# Patient Record
Sex: FEMALE | Race: BLACK OR AFRICAN AMERICAN | Employment: UNEMPLOYED | URBAN - METROPOLITAN AREA
[De-identification: names, ages, dates, MRNs, and addresses within clinical notes are randomized per-mention and may not be internally consistent; named-entity substitution may affect disease eponyms.]

---

## 2017-01-11 ENCOUNTER — OFFICE VISIT (OUTPATIENT)
Dept: FAMILY MEDICINE CLINIC | Age: 62
End: 2017-01-11

## 2017-01-11 VITALS
WEIGHT: 169 LBS | HEART RATE: 83 BPM | TEMPERATURE: 98.2 F | BODY MASS INDEX: 28.16 KG/M2 | DIASTOLIC BLOOD PRESSURE: 89 MMHG | OXYGEN SATURATION: 96 % | RESPIRATION RATE: 20 BRPM | HEIGHT: 65 IN | SYSTOLIC BLOOD PRESSURE: 143 MMHG

## 2017-01-11 DIAGNOSIS — M50.30 DDD (DEGENERATIVE DISC DISEASE), CERVICAL: ICD-10-CM

## 2017-01-11 DIAGNOSIS — M54.2 NECK PAIN: ICD-10-CM

## 2017-01-11 DIAGNOSIS — M79.601 RIGHT ARM PAIN: Primary | ICD-10-CM

## 2017-01-11 RX ORDER — MELOXICAM 15 MG/1
15 TABLET ORAL DAILY
Qty: 30 TAB | Refills: 3 | Status: SHIPPED | OUTPATIENT
Start: 2017-01-11 | End: 2017-04-29 | Stop reason: SDUPTHER

## 2017-01-11 RX ORDER — HYDROCODONE BITARTRATE AND ACETAMINOPHEN 5; 325 MG/1; MG/1
1 TABLET ORAL
Qty: 20 TAB | Refills: 0 | Status: SHIPPED | OUTPATIENT
Start: 2017-01-11 | End: 2018-04-10 | Stop reason: ALTCHOICE

## 2017-01-11 NOTE — PROGRESS NOTES
Acute Care Visit    Today's Date:  2017   Patient's Name: Romy Funes   Patient's :  1955     History:     Chief Complaint   Patient presents with    Back Pain     pt presents for neck pain that goes to pt right shoulder and back pain as well pt has had these pain for a couple of months        Romy Funes is a 63 y.o. female presenting for Acute Care    Right Arm Pain     Onset: worsened about 1-2 months ago. Occurs in the right shoulder it radiates down the neck  Character: achy/throbbing. Denies numbness or shooting pain. Worse when lays on the arm  Denies acute injuries or falls. Patient usually takes zanaflex and NSAID for pain relief  At worst pain can be 10/10. Past Medical History   Diagnosis Date    CAD (coronary artery disease)     Hypertension     Uterine fibroid      History reviewed. No pertinent past surgical history. Social History     Social History    Marital status: SINGLE     Spouse name: N/A    Number of children: N/A    Years of education: N/A     Social History Main Topics    Smoking status: Current Every Day Smoker     Packs/day: 0.25     Years: 2.00    Smokeless tobacco: None    Alcohol use No    Drug use: Yes     Special: Marijuana    Sexual activity: Not Asked     Other Topics Concern    None     Social History Narrative     Family History   Problem Relation Age of Onset    Diabetes Mother     Stroke Mother     Diabetes Father     Cancer Paternal Grandfather      Allergies   Allergen Reactions    Latex Rash    Penicillins Nausea and Vomiting       Problem List:      Patient Active Problem List   Diagnosis Code    Type II diabetes mellitus, uncontrolled [facility] E11.65    Mixed hyperlipidemia E78.2       Medications:     Current Outpatient Prescriptions   Medication Sig    meloxicam (MOBIC) 15 mg tablet Take 1 Tab by mouth daily.     HYDROcodone-acetaminophen (NORCO) 5-325 mg per tablet Take 1 Tab by mouth every eight (8) hours as needed for Pain. Max Daily Amount: 3 Tabs.  glucose blood VI test strips (FREESTYLE LITE STRIPS) strip Check glucose ACHS.  pravastatin (PRAVACHOL) 40 mg tablet Take 1 Tab by mouth nightly.  Lancets misc Check glucose ACHS    insulin glargine (LANTUS) 100 unit/mL injection 20 Units by SubCUTAneous route every twelve (12) hours.  tiZANidine (ZANAFLEX) 2 mg tablet 1 tablet three times a day    Blood-Glucose Meter (FREESTYLE LITE METER) monitoring kit accucheck ACHS and record in log. Take log to every doctor visit. **MEDICALLY NECESSARY**    alcohol swabs padm 1 Each by Apply Externally route Before breakfast, lunch, dinner and at bedtime. accucheck ACHS and record in log. Take log to every doctor visit. **MEDICALLY NECESSARY**    traZODone (DESYREL) 150 mg tablet Take 600 mg by mouth nightly.  sertraline (ZOLOFT) 100 mg tablet Take 100 mg by mouth daily.  sertraline (ZOLOFT) 50 mg tablet Take 50 mg by mouth daily.  insulin lispro (HUMALOG) 100 unit/mL injection 10 Units by SubCUTAneous route Before breakfast, lunch, and dinner.  insulin lispro (HUMALOG) 100 unit/mL injection For Blood Sugar (mg/dL) of: <150 =   0 units; 150 -199 =   2 units; 200 -249 =   4 units; 250 -299 =   6 units; 300 -349 = 8 units; 350 and above =   10 units    perphenazine (TRILAFON) 4 mg tablet Take 4 mg by mouth three (3) times daily.  benztropine (COGENTIN) 1 mg tablet Take 1 mg by mouth daily. No current facility-administered medications for this visit.           Constitutional: negative for fevers, chills, sweats and fatigue  Respiratory: negative for cough, sputum or wheezing  Cardiovascular: negative for chest pain, chest pressure/discomfort, dyspnea  Gastrointestinal: negative for nausea, vomiting, diarrhea, constipation and abdominal pain  Musculoskeletal:positive for myalgias and arthralgias    Physical Assessment:   VS:    Visit Vitals    /89    Pulse 83    Temp 98.2 °F (36.8 °C) (Oral)    Resp 20  Ht 5' 5\" (1.651 m)    Wt 169 lb (76.7 kg)    SpO2 96%    BMI 28.12 kg/m2       Lab Results   Component Value Date/Time    Sodium 141 07/25/2016 12:47 AM    Potassium 3.4 07/25/2016 12:47 AM    Chloride 110 07/25/2016 12:47 AM    CO2 22 07/25/2016 12:47 AM    Anion gap 9 07/25/2016 12:47 AM    Glucose 259 07/25/2016 12:47 AM    BUN 10 07/25/2016 12:47 AM    Creatinine 0.86 07/25/2016 12:47 AM    BUN/Creatinine ratio 12 07/25/2016 12:47 AM    GFR est AA >60 07/25/2016 12:47 AM    GFR est non-AA >60 07/25/2016 12:47 AM    Calcium 8.4 07/25/2016 12:47 AM       General:   Well-groomed, well-nourished, in no distress, pleasant, alert, appropriate and conversant. Mouth:  Good dentition, oropharynx WNL without membranes, exudates, petechiae or ulcers  Neck:   Neck supple, no swelling, mass or tenderness, no thyromegaly  Cardiovasc:   RRR, no MRG. Pulses 2+ and symmetric at distal extremities. Pulmonary:   Lungs clear bilaterally. Normal respiratory effort. Abdomen:   Abdomen soft, NT, ND, NAB. Extremities:   No edema, no TTP bilateral calves. LEs warm and well-perfused. Neuro:   Alert and oriented, CN 2-12 intact, no focal deficits. No facial asymmetry noted. Skin:    No rashes or jaundice  MSK:   Normal ROM, 5/5 muscle strength  Psych:  No pressured speech or abnormal thought content        Assessment/Plan & Orders:       1. Right arm pain    2. Neck pain    3. DDD (degenerative disc disease), cervical        Orders Placed This Encounter    meloxicam (MOBIC) 15 mg tablet    HYDROcodone-acetaminophen (NORCO) 5-325 mg per tablet       Right Arm Pain most likely secondary to arthritis of DDD  -recommend mobic daily and norco for break through pain  -offered Ref to the [id number]    Follow up in 2-3 months    *Plan of care reviewed with patient. Patient in agreement with plan and expresses understanding.  All questions answered and patient encouraged to call or RTO if further questions or concerns.     Louise Petit MD  Family Medicine  1/11/2017  12:02 PM

## 2017-01-11 NOTE — MR AVS SNAPSHOT
Visit Information Date & Time Provider Department Dept. Phone Encounter #  
 1/11/2017 11:30 AM Irene Patterson [phone] [id number] Follow-up Instructions Return in about 3 months (around 4/11/2017). Your Appointments 2/27/2017  9:15 AM  
FOLLOW UP EXAM with MD Irene Patterson [address] [facility]) Appt Note: 3 month f/u  
 [address] [facility] [address]  
[facility] [address] Upcoming Health Maintenance Date Due Hepatitis C Screening 1955 FOOT EXAM Q1 9/7/1967 MICROALBUMIN Q1 9/7/1967 EYE EXAM RETINAL OR DILATED Q1 9/7/1967 Pneumococcal 19-64 Medium Risk (1 of 1 - PPSV23) 9/7/1976 DTaP/Tdap/Td series (1 - Tdap) 9/7/1978 PAP AKA CERVICAL CYTOLOGY 9/7/1978 FOBT Q 1 YEAR AGE 50-75 9/7/2007 BREAST CANCER SCRN MAMMOGRAM 4/14/2011 INFLUENZA AGE 9 TO ADULT 8/1/2016 HEMOGLOBIN A1C Q6M 5/25/2017 LIPID PANEL Q1 7/23/2017 Allergies as of 1/11/2017  Review Complete On: 1/11/2017 By: Jennifer Ireland Severity Noted Reaction Type Reactions Latex Medium 08/12/2012   Topical Rash Penicillins Medium 08/12/2012   Side Effect Nausea and Vomiting Current Immunizations  Never Reviewed No immunizations on file. Not reviewed this visit You Were Diagnosed With   
  
 Codes Comments Right arm pain    -  Primary ICD-10-CM: D35.376 ICD-9-CM: 729.5 Neck pain     ICD-10-CM: M54.2 ICD-9-CM: 723.1 DDD (degenerative disc disease), cervical     ICD-10-CM: M50.30 ICD-9-CM: 722.4 Vitals BP Pulse Temp Resp Height(growth percentile) Weight(growth percentile) 143/89 83 98.2 °F (36.8 °C) (Oral) 20 5' 5\" (1.651 m) 169 lb (76.7 kg) SpO2 BMI OB Status Smoking Status 96% 28.12 kg/m2 Menopause Current Every Day Smoker Vitals History BMI and BSA Data Body Mass Index Body Surface Area  
 28.12 kg/m 2 1.88 m 2 Preferred Pharmacy Pharmacy Name Phone [facility] [address] [phone] Your Updated Medication List  
  
   
This list is accurate as of: 1/11/17 12:09 PM.  Always use your most recent med list.  
  
  
  
  
 alcohol swabs Padm 1 Each by Apply Externally route Before breakfast, lunch, dinner and at bedtime. accucheck ACHS and record in log. Take log to every doctor visit. **MEDICALLY NECESSARY**  
  
 benztropine 1 mg tablet Commonly known as:  COGENTIN Take 1 mg by mouth daily. Blood-Glucose Meter monitoring kit Commonly known as:  FREESTYLE LITE METER  
accucheck ACHS and record in log. Take log to every doctor visit. **MEDICALLY NECESSARY**  
  
 glucose blood VI test strips strip Commonly known as:  FREESTYLE LITE STRIPS Check glucose ACHS. HYDROcodone-acetaminophen 5-325 mg per tablet Commonly known as:  Annamary Yuriy Take 1 Tab by mouth every eight (8) hours as needed for Pain. Max Daily Amount: 3 Tabs. insulin glargine 100 unit/mL injection Commonly known as:  LANTUS  
20 Units by SubCUTAneous route every twelve (12) hours. * insulin lispro 100 unit/mL injection Commonly known as:  HUMALOG For Blood Sugar (mg/dL) of: <150 =   0 units; 150 -199 =   2 units; 200 -249 =   4 units; 250 -299 =   6 units; 300 -349 = 8 units; 350 and above =   10 units * insulin lispro 100 unit/mL injection Commonly known as:  HUMALOG  
10 Units by SubCUTAneous route Before breakfast, lunch, and dinner. Lancets Misc Check glucose ACHS  
  
 meloxicam 15 mg tablet Commonly known as:  MOBIC Take 1 Tab by mouth daily. perphenazine 4 mg tablet Commonly known as:  TRILAFON Take 4 mg by mouth three (3) times daily. pravastatin 40 mg tablet Commonly known as:  PRAVACHOL Take 1 Tab by mouth nightly. * sertraline 100 mg tablet Commonly known as:  ZOLOFT Take 100 mg by mouth daily. * sertraline 50 mg tablet Commonly known as:  ZOLOFT Take 50 mg by mouth daily. tiZANidine 2 mg tablet Commonly known as:  ZANAFLEX  
1 tablet three times a day  
  
 traZODone 150 mg tablet Commonly known as:  Junecarlos Rodriguez Take 600 mg by mouth nightly. * Notice: This list has 4 medication(s) that are the same as other medications prescribed for you. Read the directions carefully, and ask your doctor or other care provider to review them with you. Prescriptions Printed Refills HYDROcodone-acetaminophen (NORCO) 5-325 mg per tablet 0 Sig: Take 1 Tab by mouth every eight (8) hours as needed for Pain. Max Daily Amount: 3 Tabs. Class: Print Route: Oral  
  
Prescriptions Sent to Pharmacy Refills  
 meloxicam (MOBIC) 15 mg tablet 3 Sig: Take 1 Tab by mouth daily. Class: Normal  
 Pharmacy: [id number] [facility] [address]  #: [phone] Route: Oral  
  
Follow-up Instructions Return in about 3 months (around 4/11/2017). Patient Instructions Eating Healthy Foods: Care Instructions Your Care Instructions Eating healthy foods can help lower your risk for disease. Healthy food gives you energy and keeps your heart strong, your brain active, your muscles working, and your bones strong. A healthy diet includes a variety of foods from the basic food groups: grains, vegetables, fruits, milk and milk products, and meat and beans. Some people may eat more of their favorite foods from only one food group and, as a result, miss getting the nutrients they need. So, it is important to pay attention not only to what you eat but also to what you are missing from your diet. You can eat a healthy, balanced diet by making a few small changes. Follow-up care is a key part of your treatment and safety.  Be sure to make and go to all appointments, and call your doctor if you are having problems. Its also a good idea to know your test results and keep a list of the medicines you take. How can you care for yourself at home? Look at what you eat · Keep a food diary for a week or two and record everything you eat or drink. Track the number of servings you eat from each food group. · For a balanced diet every day, eat a variety of: ¨ 6 or more ounce-equivalents of grains, such as cereals, breads, crackers, rice, or pasta, every day. An ounce-equivalent is 1 slice of bread, 1 cup of ready-to-eat cereal, or ½ cup of cooked rice, cooked pasta, or cooked cereal. 
¨ 2½ cups of vegetables, especially: § Dark-green vegetables such as broccoli and spinach. § Orange vegetables such as carrots and sweet potatoes. § Dry beans (such as viveros and kidney beans) and peas (such as lentils). ¨ 2 cups of fresh, frozen, or canned fruit. A small apple or 1 banana or orange equals 1 cup. ¨ 3 cups of nonfat or low-fat milk, yogurt, or other milk products. ¨ 5½ ounces of meat and beans, such as chicken, fish, lean meat, beans, nuts, and seeds. One egg, 1 tablespoon of peanut butter, ½ ounce nuts or seeds, or ¼ cup of cooked beans equals 1 ounce of meat. · Learn how to read food labels for serving sizes and ingredients. Fast-food and convenience-food meals often contain few or no fruits or vegetables. Make sure you eat some fruits and vegetables to make the meal more nutritious. · Look at your food diary. For each food group, add up what you have eaten and then divide the total by the number of days. This will give you an idea of how much you are eating from each food group. See if you can find some ways to change your diet to make it more healthy. Start small · Do not try to make dramatic changes to your diet all at once. You might feel that you are missing out on your favorite foods and then be more likely to fail. · Start slowly, and gradually change your habits. Try some of the following: ¨ Use whole wheat bread instead of white bread. ¨ Use nonfat or low-fat milk instead of whole milk. ¨ Eat brown rice instead of white rice, and eat whole wheat pasta instead of white-flour pasta. ¨ Try low-fat cheeses and low-fat yogurt. ¨ Add more fruits and vegetables to meals and have them for snacks. ¨ Add lettuce, tomato, cucumber, and onion to sandwiches. ¨ Add fruit to yogurt and cereal. 
Enjoy food · You can still eat your favorite foods. You just may need to eat less of them. If your favorite foods are high in fat, salt, and sugar, limit how often you eat them, but do not cut them out entirely. · Eat a wide variety of foods. Make healthy choices when eating out · The type of restaurant you choose can help you make healthy choices. Even fast-food chains are now offering more low-fat or healthier choices on the menu. · Choose smaller portions, or take half of your meal home. · When eating out, try: ¨ A veggie pizza with a whole wheat crust or grilled chicken (instead of sausage or pepperoni). ¨ Pasta with roasted vegetables, grilled chicken, or marinara sauce instead of cream sauce. ¨ A vegetable wrap or grilled chicken wrap. ¨ Broiled or poached food instead of fried or breaded items. Make healthy choices easy · Buy packaged, prewashed, ready-to-eat fresh vegetables and fruits, such as baby carrots, salad mixes, and chopped or shredded broccoli and cauliflower. · Buy packaged, presliced fruits, such as melon or pineapple. · Choose 100% fruit or vegetable juice instead of soda. Limit juice intake to 4 to 6 oz (½ to ¾ cup) a day. · Blend low-fat yogurt, fruit juice, and canned or frozen fruit to make a smoothie for breakfast or a snack. Where can you learn more? Go to http://bernardino-prateek.info/. Enter T756 in the search box to learn more about \"Eating Healthy Foods: Care Instructions. \" 
 Current as of: November 20, 2015 Content Version: 11.1 © 8079-3241 [employer]. Care instructions adapted under license by [employer] (which disclaims liability or warranty for this information). If you have questions about a medical condition or this instruction, always ask your healthcare professional. [address] any warranty or liability for your use of this information. Please provide this summary of care documentation to your next provider. Your primary care clinician is listed as Devon Warner. If you have any questions after today's visit, please call [phone].

## 2017-01-11 NOTE — PATIENT INSTRUCTIONS

## 2017-01-20 DIAGNOSIS — M50.30 DDD (DEGENERATIVE DISC DISEASE), CERVICAL: Primary | ICD-10-CM

## 2017-02-21 ENCOUNTER — OFFICE VISIT (OUTPATIENT)
Dept: ORTHOPEDIC SURGERY | Age: 62
End: 2017-02-21

## 2017-02-21 VITALS
TEMPERATURE: 98.4 F | RESPIRATION RATE: 18 BRPM | HEART RATE: 84 BPM | OXYGEN SATURATION: 94 % | WEIGHT: 173 LBS | BODY MASS INDEX: 28.79 KG/M2 | DIASTOLIC BLOOD PRESSURE: 77 MMHG | SYSTOLIC BLOOD PRESSURE: 123 MMHG

## 2017-02-21 DIAGNOSIS — M79.18 MYOFASCIAL PAIN: ICD-10-CM

## 2017-02-21 DIAGNOSIS — M79.2 NEURITIS: ICD-10-CM

## 2017-02-21 DIAGNOSIS — M50.30 DDD (DEGENERATIVE DISC DISEASE), CERVICAL: Primary | ICD-10-CM

## 2017-02-21 DIAGNOSIS — M47.22 OSTEOARTHRITIS OF SPINE WITH RADICULOPATHY, CERVICAL REGION: ICD-10-CM

## 2017-02-21 DIAGNOSIS — F17.200 TOBACCO USE DISORDER: ICD-10-CM

## 2017-02-21 DIAGNOSIS — M25.511 RIGHT SHOULDER PAIN, UNSPECIFIED CHRONICITY: ICD-10-CM

## 2017-02-21 RX ORDER — PREDNISONE 10 MG/1
TABLET ORAL
Qty: 11 TAB | Refills: 0 | Status: SHIPPED | OUTPATIENT
Start: 2017-02-21 | End: 2018-03-19 | Stop reason: ALTCHOICE

## 2017-02-21 RX ORDER — GABAPENTIN 300 MG/1
CAPSULE ORAL
Qty: 60 CAP | Refills: 1 | Status: SHIPPED | OUTPATIENT
Start: 2017-02-21 | End: 2017-04-11 | Stop reason: SDUPTHER

## 2017-02-21 NOTE — PATIENT INSTRUCTIONS
Neck Arthritis: Exercises  Your Care Instructions  Here are some examples of typical rehabilitation exercises for your condition. Start each exercise slowly. Ease off the exercise if you start to have pain. Your doctor or physical therapist will tell you when you can start these exercises and which ones will work best for you. How to do the exercises  Neck stretches to the side    1. This stretch works best if you keep your shoulder down as you lean away from it. To help you remember to do this, start by relaxing your shoulders and lightly holding on to your thighs or your chair. 2. Tilt your head toward your shoulder and hold for 15 to 30 seconds. Let the weight of your head stretch your muscles. 3. Repeat 2 to 4 times toward each shoulder. Chin tuck    1. Lie on the floor with a rolled-up towel under your neck. Your head should be touching the floor. 2. Slowly bring your chin toward your chest.  3. Hold for a count of 6, and then relax for up to 10 seconds. 4. Repeat 8 to 12 times. Active cervical rotation    1. Sit in a firm chair, or stand up straight. 2. Keeping your chin level, turn your head to the right, and hold for 15 to 30 seconds. 3. Turn your head to the left and hold for 15 to 30 seconds. 4. Repeat 2 to 4 times to each side. Shoulder blade squeeze    1. While standing, squeeze your shoulder blades together. 2. Do not raise your shoulders up as you are squeezing. 3. Hold for 6 seconds. 4. Repeat 8 to 12 times. Shoulder rolls    1. Sit comfortably with your feet shoulder-width apart. You can also do this exercise standing up. 2. Roll your shoulders up, then back, and then down in a smooth, circular motion. 3. Repeat 2 to 4 times. Follow-up care is a key part of your treatment and safety. Be sure to make and go to all appointments, and call your doctor if you are having problems. It's also a good idea to know your test results and keep a list of the medicines you take.   Where can you learn more? Go to http://bernardino-prateek.info/. Enter E356 in the search box to learn more about \"Neck Arthritis: Exercises. \"  Current as of: May 23, 2016  Content Version: 11.1  © 9801-5270 [employer]. Care instructions adapted under license by [employer] (which disclaims liability or warranty for this information). If you have questions about a medical condition or this instruction, always ask your healthcare professional. [address] any warranty or liability for your use of this information. Learning About Benefits From Quitting Smoking  How does quitting smoking make you healthier? If you're thinking about quitting smoking, you may have a few reasons to be smoke-free. Your health may be one of them. · When you quit smoking, you lower your risks for cancer, lung disease, heart attack, stroke, blood vessel disease, and blindness from macular degeneration. · When you're smoke-free, you get sick less often, and you heal faster. You are less likely to get colds, flu, bronchitis, and pneumonia. · As a nonsmoker, you may find that your mood is better and you are less stressed. When and how will you feel healthier? Quitting has real health benefits that start from day 1 of being smoke-free. And the longer you stay smoke-free, the healthier you get and the better you feel. The first hours  · After just 20 minutes, your blood pressure and heart rate go down. That means there's less stress on your heart and blood vessels. · Within 12 hours, the level of carbon monoxide in your blood drops back to normal. That makes room for more oxygen. With more oxygen in your body, you may notice that you have more energy than when you smoked. After 2 weeks  · Your lungs start to work better. · Your risk of heart attack starts to drop. After 1 month  · When your lungs are clear, you cough less and breathe deeper, so it's easier to be active.   · Your sense of taste and smell return. That means you can enjoy food more than you have since you started smoking. Over the years  · After 1 year, your risk of heart disease is half what it would be if you kept smoking. · After 5 years, your risk of stroke starts to shrink. Within a few years after that, it's about the same as if you'd never smoked. · After 10 years, your risk of dying from lung cancer is cut by about half. And your risk for many other types of cancer is lower too. How would quitting help others in your life? When you quit smoking, you improve the health of everyone who now breathes in your smoke. · Their heart, lung, and cancer risks drop, much like yours. · They are sick less. For babies and small children, living smoke-free means they're less likely to have ear infections, pneumonia, and bronchitis. · If you're a woman who is or will be pregnant someday, quitting smoking means a healthier . · Children who are close to you are less likely to become adult smokers. Where can you learn more? Go to http://bernardino-prateek.info/. Enter [id number] [id number] in the search box to learn more about \"Learning About Benefits From Quitting Smoking. \"  Current as of: May 26, 2016  Content Version: 11.1  © 8657-7900 [employer]. Care instructions adapted under license by [employer] (which disclaims liability or warranty for this information). If you have questions about a medical condition or this instruction, always ask your healthcare professional. [place] [address] any warranty or liability for your use of this information. Stopping Smoking: Care Instructions  Your Care Instructions  Cigarette smokers crave the nicotine in cigarettes. Giving it up is much harder than simply changing a habit. Your body has to stop craving the nicotine. It is hard to quit, but you can do it. There are many tools that people use to quit smoking.  You may find that combining tools works best for you. There are several steps to quitting. First you get ready to quit. Then you get support to help you. After that, you learn new skills and behaviors to become a nonsmoker. For many people, a necessary step is getting and using medicine. Your doctor will help you set up the plan that best meets your needs. You may want to attend a smoking cessation program to help you quit smoking. When you choose a program, look for one that has proven success. Ask your doctor for ideas. You will greatly increase your chances of success if you take medicine as well as get counseling or join a cessation program.  Some of the changes you feel when you first quit tobacco are uncomfortable. Your body will miss the nicotine at first, and you may feel short-tempered and grumpy. You may have trouble sleeping or concentrating. Medicine can help you deal with these symptoms. You may struggle with changing your smoking habits and rituals. The last step is the tricky one: Be prepared for the smoking urge to continue for a time. This is a lot to deal with, but keep at it. You will feel better. Follow-up care is a key part of your treatment and safety. Be sure to make and go to all appointments, and call your doctor if you are having problems. Its also a good idea to know your test results and keep a list of the medicines you take. How can you care for yourself at home? · Ask your family, friends, and coworkers for support. You have a better chance of quitting if you have help and support. · Join a support group, such as Nicotine Anonymous, for people who are trying to quit smoking. · Consider signing up for a smoking cessation program, such as the American Lung Association's Freedom from Smoking program.  · Set a quit date. Pick your date carefully so that it is not right in the middle of a big deadline or stressful time. Once you quit, do not even take a puff.  Get rid of all ashtrays and lighters after your last cigarette. Clean your house and your clothes so that they do not smell of smoke. · Learn how to be a nonsmoker. Think about ways you can avoid those things that make you reach for a cigarette. ¨ Avoid situations that put you at greatest risk for smoking. For some people, it is hard to have a drink with friends without smoking. For others, they might skip a coffee break with coworkers who smoke. ¨ Change your daily routine. Take a different route to work or eat a meal in a different place. · Cut down on stress. Calm yourself or release tension by doing an activity you enjoy, such as reading a book, taking a hot bath, or gardening. · Talk to your doctor or pharmacist about nicotine replacement therapy, which replaces the nicotine in your body. You still get nicotine but you do not use tobacco. Nicotine replacement products help you slowly reduce the amount of nicotine you need. These products come in several forms, many of them available over-the-counter:  ¨ Nicotine patches  ¨ Nicotine gum and lozenges  ¨ Nicotine inhaler  · Ask your doctor about bupropion (Wellbutrin) or varenicline (Chantix), which are prescription medicines. They do not contain nicotine. They help you by reducing withdrawal symptoms, such as stress and anxiety. · Some people find hypnosis, acupuncture, and massage helpful for ending the smoking habit. · Eat a healthy diet and get regular exercise. Having healthy habits will help your body move past its craving for nicotine. · Be prepared to keep trying. Most people are not successful the first few times they try to quit. Do not get mad at yourself if you smoke again. Make a list of things you learned and think about when you want to try again, such as next week, next month, or next year. Where can you learn more? Go to http://bernardino-prateek.info/. Enter U893 in the search box to learn more about \"Stopping Smoking: Care Instructions. \"  Current as of: May 26, 2016  Content Version: 11.1  © 7840-0061 [employer]. Care instructions adapted under license by [employer] (which disclaims liability or warranty for this information). If you have questions about a medical condition or this instruction, always ask your healthcare professional. [address] any warranty or liability for your use of this information.

## 2017-02-21 NOTE — PROGRESS NOTES
[facility]  [address] [place], [address]  Phone: [phone]  Fax: [phone]          HISTORY OF PRESENT ILLNESS:  Richard Rubalcava is a 63 y.o. right hand dominant female with history of cervical pain. She c/o pain in the neck that extends to the right shoulder and down the arm to the 5th digit. Pt also c/o a tingling, crawling sensation in the right arm. She denies any inciting injuries. Pt reports that she favors her left arm. Pt experiences pain with any activity, even when taking a shower. Pt denies recently trying physical therapy. She is diabetic, admits that her blood sugars fluctuate, but states that recently they have remained around 190. Pt denies ever trying Neurontin. Pt at this time desires to proceed with medication evaluation and physical therapy. She does have some difficulty with sleep at times. Of note, Pt smokes 2 cigarettes daily. 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    Pain Scale: 8/10     PCP: Sachin Solo MD      Past Medical History:   Diagnosis Date    CAD (coronary artery disease)     Hypertension     Uterine fibroid         Social History     Social History    Marital status: SINGLE     Spouse name: N/A    Number of children: N/A    Years of education: N/A     Occupational History    Not on file.      Social History Main Topics    Smoking status: Current Every Day Smoker     Packs/day: 0.25     Years: 2.00    Smokeless tobacco: Not on file    Alcohol use No    Drug use: Yes     Special: Marijuana    Sexual activity: Not on file     Other Topics Concern    Not on file     Social History Narrative       Current Outpatient Prescriptions   Medication Sig Dispense Refill    predniSONE (DELTASONE) 10 mg tablet 2 pills in am for 3 days, then 1 pill in am for 3 days and 1/2 pill in am for remainder 11 Tab 0    gabapentin (NEURONTIN) 300 mg capsule Take 1 in the evening for 1 week then increase to 2  Indications: NEUROPATHIC PAIN 60 Cap 1    meloxicam (MOBIC) 15 mg tablet Take 1 Tab by mouth daily. 30 Tab 3    HYDROcodone-acetaminophen (NORCO) 5-325 mg per tablet Take 1 Tab by mouth every eight (8) hours as needed for Pain. Max Daily Amount: 3 Tabs. 20 Tab 0    glucose blood VI test strips (FREESTYLE LITE STRIPS) strip Check glucose ACHS. 200 Strip 11    pravastatin (PRAVACHOL) 40 mg tablet Take 1 Tab by mouth nightly. 30 Tab 2    insulin lispro (HUMALOG) 100 unit/mL injection 10 Units by SubCUTAneous route Before breakfast, lunch, and dinner. 1 Vial 2    Lancets misc Check glucose ACHS 200 Each 11    insulin glargine (LANTUS) 100 unit/mL injection 20 Units by SubCUTAneous route every twelve (12) hours. 2 Vial 2    tiZANidine (ZANAFLEX) 2 mg tablet 1 tablet three times a day 90 Tab 3    Blood-Glucose Meter (FREESTYLE LITE METER) monitoring kit accucheck ACHS and record in log. Take log to every doctor visit. **MEDICALLY NECESSARY** 1 Kit 0    insulin lispro (HUMALOG) 100 unit/mL injection For Blood Sugar (mg/dL) of: <150 =   0 units; 150 -199 =   2 units; 200 -249 =   4 units; 250 -299 =   6 units; 300 -349 = 8 units; 350 and above =   10 units 1 Vial 2    alcohol swabs padm 1 Each by Apply Externally route Before breakfast, lunch, dinner and at bedtime. accucheck ACHS and record in log. Take log to every doctor visit. **MEDICALLY NECESSARY** 100 Pad 2    traZODone (DESYREL) 150 mg tablet Take 600 mg by mouth nightly.  sertraline (ZOLOFT) 100 mg tablet Take 100 mg by mouth daily.  perphenazine (TRILAFON) 4 mg tablet Take 4 mg by mouth three (3) times daily.  benztropine (COGENTIN) 1 mg tablet Take 1 mg by mouth daily.  sertraline (ZOLOFT) 50 mg tablet Take 50 mg by mouth daily.          Allergies   Allergen Reactions    Latex Rash    Penicillins Nausea and Vomiting       REVIEW OF SYSTEMS    Constitutional: Negative for fever, chills, or weight change. Respiratory: Negative for cough or shortness of breath. Cardiovascular: Negative for chest pain or palpitations. Gastrointestinal: Negative for acid reflux, change in bowel habits, or constipation. Genitourinary: Negative for dysuria and flank pain. Musculoskeletal: Positive for cervical pain and shoulder pain  Skin: Negative for rash. Neurological: Positive for numbness in the right arm. Negative for headaches or dizziness. Endo/Heme/Allergies: Negative for increased bruising. Psychiatric/Behavioral: Positive for difficulty with sleep. PHYSICAL EXAMINATION  Visit Vitals    /77    Pulse 84    Temp 98.4 °F (36.9 °C) (Oral)    Resp 18    Wt 173 lb (78.5 kg)    SpO2 94%    BMI 28.79 kg/m2       Constitutional: Awake, alert, and in no acute distress  HEENT: Normocephalic. Atraumatic. Oropharynx is moist and clear. PERRL. EOMI. Sclerae are nonicteric  Heart: Regular rate and rhythm  Lungs: Clear to auscultation bilaterally  Abdomen: Soft and nontender. Bowel sounds are present  Neurological: 1+ symmetrical DTRs in the upper extremities. 1+ symmetrical DTRs in the lower extremities. Sensation to light touch is intact. Negative Sofia's sign bilaterally. Skin: warm, dry, and intact. Musculoskeletal: Tight across the trapezius, R>L, with multiple trigger points. Decreased left cervical flexion. Pain with right cervical flexion and extension. No pain and good ROM in the right shoulder. Pain with right shoulder abduction and positive signs of impingement. Pain with external rotation of there right shoulder. Positive Neer's test on the right. Moderate pain with extension and axial loading. Tenderness to palpation in the lower lumbar region and over the SI joints. No pain with internal or external rotation of her hips. Negative straight leg raise bilaterally.        Biceps  Triceps Deltoids Wrist Ext Wrist Flex Hand Intrin   Right +4/5 -4/5 +4/5 +4/5 +4/5 +4/5   Left +4/5 +4/5 +4/5 +4/5 +4/5 +4/5      Hip Flex  Quads Hamstrings Ankle DF EHL Ankle PF   Right +4/5 +4/5 +4/5 +4/5 +4/5 +4/5   Left +4/5 +4/5 +4/5 +4/5 +4/5 +4/5     IMAGING:    Thoracic Spine 2V X-rays from 12/27/2016 were personally reviewed with the Pt and demonstrated:    Results from [place] encounter on 12/27/16   XR SPINE THORAC 2 V   Narrative THORACIC SPINE AP AND LATERAL    CPT CODE: 38046    HISTORY: Upper back pain. COMPARISON: None. FINDINGS:    There are mild endplate irregularities in each level of the thoracic spine with  very small circumferential vertebral body osteophytes  The vertebral body  heights and alignment appears normal. .         Impression IMPRESSION:    Mild spondylosis at multiple levels. Cervical Spine X-rays from 12/27/2016 were personally reviewed with the Pt and demonstrated:  FINDINGS:     Vertebral body height and alignment is intact. Slight straightening of the  cervical lordosis. Moderate disc height loss endplate sclerosis and vertebral  body osteophytes from C3 through C7. Alignment is intact. No fracture. Mild  facet joint arthropathy at multiple levels. The prevertebral space appears normal.      IMPRESSION:     Moderate cervical spondylosis. Janice Hunter was seen today for neck pain, back pain, shoulder pain and new patient. Diagnoses and all orders for this visit:    DDD (degenerative disc disease), cervical  -     predniSONE (DELTASONE) 10 mg tablet; 2 pills in am for 3 days, then 1 pill in am for 3 days and 1/2 pill in am for remainder  -     REFERRAL TO PHYSICAL THERAPY    Myofascial pain  -     REFERRAL TO PHYSICAL THERAPY    Neuritis  -     gabapentin (NEURONTIN) 300 mg capsule;  Take 1 in the evening for 1 week then increase to 2  Indications: NEUROPATHIC PAIN  -     REFERRAL TO PHYSICAL THERAPY    Osteoarthritis of spine with radiculopathy, cervical region  -     REFERRAL TO PHYSICAL THERAPY    Right shoulder pain, unspecified chronicity  -     REFERRAL TO PHYSICAL THERAPY  -     XR SHOULDER RT AP/LAT MIN 2 V; Future    Tobacco use disorder         IMPRESSION AND PLAN:  Mamta Ferrera is a 63 y.o. right hand dominant female with history of cervical pain. She c/o pain in the neck that extends to the right shoulder and down the arm to the 5th digit. Pt also c/o a tingling, crawling sensation in the right arm. She denies ever trying Neurontin. 1) Pt was given information on cervical arthritis exercises. 2) She was prescribed Neurontin 300 mg 2 tabs QHS, tapering up as directed. 3) Pt was also prescribed a small prednisone taper. 4) She was referred to cervical and right shoulder physical therapy. 5) I strongly encouraged the Pt to quit smoking and gave information on smoking cessation. 6) Ms. Ezra Huynh has a reminder for a \"due or due soon\" health maintenance. I have asked that she contact her primary care provider, Allyson Aponte MD, for follow-up on this health maintenance. 7)  reviewed. 8) Pt will follow-up in 6 weeks.         Written by Solange Centeno, as dictated by Ilia Kim MD.

## 2017-02-21 NOTE — MR AVS SNAPSHOT
Visit Information Date & Time Provider Department Dept. Phone Encounter #  
 2/21/2017  1:00 PM Kathia Medina MD [facility] [phone] [id number] Follow-up Instructions Return in about 6 weeks (around 4/4/2017) for PT follow up, Medication follow up. Your Appointments 4/11/2017  4:00 PM  
FOLLOW UP EXAM with Sachin Solo MD  
[address]) Appt Note: 3 month f/u  
 [address] [facility] [address]  
[facility] [address] Upcoming Health Maintenance Date Due Hepatitis C Screening 1955 FOOT EXAM Q1 9/7/1967 MICROALBUMIN Q1 9/7/1967 EYE EXAM RETINAL OR DILATED Q1 9/7/1967 Pneumococcal 19-64 Medium Risk (1 of 1 - PPSV23) 9/7/1976 DTaP/Tdap/Td series (1 - Tdap) 9/7/1978 PAP AKA CERVICAL CYTOLOGY 9/7/1978 FOBT Q 1 YEAR AGE 50-75 9/7/2007 BREAST CANCER SCRN MAMMOGRAM 4/14/2011 INFLUENZA AGE 9 TO ADULT 8/1/2016 HEMOGLOBIN A1C Q6M 5/25/2017 LIPID PANEL Q1 7/23/2017 Allergies as of 2/21/2017  Review Complete On: 2/21/2017 By: Kathia Medina MD  
  
 Severity Noted Reaction Type Reactions Latex Medium 08/12/2012   Topical Rash Penicillins Medium 08/12/2012   Side Effect Nausea and Vomiting Current Immunizations  Never Reviewed No immunizations on file. Not reviewed this visit You Were Diagnosed With   
  
 Codes Comments DDD (degenerative disc disease), cervical    -  Primary ICD-10-CM: M50.30 ICD-9-CM: 722.4 Myofascial pain     ICD-10-CM: M79.1 ICD-9-CM: 729.1 Neuritis     ICD-10-CM: M79.2 ICD-9-CM: 729.2 Osteoarthritis of spine with radiculopathy, cervical region     ICD-10-CM: M47.22 
ICD-9-CM: 721.0 Right shoulder pain, unspecified chronicity     ICD-10-CM: M25.511 ICD-9-CM: 719.41 Tobacco use disorder     ICD-10-CM: F17.200 ICD-9-CM: 305.1 Vitals BP Pulse Temp Resp Weight(growth percentile) SpO2  
 123/77 84 98.4 °F (36.9 °C) (Oral) 18 173 lb (78.5 kg) 94% BMI OB Status Smoking Status 28.79 kg/m2 Menopause Current Every Day Smoker BMI and BSA Data Body Mass Index Body Surface Area 28.79 kg/m 2 1.9 m 2 Preferred Pharmacy Pharmacy Name Phone [facility] [address] [phone] Your Updated Medication List  
  
   
This list is accurate as of: 2/21/17  2:06 PM.  Always use your most recent med list.  
  
  
  
  
 alcohol swabs Padm 1 Each by Apply Externally route Before breakfast, lunch, dinner and at bedtime. accucheck ACHS and record in log. Take log to every doctor visit. **MEDICALLY NECESSARY**  
  
 benztropine 1 mg tablet Commonly known as:  COGENTIN Take 1 mg by mouth daily. Blood-Glucose Meter monitoring kit Commonly known as:  FREESTYLE LITE METER  
accucheck ACHS and record in log. Take log to every doctor visit. **MEDICALLY NECESSARY**  
  
 gabapentin 300 mg capsule Commonly known as:  NEURONTIN Take 1 in the evening for 1 week then increase to 2  Indications: NEUROPATHIC PAIN  
  
 glucose blood VI test strips strip Commonly known as:  FREESTYLE LITE STRIPS Check glucose ACHS. HYDROcodone-acetaminophen 5-325 mg per tablet Commonly known as:  Teodoro Cabanipes Take 1 Tab by mouth every eight (8) hours as needed for Pain. Max Daily Amount: 3 Tabs. insulin glargine 100 unit/mL injection Commonly known as:  LANTUS  
20 Units by SubCUTAneous route every twelve (12) hours. * insulin lispro 100 unit/mL injection Commonly known as:  HUMALOG For Blood Sugar (mg/dL) of: <150 =   0 units; 150 -199 =   2 units; 200 -249 =   4 units; 250 -299 =   6 units; 300 -349 = 8 units; 350 and above =   10 units * insulin lispro 100 unit/mL injection Commonly known as:  HUMALOG 10 Units by SubCUTAneous route Before breakfast, lunch, and dinner. Lancets Misc Check glucose ACHS  
  
 meloxicam 15 mg tablet Commonly known as:  MOBIC Take 1 Tab by mouth daily. perphenazine 4 mg tablet Commonly known as:  TRILAFON Take 4 mg by mouth three (3) times daily. pravastatin 40 mg tablet Commonly known as:  PRAVACHOL Take 1 Tab by mouth nightly. predniSONE 10 mg tablet Commonly known as:  DELTASONE  
2 pills in am for 3 days, then 1 pill in am for 3 days and 1/2 pill in am for remainder * sertraline 100 mg tablet Commonly known as:  ZOLOFT Take 100 mg by mouth daily. * sertraline 50 mg tablet Commonly known as:  ZOLOFT Take 50 mg by mouth daily. tiZANidine 2 mg tablet Commonly known as:  ZANAFLEX  
1 tablet three times a day  
  
 traZODone 150 mg tablet Commonly known as:  Rebbecca Bunde Take 600 mg by mouth nightly. * Notice: This list has 4 medication(s) that are the same as other medications prescribed for you. Read the directions carefully, and ask your doctor or other care provider to review them with you. Prescriptions Sent to Pharmacy Refills  
 predniSONE (DELTASONE) 10 mg tablet 0 Si pills in am for 3 days, then 1 pill in am for 3 days and 1/2 pill in am for remainder Class: Normal  
 Pharmacy: [id number] [facility]. Ph #: [phone]  
 gabapentin (NEURONTIN) 300 mg capsule 1 Sig: Take 1 in the evening for 1 week then increase to 2  Indications: NEUROPATHIC PAIN Class: Normal  
 Pharmacy: [id number] [facility]. Ph #: [phone] We Performed the Following REFERRAL TO PHYSICAL THERAPY [QWT86 Custom] Comments:  
 Eval/Treat Cervical and Right Shoulder Follow-up Instructions Return in about 6 weeks (around 2017) for PT follow up, Medication follow up. To-Do List   
 2017 Imaging:  XR SHOULDER RT AP/LAT MIN 2 V Referral Information Referral ID Referred By Referred To  
  
 [id number] Mitra Ray Not Available Visits Status Start Date End Date 1 New Request 2/21/17 2/21/18 If your referral has a status of pending review or denied, additional information will be sent to support the outcome of this decision. Patient Instructions Neck Arthritis: Exercises Your Care Instructions Here are some examples of typical rehabilitation exercises for your condition. Start each exercise slowly. Ease off the exercise if you start to have pain. Your doctor or physical therapist will tell you when you can start these exercises and which ones will work best for you. How to do the exercises Neck stretches to the side 1. This stretch works best if you keep your shoulder down as you lean away from it. To help you remember to do this, start by relaxing your shoulders and lightly holding on to your thighs or your chair. 2. Tilt your head toward your shoulder and hold for 15 to 30 seconds. Let the weight of your head stretch your muscles. 3. Repeat 2 to 4 times toward each shoulder. Chin tuck 1. Lie on the floor with a rolled-up towel under your neck. Your head should be touching the floor. 2. Slowly bring your chin toward your chest. 
3. Hold for a count of 6, and then relax for up to 10 seconds. 4. Repeat 8 to 12 times. Active cervical rotation 1. Sit in a firm chair, or stand up straight. 2. Keeping your chin level, turn your head to the right, and hold for 15 to 30 seconds. 3. Turn your head to the left and hold for 15 to 30 seconds. 4. Repeat 2 to 4 times to each side. Shoulder blade squeeze 1. While standing, squeeze your shoulder blades together. 2. Do not raise your shoulders up as you are squeezing. 3. Hold for 6 seconds. 4. Repeat 8 to 12 times. Shoulder rolls 1. Sit comfortably with your feet shoulder-width apart. You can also do this exercise standing up. 2. Roll your shoulders up, then back, and then down in a smooth, circular motion. 3. Repeat 2 to 4 times. Follow-up care is a key part of your treatment and safety. Be sure to make and go to all appointments, and call your doctor if you are having problems. It's also a good idea to know your test results and keep a list of the medicines you take. Where can you learn more? Go to http://bernardino-prateek.info/. Enter T529 in the search box to learn more about \"Neck Arthritis: Exercises. \" Current as of: May 23, 2016 Content Version: 11.1 © 2091-4440 [employer]. Care instructions adapted under license by [employer] (which disclaims liability or warranty for this information). If you have questions about a medical condition or this instruction, always ask your healthcare professional. [place] [address] any warranty or liability for your use of this information. Learning About Benefits From Quitting Smoking How does quitting smoking make you healthier? If you're thinking about quitting smoking, you may have a few reasons to be smoke-free. Your health may be one of them. · When you quit smoking, you lower your risks for cancer, lung disease, heart attack, stroke, blood vessel disease, and blindness from macular degeneration. · When you're smoke-free, you get sick less often, and you heal faster. You are less likely to get colds, flu, bronchitis, and pneumonia. · As a nonsmoker, you may find that your mood is better and you are less stressed. When and how will you feel healthier? Quitting has real health benefits that start from day 1 of being smoke-free. And the longer you stay smoke-free, the healthier you get and the better you feel. The first hours · After just 20 minutes, your blood pressure and heart rate go down.  That means there's less stress on your heart and blood vessels. · Within 12 hours, the level of carbon monoxide in your blood drops back to normal. That makes room for more oxygen. With more oxygen in your body, you may notice that you have more energy than when you smoked. After 2 weeks · Your lungs start to work better. · Your risk of heart attack starts to drop. After 1 month · When your lungs are clear, you cough less and breathe deeper, so it's easier to be active. · Your sense of taste and smell return. That means you can enjoy food more than you have since you started smoking. Over the years · After 1 year, your risk of heart disease is half what it would be if you kept smoking. · After 5 years, your risk of stroke starts to shrink. Within a few years after that, it's about the same as if you'd never smoked. · After 10 years, your risk of dying from lung cancer is cut by about half. And your risk for many other types of cancer is lower too. How would quitting help others in your life? When you quit smoking, you improve the health of everyone who now breathes in your smoke. · Their heart, lung, and cancer risks drop, much like yours. · They are sick less. For babies and small children, living smoke-free means they're less likely to have ear infections, pneumonia, and bronchitis. · If you're a woman who is or will be pregnant someday, quitting smoking means a healthier . · Children who are close to you are less likely to become adult smokers. Where can you learn more? Go to http://bernardino-prateek.info/. Enter [id number] [id number] [id number] in the search box to learn more about \"Learning About Benefits From Quitting Smoking. \" Current as of: May 26, 2016 Content Version: 11.1 © 8667-0859 [employer] Care instructions adapted under license by [employer] (which disclaims liability or warranty for this information).  If you have questions about a medical condition or this instruction, always ask your healthcare professional. [address] any warranty or liability for your use of this information. Stopping Smoking: Care Instructions Your Care Instructions Cigarette smokers crave the nicotine in cigarettes. Giving it up is much harder than simply changing a habit. Your body has to stop craving the nicotine. It is hard to quit, but you can do it. There are many tools that people use to quit smoking. You may find that combining tools works best for you. There are several steps to quitting. First you get ready to quit. Then you get support to help you. After that, you learn new skills and behaviors to become a nonsmoker. For many people, a necessary step is getting and using medicine. Your doctor will help you set up the plan that best meets your needs. You may want to attend a smoking cessation program to help you quit smoking. When you choose a program, look for one that has proven success. Ask your doctor for ideas. You will greatly increase your chances of success if you take medicine as well as get counseling or join a cessation program. 
Some of the changes you feel when you first quit tobacco are uncomfortable. Your body will miss the nicotine at first, and you may feel short-tempered and grumpy. You may have trouble sleeping or concentrating. Medicine can help you deal with these symptoms. You may struggle with changing your smoking habits and rituals. The last step is the tricky one: Be prepared for the smoking urge to continue for a time. This is a lot to deal with, but keep at it. You will feel better. Follow-up care is a key part of your treatment and safety. Be sure to make and go to all appointments, and call your doctor if you are having problems. Its also a good idea to know your test results and keep a list of the medicines you take. How can you care for yourself at home? · Ask your family, friends, and coworkers for support. You have a better chance of quitting if you have help and support. · Join a support group, such as Nicotine Anonymous, for people who are trying to quit smoking. · Consider signing up for a smoking cessation program, such as the American Lung Association's Freedom from Smoking program. 
· Set a quit date. Pick your date carefully so that it is not right in the middle of a big deadline or stressful time. Once you quit, do not even take a puff. Get rid of all ashtrays and lighters after your last cigarette. Clean your house and your clothes so that they do not smell of smoke. · Learn how to be a nonsmoker. Think about ways you can avoid those things that make you reach for a cigarette. ¨ Avoid situations that put you at greatest risk for smoking. For some people, it is hard to have a drink with friends without smoking. For others, they might skip a coffee break with coworkers who smoke. ¨ Change your daily routine. Take a different route to work or eat a meal in a different place. · Cut down on stress. Calm yourself or release tension by doing an activity you enjoy, such as reading a book, taking a hot bath, or gardening. · Talk to your doctor or pharmacist about nicotine replacement therapy, which replaces the nicotine in your body. You still get nicotine but you do not use tobacco. Nicotine replacement products help you slowly reduce the amount of nicotine you need. These products come in several forms, many of them available over-the-counter: ¨ Nicotine patches ¨ Nicotine gum and lozenges ¨ Nicotine inhaler · Ask your doctor about bupropion (Wellbutrin) or varenicline (Chantix), which are prescription medicines. They do not contain nicotine. They help you by reducing withdrawal symptoms, such as stress and anxiety. · Some people find hypnosis, acupuncture, and massage helpful for ending the smoking habit. · Eat a healthy diet and get regular exercise. Having healthy habits will help your body move past its craving for nicotine. · Be prepared to keep trying. Most people are not successful the first few times they try to quit. Do not get mad at yourself if you smoke again. Make a list of things you learned and think about when you want to try again, such as next week, next month, or next year. Where can you learn more? Go to http://bernardino-prateek.info/. Enter W730 in the search box to learn more about \"Stopping Smoking: Care Instructions. \" Current as of: May 26, 2016 Content Version: 11.1 © 3290-2085 [employer]. Care instructions adapted under license by [employer] (which disclaims liability or warranty for this information). If you have questions about a medical condition or this instruction, always ask your healthcare professional. [address] any warranty or liability for your use of this information. Please provide this summary of care documentation to your next provider. Your primary care clinician is listed as Leia Rojas. If you have any questions after today's visit, please call [phone].

## 2017-03-18 ENCOUNTER — HOSPITAL ENCOUNTER (OUTPATIENT)
Dept: GENERAL RADIOLOGY | Age: 62
Discharge: HOME | End: 2017-03-18
Payer: MEDICAID

## 2017-03-18 DIAGNOSIS — M25.511 RIGHT SHOULDER PAIN, UNSPECIFIED CHRONICITY: ICD-10-CM

## 2017-03-18 PROCEDURE — 73030 X-RAY EXAM OF SHOULDER: CPT

## 2017-04-04 ENCOUNTER — OFFICE VISIT (OUTPATIENT)
Dept: ORTHOPEDIC SURGERY | Age: 62
End: 2017-04-04

## 2017-04-04 VITALS
DIASTOLIC BLOOD PRESSURE: 79 MMHG | RESPIRATION RATE: 18 BRPM | HEART RATE: 82 BPM | OXYGEN SATURATION: 95 % | TEMPERATURE: 98.5 F | SYSTOLIC BLOOD PRESSURE: 133 MMHG

## 2017-04-04 DIAGNOSIS — M47.22 OSTEOARTHRITIS OF SPINE WITH RADICULOPATHY, CERVICAL REGION: ICD-10-CM

## 2017-04-04 DIAGNOSIS — M62.838 MUSCLE SPASM: ICD-10-CM

## 2017-04-04 DIAGNOSIS — M50.30 DDD (DEGENERATIVE DISC DISEASE), CERVICAL: Primary | ICD-10-CM

## 2017-04-04 DIAGNOSIS — M79.18 MYOFASCIAL PAIN: ICD-10-CM

## 2017-04-04 DIAGNOSIS — M25.511 RIGHT SHOULDER PAIN, UNSPECIFIED CHRONICITY: ICD-10-CM

## 2017-04-04 RX ORDER — METHOCARBAMOL 500 MG/1
TABLET, FILM COATED ORAL
Qty: 75 TAB | Refills: 3 | Status: SHIPPED | OUTPATIENT
Start: 2017-04-04 | End: 2017-06-23 | Stop reason: SDUPTHER

## 2017-04-04 RX ORDER — GABAPENTIN 300 MG/1
CAPSULE ORAL
Qty: 120 CAP | Refills: 3 | Status: SHIPPED | OUTPATIENT
Start: 2017-04-04 | End: 2017-07-13 | Stop reason: SDUPTHER

## 2017-04-04 NOTE — MR AVS SNAPSHOT
Visit Information Date & Time Provider Department Dept. Phone Encounter #  
 4/4/2017  3:15 PM Brenda Deng MD [facility] [phone] Follow-up Instructions Return in about 3 months (around 7/4/2017) for Medication follow up. Your Appointments 4/11/2017  4:00 PM  
FOLLOW UP EXAM with Hugo Park MD  
[address] [facility]) Appt Note: 3 month f/u  
 [address] [facility] [address]  
[facility] [id number] Upcoming Health Maintenance Date Due Hepatitis C Screening 1955 FOOT EXAM Q1 9/7/1967 MICROALBUMIN Q1 9/7/1967 EYE EXAM RETINAL OR DILATED Q1 9/7/1967 Pneumococcal 19-64 Medium Risk (1 of 1 - PPSV23) 9/7/1976 DTaP/Tdap/Td series (1 - Tdap) 9/7/1978 PAP AKA CERVICAL CYTOLOGY 9/7/1978 FOBT Q 1 YEAR AGE 50-75 9/7/2007 BREAST CANCER SCRN MAMMOGRAM 4/14/2011 INFLUENZA AGE 9 TO ADULT 8/1/2016 HEMOGLOBIN A1C Q6M 5/25/2017 LIPID PANEL Q1 7/23/2017 Allergies as of 4/4/2017  Review Complete On: 4/4/2017 By: Brenda Deng MD  
  
 Severity Noted Reaction Type Reactions Latex Medium 08/12/2012   Topical Rash Penicillins Medium 08/12/2012   Side Effect Nausea and Vomiting Current Immunizations  Never Reviewed No immunizations on file. Not reviewed this visit You Were Diagnosed With   
  
 Codes Comments DDD (degenerative disc disease), cervical    -  Primary ICD-10-CM: M50.30 ICD-9-CM: 722.4 Myofascial pain     ICD-10-CM: M79.1 ICD-9-CM: 729.1 Right shoulder pain, unspecified chronicity     ICD-10-CM: M25.511 ICD-9-CM: 719.41 Osteoarthritis of spine with radiculopathy, cervical region     ICD-10-CM: M47.22 
ICD-9-CM: 721.0 Muscle spasm     ICD-10-CM: L14.030 ICD-9-CM: 728.85 Vitals BP Pulse Temp Resp SpO2 OB Status 133/79 82 98.5 °F (36.9 °C) (Oral) 18 95% Menopause Smoking Status Current Every Day Smoker Preferred Pharmacy Pharmacy Name Phone [facility] [address] [phone] Your Updated Medication List  
  
   
This list is accurate as of: 4/4/17  4:09 PM.  Always use your most recent med list.  
  
  
  
  
 alcohol swabs Padm 1 Each by Apply Externally route Before breakfast, lunch, dinner and at bedtime. accucheck ACHS and record in log. Take log to every doctor visit. **MEDICALLY NECESSARY**  
  
 benztropine 1 mg tablet Commonly known as:  COGENTIN Take 1 mg by mouth daily. Blood-Glucose Meter monitoring kit Commonly known as:  FREESTYLE LITE METER  
accucheck ACHS and record in log. Take log to every doctor visit. **MEDICALLY NECESSARY**  
  
 * gabapentin 300 mg capsule Commonly known as:  NEURONTIN Take 1 in the evening for 1 week then increase to 2  Indications: NEUROPATHIC PAIN  
  
 * gabapentin 300 mg capsule Commonly known as:  NEURONTIN Take 1-2 po q am and 1-2 po q evening  
  
 glucose blood VI test strips strip Commonly known as:  FREESTYLE LITE STRIPS Check glucose ACHS. HYDROcodone-acetaminophen 5-325 mg per tablet Commonly known as:  Lynnda Levo Take 1 Tab by mouth every eight (8) hours as needed for Pain. Max Daily Amount: 3 Tabs. insulin glargine 100 unit/mL injection Commonly known as:  LANTUS  
20 Units by SubCUTAneous route every twelve (12) hours. * insulin lispro 100 unit/mL injection Commonly known as:  HUMALOG For Blood Sugar (mg/dL) of: <150 =   0 units; 150 -199 =   2 units; 200 -249 =   4 units; 250 -299 =   6 units; 300 -349 = 8 units; 350 and above =   10 units * insulin lispro 100 unit/mL injection Commonly known as:  HUMALOG  
10 Units by SubCUTAneous route Before breakfast, lunch, and dinner. Lancets Misc Check glucose ACHS meloxicam 15 mg tablet Commonly known as:  MOBIC Take 1 Tab by mouth daily. methocarbamol 500 mg tablet Commonly known as:  ROBAXIN Take 1 po bid-tid prn spasms  
  
 perphenazine 4 mg tablet Commonly known as:  TRILAFON Take 4 mg by mouth three (3) times daily. pravastatin 40 mg tablet Commonly known as:  PRAVACHOL Take 1 Tab by mouth nightly. predniSONE 10 mg tablet Commonly known as:  DELTASONE  
2 pills in am for 3 days, then 1 pill in am for 3 days and 1/2 pill in am for remainder * sertraline 100 mg tablet Commonly known as:  ZOLOFT Take 100 mg by mouth daily. * sertraline 50 mg tablet Commonly known as:  ZOLOFT Take 50 mg by mouth daily. tiZANidine 2 mg tablet Commonly known as:  ZANAFLEX  
1 tablet three times a day  
  
 traZODone 150 mg tablet Commonly known as:  Auther Sax Take 600 mg by mouth nightly. * Notice: This list has 6 medication(s) that are the same as other medications prescribed for you. Read the directions carefully, and ask your doctor or other care provider to review them with you. Prescriptions Sent to Pharmacy Refills  
 gabapentin (NEURONTIN) 300 mg capsule 3 Sig: Take 1-2 po q am and 1-2 po q evening Class: Normal  
 Pharmacy: [id number] Ph #: [phone]  
 methocarbamol (ROBAXIN) 500 mg tablet 3 Sig: Take 1 po bid-tid prn spasms Class: Normal  
 Pharmacy: [id number] Ph #: [phone] Follow-up Instructions Return in about 3 months (around 7/4/2017) for Medication follow up. Patient Instructions Shoulder Arthritis: Exercises Your Care Instructions Here are some examples of typical rehabilitation exercises for your condition. Start each exercise slowly. Ease off the exercise if you start to have pain. Your doctor or physical therapist will tell you when you can start these exercises and which ones will work best for you. How to do the exercises Shoulder flexion (lying down) Note: To make a wand for this exercise, use a piece of PVC pipe or a broom handle with the broom removed. Make the wand about a foot wider than your shoulders. 1. Lie on your back, holding a wand with both hands. Your palms should face down as you hold the wand. 2. Keeping your elbows straight, slowly raise your arms over your head. Raise them until you feel a stretch in your shoulders, upper back, and chest. 
3. Hold for 15 to 30 seconds. 4. Repeat 2 to 4 times. Shoulder rotation (lying down) Note: To make a wand for this exercise, use a piece of PVC pipe or a broom handle with the broom removed. Make the wand about a foot wider than your shoulders. 1. Lie on your back. Hold a wand with both hands with your elbows bent and palms up. 2. Keep your elbows close to your body, and move the wand across your body toward the sore arm. 3. Hold for 8 to 12 seconds. 4. Repeat 2 to 4 times. Shoulder internal rotation with towel 1. Hold a towel above and behind your head with the arm that is not sore. 2. With your sore arm, reach behind your back and grasp the towel. 3. With the arm above your head, pull the towel upward. Do this until you feel a stretch on the front and outside of your sore shoulder. 4. Hold 15 to 30 seconds. 5. Repeat 2 to 4 times. Shoulder blade squeeze 1. Stand with your arms at your sides, and squeeze your shoulder blades together. Do not raise your shoulders up as you squeeze. 2. Hold 6 seconds. 3. Repeat 8 to 12 times. Resisted rows Note: For this exercise, you will need elastic exercise material, such as surgical tubing or Thera-Band. 1. Put the band around a solid object at about waist level. (A bedpost will work well.) Each hand should hold an end of the band. 2. With your elbows at your sides and bent to 90 degrees, pull the band back. Your shoulder blades should move toward each other. Return to the starting position. 3. Repeat 8 to 12 times. External rotator strengthening exercise 1. Start by tying a piece of elastic exercise material to a doorknob. You can use surgical tubing or Thera-Band. (You may also hold one end of the band in each hand.) 2. Stand or sit with your shoulder relaxed and your elbow bent 90 degrees. Your upper arm should rest comfortably against your side. Squeeze a rolled towel between your elbow and your body for comfort. This will help keep your arm at your side. 3. Hold one end of the elastic band with the hand of the painful arm. 4. Start with your forearm across your belly. Slowly rotate the forearm out away from your body. Keep your elbow and upper arm tucked against the towel roll or the side of your body until you begin to feel tightness in your shoulder. Slowly move your arm back to where you started. 5. Repeat 8 to 12 times. Internal rotator strengthening exercise 1. Start by tying a piece of elastic exercise material to a doorknob. You can use surgical tubing or Thera-Band. 2. Stand or sit with your shoulder relaxed and your elbow bent 90 degrees. Your upper arm should rest comfortably against your side. Squeeze a rolled towel between your elbow and your body for comfort. This will help keep your arm at your side. 3. Hold one end of the elastic band in the hand of the painful arm. 4. Slowly rotate your forearm toward your body until it touches your belly. Slowly move it back to where you started. 5. Keep your elbow and upper arm firmly tucked against the towel roll or at your side. 6. Repeat 8 to 12 times. Pendulum swing Note: If you have pain in your back, do not do this exercise. 1. Hold on to a table or the back of a chair with your good arm.  Then bend forward a little and let your sore arm hang straight down. This exercise does not use the arm muscles. Rather, use your legs and your hips to create movement that makes your arm swing freely. 2. Use the movement from your hips and legs to guide the slightly swinging arm back and forth like a pendulum (or elephant trunk). Then guide it in circles that start small (about the size of a dinner plate). Make the circles a bit larger each day, as your pain allows. 3. Do this exercise for 5 minutes, 5 to 7 times each day. 4. As you have less pain, try bending over a little farther to do this exercise. This will increase the amount of movement at your shoulder. Follow-up care is a key part of your treatment and safety. Be sure to make and go to all appointments, and call your doctor if you are having problems. It's also a good idea to know your test results and keep a list of the medicines you take. Where can you learn more? Go to http://bernardino-prateek.info/. Enter H562 in the search box to learn more about \"Shoulder Arthritis: Exercises. \" Current as of: May 23, 2016 Content Version: 11.2 © 6388-5231 [employer]. Care instructions adapted under license by [employer] (which disclaims liability or warranty for this information). If you have questions about a medical condition or this instruction, always ask your healthcare professional. [place] [address] any warranty or liability for your use of this information. Please provide this summary of care documentation to your next provider. Your primary care clinician is listed as Sophybernardo Kennedy. If you have any questions after today's visit, please call [phone].

## 2017-04-04 NOTE — PATIENT INSTRUCTIONS
Shoulder Arthritis: Exercises  Your Care Instructions  Here are some examples of typical rehabilitation exercises for your condition. Start each exercise slowly. Ease off the exercise if you start to have pain. Your doctor or physical therapist will tell you when you can start these exercises and which ones will work best for you. How to do the exercises  Shoulder flexion (lying down)    Note: To make a wand for this exercise, use a piece of PVC pipe or a broom handle with the broom removed. Make the wand about a foot wider than your shoulders. 1. Lie on your back, holding a wand with both hands. Your palms should face down as you hold the wand. 2. Keeping your elbows straight, slowly raise your arms over your head. Raise them until you feel a stretch in your shoulders, upper back, and chest.  3. Hold for 15 to 30 seconds. 4. Repeat 2 to 4 times. Shoulder rotation (lying down)    Note: To make a wand for this exercise, use a piece of PVC pipe or a broom handle with the broom removed. Make the wand about a foot wider than your shoulders. 1. Lie on your back. Hold a wand with both hands with your elbows bent and palms up. 2. Keep your elbows close to your body, and move the wand across your body toward the sore arm. 3. Hold for 8 to 12 seconds. 4. Repeat 2 to 4 times. Shoulder internal rotation with towel    1. Hold a towel above and behind your head with the arm that is not sore. 2. With your sore arm, reach behind your back and grasp the towel. 3. With the arm above your head, pull the towel upward. Do this until you feel a stretch on the front and outside of your sore shoulder. 4. Hold 15 to 30 seconds. 5. Repeat 2 to 4 times. Shoulder blade squeeze    1. Stand with your arms at your sides, and squeeze your shoulder blades together. Do not raise your shoulders up as you squeeze. 2. Hold 6 seconds. 3. Repeat 8 to 12 times.   Resisted rows    Note: For this exercise, you will need elastic exercise material, such as surgical tubing or Thera-Band. 1. Put the band around a solid object at about waist level. (A bedpost will work well.) Each hand should hold an end of the band. 2. With your elbows at your sides and bent to 90 degrees, pull the band back. Your shoulder blades should move toward each other. Return to the starting position. 3. Repeat 8 to 12 times. External rotator strengthening exercise    1. Start by tying a piece of elastic exercise material to a doorknob. You can use surgical tubing or Thera-Band. (You may also hold one end of the band in each hand.)  2. Stand or sit with your shoulder relaxed and your elbow bent 90 degrees. Your upper arm should rest comfortably against your side. Squeeze a rolled towel between your elbow and your body for comfort. This will help keep your arm at your side. 3. Hold one end of the elastic band with the hand of the painful arm. 4. Start with your forearm across your belly. Slowly rotate the forearm out away from your body. Keep your elbow and upper arm tucked against the towel roll or the side of your body until you begin to feel tightness in your shoulder. Slowly move your arm back to where you started. 5. Repeat 8 to 12 times. Internal rotator strengthening exercise    1. Start by tying a piece of elastic exercise material to a doorknob. You can use surgical tubing or Thera-Band. 2. Stand or sit with your shoulder relaxed and your elbow bent 90 degrees. Your upper arm should rest comfortably against your side. Squeeze a rolled towel between your elbow and your body for comfort. This will help keep your arm at your side. 3. Hold one end of the elastic band in the hand of the painful arm. 4. Slowly rotate your forearm toward your body until it touches your belly. Slowly move it back to where you started. 5. Keep your elbow and upper arm firmly tucked against the towel roll or at your side. 6. Repeat 8 to 12 times.   Pendulum swing    Note: If you have pain in your back, do not do this exercise. 1. Hold on to a table or the back of a chair with your good arm. Then bend forward a little and let your sore arm hang straight down. This exercise does not use the arm muscles. Rather, use your legs and your hips to create movement that makes your arm swing freely. 2. Use the movement from your hips and legs to guide the slightly swinging arm back and forth like a pendulum (or elephant trunk). Then guide it in circles that start small (about the size of a dinner plate). Make the circles a bit larger each day, as your pain allows. 3. Do this exercise for 5 minutes, 5 to 7 times each day. 4. As you have less pain, try bending over a little farther to do this exercise. This will increase the amount of movement at your shoulder. Follow-up care is a key part of your treatment and safety. Be sure to make and go to all appointments, and call your doctor if you are having problems. It's also a good idea to know your test results and keep a list of the medicines you take. Where can you learn more? Go to http://bernardino-prateek.info/. Enter H562 in the search box to learn more about \"Shoulder Arthritis: Exercises. \"  Current as of: May 23, 2016  Content Version: 11.2  © 4494-3054 [employer]. Care instructions adapted under license by [employer] (which disclaims liability or warranty for this information). If you have questions about a medical condition or this instruction, always ask your healthcare professional. [address] any warranty or liability for your use of this information.

## 2017-04-04 NOTE — PROGRESS NOTES
[facility]  [address] [place], [address]  Phone: [phone]  Fax: [phone]          HISTORY OF PRESENT ILLNESS:  Harriet Morocho is a 63 y.o. female with history of cervical pain. She c/o neck and right shoulder pain. Pt reports that she no longer experiences pain radiating down the right arm. Pt admits that she occasionally has to sit while washing dishes due to upper back pain. She tried a small prednisone taper but denies any significant relief with the medication. Pt was unable to start physical therapy for her neck and shoulder due to issues with transportation. She has been prescribed Neurontin 300 mg but reports that she discontinued the medication since she thought it was making her gain weight and was not effective. Pt denies any sedation when taking the Neurontin. She currently takes Mobic 15 mg 1 tab daily. Pt at this time desires to proceed with medication evaluation. Pain Scale: 7/10    PCP: Orestes Howard MD       Past Medical History:   Diagnosis Date    CAD (coronary artery disease)     Hypertension     Uterine fibroid         Social History     Social History    Marital status: SINGLE     Spouse name: N/A    Number of children: N/A    Years of education: N/A     Occupational History    Not on file. Social History Main Topics    Smoking status: Current Every Day Smoker     Packs/day: 0.25     Years: 2.00    Smokeless tobacco: Not on file    Alcohol use No    Drug use: Yes     Special: Marijuana    Sexual activity: Not on file     Other Topics Concern    Not on file     Social History Narrative       Current Outpatient Prescriptions   Medication Sig Dispense Refill    gabapentin (NEURONTIN) 300 mg capsule Take 1-2 po q am and 1-2 po q evening 120 Cap 3    methocarbamol (ROBAXIN) 500 mg tablet Take 1 po bid-tid prn spasms 75 Tab 3    meloxicam (MOBIC) 15 mg tablet Take 1 Tab by mouth daily.  30 Tab 3    glucose blood VI test strips (FREESTYLE LITE STRIPS) strip Check glucose ACHS. 200 Strip 11    pravastatin (PRAVACHOL) 40 mg tablet Take 1 Tab by mouth nightly. 30 Tab 2    insulin lispro (HUMALOG) 100 unit/mL injection 10 Units by SubCUTAneous route Before breakfast, lunch, and dinner. 1 Vial 2    Lancets misc Check glucose ACHS 200 Each 11    insulin glargine (LANTUS) 100 unit/mL injection 20 Units by SubCUTAneous route every twelve (12) hours. 2 Vial 2    tiZANidine (ZANAFLEX) 2 mg tablet 1 tablet three times a day 90 Tab 3    Blood-Glucose Meter (FREESTYLE LITE METER) monitoring kit accucheck ACHS and record in log. Take log to every doctor visit. **MEDICALLY NECESSARY** 1 Kit 0    insulin lispro (HUMALOG) 100 unit/mL injection For Blood Sugar (mg/dL) of: <150 =   0 units; 150 -199 =   2 units; 200 -249 =   4 units; 250 -299 =   6 units; 300 -349 = 8 units; 350 and above =   10 units 1 Vial 2    alcohol swabs padm 1 Each by Apply Externally route Before breakfast, lunch, dinner and at bedtime. accucheck ACHS and record in log. Take log to every doctor visit. **MEDICALLY NECESSARY** 100 Pad 2    traZODone (DESYREL) 150 mg tablet Take 600 mg by mouth nightly.  sertraline (ZOLOFT) 100 mg tablet Take 100 mg by mouth daily.  perphenazine (TRILAFON) 4 mg tablet Take 4 mg by mouth three (3) times daily.  benztropine (COGENTIN) 1 mg tablet Take 1 mg by mouth daily.  sertraline (ZOLOFT) 50 mg tablet Take 50 mg by mouth daily.  predniSONE (DELTASONE) 10 mg tablet 2 pills in am for 3 days, then 1 pill in am for 3 days and 1/2 pill in am for remainder 11 Tab 0    gabapentin (NEURONTIN) 300 mg capsule Take 1 in the evening for 1 week then increase to 2  Indications: NEUROPATHIC PAIN 60 Cap 1    HYDROcodone-acetaminophen (NORCO) 5-325 mg per tablet Take 1 Tab by mouth every eight (8) hours as needed for Pain. Max Daily Amount: 3 Tabs.  20 Tab 0       Allergies   Allergen Reactions    Latex Rash    Penicillins Nausea and Vomiting         REVIEW OF SYSTEMS    Constitutional: Negative for fever, chills, or weight change. Respiratory: Negative for cough or shortness of breath. Cardiovascular: Negative for chest pain or palpitations. Gastrointestinal: Negative for acid reflux, change in bowel habits, or constipation. Genitourinary: Negative for dysuria and flank pain. Musculoskeletal: Positive for cervical pain. Skin: Negative for rash. Neurological: Negative for headaches, dizziness, or numbness. Endo/Heme/Allergies: Negative for increased bruising. Psychiatric/Behavioral: Negative for difficulty with sleep. PHYSICAL EXAMINATION  Visit Vitals    /79    Pulse 82    Temp 98.5 °F (36.9 °C) (Oral)    Resp 18    SpO2 95%       Constitutional: Awake, alert, and in no acute distress  Neurological: 1+ symmetrical DTRs in the upper extremities. Sensation to light touch is intact. Negative Sofia's sign bilaterally. Skin: warm, dry, and intact. Musculoskeletal: Moderately tight across the upper trapezius. Good ROM in both shoulders. Negative empty can test bilaterally. Pain with palpation in the left infrascapular area. Good strength with resisted abduction and adduction. Positive Cesar's test on the right. No pain with extension, axial loading, or forward flexion. No pain with internal or external rotation of her hips. Negative straight leg raise bilaterally. Pt ambulates with the assistance of a single point cane. Biceps  Triceps Deltoids Wrist Ext Wrist Flex Hand Intrin   Right -4/5 -4/5 -4/5 +4/5 +4/5 +4/5   Left +4/5 +4/5 +4/5 +4/5 +4/5 +4/5     IMAGING:    Cervical Spine X-rays from 12/27/2016 were personally reviewed with the Pt and demonstrated:  FINDINGS:      Vertebral body height and alignment is intact. Slight straightening of the  cervical lordosis. Moderate disc height loss endplate sclerosis and vertebral  body osteophytes from C3 through C7.  Alignment is intact. No fracture. Mild  facet joint arthropathy at multiple levels. The prevertebral space appears normal.       IMPRESSION:      Moderate cervical spondylosis. Thoracic Spine 2V X-rays from 12/27/2016 were personally reviewed with the Pt and demonstrated:   Results from [facility] on 12/27/16   XR SPINE THORAC 2 V    Narrative THORACIC SPINE AP AND LATERAL    CPT CODE: 11577    HISTORY: Upper back pain. COMPARISON: None. FINDINGS:    There are mild endplate irregularities in each level of the thoracic spine with  very small circumferential vertebral body osteophytes The vertebral body  heights and alignment appears normal. .           Impression IMPRESSION:    Mild spondylosis at multiple levels.          ASSESSMENT   German Wilkins was seen today for back pain and neck pain. Diagnoses and all orders for this visit:    DDD (degenerative disc disease), cervical  -     gabapentin (NEURONTIN) 300 mg capsule; Take 1-2 po q am and 1-2 po q evening    Myofascial pain    Right shoulder pain, unspecified chronicity    Osteoarthritis of spine with radiculopathy, cervical region    Muscle spasm  -     methocarbamol (ROBAXIN) 500 mg tablet; Take 1 po bid-tid prn spasms       IMPRESSION AND PLAN:  Hilda Javier is a 63 y.o. female with history of cervical pain. She c/o neck and right shoulder pain. Pt tried a small prednisone taper but denies any significant relief with the medication. Pt was unable to start physical therapy for her neck and shoulder due to issues with transportation. 1) Pt was given information on shoulder arthritis exercises. 2) She will increase her Neurontin 300 mg to 1-2 tabs QAM and 1-2 tabs QHS, tapering up as directed. 3) Pt will continue taking Mobic 15 mg as prescribed. 4) She was prescribed Robaxin 750 mg 1 tab TID prn muscle spasm. 5) Ms. Sean Naik has a reminder for a \"due or due soon\" health maintenance.  I have asked that she contact her primary care provider, Cori Mooney Ethel Anthony MD, for follow-up on this health maintenance. 6)  reviewed. 7) Pt will follow-up in 3 months. Written by Tonny Hopson, as dictated by Nora Cole MD.  I, Dr. Nora Cole confirm that all documentation is accurate.

## 2017-04-11 ENCOUNTER — OFFICE VISIT (OUTPATIENT)
Dept: FAMILY MEDICINE CLINIC | Age: 62
End: 2017-04-11

## 2017-04-11 VITALS
OXYGEN SATURATION: 95 % | HEART RATE: 100 BPM | TEMPERATURE: 98.8 F | DIASTOLIC BLOOD PRESSURE: 89 MMHG | WEIGHT: 171 LBS | RESPIRATION RATE: 20 BRPM | SYSTOLIC BLOOD PRESSURE: 142 MMHG | BODY MASS INDEX: 28.49 KG/M2 | HEIGHT: 65 IN

## 2017-04-11 DIAGNOSIS — E78.2 MIXED HYPERLIPIDEMIA: Chronic | ICD-10-CM

## 2017-04-11 DIAGNOSIS — I10 ESSENTIAL HYPERTENSION: ICD-10-CM

## 2017-04-11 LAB
HBA1C MFR BLD HPLC: 6.4 %
MICROALBUMIN UR TEST STR-MCNC: 30 MG/L
MICROALBUMIN/CREAT RATIO POC: 200 MG/G

## 2017-04-11 RX ORDER — LISINOPRIL 10 MG/1
10 TABLET ORAL DAILY
Qty: 90 TAB | Refills: 1 | Status: SHIPPED | OUTPATIENT
Start: 2017-04-11 | End: 2018-04-10 | Stop reason: ALTCHOICE

## 2017-04-11 RX ORDER — PRAVASTATIN SODIUM 40 MG/1
40 TABLET ORAL
Qty: 90 TAB | Refills: 1 | Status: SHIPPED | OUTPATIENT
Start: 2017-04-11 | End: 2017-12-18 | Stop reason: SDUPTHER

## 2017-04-11 NOTE — MR AVS SNAPSHOT
Visit Information Date & Time Provider Department Dept. Phone Encounter #  
 4/11/2017  4:00 PM Meli Servin [facility] [phone] [id number] Your Appointments 7/3/2017  3:15 PM  
Follow Up with Gifty Koo MD  
[address] [facility], [address] [facility] [place] [facility]) Appt Note: 3 MONTH FU - NO COPAY PT AWARE  
 [address] [facility] [address]  
[phone]  
  
   
 [address] [facility],[address] [facility]  
  
    
 10/12/2017  4:00 PM  
FOLLOW UP EXAM with Meli Servin MD  
[facility]) Appt Note: 6 month f/u  
 [address] [facility] [address]  
[facility] [address] Upcoming Health Maintenance Date Due Hepatitis C Screening 1955 FOOT EXAM Q1 9/7/1967 MICROALBUMIN Q1 9/7/1967 EYE EXAM RETINAL OR DILATED Q1 9/7/1967 Pneumococcal 19-64 Medium Risk (1 of 1 - PPSV23) 9/7/1976 DTaP/Tdap/Td series (1 - Tdap) 9/7/1978 PAP AKA CERVICAL CYTOLOGY 9/7/1978 FOBT Q 1 YEAR AGE 50-75 9/7/2007 BREAST CANCER SCRN MAMMOGRAM 4/14/2011 INFLUENZA AGE 9 TO ADULT 8/1/2016 HEMOGLOBIN A1C Q6M 5/25/2017 LIPID PANEL Q1 7/23/2017 Allergies as of 4/11/2017  Review Complete On: 4/11/2017 By: Vamshi Brannon LPN Severity Noted Reaction Type Reactions Latex Medium 08/12/2012   Topical Rash Penicillins Medium 08/12/2012   Side Effect Nausea and Vomiting Current Immunizations  Never Reviewed No immunizations on file. Not reviewed this visit You Were Diagnosed With   
  
 Codes Comments Uncontrolled type 2 diabetes mellitus without complication, with long-term current use of insulin ([facility] [address]    -  Primary ICD-10-CM: E11.65, Z79.4 ICD-9-CM: 250.02, V58.67 Mixed hyperlipidemia     ICD-10-CM: E78.2 ICD-9-CM: 272.2  Essential hypertension     ICD-10-CM: I10 
 ICD-9-CM: 401.9 Vitals BP Pulse Temp Resp Height(growth percentile) Weight(growth percentile) 142/89 (BP 1 Location: Left arm, BP Patient Position: Sitting) 100 98.8 °F (37.1 °C) (Oral) 20 5' 5\" (1.651 m) 171 lb (77.6 kg) SpO2 BMI OB Status Smoking Status 95% 28.46 kg/m2 Menopause Current Every Day Smoker Vitals History BMI and BSA Data Body Mass Index Body Surface Area  
 28.46 kg/m 2 1.89 m 2 Preferred Pharmacy Pharmacy Name Phone [facility] [address] [phone] Your Updated Medication List  
  
   
This list is accurate as of: 4/11/17  4:44 PM.  Always use your most recent med list.  
  
  
  
  
 alcohol swabs Padm 1 Each by Apply Externally route Before breakfast, lunch, dinner and at bedtime. accucheck ACHS and record in log. Take log to every doctor visit. **MEDICALLY NECESSARY**  
  
 benztropine 1 mg tablet Commonly known as:  COGENTIN Take 1 mg by mouth daily. Blood-Glucose Meter monitoring kit Commonly known as:  FREESTYLE LITE METER  
accucheck ACHS and record in log. Take log to every doctor visit. **MEDICALLY NECESSARY**  
  
 gabapentin 300 mg capsule Commonly known as:  NEURONTIN Take 1-2 po q am and 1-2 po q evening  
  
 glucose blood VI test strips strip Commonly known as:  FREESTYLE LITE STRIPS Check glucose ACHS. HYDROcodone-acetaminophen 5-325 mg per tablet Commonly known as:  Lynnda Levo Take 1 Tab by mouth every eight (8) hours as needed for Pain. Max Daily Amount: 3 Tabs. insulin glargine 100 unit/mL injection Commonly known as:  LANTUS  
20 Units by SubCUTAneous route every twelve (12) hours. * insulin lispro 100 unit/mL injection Commonly known as:  HUMALOG For Blood Sugar (mg/dL) of: <150 =   0 units; 150 -199 =   2 units; 200 -249 =   4 units; 250 -299 =   6 units; 300 -349 = 8 units; 350 and above =   10 units * insulin lispro 100 unit/mL injection Commonly known as:  HUMALOG  
10 Units by SubCUTAneous route Before breakfast, lunch, and dinner. Lancets Misc Check glucose ACHS  
  
 lisinopril 10 mg tablet Commonly known as:  Shirlie Holes Take 1 Tab by mouth daily. meloxicam 15 mg tablet Commonly known as:  MOBIC Take 1 Tab by mouth daily. methocarbamol 500 mg tablet Commonly known as:  ROBAXIN Take 1 po bid-tid prn spasms  
  
 perphenazine 4 mg tablet Commonly known as:  TRILAFON Take 4 mg by mouth three (3) times daily. pravastatin 40 mg tablet Commonly known as:  PRAVACHOL Take 1 Tab by mouth nightly. predniSONE 10 mg tablet Commonly known as:  DELTASONE  
2 pills in am for 3 days, then 1 pill in am for 3 days and 1/2 pill in am for remainder * sertraline 100 mg tablet Commonly known as:  ZOLOFT Take 100 mg by mouth daily. * sertraline 50 mg tablet Commonly known as:  ZOLOFT Take 50 mg by mouth daily. tiZANidine 2 mg tablet Commonly known as:  ZANAFLEX  
1 tablet three times a day  
  
 traZODone 150 mg tablet Commonly known as:  Ayesha Foil Take 600 mg by mouth nightly. * Notice: This list has 4 medication(s) that are the same as other medications prescribed for you. Read the directions carefully, and ask your doctor or other care provider to review them with you. Prescriptions Sent to Pharmacy Refills  
 pravastatin (PRAVACHOL) 40 mg tablet 1 Sig: Take 1 Tab by mouth nightly. Class: Normal  
 Pharmacy: [place] [id number]. Ph #: [phone] Route: Oral  
 lisinopril (PRINIVIL, ZESTRIL) 10 mg tablet 1 Sig: Take 1 Tab by mouth daily. Class: Normal  
 Pharmacy: [place] [id number]. Ph #: [phone] Route: Oral  
  
We Performed the Following AMB POC HEMOGLOBIN A1C [19336 CPT(R)] AMB POC URINE, MICROALBUMIN, SEMIQUANTITATIVE [04456 CPT(R)]  DIABETES FOOT EXAM [7 Custom] Please provide this summary of care documentation to your next provider. Your primary care clinician is listed as Dominick Rojas. If you have any questions after today's visit, please call [phone].

## 2017-04-11 NOTE — PROGRESS NOTES
Chronic Illness Visit    Today's Date:  2017  Patient's Name: Mango Toscano   Patient's :  1955     History:     Chief Complaint   Patient presents with    Follow Up Chronic Condition     pt here for follow up chronic conditions DM,back pain       Mango Toscano is a 63 y.o. female presenting for a follow up of Chronic Illness. Diabetes/Hyperlipidemia/Hypertension     BP today is not at goal today <130/80. Patients risk factors include: denies  Home Blood Sugars are in the following range: <100. Denies hypoglycemic episodes  Recent hospitalizations: yes, for DKA  Medications regimen is as follows: see below  Last HgbA1C:  6.3 (2016) and 6.4 (2017)  Daily exercise and diet are as follows: denies  Weight is stable  Takes the below meds regularly with no side effects. Taking daily ASA 81 mg. Last LDL: 139  Last DM eye exam: unknown  Last DM foot exam: 2017  Last urine microalbumin: 2017     Chronic Back Pain    Onset: 3-6 months ago  Patient is being followed by the [id number] [place] [address] but denies resolution in pain. Started on prednisone and muscle relaxers. Neck and upper back pain acute onset  Denies history of trauma/injury  Character of Pain: achy/throbbin  Denies problem with bowels or urine  Alleviating/Agravating Factors: worse w/ activity  Current meds: none    Past Medical History:   Diagnosis Date    CAD (coronary artery disease)     Hypertension     Uterine fibroid      No past surgical history on file.   Social History     Social History    Marital status: SINGLE     Spouse name: N/A    Number of children: N/A    Years of education: N/A     Social History Main Topics    Smoking status: Current Every Day Smoker     Packs/day: 0.25     Years: 2.00    Smokeless tobacco: None    Alcohol use No    Drug use: Yes     Special: Marijuana    Sexual activity: Not Asked     Other Topics Concern    None     Social History Narrative     Family History   Problem Relation Age of Onset    Diabetes Mother     Stroke Mother     Diabetes Father     Cancer Paternal Grandfather      Allergies   Allergen Reactions    Latex Rash    Penicillins Nausea and Vomiting       Problem List:      Patient Active Problem List   Diagnosis Code    [facility] mellitus, uncontrolled ([facility] E11.65    Mixed hyperlipidemia E78.2    Myofascial pain M79.1    DDD (degenerative disc disease), cervical M50.30    Osteoarthritis of spine with radiculopathy, cervical region M47.22    Right shoulder pain M25.511    Tobacco use disorder F17.200    Muscle spasm M62.838       Medications:     Current Outpatient Prescriptions   Medication Sig    pravastatin (PRAVACHOL) 40 mg tablet Take 1 Tab by mouth nightly.  lisinopril (PRINIVIL, ZESTRIL) 10 mg tablet Take 1 Tab by mouth daily.  gabapentin (NEURONTIN) 300 mg capsule Take 1-2 po q am and 1-2 po q evening    meloxicam (MOBIC) 15 mg tablet Take 1 Tab by mouth daily.  glucose blood VI test strips (FREESTYLE LITE STRIPS) strip Check glucose ACHS.  insulin lispro (HUMALOG) 100 unit/mL injection 10 Units by SubCUTAneous route Before breakfast, lunch, and dinner.  Lancets misc Check glucose ACHS    insulin glargine (LANTUS) 100 unit/mL injection 20 Units by SubCUTAneous route every twelve (12) hours.  Blood-Glucose Meter (FREESTYLE LITE METER) monitoring kit accucheck ACHS and record in log. Take log to every doctor visit. **MEDICALLY NECESSARY**    insulin lispro (HUMALOG) 100 unit/mL injection For Blood Sugar (mg/dL) of: <150 =   0 units; 150 -199 =   2 units; 200 -249 =   4 units; 250 -299 =   6 units; 300 -349 = 8 units; 350 and above =   10 units    alcohol swabs padm 1 Each by Apply Externally route Before breakfast, lunch, dinner and at bedtime. accucheck ACHS and record in log. Take log to every doctor visit. **MEDICALLY NECESSARY**    traZODone (DESYREL) 150 mg tablet Take 600 mg by mouth nightly.     sertraline (ZOLOFT) 100 mg tablet Take 100 mg by mouth daily.  perphenazine (TRILAFON) 4 mg tablet Take 4 mg by mouth three (3) times daily.  benztropine (COGENTIN) 1 mg tablet Take 1 mg by mouth daily.  sertraline (ZOLOFT) 50 mg tablet Take 50 mg by mouth daily.  methocarbamol (ROBAXIN) 500 mg tablet Take 1 po bid-tid prn spasms    predniSONE (DELTASONE) 10 mg tablet 2 pills in am for 3 days, then 1 pill in am for 3 days and 1/2 pill in am for remainder    HYDROcodone-acetaminophen (NORCO) 5-325 mg per tablet Take 1 Tab by mouth every eight (8) hours as needed for Pain. Max Daily Amount: 3 Tabs.  tiZANidine (ZANAFLEX) 2 mg tablet 1 tablet three times a day     No current facility-administered medications for this visit.           Constitutional: negative for fevers, chills, sweats and fatigue  Respiratory: negative for cough, sputum or wheezing  Cardiovascular: negative for chest pain, chest pressure/discomfort, dyspnea  Gastrointestinal: negative for nausea, vomiting, diarrhea, constipation and abdominal pain  Musculoskeletal:positive for myalgias, neck pain and back pain, negative for muscle weakness  Neurological: negative for headaches and dizziness  Behavioral/Psych: negative for anxiety and depression    Physical Assessment:   VS:    Visit Vitals    /89 (BP 1 Location: Left arm, BP Patient Position: Sitting)    Pulse 100    Temp 98.8 °F (37.1 °C) (Oral)    Resp 20    Ht 5' 5\" (1.651 m)    Wt 171 lb (77.6 kg)    SpO2 95%    BMI 28.46 kg/m2       Lab Results   Component Value Date/Time    Sodium 141 07/25/2016 12:47 AM    Potassium 3.4 07/25/2016 12:47 AM    Chloride 110 07/25/2016 12:47 AM    CO2 22 07/25/2016 12:47 AM    Anion gap 9 07/25/2016 12:47 AM    Glucose 259 07/25/2016 12:47 AM    BUN 10 07/25/2016 12:47 AM    Creatinine 0.86 07/25/2016 12:47 AM    BUN/Creatinine ratio 12 07/25/2016 12:47 AM    GFR est AA >60 07/25/2016 12:47 AM    GFR est non-AA >60 07/25/2016 12:47 AM    Calcium 8.4 07/25/2016 12:47 AM       General:   Well-groomed, well-nourished, in no distress, pleasant, alert, appropriate and conversant. Mouth:  Good dentition, oropharynx WNL without membranes, exudates, petechiae or ulcers  Neck:   Neck supple, no swelling, mass or tenderness, no thyromegaly  Cardiovasc:   RRR, no MRG. Pulses 2+ and symmetric at distal extremities. Pulmonary:   Lungs clear bilaterally. Normal respiratory effort. Abdomen:   Abdomen soft, NT, ND, NAB. Extremities:   No edema, no TTP bilateral calves. LEs warm and well-perfused. Neuro:   Alert and oriented, no focal deficits. No facial asymmetry noted. Skin:    No rashes or jaundice  MSK:   Normal ROM, 5/5 muscle strength  Psych:  No pressured speech or abnormal thought content        Assessment/Plan & Orders:       1. Uncontrolled type 2 diabetes mellitus without complication, with long-term current use of insulin ([facility] [address]    2. Mixed hyperlipidemia    3. Essential hypertension        Orders Placed This Encounter    AMB POC HEMOGLOBIN A1C    AMB POC URINE, MICROALBUMIN, SEMIQUANTITATIVE    HM DIABETES FOOT EXAM    pravastatin (PRAVACHOL) 40 mg tablet    lisinopril (PRINIVIL, ZESTRIL) 10 mg tablet       Diabetes HgbA1c 6.4 stable patient is doing well on current meds  HTN Mildly elevated BP will start low dose lisinopril  Hyperlipidemia would like patient to continue w/ pravachol  Acute/Chronic Back pain being followed by the [facility]       Follow up in 2-3 months    *Plan of care reviewed with patient. Patient in agreement with plan and expresses understanding. All questions answered and patient encouraged to call or RTO if further questions or concerns.     Quang Gastelum MD  Family Medicine  4/11/2017   3:59 PM

## 2017-04-29 DIAGNOSIS — M54.2 NECK PAIN: ICD-10-CM

## 2017-04-29 DIAGNOSIS — M79.601 RIGHT ARM PAIN: ICD-10-CM

## 2017-04-29 DIAGNOSIS — M50.30 DDD (DEGENERATIVE DISC DISEASE), CERVICAL: ICD-10-CM

## 2017-05-01 RX ORDER — MELOXICAM 15 MG/1
TABLET ORAL
Qty: 30 TAB | Refills: 4 | Status: SHIPPED | OUTPATIENT
Start: 2017-05-01 | End: 2017-07-13 | Stop reason: SDUPTHER

## 2017-06-23 DIAGNOSIS — M62.838 MUSCLE SPASM: ICD-10-CM

## 2017-06-26 RX ORDER — METHOCARBAMOL 500 MG/1
TABLET, FILM COATED ORAL
Qty: 75 TAB | Refills: 0 | Status: SHIPPED | OUTPATIENT
Start: 2017-06-26 | End: 2017-07-13 | Stop reason: SDUPTHER

## 2017-07-13 ENCOUNTER — OFFICE VISIT (OUTPATIENT)
Dept: ORTHOPEDIC SURGERY | Age: 62
End: 2017-07-13

## 2017-07-13 VITALS
TEMPERATURE: 98.2 F | HEART RATE: 84 BPM | SYSTOLIC BLOOD PRESSURE: 130 MMHG | BODY MASS INDEX: 29.15 KG/M2 | DIASTOLIC BLOOD PRESSURE: 72 MMHG | RESPIRATION RATE: 16 BRPM | WEIGHT: 175.2 LBS

## 2017-07-13 DIAGNOSIS — M62.838 MUSCLE SPASM: ICD-10-CM

## 2017-07-13 DIAGNOSIS — F17.200 TOBACCO USE DISORDER: ICD-10-CM

## 2017-07-13 DIAGNOSIS — M50.30 DDD (DEGENERATIVE DISC DISEASE), CERVICAL: Primary | ICD-10-CM

## 2017-07-13 DIAGNOSIS — M54.2 NECK PAIN: ICD-10-CM

## 2017-07-13 DIAGNOSIS — M79.18 MYOFASCIAL PAIN: ICD-10-CM

## 2017-07-13 DIAGNOSIS — M79.601 RIGHT ARM PAIN: ICD-10-CM

## 2017-07-13 RX ORDER — METHOCARBAMOL 500 MG/1
TABLET, FILM COATED ORAL
Qty: 75 TAB | Refills: 0 | Status: SHIPPED | OUTPATIENT
Start: 2017-07-13 | End: 2017-11-28 | Stop reason: SDUPTHER

## 2017-07-13 RX ORDER — GABAPENTIN 300 MG/1
CAPSULE ORAL
Qty: 120 CAP | Refills: 3 | Status: SHIPPED | OUTPATIENT
Start: 2017-07-13 | End: 2017-11-28 | Stop reason: SDUPTHER

## 2017-07-13 RX ORDER — MELOXICAM 15 MG/1
TABLET ORAL
Qty: 30 TAB | Refills: 4 | Status: SHIPPED | OUTPATIENT
Start: 2017-07-13 | End: 2018-04-10

## 2017-07-13 NOTE — PATIENT INSTRUCTIONS
Neck Arthritis: Exercises  Your Care Instructions  Here are some examples of typical rehabilitation exercises for your condition. Start each exercise slowly. Ease off the exercise if you start to have pain. Your doctor or physical therapist will tell you when you can start these exercises and which ones will work best for you. How to do the exercises  Neck stretches to the side    1. This stretch works best if you keep your shoulder down as you lean away from it. To help you remember to do this, start by relaxing your shoulders and lightly holding on to your thighs or your chair. 2. Tilt your head toward your shoulder and hold for 15 to 30 seconds. Let the weight of your head stretch your muscles. 3. Repeat 2 to 4 times toward each shoulder. Chin tuck    1. Lie on the floor with a rolled-up towel under your neck. Your head should be touching the floor. 2. Slowly bring your chin toward your chest.  3. Hold for a count of 6, and then relax for up to 10 seconds. 4. Repeat 8 to 12 times. Active cervical rotation    1. Sit in a firm chair, or stand up straight. 2. Keeping your chin level, turn your head to the right, and hold for 15 to 30 seconds. 3. Turn your head to the left and hold for 15 to 30 seconds. 4. Repeat 2 to 4 times to each side. Shoulder blade squeeze    1. While standing, squeeze your shoulder blades together. 2. Do not raise your shoulders up as you are squeezing. 3. Hold for 6 seconds. 4. Repeat 8 to 12 times. Shoulder rolls    1. Sit comfortably with your feet shoulder-width apart. You can also do this exercise standing up. 2. Roll your shoulders up, then back, and then down in a smooth, circular motion. 3. Repeat 2 to 4 times. Follow-up care is a key part of your treatment and safety. Be sure to make and go to all appointments, and call your doctor if you are having problems. It's also a good idea to know your test results and keep a list of the medicines you take.   Where can you learn more? Go to http://bernardino-prateek.info/. Enter L686 in the search box to learn more about \"Neck Arthritis: Exercises. \"  Current as of: March 21, 2017  Content Version: 11.3  © 1632-6002 [employer]. Care instructions adapted under license by [employer] (which disclaims liability or warranty for this information). If you have questions about a medical condition or this instruction, always ask your healthcare professional. [address] any warranty or liability for your use of this information. Healthy Upper Back: Exercises  Your Care Instructions  Here are some examples of exercises for your upper back. Start each exercise slowly. Ease off the exercise if you start to have pain. Your doctor or physical therapist will tell you when you can start these exercises and which ones will work best for you. How to do the exercises  Lower neck and upper back stretch    4. Stretch your arms out in front of your body. Clasp one hand on top of your other hand. 5. Gently reach out so that you feel your shoulder blades stretching away from each other. 6. Gently bend your head forward. 7. Hold for 15 to 30 seconds. 8. Repeat 2 to 4 times. Midback stretch    Note: If you have knee pain, do not do this exercise. 5. Kneel on the floor, and sit back on your ankles. 6. Lean forward, place your hands on the floor, and stretch your arms out in front of you. Rest your head between your arms. 7. Gently push your chest toward the floor, reaching as far in front of you as possible. 8. Hold for 15 to 30 seconds. 9. Repeat 2 to 4 times. Shoulder rolls    5. Sit comfortably with your feet shoulder-width apart. You can also do this exercise while standing. 6. Roll your shoulders up, then back, and then down in a smooth, circular motion. 7. Repeat 2 to 4 times. Wall push-up    5.  Stand against a wall with your feet about 12 to 24 inches back from the wall. If you feel any pain when you do this exercise, stand closer to the wall. 6. Place your hands on the wall slightly wider apart than your shoulders, and lean forward. 7. Gently lean your body toward the wall. Then push back to your starting position. Keep the motion smooth and controlled. 8. Repeat 8 to 12 times. Resisted shoulder blade squeeze    Note: For this exercise, you will need elastic exercise material, such as surgical tubing or Thera-Band. 4. Sit or stand, holding the band in both hands in front of you. Keep your elbows close to your sides, bent at a 90-degree angle. Your palms should face up. 5. Squeeze your shoulder blades together, and move your arms to the outside, stretching the band. Be sure to keep your elbows at your sides while you do this. 6. Relax. 7. Repeat 8 to 12 times. Resisted rows    Note: For this exercise, you will need elastic exercise material, such as surgical tubing or Thera-Band. 1. Put the band around a solid object, such as a bedpost, at about waist level. Hold one end of the band in each hand. 2. With your elbows at your sides and bent to 90 degrees, pull the band back to move your shoulder blades toward each other. Return to the starting position. 3. Repeat 8 to 12 times. Follow-up care is a key part of your treatment and safety. Be sure to make and go to all appointments, and call your doctor if you are having problems. It's also a good idea to know your test results and keep a list of the medicines you take. Where can you learn more? Go to http://bernardino-prateek.info/. Enter Y500 in the search box to learn more about \"Healthy Upper Back: Exercises. \"  Current as of: March 21, 2017  Content Version: 11.3  © 0348-7120 [employer], [employer]. Care instructions adapted under license by [employer] (which disclaims liability or warranty for this information).  If you have questions about a medical condition or this instruction, always ask your healthcare professional. [place] [address] any warranty or liability for your use of this information.

## 2017-07-13 NOTE — PROGRESS NOTES
[facility]  [address] [place] [address], [facility] [address]  Phone: [phone]  Fax: [phone]      ASSESSMENT   Michael Henderson was seen today for neck pain and back pain. Diagnoses and all orders for this visit:    DDD (degenerative disc disease), cervical  -     gabapentin (NEURONTIN) 300 mg capsule; Take 1-2 po q am and 1-2 po q evening  -     meloxicam (MOBIC) 15 mg tablet; TAKE ONE TABLET BY MOUTH ONCE DAILY-- take with food and use as needed    Muscle spasm  -     methocarbamol (ROBAXIN) 500 mg tablet; TAKE ONE TABLET BY MOUTH 2 TO 3 TIMES DAILY AS NEEDED FOR SPASMS    Myofascial pain    Right arm pain  -     meloxicam (MOBIC) 15 mg tablet; TAKE ONE TABLET BY MOUTH ONCE DAILY-- take with food and use as needed    Neck pain  -     meloxicam (MOBIC) 15 mg tablet; TAKE ONE TABLET BY MOUTH ONCE DAILY-- take with food and use as needed    Tobacco use disorder         IMPRESSION AND PLAN:  Norlin Babinski is a 63 y.o. female with history of cervical pain. She was referred to physical therapy but experienced issue with transportation. Pt wakes up with soreness in the neck and hands and takes Neurontin 300 mg to 1 tab QAM and 2 tabs QHS and Robaxin 500 mg 1 tab TID. 1) Pt was given information on cervical arthritis and upper back exercises. 2) She received a refill of Robaxin 500 mg 2-3 tabs daily prn muscle spasms. Pt was instructed to take the Robaxin as needed. 3) I recommended the Pt try water exercise. 4) Pt was prescribed Mobic 15 mg 1 tab daily with food as needed. 5) She received a refill of Neurontin 300 mg 1-2 tabs QAM and 1-2 tab QHS. 6) Pt was given information on how to use a TheraCane. 7) I recommended the Pt try moist heat. 8) Ms. Ismael Hodgkins has a reminder for a \"due or due soon\" health maintenance. I have asked that she contact her primary care provider, rCystal Savage MD, for follow-up on this health maintenance.   9)  demonstrated consistency with prescribing. 10) Pt will follow-up in 3 months. 11) Smoking cessation discussed    HISTORY OF PRESENT ILLNESS:  Janice Jimenez is a 63 y.o. female with history of cervical pain. She was referred to physical therapy but experienced issue with transportation. Pt has been performing exercises on her own. She wakes up with soreness in the neck and hands. Pt increased her Neurontin 300 mg to 1 tab QAM and 2 tabs QHS and has experienced some relief. She also takes Robaxin 500 mg 1 tab TID and admits to sedation with her medications. Pt reports that she does not take Mobic or ibuprofen at this time. Pt desires to proceed with medication evaluation. Pain Scale: 7/10    PCP: Stasia Gottron, MD       Past Medical History:   Diagnosis Date    CAD (coronary artery disease)     Hypertension     Uterine fibroid         Social History     Social History    Marital status: SINGLE     Spouse name: N/A    Number of children: N/A    Years of education: N/A     Occupational History    Not on file. Social History Main Topics    Smoking status: Current Every Day Smoker     Packs/day: 0.25     Years: 2.00    Smokeless tobacco: Not on file    Alcohol use No    Drug use: Yes     Special: Marijuana    Sexual activity: Not on file     Other Topics Concern    Not on file     Social History Narrative       Current Outpatient Prescriptions   Medication Sig Dispense Refill    methocarbamol (ROBAXIN) 500 mg tablet TAKE ONE TABLET BY MOUTH 2 TO 3 TIMES DAILY AS NEEDED FOR SPASMS 75 Tab 0    gabapentin (NEURONTIN) 300 mg capsule Take 1-2 po q am and 1-2 po q evening 120 Cap 3    meloxicam (MOBIC) 15 mg tablet TAKE ONE TABLET BY MOUTH ONCE DAILY-- take with food and use as needed 30 Tab 4    pravastatin (PRAVACHOL) 40 mg tablet Take 1 Tab by mouth nightly. 90 Tab 1    glucose blood VI test strips (FREESTYLE LITE STRIPS) strip Check glucose ACHS.  200 Strip 11    Lancets misc Check glucose ACHS 200 Each 11    insulin glargine (LANTUS) 100 unit/mL injection 20 Units by SubCUTAneous route every twelve (12) hours. 2 Vial 2    Blood-Glucose Meter (FREESTYLE LITE METER) monitoring kit accucheck ACHS and record in log. Take log to every doctor visit. **MEDICALLY NECESSARY** 1 Kit 0    insulin lispro (HUMALOG) 100 unit/mL injection For Blood Sugar (mg/dL) of: <150 =   0 units; 150 -199 =   2 units; 200 -249 =   4 units; 250 -299 =   6 units; 300 -349 = 8 units; 350 and above =   10 units 1 Vial 2    alcohol swabs padm 1 Each by Apply Externally route Before breakfast, lunch, dinner and at bedtime. accucheck ACHS and record in log. Take log to every doctor visit. **MEDICALLY NECESSARY** 100 Pad 2    traZODone (DESYREL) 150 mg tablet Take 600 mg by mouth nightly.  sertraline (ZOLOFT) 100 mg tablet Take 100 mg by mouth daily.  benztropine (COGENTIN) 1 mg tablet Take 1 mg by mouth daily.  sertraline (ZOLOFT) 50 mg tablet Take 50 mg by mouth daily.  lisinopril (PRINIVIL, ZESTRIL) 10 mg tablet Take 1 Tab by mouth daily. 90 Tab 1    predniSONE (DELTASONE) 10 mg tablet 2 pills in am for 3 days, then 1 pill in am for 3 days and 1/2 pill in am for remainder 11 Tab 0    HYDROcodone-acetaminophen (NORCO) 5-325 mg per tablet Take 1 Tab by mouth every eight (8) hours as needed for Pain. Max Daily Amount: 3 Tabs. 20 Tab 0    insulin lispro (HUMALOG) 100 unit/mL injection 10 Units by SubCUTAneous route Before breakfast, lunch, and dinner. 1 Vial 2    tiZANidine (ZANAFLEX) 2 mg tablet 1 tablet three times a day 90 Tab 3    perphenazine (TRILAFON) 4 mg tablet Take 4 mg by mouth three (3) times daily. Allergies   Allergen Reactions    Latex Rash    Penicillins Nausea and Vomiting         REVIEW OF SYSTEMS    Constitutional: Negative for fever, chills, or weight change. Respiratory: Negative for cough or shortness of breath. Cardiovascular: Negative for chest pain or palpitations.   Gastrointestinal: Negative for acid reflux, change in bowel habits, or constipation. Genitourinary: Negative for dysuria and flank pain. Musculoskeletal: Positive for cervical pain. Skin: Negative for rash. Neurological: Negative for headaches, dizziness, or numbness. Endo/Heme/Allergies: Negative for increased bruising. Psychiatric/Behavioral: Negative for difficulty with sleep. PHYSICAL EXAMINATION  Visit Vitals    /72 (BP 1 Location: Left arm, BP Patient Position: Sitting)    Pulse 84    Temp 98.2 °F (36.8 °C) (Oral)    Resp 16    Wt 175 lb 3.2 oz (79.5 kg)    BMI 29.15 kg/m2       Constitutional: Awake, alert, and in no acute distress  Neurological: 1+ symmetrical DTRs in the upper extremities Sensation to light touch is intact. Negative Sofia's sign bilaterally. Skin: warm, dry, and intact. Musculoskeletal: Mild decreased side to side cervical flexion. Mild tightness across the upper trapezius and mid to lower scapular region. No pain with extension, axial loading, or forward flexion. No pain with internal or external rotation of her hips. Negative straight leg raise bilaterally. Biceps  Triceps Deltoids Wrist Ext Wrist Flex Hand Intrin   Right 4/5 4/5 4/5 +4/5 +4/5 +4/5   Left 4/5 4/5 4/5 +4/5 +4/5 +4/5     IMAGING:    Cervical Spine X-rays from 12/27/2016 were personally reviewed with the Pt and demonstrated:  FINDINGS:       Vertebral body height and alignment is intact. Slight straightening of the  cervical lordosis. Moderate disc height loss endplate sclerosis and vertebral  body osteophytes from C3 through C7. Alignment is intact. No fracture. Mild  facet joint arthropathy at multiple levels.   The prevertebral space appears normal.       IMPRESSION:      Moderate cervical spondylosis.      Thoracic Spine 2V X-rays from 12/27/2016 were personally reviewed with the Pt and demonstrated:   Results from [facility] on 12/27/16   XR SPINE THORAC 2 V     Narrative THORACIC SPINE AP AND LATERAL    CPT CODE: 18477    HISTORY: Upper back pain. COMPARISON: None. FINDINGS:    There are mild endplate irregularities in each level of the thoracic spine with  very small circumferential vertebral body osteophytes The vertebral body  heights and alignment appears normal. .              Impression IMPRESSION:    Mild spondylosis at multiple levels. Written by Papa Mcelroy, as dictated by Eric Abraham MD.  I, Dr. Eric Abraham confirm that all documentation is accurate.

## 2017-07-13 NOTE — MR AVS SNAPSHOT
Visit Information Date & Time Provider Department Dept. Phone Encounter #  
 7/13/2017  2:45 PM Shanna Fernandez MD [facility] [phone] [id number] Follow-up Instructions Return in about 3 months (around 10/13/2017) for Medication follow up. Your Appointments 10/12/2017  4:00 PM  
FOLLOW UP EXAM with Lindy Tapia MD  
[address] [facility]) Appt Note: 6 month f/u  
 [address] [facility] [address]  
[facility] [address] Upcoming Health Maintenance Date Due Hepatitis C Screening 1955 EYE EXAM RETINAL OR DILATED Q1 9/7/1967 Pneumococcal 19-64 Medium Risk (1 of 1 - PPSV23) 9/7/1976 DTaP/Tdap/Td series (1 - Tdap) 9/7/1978 PAP AKA CERVICAL CYTOLOGY 9/7/1978 FOBT Q 1 YEAR AGE 50-75 9/7/2007 BREAST CANCER SCRN MAMMOGRAM 4/14/2011 LIPID PANEL Q1 7/23/2017 INFLUENZA AGE 9 TO ADULT 8/1/2017 HEMOGLOBIN A1C Q6M 10/11/2017 FOOT EXAM Q1 4/11/2018 MICROALBUMIN Q1 4/11/2018 Allergies as of 7/13/2017  Review Complete On: 7/13/2017 By: Rachel Elaine Severity Noted Reaction Type Reactions Latex Medium 08/12/2012   Topical Rash Penicillins Medium 08/12/2012   Side Effect Nausea and Vomiting Current Immunizations  Never Reviewed No immunizations on file. Not reviewed this visit You Were Diagnosed With   
  
 Codes Comments DDD (degenerative disc disease), cervical    -  Primary ICD-10-CM: M50.30 ICD-9-CM: 722.4 Muscle spasm     ICD-10-CM: E85.611 ICD-9-CM: 728.85 Myofascial pain     ICD-10-CM: M79.1 ICD-9-CM: 729.1 Right arm pain     ICD-10-CM: N37.376 ICD-9-CM: 729.5 Neck pain     ICD-10-CM: M54.2 ICD-9-CM: 723.1 Vitals  BP Pulse Temp Resp Weight(growth percentile) BMI  
 130/72 (BP 1 Location: Left arm, BP Patient Position: Sitting) 84 98.2 °F (36.8 °C) (Oral) 16 175 lb 3.2 oz (79.5 kg) 29.15 kg/m2 OB Status Smoking Status Menopause Current Every Day Smoker BMI and BSA Data Body Mass Index Body Surface Area  
 29.15 kg/m 2 1.91 m 2 Preferred Pharmacy Pharmacy Name Phone [facility] [phone] [phone] Your Updated Medication List  
  
   
This list is accurate as of: 7/13/17  3:52 PM.  Always use your most recent med list.  
  
  
  
  
 alcohol swabs Padm 1 Each by Apply Externally route Before breakfast, lunch, dinner and at bedtime. accucheck ACHS and record in log. Take log to every doctor visit. **MEDICALLY NECESSARY**  
  
 benztropine 1 mg tablet Commonly known as:  COGENTIN Take 1 mg by mouth daily. Blood-Glucose Meter monitoring kit Commonly known as:  FREESTYLE LITE METER  
accucheck ACHS and record in log. Take log to every doctor visit. **MEDICALLY NECESSARY**  
  
 gabapentin 300 mg capsule Commonly known as:  NEURONTIN Take 1-2 po q am and 1-2 po q evening  
  
 glucose blood VI test strips strip Commonly known as:  FREESTYLE LITE STRIPS Check glucose ACHS. HYDROcodone-acetaminophen 5-325 mg per tablet Commonly known as:  Iona Duran Take 1 Tab by mouth every eight (8) hours as needed for Pain. Max Daily Amount: 3 Tabs. insulin glargine 100 unit/mL injection Commonly known as:  LANTUS  
20 Units by SubCUTAneous route every twelve (12) hours. * insulin lispro 100 unit/mL injection Commonly known as:  HUMALOG For Blood Sugar (mg/dL) of: <150 =   0 units; 150 -199 =   2 units; 200 -249 =   4 units; 250 -299 =   6 units; 300 -349 = 8 units; 350 and above =   10 units * insulin lispro 100 unit/mL injection Commonly known as:  HUMALOG  
10 Units by SubCUTAneous route Before breakfast, lunch, and dinner. Lancets Misc Check glucose ACHS  
  
 lisinopril 10 mg tablet Commonly known as:  Armin Lamarhector Take 1 Tab by mouth daily. meloxicam 15 mg tablet Commonly known as:  MOBIC  
TAKE ONE TABLET BY MOUTH ONCE DAILY-- take with food and use as needed  
  
 methocarbamol 500 mg tablet Commonly known as:  ROBAXIN  
TAKE ONE TABLET BY MOUTH 2 TO 3 TIMES DAILY AS NEEDED FOR SPASMS  
  
 perphenazine 4 mg tablet Commonly known as:  TRILAFON Take 4 mg by mouth three (3) times daily. pravastatin 40 mg tablet Commonly known as:  PRAVACHOL Take 1 Tab by mouth nightly. predniSONE 10 mg tablet Commonly known as:  DELTASONE  
2 pills in am for 3 days, then 1 pill in am for 3 days and 1/2 pill in am for remainder * sertraline 100 mg tablet Commonly known as:  ZOLOFT Take 100 mg by mouth daily. * sertraline 50 mg tablet Commonly known as:  ZOLOFT Take 50 mg by mouth daily. tiZANidine 2 mg tablet Commonly known as:  ZANAFLEX  
1 tablet three times a day  
  
 traZODone 150 mg tablet Commonly known as:  [facility] Take 600 mg by mouth nightly. * Notice: This list has 4 medication(s) that are the same as other medications prescribed for you. Read the directions carefully, and ask your doctor or other care provider to review them with you. Prescriptions Printed Refills  
 meloxicam (MOBIC) 15 mg tablet 4 Sig: TAKE ONE TABLET BY MOUTH ONCE DAILY-- take with food and use as needed Class: Print Prescriptions Sent to Pharmacy Refills  
 methocarbamol (ROBAXIN) 500 mg tablet 0 Sig: TAKE ONE TABLET BY MOUTH 2 TO 3 TIMES DAILY AS NEEDED FOR SPASMS Class: Normal  
 [address] Ph #: [phone]  
 gabapentin (NEURONTIN) 300 mg capsule 3 Sig: Take 1-2 po q am and 1-2 po q evening Class: Normal  
 [address] Ph #: [phone] Follow-up Instructions Return in about 3 months (around 10/13/2017) for Medication follow up. Patient Instructions Neck Arthritis: Exercises Your Care Instructions Here are some examples of typical rehabilitation exercises for your condition. Start each exercise slowly. Ease off the exercise if you start to have pain. Your doctor or physical therapist will tell you when you can start these exercises and which ones will work best for you. How to do the exercises Neck stretches to the side 1. This stretch works best if you keep your shoulder down as you lean away from it. To help you remember to do this, start by relaxing your shoulders and lightly holding on to your thighs or your chair. 2. Tilt your head toward your shoulder and hold for 15 to 30 seconds. Let the weight of your head stretch your muscles. 3. Repeat 2 to 4 times toward each shoulder. Chin tuck 1. Lie on the floor with a rolled-up towel under your neck. Your head should be touching the floor. 2. Slowly bring your chin toward your chest. 
3. Hold for a count of 6, and then relax for up to 10 seconds. 4. Repeat 8 to 12 times. Active cervical rotation 1. Sit in a firm chair, or stand up straight. 2. Keeping your chin level, turn your head to the right, and hold for 15 to 30 seconds. 3. Turn your head to the left and hold for 15 to 30 seconds. 4. Repeat 2 to 4 times to each side. Shoulder blade squeeze 1. While standing, squeeze your shoulder blades together. 2. Do not raise your shoulders up as you are squeezing. 3. Hold for 6 seconds. 4. Repeat 8 to 12 times. Shoulder rolls 1. Sit comfortably with your feet shoulder-width apart. You can also do this exercise standing up. 2. Roll your shoulders up, then back, and then down in a smooth, circular motion. 3. Repeat 2 to 4 times. Follow-up care is a key part of your treatment and safety.  Be sure to make and go to all appointments, and call your doctor if you are having problems. It's also a good idea to know your test results and keep a list of the medicines you take. Where can you learn more? Go to http://bernardino-prateek.info/. Enter E390 in the search box to learn more about \"Neck Arthritis: Exercises. \" Current as of: March 21, 2017 Content Version: 11.3 © 5977-5356 [employer]. Care instructions adapted under license by [employer] (which disclaims liability or warranty for this information). If you have questions about a medical condition or this instruction, always ask your healthcare professional. [place] [address] any warranty or liability for your use of this information. Healthy Upper Back: Exercises Your Care Instructions Here are some examples of exercises for your upper back. Start each exercise slowly. Ease off the exercise if you start to have pain. Your doctor or physical therapist will tell you when you can start these exercises and which ones will work best for you. How to do the exercises Lower neck and upper back stretch 4. Stretch your arms out in front of your body. Clasp one hand on top of your other hand. 5. Gently reach out so that you feel your shoulder blades stretching away from each other. 6. Gently bend your head forward. 7. Hold for 15 to 30 seconds. 8. Repeat 2 to 4 times. Midback stretch Note: If you have knee pain, do not do this exercise. 5. Kneel on the floor, and sit back on your ankles. 6. Lean forward, place your hands on the floor, and stretch your arms out in front of you. Rest your head between your arms. 7. Gently push your chest toward the floor, reaching as far in front of you as possible. 8. Hold for 15 to 30 seconds. 9. Repeat 2 to 4 times. Shoulder rolls 5. Sit comfortably with your feet shoulder-width apart. You can also do this exercise while standing. 6. Roll your shoulders up, then back, and then down in a smooth, circular motion. 7. Repeat 2 to 4 times. Wall push-up 5. Stand against a wall with your feet about 12 to 24 inches back from the wall. If you feel any pain when you do this exercise, stand closer to the wall. 6. Place your hands on the wall slightly wider apart than your shoulders, and lean forward. 7. Gently lean your body toward the wall. Then push back to your starting position. Keep the motion smooth and controlled. 8. Repeat 8 to 12 times. Resisted shoulder blade squeeze Note: For this exercise, you will need elastic exercise material, such as surgical tubing or Thera-Band. 4. Sit or stand, holding the band in both hands in front of you. Keep your elbows close to your sides, bent at a 90-degree angle. Your palms should face up. 5. Squeeze your shoulder blades together, and move your arms to the outside, stretching the band. Be sure to keep your elbows at your sides while you do this. 6. Relax. 7. Repeat 8 to 12 times. Resisted rows Note: For this exercise, you will need elastic exercise material, such as surgical tubing or Thera-Band. 1. Put the band around a solid object, such as a bedpost, at about waist level. Hold one end of the band in each hand. 2. With your elbows at your sides and bent to 90 degrees, pull the band back to move your shoulder blades toward each other. Return to the starting position. 3. Repeat 8 to 12 times. Follow-up care is a key part of your treatment and safety. Be sure to make and go to all appointments, and call your doctor if you are having problems. It's also a good idea to know your test results and keep a list of the medicines you take. Where can you learn more? Go to http://bernardino-prateek.info/. Enter H733 in the search box to learn more about \"Healthy Upper Back: Exercises. \" Current as of: March 21, 2017 Content Version: 11.3 © 4488-4045 [employer].  Care instructions adapted under license by [address] (which disclaims liability or warranty for this information). If you have questions about a medical condition or this instruction, always ask your healthcare professional. [address] any warranty or liability for your use of this information. Please provide this summary of care documentation to your next provider. Your primary care clinician is listed as Sheba Johns. If you have any questions after today's visit, please call [phone].

## 2017-07-25 RX ORDER — INSULIN GLARGINE 100 [IU]/ML
INJECTION, SOLUTION SUBCUTANEOUS
Qty: 20 VIAL | Refills: 0 | Status: SHIPPED | OUTPATIENT
Start: 2017-07-25 | End: 2018-03-19 | Stop reason: SDUPTHER

## 2017-09-11 ENCOUNTER — OFFICE VISIT (OUTPATIENT)
Dept: FAMILY MEDICINE CLINIC | Age: 62
End: 2017-09-11

## 2017-09-11 VITALS
WEIGHT: 171 LBS | HEIGHT: 65 IN | RESPIRATION RATE: 20 BRPM | DIASTOLIC BLOOD PRESSURE: 79 MMHG | BODY MASS INDEX: 28.49 KG/M2 | TEMPERATURE: 98.3 F | SYSTOLIC BLOOD PRESSURE: 152 MMHG | HEART RATE: 82 BPM | OXYGEN SATURATION: 96 %

## 2017-09-11 DIAGNOSIS — Z23 ENCOUNTER FOR IMMUNIZATION: ICD-10-CM

## 2017-09-11 DIAGNOSIS — F32.A DEPRESSION, UNSPECIFIED DEPRESSION TYPE: ICD-10-CM

## 2017-09-11 DIAGNOSIS — E78.2 MIXED HYPERLIPIDEMIA: Chronic | ICD-10-CM

## 2017-09-11 RX ORDER — SERTRALINE HYDROCHLORIDE 50 MG/1
50 TABLET, FILM COATED ORAL DAILY
Qty: 30 TAB | Refills: 3 | Status: SHIPPED | OUTPATIENT
Start: 2017-09-11 | End: 2018-12-04 | Stop reason: SDUPTHER

## 2017-09-11 NOTE — PATIENT INSTRUCTIONS
Noninsulin Medicines for Type 2 Diabetes: Care Instructions  Your Care Instructions  There are different types of noninsulin medicines for diabetes. Each works in a different way. But they all help you control your blood sugar. Some types help your body make insulin to lower your blood sugar. Others lower how much insulin your body needs. Some can slow how fast your body digests sugars. And some can remove extra glucose through your urine. · Alpha-glucosidase inhibitors. These keep starches from breaking down. This means that they lower the amount of glucose absorbed when you eat. They don't help your body make more insulin. So they will not cause low blood sugar unless you use them with other medicines for diabetes. They include acarbose and miglitol. · DPP-4 inhibitors. These help your body raise the level of insulin after you eat. They also help your body make less of a hormone that raises blood sugar. They include linagliptin, saxagliptin, and sitagliptin. · Incretin hormones (GLP-1 receptor agonists). Your body makes a protein that can raise your insulin level. It also can lower your blood sugar and make you less hungry. You can get shots of hormones that work the same way. They include exenatide and liraglutide. · Meglitinides. These help your body release insulin. They also help slow how your body digests sugars. So they can keep your blood sugar from rising too fast after you eat. They include nateglinide and repaglinide. · Metformin. This lowers how much glucose your liver makes. And it helps you respond better to insulin. It also lowers the amount of stored sugar that your liver releases when you are not eating. · SGLT2 inhibitors. These help to remove extra glucose through your urine. They may also help some people lose weight. They include canagliflozin, dapagliflozin, and empagliflozin. · Sulfonylureas. These help your body release more insulin. Some work for many hours.  They can cause low blood sugar if you don't eat as you planned. They include glipizide and glyburide. · Thiazolidinediones. These reduce the amount of blood glucose. They also help you respond better to insulin. They include pioglitazone and rosiglitazone. You may need to take more than one medicine for diabetes. Two or more medicines may work better to lower your blood sugar level than just one does. Follow-up care is a key part of your treatment and safety. Be sure to make and go to all appointments, and call your doctor if you are having problems. It's also a good idea to know your test results and keep a list of the medicines you take. How can you care for yourself at home? · Eat a healthy diet. Get some exercise each day. This may help you to reduce how much medicine you need. · Do not take other prescription or over-the-counter medicines, vitamins, herbal products, or supplements without talking to your doctor first. Some medicines for type 2 diabetes can cause problems with other medicines or supplements. · Tell your doctor if you plan to get pregnant. Some of these drugs are not safe for pregnant women. · Be safe with medicines. Take your medicines exactly as prescribed. Meglitinides and sulfonylureas can cause your blood sugar to drop very low. Call your doctor if you think you are having a problem with your medicine. · Check your blood sugar often. You can use a glucose monitor. Keeping track can help you know how certain foods, activities, and medicines affect your blood sugar. And it can help you keep your blood sugar from getting so low that it's not safe. When should you call for help? Call 911 anytime you think you may need emergency care. For example, call if:  · You passed out (lost consciousness). · You are confused or cannot think clearly. · Your blood sugar is very high or very low.   Watch closely for changes in your health, and be sure to contact your doctor if:  · Your blood sugar stays outside the level your doctor set for you. · You have any problems. Where can you learn more? Go to http://bernardino-prateek.info/. Enter H153 in the search box to learn more about \"Noninsulin Medicines for Type 2 Diabetes: Care Instructions. \"  Current as of: March 13, 2017  Content Version: 11.3  © 1994-0928 [employer]. Care instructions adapted under license by [employer] (which disclaims liability or warranty for this information). If you have questions about a medical condition or this instruction, always ask your healthcare professional. [address] any warranty or liability for your use of this information.

## 2017-09-11 NOTE — PROGRESS NOTES
Chronic Illness Visit    Today's Date:  2017  Patient's Name: Sherly Wilkes   Patient's :  1955     History:     Chief Complaint   Patient presents with    Follow Up Chronic Condition     pt here for follow up chronic condition DM       Sherly Wilkes is a 63 y.o. female presenting for a follow up of Chronic Illness. Diabetes/Hyperlipidemia/Hypertension     BP today is not at goal today <130/80. Patients risk factors include: denies  Home Blood Sugars are in the following range: <100. Denies hypoglycemic episodes  Recent hospitalizations: yes, for DKA  Medications regimen is as follows: see below  Last HgbA1C:  6.3 (2016) and 6.4 (2017)  Daily exercise and diet are as follows: denies  Weight is stable  Takes the below meds regularly with no side effects. Taking daily ASA 81 mg. Last LDL: 139  Last DM eye exam: unknown  Last DM foot exam: 2017  Last urine microalbumin: 2017     Chronic Back Pain    Onset: 12 months ago. Pain is worsening [facility] Patient is being followed by the [address] [place] [address] but denies resolution in pain. Started on prednisone and muscle relaxers no improvements. Neck and upper back pain acute onset  Denies history of trauma/injury  Character of Pain: achy/throbbin  Denies problem with bowels or urine  Alleviating/Agravating Factors: worse w/ activity  Current meds: none    Past Medical History:   Diagnosis Date    CAD (coronary artery disease)     Hypertension     Uterine fibroid      No past surgical history on file.   Social History     Social History    Marital status: SINGLE     Spouse name: N/A    Number of children: N/A    Years of education: N/A     Social History Main Topics    Smoking status: Current Every Day Smoker     Packs/day: 0.25     Years: 2.00    Smokeless tobacco: None    Alcohol use No    Drug use: Yes     Special: Marijuana    Sexual activity: Not Asked     Other Topics Concern    None     Social History Narrative     Family History   Problem Relation Age of Onset    Diabetes Mother     Stroke Mother     Diabetes Father     Cancer Paternal Grandfather      Allergies   Allergen Reactions    Latex Rash    Penicillins Nausea and Vomiting       Problem List:      Patient Active Problem List   Diagnosis Code    Type [facility] mellitus, uncontrolled [facility] E11.65    Mixed hyperlipidemia E78.2    Myofascial pain M79.1    DDD (degenerative disc disease), cervical M50.30    Osteoarthritis of spine with radiculopathy, cervical region M47.22    Right shoulder pain M25.511    Tobacco use disorder F17.200    Muscle spasm M62.838       Medications:     Current Outpatient Prescriptions   Medication Sig    sertraline (ZOLOFT) 50 mg tablet Take 1 Tab by mouth daily.  methocarbamol (ROBAXIN) 500 mg tablet TAKE ONE TABLET BY MOUTH 2 TO 3 TIMES DAILY AS NEEDED FOR SPASMS    LANTUS 100 unit/mL injection INJECT 20 UNITS SUBCUTANEOUSLY EVERY 12 HOURS    methocarbamol (ROBAXIN) 500 mg tablet TAKE ONE TABLET BY MOUTH 2 TO 3 TIMES DAILY AS NEEDED FOR SPASMS    gabapentin (NEURONTIN) 300 mg capsule Take 1-2 po q am and 1-2 po q evening    meloxicam (MOBIC) 15 mg tablet TAKE ONE TABLET BY MOUTH ONCE DAILY-- take with food and use as needed    pravastatin (PRAVACHOL) 40 mg tablet Take 1 Tab by mouth nightly.  glucose blood VI test strips (FREESTYLE LITE STRIPS) strip Check glucose ACHS.  Lancets misc Check glucose ACHS    insulin glargine (LANTUS) 100 unit/mL injection 20 Units by SubCUTAneous route every twelve (12) hours.  traZODone (DESYREL) 150 mg tablet Take 600 mg by mouth nightly.  sertraline (ZOLOFT) 100 mg tablet Take 100 mg by mouth daily.  perphenazine (TRILAFON) 4 mg tablet Take 4 mg by mouth three (3) times daily.  benztropine (COGENTIN) 1 mg tablet Take 1 mg by mouth daily.  lisinopril (PRINIVIL, ZESTRIL) 10 mg tablet Take 1 Tab by mouth daily.     predniSONE (DELTASONE) 10 mg tablet 2 pills in am for 3 days, then 1 pill in am for 3 days and 1/2 pill in am for remainder    HYDROcodone-acetaminophen (NORCO) 5-325 mg per tablet Take 1 Tab by mouth every eight (8) hours as needed for Pain. Max Daily Amount: 3 Tabs.  insulin lispro (HUMALOG) 100 unit/mL injection 10 Units by SubCUTAneous route Before breakfast, lunch, and dinner.  tiZANidine (ZANAFLEX) 2 mg tablet 1 tablet three times a day    Blood-Glucose Meter (FREESTYLE LITE METER) monitoring kit accucheck ACHS and record in log. Take log to every doctor visit. **MEDICALLY NECESSARY**    insulin lispro (HUMALOG) 100 unit/mL injection For Blood Sugar (mg/dL) of: <150 =   0 units; 150 -199 =   2 units; 200 -249 =   4 units; 250 -299 =   6 units; 300 -349 = 8 units; 350 and above =   10 units    alcohol swabs padm 1 Each by Apply Externally route Before breakfast, lunch, dinner and at bedtime. accucheck ACHS and record in log. Take log to every doctor visit. **MEDICALLY NECESSARY**     No current facility-administered medications for this visit.           Constitutional: negative for fevers, chills, sweats and fatigue  Respiratory: negative for cough, sputum or wheezing  Cardiovascular: negative for chest pain, chest pressure/discomfort, dyspnea  Gastrointestinal: negative for nausea, vomiting, diarrhea, constipation and abdominal pain  Musculoskeletal:positive for myalgias, neck pain and back pain, negative for muscle weakness  Neurological: negative for headaches and dizziness  Behavioral/Psych: negative for anxiety and depression    Physical Assessment:   VS:    Visit Vitals    /79 (BP 1 Location: Left arm, BP Patient Position: Sitting)    Pulse 82    Temp 98.3 °F (36.8 °C) (Oral)    Resp 20    Ht 5' 5\" (1.651 m)    Wt 171 lb (77.6 kg)    SpO2 96%    BMI 28.46 kg/m2       Lab Results   Component Value Date/Time    Sodium 141 07/25/2016 12:47 AM    Potassium 3.4 07/25/2016 12:47 AM    Chloride 110 07/25/2016 12:47 AM CO2 22 07/25/2016 12:47 AM    Anion gap 9 07/25/2016 12:47 AM    Glucose 259 07/25/2016 12:47 AM    BUN 10 07/25/2016 12:47 AM    Creatinine 0.86 07/25/2016 12:47 AM    BUN/Creatinine ratio 12 07/25/2016 12:47 AM    GFR est AA >60 07/25/2016 12:47 AM    GFR est non-AA >60 07/25/2016 12:47 AM    Calcium 8.4 07/25/2016 12:47 AM       General:   Well-groomed, well-nourished, in no distress, pleasant, alert, appropriate and conversant. Mouth:  Good dentition, oropharynx WNL without membranes, exudates, petechiae or ulcers  Neck:   Neck supple, no swelling, mass or tenderness, no thyromegaly  Cardiovasc:   RRR, no MRG. Pulses 2+ and symmetric at distal extremities. Pulmonary:   Lungs clear bilaterally. Normal respiratory effort. Abdomen:   Abdomen soft, NT, ND, NAB. Extremities:   No edema, no TTP bilateral calves. LEs warm and well-perfused. Neuro:   Alert and oriented, no focal deficits. No facial asymmetry noted. Skin:    No rashes or jaundice  MSK:   Normal ROM, 5/5 muscle strength  Psych:  No pressured speech or abnormal thought content        Assessment/Plan & Orders:       1. Uncontrolled type 2 diabetes mellitus without complication, with long-term current use of insulin ([address]    2. Mixed hyperlipidemia    3. Depression, unspecified depression type        Orders Placed This Encounter    HEMOGLOBIN A1C WITH EAG    METABOLIC PANEL, COMPREHENSIVE    LIPID PANEL    sertraline (ZOLOFT) 50 mg tablet       Diabetes HgbA1c 6.4 stable patient is doing well on current meds will order HgbA1c, CMP and Lipid panel. HTN Mildly elevated BP will start low dose lisinopril  Hyperlipidemia would like patient to continue w/ pravachol  Acute/Chronic Back pain uncontrolled followed by the [facility] [address] [place] [address] Dr. Fred Sullivan     Follow up in 2-3 months    *Plan of care reviewed with patient. Patient in agreement with plan and expresses understanding.  All questions answered and patient encouraged to call or RTO if further questions or concerns.     Rafa Sesay MD  Family Medicine  9/11/2017  5:40 PM

## 2017-09-11 NOTE — MR AVS SNAPSHOT
Visit Information Date & Time Provider Department Dept. Phone Encounter #  
 9/11/2017  5:30 PM MD Anthony Carranza [facility] 81 [phone] [id number] Follow-up Instructions Return in about 3 months (around 12/11/2017). Your Appointments 10/11/2017  9:45 AM  
Follow Up with Zurdo Eaton MD  
[facility]) Appt Note: 3 mo f/u  
 [address] [facility] [address]  
[phone]  
  
   
 [address] [facility],[address] [facility]  
  
    
 10/12/2017  4:00 PM  
FOLLOW UP EXAM with MD Anthony Carranza [address]) Appt Note: 6 month f/u  
 [address] [facility] [address]  
[facility] [address] Upcoming Health Maintenance Date Due Hepatitis C Screening 1955 EYE EXAM RETINAL OR DILATED Q1 9/7/1967 Pneumococcal 19-64 Medium Risk (1 of 1 - PPSV23) 9/7/1976 DTaP/Tdap/Td series (1 - Tdap) 9/7/1978 PAP AKA CERVICAL CYTOLOGY 9/7/1978 FOBT Q 1 YEAR AGE 50-75 9/7/2007 BREAST CANCER SCRN MAMMOGRAM 4/14/2011 ZOSTER VACCINE AGE 60> 7/7/2017 LIPID PANEL Q1 7/23/2017 INFLUENZA AGE 9 TO ADULT 8/1/2017 HEMOGLOBIN A1C Q6M 10/11/2017 FOOT EXAM Q1 4/11/2018 MICROALBUMIN Q1 4/11/2018 Allergies as of 9/11/2017  Review Complete On: 9/11/2017 By: Arslan Mg MD  
  
 Severity Noted Reaction Type Reactions Latex Medium 08/12/2012   Topical Rash Penicillins Medium 08/12/2012   Side Effect Nausea and Vomiting Current Immunizations  Never Reviewed Name Date Influenza Vaccine (Quad) PF  Incomplete Not reviewed this visit You Were Diagnosed With   
  
 Codes Comments Uncontrolled type 2 diabetes mellitus without complication, with long-term current use of insulin ([facility] [address]    -  Primary ICD-10-CM: E11.65, Z79.4 ICD-9-CM: 250.02, V58.67 Mixed hyperlipidemia     ICD-10-CM: E78.2 ICD-9-CM: 272.2 Depression, unspecified depression type     ICD-10-CM: F32.9 ICD-9-CM: 677 Encounter for immunization     ICD-10-CM: N27 ICD-9-CM: V03.89 Vitals BP Pulse Temp Resp Height(growth percentile) Weight(growth percentile) 152/79 (BP 1 Location: Left arm, BP Patient Position: Sitting) 82 98.3 °F (36.8 °C) (Oral) 20 5' 5\" (1.651 m) 171 lb (77.6 kg) SpO2 BMI OB Status Smoking Status 96% 28.46 kg/m2 Menopause Current Every Day Smoker Vitals History BMI and BSA Data Body Mass Index Body Surface Area  
 28.46 kg/m 2 1.89 m 2 Preferred Pharmacy Pharmacy Name Phone [id number] [address] [phone] Your Updated Medication List  
  
   
This list is accurate as of: 9/11/17  6:19 PM.  Always use your most recent med list.  
  
  
  
  
 alcohol swabs Padm 1 Each by Apply Externally route Before breakfast, lunch, dinner and at bedtime. accucheck ACHS and record in log. Take log to every doctor visit. **MEDICALLY NECESSARY**  
  
 benztropine 1 mg tablet Commonly known as:  COGENTIN Take 1 mg by mouth daily. Blood-Glucose Meter monitoring kit Commonly known as:  FREESTYLE LITE METER  
accucheck ACHS and record in log. Take log to every doctor visit. **MEDICALLY NECESSARY**  
  
 gabapentin 300 mg capsule Commonly known as:  NEURONTIN Take 1-2 po q am and 1-2 po q evening  
  
 glucose blood VI test strips strip Commonly known as:  FREESTYLE LITE STRIPS Check glucose ACHS. HYDROcodone-acetaminophen 5-325 mg per tablet Commonly known as:  Marnie Lv Take 1 Tab by mouth every eight (8) hours as needed for Pain. Max Daily Amount: 3 Tabs. * insulin glargine 100 unit/mL injection Commonly known as:  LANTUS  
20 Units by SubCUTAneous route every twelve (12) hours. * LANTUS 100 unit/mL injection Generic drug:  insulin glargine INJECT 20 UNITS SUBCUTANEOUSLY EVERY 12 HOURS  
  
 * insulin lispro 100 unit/mL injection Commonly known as:  HUMALOG For Blood Sugar (mg/dL) of: <150 =   0 units; 150 -199 =   2 units; 200 -249 =   4 units; 250 -299 =   6 units; 300 -349 = 8 units; 350 and above =   10 units * insulin lispro 100 unit/mL injection Commonly known as:  HUMALOG  
10 Units by SubCUTAneous route Before breakfast, lunch, and dinner. Lancets Misc Check glucose ACHS  
  
 lisinopril 10 mg tablet Commonly known as:  Burma Fairy Take 1 Tab by mouth daily. meloxicam 15 mg tablet Commonly known as:  MOBIC  
TAKE ONE TABLET BY MOUTH ONCE DAILY-- take with food and use as needed * methocarbamol 500 mg tablet Commonly known as:  ROBAXIN  
TAKE ONE TABLET BY MOUTH 2 TO 3 TIMES DAILY AS NEEDED FOR SPASMS * methocarbamol 500 mg tablet Commonly known as:  ROBAXIN  
TAKE ONE TABLET BY MOUTH 2 TO 3 TIMES DAILY AS NEEDED FOR SPASMS  
  
 perphenazine 4 mg tablet Commonly known as:  TRILAFON Take 4 mg by mouth three (3) times daily. pravastatin 40 mg tablet Commonly known as:  PRAVACHOL Take 1 Tab by mouth nightly. predniSONE 10 mg tablet Commonly known as:  DELTASONE  
2 pills in am for 3 days, then 1 pill in am for 3 days and 1/2 pill in am for remainder * sertraline 100 mg tablet Commonly known as:  ZOLOFT Take 100 mg by mouth daily. * sertraline 50 mg tablet Commonly known as:  ZOLOFT Take 1 Tab by mouth daily. tiZANidine 2 mg tablet Commonly known as:  ZANAFLEX  
1 tablet three times a day  
  
 traZODone 150 mg tablet Commonly known as:  Donneta Maringouin Take 600 mg by mouth nightly. * Notice: This list has 8 medication(s) that are the same as other medications prescribed for you. Read the directions carefully, and ask your doctor or other care provider to review them with you. Prescriptions Sent to Pharmacy Refills  
 sertraline (ZOLOFT) 50 mg tablet 3 Sig: Take 1 Tab by mouth daily. Class: Normal  
 Pharmacy: [place] [id number]ika Thomas.  #: [phone] Route: Oral  
  
We Performed the Following INFLUENZA VIRUS VAC QUAD,SPLIT,PRESV FREE SYRINGE IM [id number] CPT(R)] SD IMMUNIZ ADMIN,1 SINGLE/COMB VAC/TOXOID [id number] CPT(R)] Follow-up Instructions Return in about 3 months (around 12/11/2017). To-Do List   
 09/11/2017 Lab:  HEMOGLOBIN A1C WITH EAG   
  
 09/11/2017 Lab:  LIPID PANEL   
  
 09/11/2017 Lab:  METABOLIC PANEL, COMPREHENSIVE Patient Instructions Noninsulin Medicines for Type 2 Diabetes: Care Instructions Your Care Instructions There are different types of noninsulin medicines for diabetes. Each works in a different way. But they all help you control your blood sugar. Some types help your body make insulin to lower your blood sugar. Others lower how much insulin your body needs. Some can slow how fast your body digests sugars. And some can remove extra glucose through your urine. · Alpha-glucosidase inhibitors. These keep starches from breaking down. This means that they lower the amount of glucose absorbed when you eat. They don't help your body make more insulin. So they will not cause low blood sugar unless you use them with other medicines for diabetes. They include acarbose and miglitol. · DPP-4 inhibitors. These help your body raise the level of insulin after you eat. They also help your body make less of a hormone that raises blood sugar. They include linagliptin, saxagliptin, and sitagliptin. · Incretin hormones (GLP-1 receptor agonists). Your body makes a protein that can raise your insulin level. It also can lower your blood sugar and make you less hungry. You can get shots of hormones that work the same way. They include exenatide and liraglutide. · Meglitinides. These help your body release insulin. They also help slow how your body digests sugars. So they can keep your blood sugar from rising too fast after you eat. They include nateglinide and repaglinide. · Metformin. This lowers how much glucose your liver makes. And it helps you respond better to insulin. It also lowers the amount of stored sugar that your liver releases when you are not eating. · SGLT2 inhibitors. These help to remove extra glucose through your urine. They may also help some people lose weight. They include canagliflozin, dapagliflozin, and empagliflozin. · Sulfonylureas. These help your body release more insulin. Some work for many hours. They can cause low blood sugar if you don't eat as you planned. They include glipizide and glyburide. · Thiazolidinediones. These reduce the amount of blood glucose. They also help you respond better to insulin. They include pioglitazone and rosiglitazone. You may need to take more than one medicine for diabetes. Two or more medicines may work better to lower your blood sugar level than just one does. Follow-up care is a key part of your treatment and safety. Be sure to make and go to all appointments, and call your doctor if you are having problems. It's also a good idea to know your test results and keep a list of the medicines you take. How can you care for yourself at home? · Eat a healthy diet. Get some exercise each day. This may help you to reduce how much medicine you need. · Do not take other prescription or over-the-counter medicines, vitamins, herbal products, or supplements without talking to your doctor first. Some medicines for type 2 diabetes can cause problems with other medicines or supplements. · Tell your doctor if you plan to get pregnant. Some of these drugs are not safe for pregnant women. · Be safe with medicines. Take your medicines exactly as prescribed. Meglitinides and sulfonylureas can cause your blood sugar to drop very low. Call your doctor if you think you are having a problem with your medicine. · Check your blood sugar often. You can use a glucose monitor. Keeping track can help you know how certain foods, activities, and medicines affect your blood sugar. And it can help you keep your blood sugar from getting so low that it's not safe. When should you call for help? Call 911 anytime you think you may need emergency care. For example, call if: 
· You passed out (lost consciousness). · You are confused or cannot think clearly. · Your blood sugar is very high or very low. Watch closely for changes in your health, and be sure to contact your doctor if: 
· Your blood sugar stays outside the level your doctor set for you. · You have any problems. Where can you learn more? Go to http://bernardino-prateek.info/. Enter H153 in the search box to learn more about \"Noninsulin Medicines for Type 2 Diabetes: Care Instructions. \" Current as of: March 13, 2017 Content Version: 11.3 © 8869-5352 [employer]. Care instructions adapted under license by [employer] (which disclaims liability or warranty for this information). If you have questions about a medical condition or this instruction, always ask your healthcare professional. [address] any warranty or liability for your use of this information. Please provide this summary of care documentation to your next provider. Your primary care clinician is listed as Conrad Lopez. If you have any questions after today's visit, please call [phone].

## 2017-09-23 ENCOUNTER — HOSPITAL ENCOUNTER (OUTPATIENT)
Dept: LAB | Age: 62
Discharge: HOME | End: 2017-09-23
Payer: SELF-PAY

## 2017-09-23 DIAGNOSIS — E78.2 MIXED HYPERLIPIDEMIA: Chronic | ICD-10-CM

## 2017-09-23 LAB
ALBUMIN SERPL-MCNC: 3.8 G/DL (ref 3.4–5)
ALBUMIN/GLOB SERPL: 0.9 {RATIO} (ref 0.8–1.7)
ALP SERPL-CCNC: 61 U/L (ref 45–117)
ALT SERPL-CCNC: 40 U/L (ref 13–56)
ANION GAP SERPL CALC-SCNC: 7 MMOL/L (ref 3–18)
AST SERPL-CCNC: 21 U/L (ref 15–37)
BILIRUB SERPL-MCNC: 0.6 MG/DL (ref 0.2–1)
BUN SERPL-MCNC: 13 MG/DL (ref 7–18)
BUN/CREAT SERPL: 15 (ref 12–20)
CALCIUM SERPL-MCNC: 9.4 MG/DL (ref 8.5–10.1)
CHLORIDE SERPL-SCNC: 106 MMOL/L (ref 100–108)
CHOLEST SERPL-MCNC: 191 MG/DL
CO2 SERPL-SCNC: 28 MMOL/L (ref 21–32)
CREAT SERPL-MCNC: 0.89 MG/DL (ref 0.6–1.3)
EST. AVERAGE GLUCOSE BLD GHB EST-MCNC: 143 MG/DL
GLOBULIN SER CALC-MCNC: 4.1 G/DL (ref 2–4)
GLUCOSE SERPL-MCNC: 91 MG/DL (ref 74–99)
HBA1C MFR BLD: 6.6 % (ref 4.2–5.6)
HDLC SERPL-MCNC: 65 MG/DL (ref 40–60)
HDLC SERPL: 2.9 {RATIO} (ref 0–5)
LDLC SERPL CALC-MCNC: 98.8 MG/DL (ref 0–100)
LIPID PROFILE,FLP: ABNORMAL
POTASSIUM SERPL-SCNC: 3.9 MMOL/L (ref 3.5–5.5)
PROT SERPL-MCNC: 7.9 G/DL (ref 6.4–8.2)
SODIUM SERPL-SCNC: 141 MMOL/L (ref 136–145)
TRIGL SERPL-MCNC: 136 MG/DL (ref ?–150)
VLDLC SERPL CALC-MCNC: 27.2 MG/DL

## 2017-09-23 PROCEDURE — 80061 LIPID PANEL: CPT | Performed by: FAMILY MEDICINE

## 2017-09-23 PROCEDURE — 36415 COLL VENOUS BLD VENIPUNCTURE: CPT | Performed by: FAMILY MEDICINE

## 2017-09-23 PROCEDURE — 83036 HEMOGLOBIN GLYCOSYLATED A1C: CPT | Performed by: FAMILY MEDICINE

## 2017-09-23 PROCEDURE — 80053 COMPREHEN METABOLIC PANEL: CPT | Performed by: FAMILY MEDICINE

## 2017-10-10 DIAGNOSIS — M79.601 RIGHT ARM PAIN: ICD-10-CM

## 2017-10-10 DIAGNOSIS — M50.30 DDD (DEGENERATIVE DISC DISEASE), CERVICAL: ICD-10-CM

## 2017-10-10 DIAGNOSIS — M54.2 NECK PAIN: ICD-10-CM

## 2017-10-10 RX ORDER — MELOXICAM 15 MG/1
TABLET ORAL
Qty: 30 TAB | Refills: 4 | Status: SHIPPED | OUTPATIENT
Start: 2017-10-10 | End: 2017-11-28 | Stop reason: SDUPTHER

## 2017-10-23 DIAGNOSIS — E78.2 MIXED HYPERLIPIDEMIA: Chronic | ICD-10-CM

## 2017-10-23 RX ORDER — INSULIN GLARGINE 100 [IU]/ML
INJECTION, SOLUTION SUBCUTANEOUS
Qty: 10 VIAL | Refills: 5 | Status: SHIPPED | OUTPATIENT
Start: 2017-10-23 | End: 2018-12-05 | Stop reason: SDUPTHER

## 2017-10-24 RX ORDER — PRAVASTATIN SODIUM 40 MG/1
TABLET ORAL
Qty: 30 TAB | Refills: 2 | Status: SHIPPED | OUTPATIENT
Start: 2017-10-24 | End: 2017-12-18 | Stop reason: SDUPTHER

## 2017-11-06 ENCOUNTER — TELEPHONE (OUTPATIENT)
Dept: FAMILY MEDICINE CLINIC | Age: 62
End: 2017-11-06

## 2017-11-06 ENCOUNTER — DOCUMENTATION ONLY (OUTPATIENT)
Dept: ORTHOPEDIC SURGERY | Age: 62
End: 2017-11-06

## 2017-11-06 NOTE — TELEPHONE ENCOUNTER
Call made to the pt using two identifiers, name and . Pt made aware that the forms that she dropped of (Physcian's Report Physical Residual Functional Capacity Evaluation), has to completed by Dr. Ralph Rawls, who is following her for the Chronic Back Pain. Pt verbalized understanding.

## 2017-11-28 ENCOUNTER — OFFICE VISIT (OUTPATIENT)
Dept: ORTHOPEDIC SURGERY | Age: 62
End: 2017-11-28

## 2017-11-28 VITALS
SYSTOLIC BLOOD PRESSURE: 151 MMHG | HEIGHT: 65 IN | HEART RATE: 79 BPM | BODY MASS INDEX: 28.66 KG/M2 | RESPIRATION RATE: 18 BRPM | OXYGEN SATURATION: 97 % | WEIGHT: 172 LBS | DIASTOLIC BLOOD PRESSURE: 80 MMHG | TEMPERATURE: 99 F

## 2017-11-28 DIAGNOSIS — M25.511 RIGHT SHOULDER PAIN, UNSPECIFIED CHRONICITY: ICD-10-CM

## 2017-11-28 DIAGNOSIS — M47.894 OTHER OSTEOARTHRITIS OF SPINE, THORACIC REGION: ICD-10-CM

## 2017-11-28 DIAGNOSIS — M79.18 MYOFASCIAL PAIN: Primary | ICD-10-CM

## 2017-11-28 DIAGNOSIS — M62.838 MUSCLE SPASM: ICD-10-CM

## 2017-11-28 DIAGNOSIS — M50.30 DDD (DEGENERATIVE DISC DISEASE), CERVICAL: ICD-10-CM

## 2017-11-28 RX ORDER — METHOCARBAMOL 500 MG/1
TABLET, FILM COATED ORAL
Qty: 75 TAB | Refills: 0 | Status: SHIPPED | OUTPATIENT
Start: 2017-11-28 | End: 2017-12-21 | Stop reason: SDUPTHER

## 2017-11-28 RX ORDER — GABAPENTIN 300 MG/1
CAPSULE ORAL
Qty: 120 CAP | Refills: 3 | Status: SHIPPED | OUTPATIENT
Start: 2017-11-28 | End: 2018-07-10 | Stop reason: SDUPTHER

## 2017-11-28 RX ORDER — MELOXICAM 15 MG/1
TABLET ORAL
Qty: 90 TAB | Refills: 1 | Status: SHIPPED | OUTPATIENT
Start: 2017-11-28 | End: 2017-12-18 | Stop reason: SDUPTHER

## 2017-11-28 NOTE — MR AVS SNAPSHOT
Visit Information Date & Time Provider Department Dept. Phone Encounter #  
 11/28/2017  3:15 PM Gunjan Devi MD [address] [facility] [phone] [id number] Follow-up Instructions Return in about 3 months (around 2/28/2018) for Medication follow up. Your Appointments 12/4/2017  4:00 PM  
FOLLOW UP EXAM with SHEILA Anton [facility] Appt Note: f/u appt [address] [facility] [address]  
[facility] [address] Upcoming Health Maintenance Date Due Hepatitis C Screening 1955 EYE EXAM RETINAL OR DILATED Q1 9/7/1967 Pneumococcal 19-64 Medium Risk (1 of 1 - PPSV23) 9/7/1976 DTaP/Tdap/Td series (1 - Tdap) 9/7/1978 PAP AKA CERVICAL CYTOLOGY 9/7/1978 FOBT Q 1 YEAR AGE 50-75 9/7/2007 BREAST CANCER SCRN MAMMOGRAM 4/14/2011 ZOSTER VACCINE AGE 60> 7/7/2017 HEMOGLOBIN A1C Q6M 3/23/2018 FOOT EXAM Q1 4/11/2018 MICROALBUMIN Q1 4/11/2018 LIPID PANEL Q1 9/23/2018 Allergies as of 11/28/2017  Review Complete On: 11/28/2017 By: Gunjan Devi MD  
  
 Severity Noted Reaction Type Reactions Latex Medium 08/12/2012   Topical Rash Penicillins Medium 08/12/2012   Side Effect Nausea and Vomiting Current Immunizations  Reviewed on 9/11/2017 Name Date Influenza Vaccine (Quad) PF 9/11/2017 Not reviewed this visit You Were Diagnosed With   
  
 Codes Comments Myofascial pain    -  Primary ICD-10-CM: M79.1 ICD-9-CM: 729.1 Muscle spasm     ICD-10-CM: J06.718 ICD-9-CM: 728.85 DDD (degenerative disc disease), cervical     ICD-10-CM: M50.30 ICD-9-CM: 722.4 Right shoulder pain, unspecified chronicity     ICD-10-CM: M25.511 ICD-9-CM: 719.41 Right arm pain     ICD-10-CM: H77.712 ICD-9-CM: 729.5 Neck pain     ICD-10-CM: M54.2 ICD-9-CM: 723.1 Vitals BP Pulse Temp Resp Height(growth percentile) Weight(growth percentile) 151/80 79 99 °F (37.2 °C) (Oral) 18 5' 5\" (1.651 m) 172 lb (78 kg) SpO2 BMI OB Status Smoking Status 97% 28.62 kg/m2 Menopause Current Every Day Smoker BMI and BSA Data Body Mass Index Body Surface Area  
 28.62 kg/m 2 1.89 m 2 [facility] [address] [phone] Your Updated Medication List  
  
   
This list is accurate as of: 11/28/17  4:19 PM.  Always use your most recent med list.  
  
  
  
  
 alcohol swabs Padm 1 Each by Apply Externally route Before breakfast, lunch, dinner and at bedtime. accucheck ACHS and record in log. Take log to every doctor visit. **MEDICALLY NECESSARY**  
  
 benztropine 1 mg tablet Commonly known as:  COGENTIN Take 1 mg by mouth daily. Blood-Glucose Meter monitoring kit Commonly known as:  FREESTYLE LITE METER  
accucheck ACHS and record in log. Take log to every doctor visit. **MEDICALLY NECESSARY**  
  
 gabapentin 300 mg capsule Commonly known as:  NEURONTIN Take 1-2 po q am and 1-2 po q evening  
  
 glucose blood VI test strips strip Commonly known as:  FREESTYLE LITE STRIPS Check glucose ACHS. HYDROcodone-acetaminophen 5-325 mg per tablet Commonly known as:  Wayna Glaze Take 1 Tab by mouth every eight (8) hours as needed for Pain. Max Daily Amount: 3 Tabs. * insulin glargine 100 unit/mL injection Commonly known as:  LANTUS  
20 Units by SubCUTAneous route every twelve (12) hours. * LANTUS 100 unit/mL injection Generic drug:  insulin glargine INJECT 20 UNITS SUBCUTANEOUSLY EVERY 12 HOURS  
  
 * LANTUS 100 unit/mL injection Generic drug:  insulin glargine INJECT 20 UNITS SUBCUTANEOUSLY EVERY 12 HOURS  
  
 * insulin lispro 100 unit/mL injection Commonly known as:  HUMALOG For Blood Sugar (mg/dL) of: <150 =   0 units; 150 -199 =   2 units; 200 -249 =   4 units; 250 -299 =   6 units; 300 -349 = 8 units; 350 and above =   10 units * insulin lispro 100 unit/mL injection Commonly known as:  HUMALOG  
10 Units by SubCUTAneous route Before breakfast, lunch, and dinner. Lancets Misc Check glucose ACHS  
  
 lisinopril 10 mg tablet Commonly known as:  Mccurdy Paradise Take 1 Tab by mouth daily. * meloxicam 15 mg tablet Commonly known as:  MOBIC  
TAKE ONE TABLET BY MOUTH ONCE DAILY-- take with food and use as needed * meloxicam 15 mg tablet Commonly known as:  MOBIC Take 1 daily as needed for pain * methocarbamol 500 mg tablet Commonly known as:  ROBAXIN  
TAKE ONE TABLET BY MOUTH 2 TO 3 TIMES DAILY AS NEEDED FOR SPASMS * methocarbamol 500 mg tablet Commonly known as:  ROBAXIN  
TAKE ONE TABLET BY MOUTH 2 TO 3 TIMES DAILY AS NEEDED FOR SPASMS  
  
 perphenazine 4 mg tablet Commonly known as:  TRILAFON Take 4 mg by mouth three (3) times daily. * pravastatin 40 mg tablet Commonly known as:  PRAVACHOL Take 1 Tab by mouth nightly. * pravastatin 40 mg tablet Commonly known as:  PRAVACHOL  
TAKE ONE TABLET BY MOUTH NIGHTLY. predniSONE 10 mg tablet Commonly known as:  DELTASONE  
2 pills in am for 3 days, then 1 pill in am for 3 days and 1/2 pill in am for remainder * sertraline 100 mg tablet Commonly known as:  ZOLOFT Take 100 mg by mouth daily. * sertraline 50 mg tablet Commonly known as:  ZOLOFT Take 1 Tab by mouth daily. tiZANidine 2 mg tablet Commonly known as:  ZANAFLEX  
1 tablet three times a day  
  
 traZODone 150 mg tablet Commonly known as:  Caleb [place] Take 600 mg by mouth nightly. * Notice: This list has 13 medication(s) that are the same as other medications prescribed for you.  Read the directions carefully, and ask your doctor or other care provider to review them with you. Prescriptions Sent to Pharmacy Refills  
 methocarbamol (ROBAXIN) 500 mg tablet 0 Sig: TAKE ONE TABLET BY MOUTH 2 TO 3 TIMES DAILY AS NEEDED FOR SPASMS Class: Normal  
 Pharmacy: [place] [id number]. Ph #: [phone]  
 gabapentin (NEURONTIN) 300 mg capsule 3 Sig: Take 1-2 po q am and 1-2 po q evening Class: Normal  
 Pharmacy: [place] [id number]. Ph #: [phone]  
 meloxicam (MOBIC) 15 mg tablet 1 Sig: Take 1 daily as needed for pain  
 Class: Normal  
 Pharmacy: [place] [address]. Ph #: [phone] Follow-up Instructions Return in about 3 months (around 2/28/2018) for Medication follow up. Patient Instructions Neck Arthritis: Exercises Your Care Instructions Here are some examples of typical rehabilitation exercises for your condition. Start each exercise slowly. Ease off the exercise if you start to have pain. Your doctor or physical therapist will tell you when you can start these exercises and which ones will work best for you. How to do the exercises Neck stretches to the side 1. This stretch works best if you keep your shoulder down as you lean away from it. To help you remember to do this, start by relaxing your shoulders and lightly holding on to your thighs or your chair. 2. Tilt your head toward your shoulder and hold for 15 to 30 seconds. Let the weight of your head stretch your muscles. 3. Repeat 2 to 4 times toward each shoulder. Chin tuck 1. Lie on the floor with a rolled-up towel under your neck. Your head should be touching the floor. 2. Slowly bring your chin toward your chest. 
3. Hold for a count of 6, and then relax for up to 10 seconds. 4. Repeat 8 to 12 times. Active cervical rotation 1. Sit in a firm chair, or stand up straight. 2. Keeping your chin level, turn your head to the right, and hold for 15 to 30 seconds. 3. Turn your head to the left and hold for 15 to 30 seconds. 4. Repeat 2 to 4 times to each side. Shoulder blade squeeze 1. While standing, squeeze your shoulder blades together. 2. Do not raise your shoulders up as you are squeezing. 3. Hold for 6 seconds. 4. Repeat 8 to 12 times. Shoulder rolls 1. Sit comfortably with your feet shoulder-width apart. You can also do this exercise standing up. 2. Roll your shoulders up, then back, and then down in a smooth, circular motion. 3. Repeat 2 to 4 times. Follow-up care is a key part of your treatment and safety. Be sure to make and go to all appointments, and call your doctor if you are having problems. It's also a good idea to know your test results and keep a list of the medicines you take. Where can you learn more? Go to http://bernardino-prateek.info/. Enter T814 in the search box to learn more about \"Neck Arthritis: Exercises. \" Current as of: March 21, 2017 Content Version: 11.4 © 7609-7308 [employer]. Care instructions adapted under license by [employer] (which disclaims liability or warranty for this information). If you have questions about a medical condition or this instruction, always ask your healthcare professional. [place] [address] any warranty or liability for your use of this information. Please provide this summary of care documentation to your next provider. Your primary care clinician is listed as Cruz Ribeiro. If you have any questions after today's visit, please call [phone].

## 2017-11-28 NOTE — PATIENT INSTRUCTIONS
Neck Arthritis: Exercises  Your Care Instructions  Here are some examples of typical rehabilitation exercises for your condition. Start each exercise slowly. Ease off the exercise if you start to have pain. Your doctor or physical therapist will tell you when you can start these exercises and which ones will work best for you. How to do the exercises  Neck stretches to the side    1. This stretch works best if you keep your shoulder down as you lean away from it. To help you remember to do this, start by relaxing your shoulders and lightly holding on to your thighs or your chair. 2. Tilt your head toward your shoulder and hold for 15 to 30 seconds. Let the weight of your head stretch your muscles. 3. Repeat 2 to 4 times toward each shoulder. Chin tuck    1. Lie on the floor with a rolled-up towel under your neck. Your head should be touching the floor. 2. Slowly bring your chin toward your chest.  3. Hold for a count of 6, and then relax for up to 10 seconds. 4. Repeat 8 to 12 times. Active cervical rotation    1. Sit in a firm chair, or stand up straight. 2. Keeping your chin level, turn your head to the right, and hold for 15 to 30 seconds. 3. Turn your head to the left and hold for 15 to 30 seconds. 4. Repeat 2 to 4 times to each side. Shoulder blade squeeze    1. While standing, squeeze your shoulder blades together. 2. Do not raise your shoulders up as you are squeezing. 3. Hold for 6 seconds. 4. Repeat 8 to 12 times. Shoulder rolls    1. Sit comfortably with your feet shoulder-width apart. You can also do this exercise standing up. 2. Roll your shoulders up, then back, and then down in a smooth, circular motion. 3. Repeat 2 to 4 times. Follow-up care is a key part of your treatment and safety. Be sure to make and go to all appointments, and call your doctor if you are having problems. It's also a good idea to know your test results and keep a list of the medicines you take.   Where can you learn more? Go to http://bernardino-prateek.info/. Enter H494 in the search box to learn more about \"Neck Arthritis: Exercises. \"  Current as of: March 21, 2017  Content Version: 11.4  © 2465-6086 Healthwise, [employer]. Care instructions adapted under license by [employer] (which disclaims liability or warranty for this information). If you have questions about a medical condition or this instruction, always ask your healthcare professional. [address] any warranty or liability for your use of this information.

## 2017-11-28 NOTE — PROGRESS NOTES
[facility]  Sean Douglas., [address] [place] [address], [facility] [address]  Phone: [phone]  Fax: [phone]      ASSESSMENT   Diagnoses and all orders for this visit:    1. Myofascial pain  -     gabapentin (NEURONTIN) 300 mg capsule; Take 1-2 po q am and 1-2 po q evening    2. Muscle spasm  -     methocarbamol (ROBAXIN) 500 mg tablet; TAKE ONE TABLET BY MOUTH 2 TO 3 TIMES DAILY AS NEEDED FOR SPASMS    3. DDD (degenerative disc disease), cervical  -     gabapentin (NEURONTIN) 300 mg capsule; Take 1-2 po q am and 1-2 po q evening  -     meloxicam (MOBIC) 15 mg tablet; Take 1 daily as needed for pain    4. Right shoulder pain, unspecified chronicity  -     meloxicam (MOBIC) 15 mg tablet; Take 1 daily as needed for pain    5. Other osteoarthritis of spine, thoracic region  -     meloxicam (MOBIC) 15 mg tablet; Take 1 daily as needed for pain         IMPRESSION AND PLAN:  Leidy Valadez is a 63 y.o. female with history of cervical pain. She complains of swelling in the neck and continues to experience aching back pain. Pt admits to weakness in the arms. She reports that she has hired a  to help her get disability. Pt has been prescribed Neurontin 300 mg and takes 1-2 tabs QAM and 1-2 tabs QHS. She also takes Mobic 15 mg and Robaxin 500 mg as needed. Pt at this time desires to continue with current care. 1) Pt was given information on cervical arthritis exercises. 2) She received a refill of Robaxin 500 mg 1 tab BID-TID prn muscle spasm. 3) Pt also received a refill of Neurontin 300 mg 1-2 tabs QAM and 1-2 tabs QHS. 4) She received a refill of Mobic 15 mg 1 tab daily. 5) Ms. Ana Cristina Dominguez has a reminder for a \"due or due soon\" health maintenance. I have asked that she contact her primary care provider, Ada Silvestre MD, for follow-up on this health maintenance. 6)  demonstrated consistency with prescribing.    7) Pt will follow-up in 3 months or sooner if needed. HISTORY OF PRESENT ILLNESS:  Eda Jordan is a 63 y.o. female with history of cervical pain. She complains of swelling in the neck and continues to experience aching back pain. Pt admits to weakness in the arms. She reports that she has hired a  to help her get disability. Pt has been prescribed Neurontin 300 mg and takes 1-2 tabs QAM and 1-2 tabs QHS. She also takes Mobic 15 mg and Robaxin 500 mg as needed. Pt at this time desires to continue with current care. Of note, her last creatinine was 0.89 and her GFR was greater than 60. Pain Scale: 10 - Worst pain ever/10    PCP: Katarzyna Marie MD       Past Medical History:   Diagnosis Date    CAD (coronary artery disease)     Hypertension     Uterine fibroid         Social History     Social History    Marital status: SINGLE     Spouse name: N/A    Number of children: N/A    Years of education: N/A     Occupational History    Not on file. Social History Main Topics    Smoking status: Current Every Day Smoker     Packs/day: 0.25     Years: 2.00    Smokeless tobacco: Not on file    Alcohol use No    Drug use: Yes     Special: Marijuana    Sexual activity: Not on file     Other Topics Concern    Not on file     Social History Narrative       Current Outpatient Prescriptions   Medication Sig Dispense Refill    methocarbamol (ROBAXIN) 500 mg tablet TAKE ONE TABLET BY MOUTH 2 TO 3 TIMES DAILY AS NEEDED FOR SPASMS 75 Tab 0    gabapentin (NEURONTIN) 300 mg capsule Take 1-2 po q am and 1-2 po q evening 120 Cap 3    meloxicam (MOBIC) 15 mg tablet Take 1 daily as needed for pain 90 Tab 1    methocarbamol (ROBAXIN) 500 mg tablet TAKE ONE TABLET BY MOUTH 2 TO 3 TIMES DAILY AS NEEDED FOR SPASMS 75 Tab 0    pravastatin (PRAVACHOL) 40 mg tablet TAKE ONE TABLET BY MOUTH NIGHTLY. 30 Tab 2    LANTUS 100 unit/mL injection INJECT 20 UNITS SUBCUTANEOUSLY EVERY 12 HOURS 10 Vial 5    sertraline (ZOLOFT) 50 mg tablet Take 1 Tab by mouth daily. 30 Tab 3    LANTUS 100 unit/mL injection INJECT 20 UNITS SUBCUTANEOUSLY EVERY 12 HOURS 20 Vial 0    meloxicam (MOBIC) 15 mg tablet TAKE ONE TABLET BY MOUTH ONCE DAILY-- take with food and use as needed 30 Tab 4    pravastatin (PRAVACHOL) 40 mg tablet Take 1 Tab by mouth nightly. 90 Tab 1    glucose blood VI test strips (FREESTYLE LITE STRIPS) strip Check glucose ACHS. 200 Strip 11    insulin lispro (HUMALOG) 100 unit/mL injection 10 Units by SubCUTAneous route Before breakfast, lunch, and dinner. 1 Vial 2    Lancets misc Check glucose ACHS 200 Each 11    insulin glargine (LANTUS) 100 unit/mL injection 20 Units by SubCUTAneous route every twelve (12) hours. 2 Vial 2    Blood-Glucose Meter (FREESTYLE LITE METER) monitoring kit accucheck ACHS and record in log. Take log to every doctor visit. **MEDICALLY NECESSARY** 1 Kit 0    insulin lispro (HUMALOG) 100 unit/mL injection For Blood Sugar (mg/dL) of: <150 =   0 units; 150 -199 =   2 units; 200 -249 =   4 units; 250 -299 =   6 units; 300 -349 = 8 units; 350 and above =   10 units 1 Vial 2    alcohol swabs padm 1 Each by Apply Externally route Before breakfast, lunch, dinner and at bedtime. accucheck ACHS and record in log. Take log to every doctor visit. **MEDICALLY NECESSARY** 100 Pad 2    traZODone (DESYREL) 150 mg tablet Take 600 mg by mouth nightly.  sertraline (ZOLOFT) 100 mg tablet Take 100 mg by mouth daily.  perphenazine (TRILAFON) 4 mg tablet Take 4 mg by mouth three (3) times daily.  benztropine (COGENTIN) 1 mg tablet Take 1 mg by mouth daily.  lisinopril (PRINIVIL, ZESTRIL) 10 mg tablet Take 1 Tab by mouth daily. 90 Tab 1    predniSONE (DELTASONE) 10 mg tablet 2 pills in am for 3 days, then 1 pill in am for 3 days and 1/2 pill in am for remainder 11 Tab 0    HYDROcodone-acetaminophen (NORCO) 5-325 mg per tablet Take 1 Tab by mouth every eight (8) hours as needed for Pain. Max Daily Amount: 3 Tabs.  20 Tab 0    tiZANidine (ZANAFLEX) 2 mg tablet 1 tablet three times a day 90 Tab 3       Allergies   Allergen Reactions    Latex Rash    Penicillins Nausea and Vomiting         REVIEW OF SYSTEMS    Constitutional: Negative for fever, chills, or weight change. Respiratory: Negative for cough or shortness of breath. Cardiovascular: Negative for chest pain or palpitations. Gastrointestinal: Negative for acid reflux, change in bowel habits, or constipation. Genitourinary: Negative for dysuria and flank pain. Musculoskeletal: Positive for cervical pain and left arm pain  Skin: Negative for rash. Neurological: Negative for headaches, dizziness, or numbness. Endo/Heme/Allergies: Negative for increased bruising. Psychiatric/Behavioral: Negative for difficulty with sleep. PHYSICAL EXAMINATION  Visit Vitals    /80    Pulse 79    Temp 99 °F (37.2 °C) (Oral)    Resp 18    Ht 5' 5\" (1.651 m)    Wt 172 lb (78 kg)    SpO2 97%    BMI 28.62 kg/m2       Constitutional: Awake, alert, and in no acute distress. Neurological: 1+ symmetrical DTRs in the upper extremities. Sensation to light touch is intact. Negative Sofia's sign bilaterally. Skin: warm, dry, and intact. Musculoskeletal: Mild decreased side to side cervical flexion. Tight across the upper trapezius. No pain with extension, axial loading, or forward flexion. No pain with internal or external rotation of her hips. Negative straight leg raise bilaterally. Biceps  Triceps Deltoids Wrist Ext Wrist Flex Hand Intrin   Right +4/5 +4/5 +4/5 +4/5 +4/5 +4/5   Left +4/5 +4/5 +4/5 +4/5 +4/5 +4/5     IMAGING:    Cervical Spine X-rays from 12/27/2016 were personally reviewed with the Pt and demonstrated:  FINDINGS:       Vertebral body height and alignment is intact. Slight straightening of the  cervical lordosis. Moderate disc height loss endplate sclerosis and vertebral  body osteophytes from C3 through C7. Alignment is intact. No fracture.  Mild  facet joint arthropathy at multiple levels. The prevertebral space appears normal.       IMPRESSION:      Moderate cervical spondylosis.     Thoracic Spine 2V X-rays from 12/27/2016 were personally reviewed with the Pt and demonstrated:   Results from [facility] on 12/27/16   XR SPINE THORAC 2 V     Narrative THORACIC SPINE AP AND LATERAL    CPT CODE: 91433    HISTORY: Upper back pain. COMPARISON: None. FINDINGS:    There are mild endplate irregularities in each level of the thoracic spine with  very small circumferential vertebral body osteophytes The vertebral body  heights and alignment appears normal. .              Impression IMPRESSION:    Mild spondylosis at multiple levels. Written by Sarwat Ugalde, as dictated by Robert Vazquez MD.  I, Dr. Robert Vazquez confirm that all documentation is accurate.

## 2017-12-06 ENCOUNTER — DOCUMENTATION ONLY (OUTPATIENT)
Dept: ORTHOPEDIC SURGERY | Age: 62
End: 2017-12-06

## 2017-12-18 ENCOUNTER — OFFICE VISIT (OUTPATIENT)
Dept: FAMILY MEDICINE CLINIC | Age: 62
End: 2017-12-18

## 2017-12-18 VITALS
RESPIRATION RATE: 18 BRPM | TEMPERATURE: 98.1 F | BODY MASS INDEX: 28.66 KG/M2 | HEART RATE: 83 BPM | WEIGHT: 172 LBS | OXYGEN SATURATION: 97 % | DIASTOLIC BLOOD PRESSURE: 82 MMHG | HEIGHT: 65 IN | SYSTOLIC BLOOD PRESSURE: 138 MMHG

## 2017-12-18 DIAGNOSIS — I10 ESSENTIAL HYPERTENSION: ICD-10-CM

## 2017-12-18 DIAGNOSIS — E78.2 MIXED HYPERLIPIDEMIA: Chronic | ICD-10-CM

## 2017-12-18 DIAGNOSIS — M47.22 OSTEOARTHRITIS OF SPINE WITH RADICULOPATHY, CERVICAL REGION: ICD-10-CM

## 2017-12-18 RX ORDER — PRAVASTATIN SODIUM 40 MG/1
TABLET ORAL
Qty: 30 TAB | Refills: 2 | Status: SHIPPED | OUTPATIENT
Start: 2017-12-18 | End: 2018-04-05 | Stop reason: SDUPTHER

## 2017-12-18 NOTE — PATIENT INSTRUCTIONS
Learning About Meal Planning for Diabetes  Why plan your meals? Meal planning can be a key part of managing diabetes. Planning meals and snacks with the right balance of carbohydrate, protein, and fat can help you keep your blood sugar at the target level you set with your doctor. You don't have to eat special foods. You can eat what your family eats, including sweets once in a while. But you do have to pay attention to how often you eat and how much you eat of certain foods. You may want to work with a dietitian or a certified diabetes educator. He or she can give you tips and meal ideas and can answer your questions about meal planning. This health professional can also help you reach a healthy weight if that is one of your goals. What plan is right for you? Your dietitian or diabetes educator may suggest that you start with the plate format or carbohydrate counting. The plate format  The plate format is a simple way to help you manage how you eat. You plan meals by learning how much space each food should take on a plate. Using the plate format helps you spread carbohydrate throughout the day. It can make it easier to keep your blood sugar level within your target range. It also helps you see if you're eating healthy portion sizes. To use the plate format, you put non-starchy vegetables on half your plate. Add meat or meat substitutes on one-quarter of the plate. Put a grain or starchy vegetable (such as brown rice or a potato) on the final quarter of the plate. You can add a small piece of fruit and some low-fat or fat-free milk or yogurt, depending on your carbohydrate goal for each meal.  Here are some tips for using the plate format:  · Make sure that you are not using an oversized plate. A 9-inch plate is best. Many restaurants use larger plates. · Get used to using the plate format at home. Then you can use it when you eat out. · Write down your questions about using the plate format.  Talk to your doctor, a dietitian, or a diabetes educator about your concerns. Carbohydrate counting  With carbohydrate counting, you plan meals based on the amount of carbohydrate in each food. Carbohydrate raises blood sugar higher and more quickly than any other nutrient. It is found in desserts, breads and cereals, and fruit. It's also found in starchy vegetables such as potatoes and corn, grains such as rice and pasta, and milk and yogurt. Spreading carbohydrate throughout the day helps keep your blood sugar levels within your target range. Your daily amount depends on several things, including your weight, how active you are, which diabetes medicines you take, and what your goals are for your blood sugar levels. A registered dietitian or diabetes educator can help you plan how much carbohydrate to include in each meal and snack. A guideline for your daily amount of carbohydrate is:  · 45 to 60 grams at each meal. That's about the same as 3 to 4 carbohydrate servings. · 15 to 20 grams at each snack. That's about the same as 1 carbohydrate serving. The Nutrition Facts label on packaged foods tells you how much carbohydrate is in a serving of the food. First, look at the serving size on the food label. Is that the amount you eat in a serving? All of the nutrition information on a food label is based on that serving size. So if you eat more or less than that, you'll need to adjust the other numbers. Total carbohydrate is the next thing you need to look for on the label. If you count carbohydrate servings, one serving of carbohydrate is 15 grams. For foods that don't come with labels, such as fresh fruits and vegetables, you'll need a guide that lists carbohydrate in these foods. Ask your doctor, dietitian, or diabetes educator about books or other nutrition guides you can use.   If you take insulin, you need to know how many grams of carbohydrate are in a meal. This lets you know how much rapid-acting insulin to take before you eat. If you use an insulin pump, you get a constant rate of insulin during the day. So the pump must be programmed at meals to give you extra insulin to cover the rise in blood sugar after meals. When you know how much carbohydrate you will eat, you can take the right amount of insulin. Or, if you always use the same amount of insulin, you need to make sure that you eat the same amount of carbohydrate at meals. If you need more help to understand carbohydrate counting and food labels, ask your doctor, dietitian, or diabetes educator. How do you get started with meal planning? Here are some tips to get started:  · Plan your meals a week at a time. Don't forget to include snacks too. · Use cookbooks or online recipes to plan several main meals. Plan some quick meals for busy nights. You also can double some recipes that freeze well. Then you can save half for other busy nights when you don't have time to cook. · Make sure you have the ingredients you need for your recipes. If you're running low on basic items, put these items on your shopping list too. · List foods that you use to make breakfasts, lunches, and snacks. List plenty of fruits and vegetables. · Post this list on the refrigerator. Add to it as you think of more things you need. · Take the list to the store to do your weekly shopping. Follow-up care is a key part of your treatment and safety. Be sure to make and go to all appointments, and call your doctor if you are having problems. It's also a good idea to know your test results and keep a list of the medicines you take. Where can you learn more? Go to http://bernardino-prateek.info/. Radha Branch in the search box to learn more about \"Learning About Meal Planning for Diabetes. \"  Current as of: March 13, 2017  Content Version: 11.4  © 7001-0281 Healthwise, Incorporated.  Care instructions adapted under license by [employer] (which disclaims liability or warranty for this information). If you have questions about a medical condition or this instruction, always ask your healthcare professional. [address] any warranty or liability for your use of this information. Low Sodium Diet (2,000 Milligram): Care Instructions  Your Care Instructions    Too much sodium causes your body to hold on to extra water. This can raise your blood pressure and force your heart and kidneys to work harder. In very serious cases, this could cause you to be put in the hospital. It might even be life-threatening. By limiting sodium, you will feel better and lower your risk of serious problems. The most common source of sodium is salt. People get most of the salt in their diet from canned, prepared, and packaged foods. Fast food and restaurant meals also are very high in sodium. Your doctor will probably limit your sodium to less than 2,000 milligrams (mg) a day. This limit counts all the sodium in prepared and packaged foods and any salt you add to your food. Follow-up care is a key part of your treatment and safety. Be sure to make and go to all appointments, and call your doctor if you are having problems. It's also a good idea to know your test results and keep a list of the medicines you take. How can you care for yourself at home? Read food labels  · Read labels on cans and food packages. The labels tell you how much sodium is in each serving. Make sure that you look at the serving size. If you eat more than the serving size, you have eaten more sodium. · Food labels also tell you the Percent Daily Value for sodium. Choose products with low Percent Daily Values for sodium. · Be aware that sodium can come in forms other than salt, including monosodium glutamate (MSG), sodium citrate, and sodium bicarbonate (baking soda). MSG is often added to Asian food. When you eat out, you can sometimes ask for food without MSG or added salt.   Buy low-sodium foods  · Buy foods that are labeled \"unsalted\" (no salt added), \"sodium-free\" (less than 5 mg of sodium per serving), or \"low-sodium\" (less than 140 mg of sodium per serving). Foods labeled \"reduced-sodium\" and \"light sodium\" may still have too much sodium. Be sure to read the label to see how much sodium you are getting. · Buy fresh vegetables, or frozen vegetables without added sauces. Buy low-sodium versions of canned vegetables, soups, and other canned goods. Prepare low-sodium meals  · Cut back on the amount of salt you use in cooking. This will help you adjust to the taste. Do not add salt after cooking. One teaspoon of salt has about 2,300 mg of sodium. · Take the salt shaker off the table. · Flavor your food with garlic, lemon juice, onion, vinegar, herbs, and spices. Do not use soy sauce, lite soy sauce, steak sauce, onion salt, garlic salt, celery salt, mustard, or ketchup on your food. · Use low-sodium salad dressings, sauces, and ketchup. Or make your own salad dressings and sauces without adding salt. · Use less salt (or none) when recipes call for it. You can often use half the salt a recipe calls for without losing flavor. Other foods such as rice, pasta, and grains do not need added salt. · Rinse canned vegetables, and cook them in fresh water. This removes some-but not all-of the salt. · Avoid water that is naturally high in sodium or that has been treated with water softeners, which add sodium. Call your local water company to find out the sodium content of your water supply. If you buy bottled water, read the label and choose a sodium-free brand. Avoid high-sodium foods  · Avoid eating:  ¨ Smoked, cured, salted, and canned meat, fish, and poultry. ¨ Ham, fajardo, hot dogs, and luncheon meats. ¨ Regular, hard, and processed cheese and regular peanut butter. ¨ Crackers with salted tops, and other salted snack foods such as pretzels, chips, and salted popcorn.   ¨ Frozen prepared meals, unless labeled low-sodium. ¨ Canned and dried soups, broths, and bouillon, unless labeled sodium-free or low-sodium. ¨ Canned vegetables, unless labeled sodium-free or low-sodium. ¨ Western [place]ela fries, pizza, tacos, and other fast foods. ¨ Pickles, olives, ketchup, and other condiments, especially soy sauce, unless labeled sodium-free or low-sodium. Where can you learn more? Go to http://bernardino-prateek.info/. Enter B069 in the search box to learn more about \"Low Sodium Diet (2,000 Milligram): Care Instructions. \"  Current as of: May 12, 2017  Content Version: 11.4  © 0437-2549 Healthwise, Incorporated. Care instructions adapted under license by [employer] (which disclaims liability or warranty for this information). If you have questions about a medical condition or this instruction, always ask your healthcare professional. [place] [address] any warranty or liability for your use of this information.

## 2017-12-18 NOTE — MR AVS SNAPSHOT
Visit Information Date & Time Provider Department Dept. Phone Encounter #  
 12/18/2017  4:45 PM [address] [facility] [phone] [id number] Follow-up Instructions Return in about 3 months (around 3/18/2018). Your Appointments 3/5/2018  3:30 PM  
Follow Up with Steffany Cruz MD  
[facility]) Appt Note: 3 mo fu neck and back [address] [facility] [address]  
[phone]  
  
   
 [address] [facility] [address] Upcoming Health Maintenance Date Due Hepatitis C Screening 1955 EYE EXAM RETINAL OR DILATED Q1 9/7/1967 Pneumococcal 19-64 Medium Risk (1 of 1 - PPSV23) 9/7/1976 DTaP/Tdap/Td series (1 - Tdap) 9/7/1978 PAP AKA CERVICAL CYTOLOGY 9/7/1978 FOBT Q 1 YEAR AGE 50-75 9/7/2007 ZOSTER VACCINE AGE 60> 7/7/2017 HEMOGLOBIN A1C Q6M 3/23/2018 FOOT EXAM Q1 4/11/2018 MICROALBUMIN Q1 4/11/2018 LIPID PANEL Q1 9/23/2018 Allergies as of 12/18/2017  Review Complete On: 12/18/2017 By: Yi Colon LPN Severity Noted Reaction Type Reactions Latex Medium 08/12/2012   Topical Rash Penicillins Medium 08/12/2012   Side Effect Nausea and Vomiting Current Immunizations  Reviewed on 9/11/2017 Name Date Influenza Vaccine (Quad) PF 9/11/2017 Not reviewed this visit Vitals BP Pulse Temp Resp Height(growth percentile) Weight(growth percentile) 138/82 (BP 1 Location: Left arm, BP Patient Position: Sitting) 83 98.1 °F (36.7 °C) (Oral) 18 5' 5\" (1.651 m) 172 lb (78 kg) SpO2 BMI OB Status Smoking Status 97% 28.62 kg/m2 Menopause Current Every Day Smoker Vitals History BMI and BSA Data Body Mass Index Body Surface Area  
 28.62 kg/m 2 1.89 m 2 Preferred Pharmacy Pharmacy Name Phone [id number] [phone] Your Updated Medication List  
  
   
This list is accurate as of: 12/18/17  5:42 PM.  Always use your most recent med list.  
  
  
  
  
 alcohol swabs Padm 1 Each by Apply Externally route Before breakfast, lunch, dinner and at bedtime. accucheck ACHS and record in log. Take log to every doctor visit. **MEDICALLY NECESSARY**  
  
 benztropine 1 mg tablet Commonly known as:  COGENTIN Take 1 mg by mouth daily. Blood-Glucose Meter monitoring kit Commonly known as:  FREESTYLE LITE METER  
accucheck ACHS and record in log. Take log to every doctor visit. **MEDICALLY NECESSARY**  
  
 gabapentin 300 mg capsule Commonly known as:  NEURONTIN Take 1-2 po q am and 1-2 po q evening  
  
 glucose blood VI test strips strip Commonly known as:  FREESTYLE LITE STRIPS Check glucose ACHS. HYDROcodone-acetaminophen 5-325 mg per tablet Commonly known as:  Benetta Pock Take 1 Tab by mouth every eight (8) hours as needed for Pain. Max Daily Amount: 3 Tabs. * insulin glargine 100 unit/mL injection Commonly known as:  LANTUS  
20 Units by SubCUTAneous route every twelve (12) hours. * LANTUS 100 unit/mL injection Generic drug:  insulin glargine INJECT 20 UNITS SUBCUTANEOUSLY EVERY 12 HOURS  
  
 * LANTUS 100 unit/mL injection Generic drug:  insulin glargine INJECT 20 UNITS SUBCUTANEOUSLY EVERY 12 HOURS  
  
 * insulin lispro 100 unit/mL injection Commonly known as:  HUMALOG For Blood Sugar (mg/dL) of: <150 =   0 units; 150 -199 =   2 units; 200 -249 =   4 units; 250 -299 =   6 units; 300 -349 = 8 units; 350 and above =   10 units * insulin lispro 100 unit/mL injection Commonly known as:  HUMALOG  
10 Units by SubCUTAneous route Before breakfast, lunch, and dinner. Lancets Misc Check glucose ACHS  
  
 lisinopril 10 mg tablet Commonly known as:  Cindi Needy Take 1 Tab by mouth daily. meloxicam 15 mg tablet Commonly known as:  MOBIC  
 TAKE ONE TABLET BY MOUTH ONCE DAILY-- take with food and use as needed * methocarbamol 500 mg tablet Commonly known as:  ROBAXIN  
TAKE ONE TABLET BY MOUTH 2 TO 3 TIMES DAILY AS NEEDED FOR SPASMS * methocarbamol 500 mg tablet Commonly known as:  ROBAXIN  
TAKE ONE TABLET BY MOUTH 2 TO 3 TIMES DAILY AS NEEDED FOR SPASMS  
  
 perphenazine 4 mg tablet Commonly known as:  TRILAFON Take 4 mg by mouth three (3) times daily. * pravastatin 40 mg tablet Commonly known as:  PRAVACHOL Take 1 Tab by mouth nightly. * pravastatin 40 mg tablet Commonly known as:  PRAVACHOL  
TAKE ONE TABLET BY MOUTH NIGHTLY. predniSONE 10 mg tablet Commonly known as:  DELTASONE  
2 pills in am for 3 days, then 1 pill in am for 3 days and 1/2 pill in am for remainder * sertraline 100 mg tablet Commonly known as:  ZOLOFT Take 100 mg by mouth daily. * sertraline 50 mg tablet Commonly known as:  ZOLOFT Take 1 Tab by mouth daily. tiZANidine 2 mg tablet Commonly known as:  ZANAFLEX  
1 tablet three times a day  
  
 traZODone 150 mg tablet Commonly known as:  Oletta Leyla Take 600 mg by mouth nightly. * Notice: This list has 11 medication(s) that are the same as other medications prescribed for you. Read the directions carefully, and ask your doctor or other care provider to review them with you. Follow-up Instructions Return in about 3 months (around 3/18/2018). Please provide this summary of care documentation to your next provider. Your primary care clinician is listed as Shaista Kiran. If you have any questions after today's visit, please call [phone].

## 2017-12-21 DIAGNOSIS — M62.838 MUSCLE SPASM: ICD-10-CM

## 2017-12-21 RX ORDER — METHOCARBAMOL 500 MG/1
TABLET, FILM COATED ORAL
Qty: 75 TAB | Refills: 0 | Status: SHIPPED | OUTPATIENT
Start: 2017-12-21 | End: 2018-01-23 | Stop reason: SDUPTHER

## 2018-01-23 DIAGNOSIS — M62.838 MUSCLE SPASM: ICD-10-CM

## 2018-01-23 RX ORDER — METHOCARBAMOL 500 MG/1
TABLET, FILM COATED ORAL
Qty: 75 TAB | Refills: 0 | Status: SHIPPED | OUTPATIENT
Start: 2018-01-23 | End: 2018-02-10 | Stop reason: SDUPTHER

## 2018-02-10 DIAGNOSIS — M62.838 MUSCLE SPASM: ICD-10-CM

## 2018-02-11 RX ORDER — METHOCARBAMOL 500 MG/1
TABLET, FILM COATED ORAL
Qty: 75 TAB | Refills: 0 | Status: SHIPPED | OUTPATIENT
Start: 2018-02-11 | End: 2018-03-28 | Stop reason: SDUPTHER

## 2018-03-19 ENCOUNTER — OFFICE VISIT (OUTPATIENT)
Dept: FAMILY MEDICINE CLINIC | Age: 63
End: 2018-03-19

## 2018-03-19 VITALS
OXYGEN SATURATION: 96 % | RESPIRATION RATE: 20 BRPM | HEART RATE: 87 BPM | SYSTOLIC BLOOD PRESSURE: 143 MMHG | TEMPERATURE: 98.3 F | BODY MASS INDEX: 28.82 KG/M2 | HEIGHT: 65 IN | WEIGHT: 173 LBS | DIASTOLIC BLOOD PRESSURE: 87 MMHG

## 2018-03-19 DIAGNOSIS — Z12.11 SCREENING FOR COLON CANCER: ICD-10-CM

## 2018-03-19 DIAGNOSIS — E78.2 MIXED HYPERLIPIDEMIA: Chronic | ICD-10-CM

## 2018-03-19 DIAGNOSIS — Z12.39 SCREENING FOR BREAST CANCER: ICD-10-CM

## 2018-03-19 LAB — HBA1C MFR BLD HPLC: 6.4 %

## 2018-03-19 NOTE — PATIENT INSTRUCTIONS

## 2018-03-19 NOTE — PROGRESS NOTES
Today's Date:  3/19/2018   Patient:  Lucia Davis  Patient :  1955    Subjective:     Chief Complaint   Patient presents with    Follow Up Chronic Condition     HTN and Diabetes        Lucia Davis is a 63 y.o. female who presents for follow up for Chroinc Diseases. BP today is not at goal today >130/80.    States that fasting blood sugars are usually range (80- 115). Denies hypoglycemic episodes. Patient report compliance with medication and denies side effects. Daily exercise and diet are as follows: patient reports staying very active. Patient is on a statin denies myalgia        Current Outpatient Meds and Allergies     Current Outpatient Prescriptions on File Prior to Visit   Medication Sig Dispense Refill    methocarbamol (ROBAXIN) 500 mg tablet TAKE ONE TABLET BY MOUTH TWO TO THREE TIMES DAILY AS NEEDED FOR SPASMS 75 Tab 0    pravastatin (PRAVACHOL) 40 mg tablet TAKE ONE TABLET BY MOUTH NIGHTLY. 30 Tab 2    gabapentin (NEURONTIN) 300 mg capsule Take 1-2 po q am and 1-2 po q evening 120 Cap 3    methocarbamol (ROBAXIN) 500 mg tablet TAKE ONE TABLET BY MOUTH 2 TO 3 TIMES DAILY AS NEEDED FOR SPASMS 75 Tab 0    LANTUS 100 unit/mL injection INJECT 20 UNITS SUBCUTANEOUSLY EVERY 12 HOURS 10 Vial 5    sertraline (ZOLOFT) 50 mg tablet Take 1 Tab by mouth daily. 30 Tab 3    meloxicam (MOBIC) 15 mg tablet TAKE ONE TABLET BY MOUTH ONCE DAILY-- take with food and use as needed 30 Tab 4    lisinopril (PRINIVIL, ZESTRIL) 10 mg tablet Take 1 Tab by mouth daily. 90 Tab 1    glucose blood VI test strips (FREESTYLE LITE STRIPS) strip Check glucose ACHS. 200 Strip 11    insulin lispro (HUMALOG) 100 unit/mL injection 10 Units by SubCUTAneous route Before breakfast, lunch, and dinner. 1 Vial 2    Lancets misc Check glucose ACHS 200 Each 11    insulin glargine (LANTUS) 100 unit/mL injection 20 Units by SubCUTAneous route every twelve (12) hours.  2 Vial 2    Blood-Glucose Meter (FREESTYLE LITE METER) monitoring kit accucheck ACHS and record in log. Take log to every doctor visit. **MEDICALLY NECESSARY** 1 Kit 0    insulin lispro (HUMALOG) 100 unit/mL injection For Blood Sugar (mg/dL) of: <150 =   0 units; 150 -199 =   2 units; 200 -249 =   4 units; 250 -299 =   6 units; 300 -349 = 8 units; 350 and above =   10 units 1 Vial 2    alcohol swabs padm 1 Each by Apply Externally route Before breakfast, lunch, dinner and at bedtime. accucheck ACHS and record in log. Take log to every doctor visit. **MEDICALLY NECESSARY** 100 Pad 2    traZODone (DESYREL) 150 mg tablet Take 600 mg by mouth nightly.  sertraline (ZOLOFT) 100 mg tablet Take 100 mg by mouth daily.  perphenazine (TRILAFON) 4 mg tablet Take 4 mg by mouth three (3) times daily.  benztropine (COGENTIN) 1 mg tablet Take 1 mg by mouth daily.  HYDROcodone-acetaminophen (NORCO) 5-325 mg per tablet Take 1 Tab by mouth every eight (8) hours as needed for Pain. Max Daily Amount: 3 Tabs. 20 Tab 0     No current facility-administered medications on file prior to visit. These medications have been reviewed and reconciled with the patient during today's visit. Allergies   Allergen Reactions    Latex Rash    Penicillins Nausea and Vomiting         ROS:     CONST:   Denies fatigue, weight change, appetite change   NEURO:   Denies headaches, vision changes, dizziness, loss of consciousness  CV:      Denies chest pain, palpitations, orthopnea, PND  PULM:  Denies SOB, wheezing, cough, hemoptysis  GI:             Denies nausea, vomiting, abdominal pain, greasy stools, blood in stool, diarrhea, constipation  :       Denies dysuria, hematuria, change in urine  MS:      Denies muscle/joint pain, joint swelling  SKIN:        Denies rashes, skin changes  ALLERGY: Denies seasonal allergies, itchy eyes  PSYCH: No agitation, confusion/disorientation, suicidal or homicidal ideation.      Objective:     VS:    Visit Vitals    /87    Pulse 87    Temp 98.3 °F (36.8 °C) (Oral)    Resp 20    Ht 5' 5\" (1.651 m)    Wt 173 lb (78.5 kg)    SpO2 96%    BMI 28.79 kg/m2       General:   Well-nourished, well-groomed, pleasant, alert, in no acute distress. Cardiovasc:   Regular rate and rhythm, no murmurs, no rubs, no gallops,   Pulmonary:   Clear breath sounds bilaterally, good air movement, no wheezing, no rales, no rhonchi, normal respiratory effort  Abdomen:   Abdomen soft, nontender, nondistended, NABS,  Extremities:   No edema, no tenderness with palpation of calves, warm and well-perfused  Neuro:   Alert, conversant, appropriate, following commands, no focal deficits. Psychiatric: Oriented to time, place and person. Normal mood, no agitation or anxiety. Normal affect. Pleasant and cooperative. Pertinent diagnostic procedures include:  No visits with results within 3 Month(s) from this visit. Latest known visit with results is:    Hospital Outpatient Visit on 09/23/2017   Component Date Value Ref Range Status    Hemoglobin A1c 09/23/2017 6.6* 4.2 - 5.6 % Final    Comment: (NOTE)  HbA1C Interpretive Ranges  <5.7              Normal  5.7 - 6.4         Consider Prediabetes  >6.5              Consider Diabetes      Est. average glucose 09/23/2017 143  mg/dL Final    Comment: (NOTE)  The eAG should be interpreted with patient characteristics in mind   since ethnicity, interindividual differences, red cell lifespan,   variation in rates of glycation, etc. may affect the validity of the   calculation.       Sodium 09/23/2017 141  136 - 145 mmol/L Final    Potassium 09/23/2017 3.9  3.5 - 5.5 mmol/L Final    Chloride 09/23/2017 106  100 - 108 mmol/L Final    CO2 09/23/2017 28  21 - 32 mmol/L Final    Anion gap 09/23/2017 7  3.0 - 18 mmol/L Final    Glucose 09/23/2017 91  74 - 99 mg/dL Final    BUN 09/23/2017 13  7.0 - 18 MG/DL Final    Creatinine 09/23/2017 0.89  0.6 - 1.3 MG/DL Final    BUN/Creatinine ratio 09/23/2017 15  12 - 20   Final    GFR est AA 09/23/2017 >60  >60 ml/min/1.73m2 Final    GFR est non-AA 09/23/2017 >60  >60 ml/min/1.73m2 Final    Comment: (NOTE)  Estimated GFR is calculated using the Modification of Diet in Renal   Disease (MDRD) Study equation, reported for both  Americans   (GFRAA) and non- Americans (GFRNA), and normalized to 1.73m2   body surface area. The physician must decide which value applies to   the patient. The MDRD study equation should only be used in   individuals age 25 or older. It has not been validated for the   following: pregnant women, patients with serious comorbid conditions,   or on certain medications, or persons with extremes of body size,   muscle mass, or nutritional status.  Calcium 09/23/2017 9.4  8.5 - 10.1 MG/DL Final    Bilirubin, total 09/23/2017 0.6  0.2 - 1.0 MG/DL Final    ALT (SGPT) 09/23/2017 40  13 - 56 U/L Final    AST (SGOT) 09/23/2017 21  15 - 37 U/L Final    Alk. phosphatase 09/23/2017 61  45 - 117 U/L Final    Protein, total 09/23/2017 7.9  6.4 - 8.2 g/dL Final    Albumin 09/23/2017 3.8  3.4 - 5.0 g/dL Final    Globulin 09/23/2017 4.1* 2.0 - 4.0 g/dL Final    A-G Ratio 09/23/2017 0.9  0.8 - 1.7   Final    LIPID PROFILE 09/23/2017        Final    Cholesterol, total 09/23/2017 191  <200 MG/DL Final    Triglyceride 09/23/2017 136  <150 MG/DL Final    Comment: The drugs N-acetylcysteine (NAC) and  Metamiszole have been found to cause falsely  low results in this chemical assay. Please  be sure to submit blood samples obtained  BEFORE administration of either of these  drugs to assure correct results.  HDL Cholesterol 09/23/2017 65* 40 - 60 MG/DL Final    LDL, calculated 09/23/2017 98.8  0 - 100 MG/DL Final    VLDL, calculated 09/23/2017 27.2  MG/DL Final    CHOL/HDL Ratio 09/23/2017 2.9  0 - 5.0   Final       Assessment/Plan:       1.  Uncontrolled type 2 diabetes mellitus without complication, with long-term current use of insulin ([facility] [address]  - REFERRAL TO OPHTHALMOLOGY  - AMB POC HEMOGLOBIN A1C    2. Mixed hyperlipidemia    3. Screening for colon cancer  - OCCULT BLOOD, IMMUNOASSAY (FIT); Future    4. Screening for breast cancer  - NATASHA MAMMO BI SCREENING INCL CAD; Future    I have discussed the diagnosis with the patient and the intended plan as seen in the above orders. The patient has received an after-visit summary along with patient information handout. I have discussed medication side effects and warnings with the patient as well. Pt verbalized understanding. Follow-up Disposition:  Return in about 3 months (around 6/19/2018).   SHEILA King  3/19/2018, 4:31 PM

## 2018-03-19 NOTE — MR AVS SNAPSHOT
[address] [facility] [id number] [phone] Patient: Rolf Mcallister MRN: [id number] HKO:[id number] Visit Information Date & Time Provider Department Dept. Phone Encounter #  
 3/19/2018  4:00 PM SHEILA Pisano [facility] [phone] [id number] Follow-up Instructions Return in about 3 months (around 6/19/2018). Your Appointments 3/20/2018  3:30 PM  
Follow Up with Livier Flores MD  
[facility]) Appt Note: 3 mo fu neck and back - pt r/s 3/6 appt due to trans [address] [facility] [id number] [phone]  
  
   
 [address] [facility] [address] Upcoming Health Maintenance Date Due Hepatitis C Screening 1955 EYE EXAM RETINAL OR DILATED Q1 9/7/1967 Pneumococcal 19-64 Medium Risk (1 of 1 - PPSV23) 9/7/1976 DTaP/Tdap/Td series (1 - Tdap) 9/7/1978 PAP AKA CERVICAL CYTOLOGY 9/7/1978 FOBT Q 1 YEAR AGE 50-75 9/7/2007 BREAST CANCER SCRN MAMMOGRAM 4/14/2011 ZOSTER VACCINE AGE 60> 7/7/2017 HEMOGLOBIN A1C Q6M 3/23/2018 FOOT EXAM Q1 4/11/2018 MICROALBUMIN Q1 4/11/2018 LIPID PANEL Q1 9/23/2018 Allergies as of 3/19/2018  Review Complete On: 3/19/2018 By: SHEILA Pisano Severity Noted Reaction Type Reactions Latex Medium 08/12/2012   Topical Rash Penicillins Medium 08/12/2012   Side Effect Nausea and Vomiting Current Immunizations  Reviewed on 9/11/2017 Name Date Influenza Vaccine (Quad) PF 9/11/2017 Not reviewed this visit You Were Diagnosed With   
  
 Codes Comments Uncontrolled type 2 diabetes mellitus without complication, with long-term current use of insulin ([facility]    -  Primary ICD-10-CM: E11.65, Z79.4 ICD-9-CM: 250.02, V58.67 Mixed hyperlipidemia     ICD-10-CM: E78.2 ICD-9-CM: 272.2 Screening for colon cancer     ICD-10-CM: Z12.11 ICD-9-CM: V76.51 Screening for breast cancer     ICD-10-CM: Z12.31 
ICD-9-CM: V76.10 Vitals BP Pulse Temp Resp Height(growth percentile) Weight(growth percentile) 143/87 87 98.3 °F (36.8 °C) (Oral) 20 5' 5\" (1.651 m) 173 lb (78.5 kg) SpO2 BMI OB Status Smoking Status 96% 28.79 kg/m2 Menopause Current Every Day Smoker Vitals History BMI and BSA Data Body Mass Index Body Surface Area 28.79 kg/m 2 1.9 m 2 Preferred Pharmacy Pharmacy Name Phone [address] [phone] Your Updated Medication List  
  
   
This list is accurate as of 3/19/18  4:46 PM.  Always use your most recent med list.  
  
  
  
  
 alcohol swabs Padm 1 Each by Apply Externally route Before breakfast, lunch, dinner and at bedtime. accucheck ACHS and record in log. Take log to every doctor visit. **MEDICALLY NECESSARY**  
  
 benztropine 1 mg tablet Commonly known as:  COGENTIN Take 1 mg by mouth daily. Blood-Glucose Meter monitoring kit Commonly known as:  FREESTYLE LITE METER  
accucheck ACHS and record in log. Take log to every doctor visit. **MEDICALLY NECESSARY**  
  
 gabapentin 300 mg capsule Commonly known as:  NEURONTIN Take 1-2 po q am and 1-2 po q evening  
  
 glucose blood VI test strips strip Commonly known as:  FREESTYLE LITE STRIPS Check glucose ACHS. HYDROcodone-acetaminophen 5-325 mg per tablet Commonly known as:  [place] Boast Take 1 Tab by mouth every eight (8) hours as needed for Pain. Max Daily Amount: 3 Tabs. * insulin glargine 100 unit/mL injection Commonly known as:  LANTUS  
20 Units by SubCUTAneous route every twelve (12) hours. * LANTUS U-100 INSULIN 100 unit/mL injection Generic drug:  insulin glargine INJECT 20 UNITS SUBCUTANEOUSLY EVERY 12 HOURS  
  
 * insulin lispro 100 unit/mL injection Commonly known as:  HUMALOG For Blood Sugar (mg/dL) of: <150 =   0 units; 150 -199 =   2 units; 200 -249 =   4 units; 250 -299 =   6 units; 300 -349 = 8 units; 350 and above =   10 units * insulin lispro 100 unit/mL injection Commonly known as:  HUMALOG  
10 Units by SubCUTAneous route Before breakfast, lunch, and dinner. Lancets Misc Check glucose ACHS  
  
 lisinopril 10 mg tablet Commonly known as:  Nu Best Take 1 Tab by mouth daily. meloxicam 15 mg tablet Commonly known as:  MOBIC  
TAKE ONE TABLET BY MOUTH ONCE DAILY-- take with food and use as needed * methocarbamol 500 mg tablet Commonly known as:  ROBAXIN  
TAKE ONE TABLET BY MOUTH 2 TO 3 TIMES DAILY AS NEEDED FOR SPASMS * methocarbamol 500 mg tablet Commonly known as:  ROBAXIN  
TAKE ONE TABLET BY MOUTH TWO TO THREE TIMES DAILY AS NEEDED FOR SPASMS  
  
 perphenazine 4 mg tablet Commonly known as:  TRILAFON Take 4 mg by mouth three (3) times daily. pravastatin 40 mg tablet Commonly known as:  PRAVACHOL  
TAKE ONE TABLET BY MOUTH NIGHTLY. * sertraline 100 mg tablet Commonly known as:  ZOLOFT Take 100 mg by mouth daily. * sertraline 50 mg tablet Commonly known as:  ZOLOFT Take 1 Tab by mouth daily. traZODone 150 mg tablet Commonly known as:  Larena Legato Take 600 mg by mouth nightly. * Notice: This list has 8 medication(s) that are the same as other medications prescribed for you. Read the directions carefully, and ask your doctor or other care provider to review them with you. We Performed the Following AMB POC HEMOGLOBIN A1C [02394 CPT(R)] REFERRAL TO OPHTHALMOLOGY [REF57 Custom] Comments:  
 Please evaluate for Diabetic Eye Exam  
  
Follow-up Instructions Return in about 3 months (around 6/19/2018). To-Do List   
 03/19/2018 Imaging:  [facility] MAMMO BI SCREENING INCL CAD   
  
 04/18/2018 Lab:  OCCULT BLOOD, IMMUNOASSAY (FIT) Referral Information Referral ID Referred By Referred To  
  
 [id number] Hardy Isaac V, OD   
   [address] [facility] Francie Gibson, [place]isha [place]  Phone: [phone] Fax: [phone] Visits Status Start Date End Date 1 New Request 3/19/18 3/19/19 If your referral has a status of pending review or denied, additional information will be sent to support the outcome of this decision. Patient Instructions Learning About Diabetes Food Guidelines Your Care Instructions Meal planning is important to manage diabetes. It helps keep your blood sugar at a target level (which you set with your doctor). You don't have to eat special foods. You can eat what your family eats, including sweets once in a while. But you do have to pay attention to how often you eat and how much you eat of certain foods. You may want to work with a dietitian or a certified diabetes educator (CDE) to help you plan meals and snacks. A dietitian or CDE can also help you lose weight if that is one of your goals. What should you know about eating carbs? Managing the amount of carbohydrate (carbs) you eat is an important part of healthy meals when you have diabetes. Carbohydrate is found in many foods. · Learn which foods have carbs. And learn the amounts of carbs in different foods. ¨ Bread, cereal, pasta, and rice have about 15 grams of carbs in a serving. A serving is 1 slice of bread (1 ounce), ½ cup of cooked cereal, or 1/3 cup of cooked pasta or rice. ¨ Fruits have 15 grams of carbs in a serving. A serving is 1 small fresh fruit, such as an apple or orange; ½ of a banana; ½ cup of cooked or canned fruit; ½ cup of fruit juice; 1 cup of melon or raspberries; or 2 tablespoons of dried fruit. ¨ Milk and no-sugar-added yogurt have 15 grams of carbs in a serving. A serving is 1 cup of milk or 2/3 cup of no-sugar-added yogurt. ¨ Starchy vegetables have 15 grams of carbs in a serving. A serving is ½ cup of mashed potatoes or sweet potato; 1 cup winter squash; ½ of a small baked potato; ½ cup of cooked beans; or ½ cup cooked corn or green peas. · Learn how much carbs to eat each day and at each meal. A dietitian or CDE can teach you how to keep track of the amount of carbs you eat. This is called carbohydrate counting. · If you are not sure how to count carbohydrate grams, use the Plate Method to plan meals. It is a good, quick way to make sure that you have a balanced meal. It also helps you spread carbs throughout the day. ¨ Divide your plate by types of foods. Put non-starchy vegetables on half the plate, meat or other protein food on one-quarter of the plate, and a grain or starchy vegetable in the final quarter of the plate. To this you can add a small piece of fruit and 1 cup of milk or yogurt, depending on how many carbs you are supposed to eat at a meal. 
· Try to eat about the same amount of carbs at each meal. Do not \"save up\" your daily allowance of carbs to eat at one meal. 
· Proteins have very little or no carbs per serving. Examples of proteins are beef, chicken, turkey, fish, eggs, tofu, cheese, cottage cheese, and peanut butter. A serving size of meat is 3 ounces, which is about the size of a deck of cards. Examples of meat substitute serving sizes (equal to 1 ounce of meat) are 1/4 cup of cottage cheese, 1 egg, 1 tablespoon of peanut butter, and ½ cup of tofu. How can you eat out and still eat healthy? · Learn to estimate the serving sizes of foods that have carbohydrate. If you measure food at home, it will be easier to estimate the amount in a serving of restaurant food. · If the meal you order has too much carbohydrate (such as potatoes, corn, or baked beans), ask to have a low-carbohydrate food instead. Ask for a salad or green vegetables.  
· If you use insulin, check your blood sugar before and after eating out to help you plan how much to eat in the future. · If you eat more carbohydrate at a meal than you had planned, take a walk or do other exercise. This will help lower your blood sugar. What else should you know? · Limit saturated fat, such as the fat from meat and dairy products. This is a healthy choice because people who have diabetes are at higher risk of heart disease. So choose lean cuts of meat and nonfat or low-fat dairy products. Use olive or canola oil instead of butter or shortening when cooking. · Don't skip meals. Your blood sugar may drop too low if you skip meals and take insulin or certain medicines for diabetes. · Check with your doctor before you drink alcohol. Alcohol can cause your blood sugar to drop too low. Alcohol can also cause a bad reaction if you take certain diabetes medicines. Follow-up care is a key part of your treatment and safety. Be sure to make and go to all appointments, and call your doctor if you are having problems. It's also a good idea to know your test results and keep a list of the medicines you take. Where can you learn more? Go to http://bernardinoPerfect Priceprateek.info/. Enter W128 in the search box to learn more about \"Learning About Diabetes Food Guidelines. \" Current as of: March 13, 2017 Content Version: 11.4 © 4164-0473 Healthwise, Incorporated. Care instructions adapted under license by [employer] (which disclaims liability or warranty for this information). If you have questions about a medical condition or this instruction, always ask your healthcare professional. [place] [address] any warranty or liability for your use of this information. Introducing [facility] HE[facility]! Dear Triston Doshi: Thank you for requesting a Burstly account. Our records indicate that you have previously registered for a Burstly account but its currently inactive. Please call our Burstly support line at [phone]. Additional Information If you have questions, please visit the Frequently Asked Questions section of the Tripnaryhart website at [email or web address] Remember, Strikingly is NOT to be used for urgent needs. For medical emergencies, dial 911. Now available from your iPhone and Android! Please provide this summary of care documentation to your next provider. Your primary care clinician is listed as Velta Slot. If you have any questions after today's visit, please call [phone].

## 2018-03-20 ENCOUNTER — OFFICE VISIT (OUTPATIENT)
Dept: ORTHOPEDIC SURGERY | Age: 63
End: 2018-03-20

## 2018-03-20 VITALS
WEIGHT: 174 LBS | SYSTOLIC BLOOD PRESSURE: 175 MMHG | HEART RATE: 81 BPM | BODY MASS INDEX: 28.99 KG/M2 | DIASTOLIC BLOOD PRESSURE: 90 MMHG | HEIGHT: 65 IN

## 2018-03-20 DIAGNOSIS — F17.200 TOBACCO USE DISORDER: ICD-10-CM

## 2018-03-20 DIAGNOSIS — G89.29 CHRONIC RIGHT SHOULDER PAIN: ICD-10-CM

## 2018-03-20 DIAGNOSIS — M51.34 DDD (DEGENERATIVE DISC DISEASE), THORACIC: Primary | ICD-10-CM

## 2018-03-20 DIAGNOSIS — M62.838 MUSCLE SPASM: ICD-10-CM

## 2018-03-20 DIAGNOSIS — M25.511 CHRONIC RIGHT SHOULDER PAIN: ICD-10-CM

## 2018-03-20 DIAGNOSIS — M47.14 OSTEOARTHRITIS OF THORACIC SPINE WITH MYELOPATHY: ICD-10-CM

## 2018-03-20 RX ORDER — PREDNISONE 10 MG/1
TABLET ORAL
Qty: 11 TAB | Refills: 0 | Status: SHIPPED | OUTPATIENT
Start: 2018-03-20 | End: 2018-04-10 | Stop reason: ALTCHOICE

## 2018-03-20 NOTE — PATIENT INSTRUCTIONS
Neck Arthritis: Exercises  Your Care Instructions  Here are some examples of typical rehabilitation exercises for your condition. Start each exercise slowly. Ease off the exercise if you start to have pain. Your doctor or physical therapist will tell you when you can start these exercises and which ones will work best for you. How to do the exercises  Neck stretches to the side    1. This stretch works best if you keep your shoulder down as you lean away from it. To help you remember to do this, start by relaxing your shoulders and lightly holding on to your thighs or your chair. 2. Tilt your head toward your shoulder and hold for 15 to 30 seconds. Let the weight of your head stretch your muscles. 3. Repeat 2 to 4 times toward each shoulder. Chin tuck    1. Lie on the floor with a rolled-up towel under your neck. Your head should be touching the floor. 2. Slowly bring your chin toward your chest.  3. Hold for a count of 6, and then relax for up to 10 seconds. 4. Repeat 8 to 12 times. Active cervical rotation    1. Sit in a firm chair, or stand up straight. 2. Keeping your chin level, turn your head to the right, and hold for 15 to 30 seconds. 3. Turn your head to the left and hold for 15 to 30 seconds. 4. Repeat 2 to 4 times to each side. Shoulder blade squeeze    1. While standing, squeeze your shoulder blades together. 2. Do not raise your shoulders up as you are squeezing. 3. Hold for 6 seconds. 4. Repeat 8 to 12 times. Shoulder rolls    1. Sit comfortably with your feet shoulder-width apart. You can also do this exercise standing up. 2. Roll your shoulders up, then back, and then down in a smooth, circular motion. 3. Repeat 2 to 4 times. Follow-up care is a key part of your treatment and safety. Be sure to make and go to all appointments, and call your doctor if you are having problems. It's also a good idea to know your test results and keep a list of the medicines you take.   Where can you learn more? Go to http://bernardino-prateek.info/. Enter K334 in the search box to learn more about \"Neck Arthritis: Exercises. \"  Current as of: March 21, 2017  Content Version: 11.4  © 4163-3708 [employer]. Care instructions adapted under license by [email or web address] (which disclaims liability or warranty for this information). If you have questions about a medical condition or this instruction, always ask your healthcare professional. [address] any warranty or liability for your use of this information. Learning About Benefits From Quitting Smoking  How does quitting smoking make you healthier? If you're thinking about quitting smoking, you may have a few reasons to be smoke-free. Your health may be one of them. · When you quit smoking, you lower your risks for cancer, lung disease, heart attack, stroke, blood vessel disease, and blindness from macular degeneration. · When you're smoke-free, you get sick less often, and you heal faster. You are less likely to get colds, flu, bronchitis, and pneumonia. · As a nonsmoker, you may find that your mood is better and you are less stressed. When and how will you feel healthier? Quitting has real health benefits that start from day 1 of being smoke-free. And the longer you stay smoke-free, the healthier you get and the better you feel. The first hours  · After just 20 minutes, your blood pressure and heart rate go down. That means there's less stress on your heart and blood vessels. · Within 12 hours, the level of carbon monoxide in your blood drops back to normal. That makes room for more oxygen. With more oxygen in your body, you may notice that you have more energy than when you smoked. After 2 weeks  · Your lungs start to work better. · Your risk of heart attack starts to drop.   After 1 month  · When your lungs are clear, you cough less and breathe deeper, so it's easier to be active. · Your sense of taste and smell return. That means you can enjoy food more than you have since you started smoking. Over the years  · After 1 year, your risk of heart disease is half what it would be if you kept smoking. · After 5 years, your risk of stroke starts to shrink. Within a few years after that, it's about the same as if you'd never smoked. · After 10 years, your risk of dying from lung cancer is cut by about half. And your risk for many other types of cancer is lower too. How would quitting help others in your life? When you quit smoking, you improve the health of everyone who now breathes in your smoke. · Their heart, lung, and cancer risks drop, much like yours. · They are sick less. For babies and small children, living smoke-free means they're less likely to have ear infections, pneumonia, and bronchitis. · If you're a woman who is or will be pregnant someday, quitting smoking means a healthier . · Children who are close to you are less likely to become adult smokers. Where can you learn more? Go to http://bernardinoBookyaprateek.info/. Enter [id number] in the search box to learn more about \"Learning About Benefits From Quitting Smoking. \"  Current as of: 2017  Content Version: 11.4  © 8582-8868 Healthwise, Incorporated. Care instructions adapted under license by [employer] (which disclaims liability or warranty for this information). If you have questions about a medical condition or this instruction, always ask your healthcare professional. [place] [address] any warranty or liability for your use of this information. Healthy Upper Back: Exercises  Your Care Instructions  Here are some examples of exercises for your upper back. Start each exercise slowly. Ease off the exercise if you start to have pain.   Your doctor or physical therapist will tell you when you can start these exercises and which ones will work best for you.  How to do the exercises  Lower neck and upper back stretch    4. Stretch your arms out in front of your body. Clasp one hand on top of your other hand. 5. Gently reach out so that you feel your shoulder blades stretching away from each other. 6. Gently bend your head forward. 7. Hold for 15 to 30 seconds. 8. Repeat 2 to 4 times. Midback stretch    If you have knee pain, do not do this exercise. 5. Kneel on the floor, and sit back on your ankles. 6. Lean forward, place your hands on the floor, and stretch your arms out in front of you. Rest your head between your arms. 7. Gently push your chest toward the floor, reaching as far in front of you as possible. 8. Hold for 15 to 30 seconds. 9. Repeat 2 to 4 times. Shoulder rolls    5. Sit comfortably with your feet shoulder-width apart. You can also do this exercise while standing. 6. Roll your shoulders up, then back, and then down in a smooth, circular motion. 7. Repeat 2 to 4 times. Wall push-up    5. Stand against a wall with your feet about 12 to 24 inches back from the wall. If you feel any pain when you do this exercise, stand closer to the wall. 6. Place your hands on the wall slightly wider apart than your shoulders, and lean forward. 7. Gently lean your body toward the wall. Then push back to your starting position. Keep the motion smooth and controlled. 8. Repeat 8 to 12 times. Resisted shoulder blade squeeze    For this exercise, you will need elastic exercise material, such as surgical tubing or Thera-Band. 4. Sit or stand, holding the band in both hands in front of you. Keep your elbows close to your sides, bent at a 90-degree angle. Your palms should face up. 5. Squeeze your shoulder blades together, and move your arms to the outside, stretching the band. Be sure to keep your elbows at your sides while you do this. 6. Relax. 7. Repeat 8 to 12 times.   Resisted rows    For this exercise, you will need elastic exercise material, such as surgical tubing or Thera-Band. 1. Put the band around a solid object, such as a bedpost, at about waist level. Hold one end of the band in each hand. 2. With your elbows at your sides and bent to 90 degrees, pull the band back to move your shoulder blades toward each other. Return to the starting position. 3. Repeat 8 to 12 times. Follow-up care is a key part of your treatment and safety. Be sure to make and go to all appointments, and call your doctor if you are having problems. It's also a good idea to know your test results and keep a list of the medicines you take. Where can you learn more? Go to http://bernardino-prateek.info/. Enter P867 in the search box to learn more about \"Healthy Upper Back: Exercises. \"  Current as of: March 21, 2017  Content Version: 11.4  © 7081-2782 Healthwise, Incorporated. Care instructions adapted under license by [employer] (which disclaims liability or warranty for this information). If you have questions about a medical condition or this instruction, always ask your healthcare professional. [place] [address] any warranty or liability for your use of this information.

## 2018-03-20 NOTE — PROGRESS NOTES
[facility]  Sean Douglas., [address] [place] [address], [address]  Phone: [phone]  Fax: [phone]    Pt's YOB: 1955    ASSESSMENT   Diagnoses and all orders for this visit:    1. DDD (degenerative disc disease), thoracic  -     predniSONE (DELTASONE) 10 mg tablet; 2 pills in am for 3 days, then 1 pill in am for 3 days and 1/2 pill in am for remainder  -     MRI [facility] SPINE WO CONT; Future    2. Osteoarthritis of thoracic spine with myelopathy  -     predniSONE (DELTASONE) 10 mg tablet; 2 pills in am for 3 days, then 1 pill in am for 3 days and 1/2 pill in am for remainder  -     MRI [facility] SPINE WO CONT; Future    3. Chronic right shoulder pain    4. Muscle spasm    5. Tobacco use disorder       IMPRESSION AND PLAN:  Khloe Cassidy is a 63 y.o. female with history of cervical and right shoulder pain. She continues to experience neck and right shoulder pain. Pt also experiences tightness and a heavy sensation across the middle back and chest wall. She takes Neurontin 300 mg 1-2 tabs BID, Mobic 15 mg 1 tab daily, and Robaxin 500 mg 1 tab TID. 1) Pt was given information on cervical arthritis and upper back exercises. 2) I strongly encouraged the Pt to quit smoking and gave information on smoking cessation. 3) She was prescribed a small prednisone taper. 4) Pt will continue with home shoulder exercises. 5) A thoracic MRI was ordered. She has pain with bilateral radicular symptoms and leg weakness. Pt is on Mobic. 6) Ms. Alberto Kumar has a reminder for a \"due or due soon\" health maintenance. I have asked that she contact her primary care provider, Reggie Rodriguez MD, for follow-up on this health maintenance; she will also follow up for evaluation of her blood pressure. 7)  demonstrated consistency with prescribing. 8) Pt will follow-up in 3 weeks or sooner if needed.       HISTORY OF PRESENT ILLNESS:  Khloe Cassidy is a 63 y.o. female with history of cervical and right shoulder pain. She continues to experience neck and right shoulder pain. Pt has not tried physical therapy due to transportation issues. She has tried home exercises but reports that this causes increased pain. Pt states that she experienced a muscle spasm in the right arm when she went to brush her hair yesterday. She admits to increased pain when sitting on a hard surface for extended periods of time. Pt reports that she experiences tightness and a heavy sensation across the middle back and chest wall. She states that she is unable to wear a bra due to the tightness and heavy sensation. Pt admits that her left knee gives out in her occasionally but she denies any prior joint replacements. Her pain is worse with activity. Pt has been prescribed Neurontin 300 mg and takes 1-2 tabs BID. She also takes Mobic 15 mg 1 tab daily, and Robaxin 500 mg 1 tab TID. Pt has tried OTC patches but states that they caused burning pain. Of note, patient is diabetic and reports that her blood sugars are well controlled. Pt has not recently tried prednisone. She takes Zoloft and admits to increased sweating. Pt takes trazodone at night. Pt at this time desires to proceed with medication evaluation and a thoracic MRI. Of note, patient is a smoker. Pain Scale: 7/10    PCP: Venu Griggs MD     Past Medical History:   Diagnosis Date    CAD (coronary artery disease)     Hypertension     Uterine fibroid         Social History     Social History    Marital status: SINGLE     Spouse name: N/A    Number of children: N/A    Years of education: N/A     Occupational History    Not on file.      Social History Main Topics    Smoking status: Current Every Day Smoker     Packs/day: 0.25     Years: 2.00    Smokeless tobacco: Never Used    Alcohol use No    Drug use: Yes     Special: Marijuana      Comment: rarely     Sexual activity: Not on file     Other Topics Concern    Not on file     Social History Narrative       Current Outpatient Prescriptions   Medication Sig Dispense Refill    predniSONE (DELTASONE) 10 mg tablet 2 pills in am for 3 days, then 1 pill in am for 3 days and 1/2 pill in am for remainder 11 Tab 0    methocarbamol (ROBAXIN) 500 mg tablet TAKE ONE TABLET BY MOUTH TWO TO THREE TIMES DAILY AS NEEDED FOR SPASMS 75 Tab 0    pravastatin (PRAVACHOL) 40 mg tablet TAKE ONE TABLET BY MOUTH NIGHTLY. 30 Tab 2    gabapentin (NEURONTIN) 300 mg capsule Take 1-2 po q am and 1-2 po q evening 120 Cap 3    methocarbamol (ROBAXIN) 500 mg tablet TAKE ONE TABLET BY MOUTH 2 TO 3 TIMES DAILY AS NEEDED FOR SPASMS 75 Tab 0    LANTUS 100 unit/mL injection INJECT 20 UNITS SUBCUTANEOUSLY EVERY 12 HOURS 10 Vial 5    sertraline (ZOLOFT) 50 mg tablet Take 1 Tab by mouth daily. 30 Tab 3    meloxicam (MOBIC) 15 mg tablet TAKE ONE TABLET BY MOUTH ONCE DAILY-- take with food and use as needed 30 Tab 4    glucose blood VI test strips (FREESTYLE LITE STRIPS) strip Check glucose ACHS. 200 Strip 11    insulin lispro (HUMALOG) 100 unit/mL injection 10 Units by SubCUTAneous route Before breakfast, lunch, and dinner. 1 Vial 2    Lancets misc Check glucose ACHS 200 Each 11    insulin glargine (LANTUS) 100 unit/mL injection 20 Units by SubCUTAneous route every twelve (12) hours. 2 Vial 2    Blood-Glucose Meter (FREESTYLE LITE METER) monitoring kit accucheck ACHS and record in log. Take log to every doctor visit. **MEDICALLY NECESSARY** 1 Kit 0    insulin lispro (HUMALOG) 100 unit/mL injection For Blood Sugar (mg/dL) of: <150 =   0 units; 150 -199 =   2 units; 200 -249 =   4 units; 250 -299 =   6 units; 300 -349 = 8 units; 350 and above =   10 units 1 Vial 2    alcohol swabs padm 1 Each by Apply Externally route Before breakfast, lunch, dinner and at bedtime. accucheck ACHS and record in log. Take log to every doctor visit. **MEDICALLY NECESSARY** 100 Pad 2    traZODone (DESYREL) 150 mg tablet Take 600 mg by mouth nightly.  sertraline (ZOLOFT) 100 mg tablet Take 100 mg by mouth daily.  perphenazine (TRILAFON) 4 mg tablet Take 4 mg by mouth three (3) times daily.  benztropine (COGENTIN) 1 mg tablet Take 1 mg by mouth daily.  lisinopril (PRINIVIL, ZESTRIL) 10 mg tablet Take 1 Tab by mouth daily. (Patient not taking: Reported on 3/20/2018) 90 Tab 1    HYDROcodone-acetaminophen (NORCO) 5-325 mg per tablet Take 1 Tab by mouth every eight (8) hours as needed for Pain. Max Daily Amount: 3 Tabs. (Patient not taking: Reported on 3/20/2018) 20 Tab 0       Allergies   Allergen Reactions    Latex Rash    Penicillins Nausea and Vomiting         REVIEW OF SYSTEMS    Constitutional: Negative for fever, chills, or weight change. Respiratory: Negative for cough or shortness of breath. Cardiovascular: Negative for chest pain or palpitations. Gastrointestinal: Negative for acid reflux, change in bowel habits, or constipation. Genitourinary: Negative for dysuria and flank pain. Musculoskeletal: Positive for cervical and right shoulder pain. Skin: Negative for rash. Neurological: Negative for headaches, dizziness, or numbness. Endo/Heme/Allergies: Negative for increased bruising. Psychiatric/Behavioral: Positive for difficulty with sleep. PHYSICAL EXAMINATION  Visit Vitals    /90    Pulse 81    Ht 5' 5\" (1.651 m)    Wt 174 lb (78.9 kg)    BMI 28.96 kg/m2       Constitutional: Awake, alert, and in no acute distress. Neurological: 1+ symmetrical DTRs in the upper extremities. Sensation to light touch is intact. Negative Sofia's sign bilaterally. Skin: warm, dry, and intact. Musculoskeletal:Tight across the upper trapezii with tenderness to palpation. Pain with extension and axial loading. Better with forward flexion. Good range of motion of the right shoulder. No pain with internal or external rotation of her hips.  Negative straight leg raise bilaterally; 4/5 LE natalee      Biceps  Triceps Deltoids Wrist Ext Wrist Flex Hand Intrin   Right +4/5 +4/5 +4/5 +4/5 +4/5 +4/5   Left +4/5 +4/5 +4/5 +4/5 +4/5 +4/5     IMAGING:    Right shoulder x-rays from 03/18/2017 were personally reviewed with the patient and demonstrated:    Results from [place] encounter on 03/18/17   XR SHOULDER RT AP/LAT MIN 2 V   Narrative Shoulder series. CPT code: 50577    Clinical history: Arthritis in the shoulder and pain in the back under the  shoulder blade without known injury of unspecified chronicity. Technique: Two shoulder views. Comparison: No priors. Findings: There are no fractures. Joint relationships at glenohumeral joint are  normal.  Acromioclavicular joint is normal. There is convex appearance of the  underside of the distal acromion process. There are no soft tissue  calcifications. Impression IMPRESSION:    No acute abnormalities. Convex appearance of the inferior aspect of the acromion  process could increased risk of rotator cuff impingement. Cervical Spine X-rays from 12/27/2016 were personally reviewed with the Pt and demonstrated:  FINDINGS:       Vertebral body height and alignment is intact. Slight straightening of the  cervical lordosis. Moderate disc height loss endplate sclerosis and vertebral  body osteophytes from C3 through C7. Alignment is intact. No fracture. Mild  facet joint arthropathy at multiple levels. The prevertebral space appears normal.       IMPRESSION:      Moderate cervical spondylosis.     Thoracic Spine 2V X-rays from 12/27/2016 were personally reviewed with the Pt and demonstrated:   Results from [facility] on 12/27/16   XR SPINE THORAC 2 V     Narrative THORACIC SPINE AP AND LATERAL    CPT CODE: 66546    HISTORY: Upper back pain. COMPARISON: None.     FINDINGS:    There are mild endplate irregularities in each level of the thoracic spine with  very small circumferential vertebral body osteophytes The vertebral body  heights and alignment appears normal. .              Impression IMPRESSION:    Mild spondylosis at multiple levels. Written by Gill Burgess, as dictated by Ander Marcelo MD.  I, Dr. Ander Marcelo confirm that all documentation is accurate.

## 2018-03-20 NOTE — MR AVS SNAPSHOT
[address] [address] [address] [facility] [address] 
[phone] Patient: Mario Brown MRN: [id number] PKQ:[id number] Visit Information Date & Time Provider Department Dept. Phone Encounter #  
 3/20/2018  3:30 PM Itz Rodriguez, 27 [facility] [phone] Follow-up Instructions Return in about 3 weeks (around 4/10/2018) for Diagnostic Test follow up, Medication follow up. Upcoming Health Maintenance Date Due Hepatitis C Screening 1955 EYE EXAM RETINAL OR DILATED Q1 9/7/1967 Pneumococcal 19-64 Medium Risk (1 of 1 - PPSV23) 9/7/1976 DTaP/Tdap/Td series (1 - Tdap) 9/7/1978 PAP AKA CERVICAL CYTOLOGY 9/7/1978 FOBT Q 1 YEAR AGE 50-75 9/7/2007 BREAST CANCER SCRN MAMMOGRAM 4/14/2011 ZOSTER VACCINE AGE 60> 7/7/2017 FOOT EXAM Q1 4/11/2018 MICROALBUMIN Q1 4/11/2018 HEMOGLOBIN A1C Q6M 9/19/2018 LIPID PANEL Q1 9/23/2018 Allergies as of 3/20/2018  Review Complete On: 3/20/2018 By: Itz Rodriguez MD  
  
 Severity Noted Reaction Type Reactions Latex Medium 08/12/2012   Topical Rash Penicillins Medium 08/12/2012   Side Effect Nausea and Vomiting Current Immunizations  Reviewed on 9/11/2017 Name Date Influenza Vaccine (Quad) PF 9/11/2017 Not reviewed this visit You Were Diagnosed With   
  
 Codes Comments DDD (degenerative disc disease), thoracic    -  Primary ICD-10-CM: M51.34 
ICD-9-CM: 722.51 Osteoarthritis of thoracic spine with myelopathy     ICD-10-CM: M47.14 
ICD-9-CM: 721.41 Vitals BP Pulse Height(growth percentile) Weight(growth percentile) BMI OB Status 175/90 81 5' 5\" (1.651 m) 174 lb (78.9 kg) 28.96 kg/m2 Menopause Smoking Status Current Every Day Smoker Vitals History BMI and BSA Data  Body Mass Index Body Surface Area  
 28.96 kg/m 2 1.9 m 2  
  
  
 Preferred Pharmacy Pharmacy Name Phone [address] [place] [address]. [phone] Your Updated Medication List  
  
   
This list is accurate as of 3/20/18  4:46 PM.  Always use your most recent med list.  
  
  
  
  
 alcohol swabs Padm 1 Each by Apply Externally route Before breakfast, lunch, dinner and at bedtime. accucheck ACHS and record in log. Take log to every doctor visit. **MEDICALLY NECESSARY**  
  
 benztropine 1 mg tablet Commonly known as:  COGENTIN Take 1 mg by mouth daily. Blood-Glucose Meter monitoring kit Commonly known as:  FREESTYLE LITE METER  
accucheck ACHS and record in log. Take log to every doctor visit. **MEDICALLY NECESSARY**  
  
 gabapentin 300 mg capsule Commonly known as:  NEURONTIN Take 1-2 po q am and 1-2 po q evening  
  
 glucose blood VI test strips strip Commonly known as:  FREESTYLE LITE STRIPS Check glucose ACHS. HYDROcodone-acetaminophen 5-325 mg per tablet Commonly known as:  Adrienne Lea Take 1 Tab by mouth every eight (8) hours as needed for Pain. Max Daily Amount: 3 Tabs. * insulin glargine 100 unit/mL injection Commonly known as:  LANTUS  
20 Units by SubCUTAneous route every twelve (12) hours. * LANTUS U-100 INSULIN 100 unit/mL injection Generic drug:  insulin glargine INJECT 20 UNITS SUBCUTANEOUSLY EVERY 12 HOURS  
  
 * insulin lispro 100 unit/mL injection Commonly known as:  HUMALOG For Blood Sugar (mg/dL) of: <150 =   0 units; 150 -199 =   2 units; 200 -249 =   4 units; 250 -299 =   6 units; 300 -349 = 8 units; 350 and above =   10 units * insulin lispro 100 unit/mL injection Commonly known as:  HUMALOG  
10 Units by SubCUTAneous route Before breakfast, lunch, and dinner. Lancets Misc Check glucose ACHS  
  
 lisinopril 10 mg tablet Commonly known as:  Conrad Deny Take 1 Tab by mouth daily. meloxicam 15 mg tablet Commonly known as:  MOBIC  
TAKE ONE TABLET BY MOUTH ONCE DAILY-- take with food and use as needed * methocarbamol 500 mg tablet Commonly known as:  ROBAXIN  
TAKE ONE TABLET BY MOUTH 2 TO 3 TIMES DAILY AS NEEDED FOR SPASMS * methocarbamol 500 mg tablet Commonly known as:  ROBAXIN  
TAKE ONE TABLET BY MOUTH TWO TO THREE TIMES DAILY AS NEEDED FOR SPASMS  
  
 perphenazine 4 mg tablet Commonly known as:  TRILAFON Take 4 mg by mouth three (3) times daily. pravastatin 40 mg tablet Commonly known as:  PRAVACHOL  
TAKE ONE TABLET BY MOUTH NIGHTLY. predniSONE 10 mg tablet Commonly known as:  DELTASONE  
2 pills in am for 3 days, then 1 pill in am for 3 days and 1/2 pill in am for remainder * sertraline 100 mg tablet Commonly known as:  ZOLOFT Take 100 mg by mouth daily. * sertraline 50 mg tablet Commonly known as:  ZOLOFT Take 1 Tab by mouth daily. traZODone 150 mg tablet Commonly known as:  Lunette Grate Take 600 mg by mouth nightly. * Notice: This list has 8 medication(s) that are the same as other medications prescribed for you. Read the directions carefully, and ask your doctor or other care provider to review them with you. Prescriptions Sent to Pharmacy Refills  
 predniSONE (DELTASONE) 10 mg tablet 0 Si pills in am for 3 days, then 1 pill in am for 3 days and 1/2 pill in am for remainder Class: Normal  
 Pharmacy: [facility] [address]DIVIA [address] [address].  #: [phone] Follow-up Instructions Return in about 3 weeks (around 4/10/2018) for Diagnostic Test follow up, Medication follow up. To-Do List   
 2018 Imaging:  MRI [facility] SPINE WO CONT   
  
 2018 4:45 PM  
  Appointment with [facility] MRI RM 1 at [facility] RAD MRI ([phone]) GENERAL INSTRUCTIONS  Bring information (ID card) if you have any medically implanted devices.   You will be required to lie still while the procedure is being performed. Remove any jewelry (including body piercing, hairpins) prior to MRI. If you have had a creatinine level drawn within the past 30 days, please bring most recent results to your appt. Bring any films, CD's, and reports related to your study with you on the day of your exam.  This only includes studies done outside of [address] [place] [address], [facility], [facility], and [facility]. Bring a complete list of all medications you are currently taking to include prescriptions, over-the-counter meds, herbals, vitamins & any dietary supplements. If you were given medications for claustrophobia or anxiety, please arrange to have someone drive you to your appointment. QUESTIONS  Notify the MRI Department if you have any questions concerning your study. [facility] - [phone] [facility] [address] [place] [address] [phone] Patient Instructions Neck Arthritis: Exercises Your Care Instructions Here are some examples of typical rehabilitation exercises for your condition. Start each exercise slowly. Ease off the exercise if you start to have pain. Your doctor or physical therapist will tell you when you can start these exercises and which ones will work best for you. How to do the exercises Neck stretches to the side 1. This stretch works best if you keep your shoulder down as you lean away from it. To help you remember to do this, start by relaxing your shoulders and lightly holding on to your thighs or your chair. 2. Tilt your head toward your shoulder and hold for 15 to 30 seconds. Let the weight of your head stretch your muscles. 3. Repeat 2 to 4 times toward each shoulder. Chin tuck 1. Lie on the floor with a rolled-up towel under your neck. Your head should be touching the floor. 2. Slowly bring your chin toward your chest. 
3. Hold for a count of 6, and then relax for up to 10 seconds. 4. Repeat 8 to 12 times. Active cervical rotation 1. Sit in a firm chair, or stand up straight. 2. Keeping your chin level, turn your head to the right, and hold for 15 to 30 seconds. 3. Turn your head to the left and hold for 15 to 30 seconds. 4. Repeat 2 to 4 times to each side. Shoulder blade squeeze 1. While standing, squeeze your shoulder blades together. 2. Do not raise your shoulders up as you are squeezing. 3. Hold for 6 seconds. 4. Repeat 8 to 12 times. Shoulder rolls 1. Sit comfortably with your feet shoulder-width apart. You can also do this exercise standing up. 2. Roll your shoulders up, then back, and then down in a smooth, circular motion. 3. Repeat 2 to 4 times. Follow-up care is a key part of your treatment and safety. Be sure to make and go to all appointments, and call your doctor if you are having problems. It's also a good idea to know your test results and keep a list of the medicines you take. Where can you learn more? Go to http://bernardino-prateek.info/. Enter K234 in the search box to learn more about \"Neck Arthritis: Exercises. \" Current as of: March 21, 2017 Content Version: 11.4 © 8191-2362 Healthwise, Incorporated. Care instructions adapted under license by [employer] (which disclaims liability or warranty for this information). If you have questions about a medical condition or this instruction, always ask your healthcare professional. [address] [address] any warranty or liability for your use of this information. Learning About Benefits From Quitting Smoking How does quitting smoking make you healthier? If you're thinking about quitting smoking, you may have a few reasons to be smoke-free. Your health may be one of them. · When you quit smoking, you lower your risks for cancer, lung disease, heart attack, stroke, blood vessel disease, and blindness from macular degeneration. · When you're smoke-free, you get sick less often, and you heal faster. You are less likely to get colds, flu, bronchitis, and pneumonia. · As a nonsmoker, you may find that your mood is better and you are less stressed. When and how will you feel healthier? Quitting has real health benefits that start from day 1 of being smoke-free. And the longer you stay smoke-free, the healthier you get and the better you feel. The first hours · After just 20 minutes, your blood pressure and heart rate go down. That means there's less stress on your heart and blood vessels. · Within 12 hours, the level of carbon monoxide in your blood drops back to normal. That makes room for more oxygen. With more oxygen in your body, you may notice that you have more energy than when you smoked. After 2 weeks · Your lungs start to work better. · Your risk of heart attack starts to drop. After 1 month · When your lungs are clear, you cough less and breathe deeper, so it's easier to be active. · Your sense of taste and smell return. That means you can enjoy food more than you have since you started smoking. Over the years · After 1 year, your risk of heart disease is half what it would be if you kept smoking. · After 5 years, your risk of stroke starts to shrink. Within a few years after that, it's about the same as if you'd never smoked. · After 10 years, your risk of dying from lung cancer is cut by about half. And your risk for many other types of cancer is lower too. How would quitting help others in your life? When you quit smoking, you improve the health of everyone who now breathes in your smoke. · Their heart, lung, and cancer risks drop, much like yours. · They are sick less. For babies and small children, living smoke-free means they're less likely to have ear infections, pneumonia, and bronchitis. · If you're a woman who is or will be pregnant someday, quitting smoking means a healthier . · Children who are close to you are less likely to become adult smokers. Where can you learn more? Go to http://bernardino-prateek.info/. Enter [id number] in the search box to learn more about \"Learning About Benefits From Quitting Smoking. \" Current as of: March 20, 2017 Content Version: 11.4 © 0702-3319 [employer]. Care instructions adapted under license by [employer] (which disclaims liability or warranty for this information). If you have questions about a medical condition or this instruction, always ask your healthcare professional. [address] [address] any warranty or liability for your use of this information. Healthy Upper Back: Exercises Your Care Instructions Here are some examples of exercises for your upper back. Start each exercise slowly. Ease off the exercise if you start to have pain. Your doctor or physical therapist will tell you when you can start these exercises and which ones will work best for you. How to do the exercises Lower neck and upper back stretch 4. Stretch your arms out in front of your body. Clasp one hand on top of your other hand. 5. Gently reach out so that you feel your shoulder blades stretching away from each other. 6. Gently bend your head forward. 7. Hold for 15 to 30 seconds. 8. Repeat 2 to 4 times. Midback stretch If you have knee pain, do not do this exercise. 5. Kneel on the floor, and sit back on your ankles. 6. Lean forward, place your hands on the floor, and stretch your arms out in front of you. Rest your head between your arms. 7. Gently push your chest toward the floor, reaching as far in front of you as possible. 8. Hold for 15 to 30 seconds. 9. Repeat 2 to 4 times. Shoulder rolls 5. Sit comfortably with your feet shoulder-width apart. You can also do this exercise while standing. 6. Roll your shoulders up, then back, and then down in a smooth, circular motion. 7. Repeat 2 to 4 times. Wall push-up 5. Stand against a wall with your feet about 12 to 24 inches back from the wall. If you feel any pain when you do this exercise, stand closer to the wall. 6. Place your hands on the wall slightly wider apart than your shoulders, and lean forward. 7. Gently lean your body toward the wall. Then push back to your starting position. Keep the motion smooth and controlled. 8. Repeat 8 to 12 times. Resisted shoulder blade squeeze For this exercise, you will need elastic exercise material, such as surgical tubing or Thera-Band. 4. Sit or stand, holding the band in both hands in front of you. Keep your elbows close to your sides, bent at a 90-degree angle. Your palms should face up. 5. Squeeze your shoulder blades together, and move your arms to the outside, stretching the band. Be sure to keep your elbows at your sides while you do this. 6. Relax. 7. Repeat 8 to 12 times. Resisted rows For this exercise, you will need elastic exercise material, such as surgical tubing or Thera-Band. 1. Put the band around a solid object, such as a bedpost, at about waist level. Hold one end of the band in each hand. 2. With your elbows at your sides and bent to 90 degrees, pull the band back to move your shoulder blades toward each other. Return to the starting position. 3. Repeat 8 to 12 times. Follow-up care is a key part of your treatment and safety. Be sure to make and go to all appointments, and call your doctor if you are having problems. It's also a good idea to know your test results and keep a list of the medicines you take. Where can you learn more? Go to http://bernardino-prateek.info/. Enter G521 in the search box to learn more about \"Healthy Upper Back: Exercises. \" Current as of: March 21, 2017 Content Version: 11.4 © 8008-7343 Healthwise, Incorporated.  Care instructions adapted under license by [employer] (which disclaims liability or warranty for this information). If you have questions about a medical condition or this instruction, always ask your healthcare professional. [address] any warranty or liability for your use of this information. Introducing [facility] & [facility] SERVICES! Roro Devries introduces TagMan patient portal. Now you can access parts of your medical record, email your doctor's office, and request medication refills online. 1. In your internet browser, go to [email or web address] 2. Click on the First Time User? Click Here link in the Sign In box. You will see the New Member Sign Up page. 3. Enter your TagMan Access Code exactly as it appears below. You will not need to use this code after youve completed the sign-up process. If you do not sign up before the expiration date, you must request a new code. · TagMan Access Code: [email or web address] Expires: 6/18/2018  4:18 PM 
 
4. Enter the last four digits of your Social Security Number (xxxx) and Date of Birth (mm/dd/yyyy) as indicated and click Submit. You will be taken to the next sign-up page. 5. Create a TagMan ID. This will be your TagMan login ID and cannot be changed, so think of one that is secure and easy to remember. 6. Create a TagMan password. You can change your password at any time. 7. Enter your Password Reset Question and Answer. This can be used at a later time if you forget your password. 8. Enter your e-mail address. You will receive e-mail notification when new information is available in [address] 9. Click Sign Up. You can now view and download portions of your medical record. 10. Click the Download Summary menu link to download a portable copy of your medical information. If you have questions, please visit the Frequently Asked Questions section of the TagMan website. Remember, TagMan is NOT to be used for urgent needs. For medical emergencies, dial 911. Now available from your iPhone and Android! Please provide this summary of care documentation to your next provider. Your primary care clinician is listed as Katheryn Henao. If you have any questions after today's visit, please call [phone].

## 2018-03-23 ENCOUNTER — HOSPITAL ENCOUNTER (OUTPATIENT)
Dept: LAB | Age: 63
Discharge: HOME | End: 2018-03-23
Payer: MEDICAID

## 2018-03-23 DIAGNOSIS — Z12.11 SCREENING FOR COLON CANCER: ICD-10-CM

## 2018-03-23 PROCEDURE — 82274 ASSAY TEST FOR BLOOD FECAL: CPT | Performed by: NURSE PRACTITIONER

## 2018-03-28 DIAGNOSIS — M62.838 MUSCLE SPASM: ICD-10-CM

## 2018-03-28 LAB — HEMOCCULT STL QL IA: NEGATIVE

## 2018-03-28 RX ORDER — METHOCARBAMOL 500 MG/1
TABLET, FILM COATED ORAL
Qty: 75 TAB | Refills: 0 | Status: SHIPPED | OUTPATIENT
Start: 2018-03-28 | End: 2018-04-20 | Stop reason: SDUPTHER

## 2018-03-28 NOTE — TELEPHONE ENCOUNTER
Requested Prescriptions     Pending Prescriptions Disp Refills    methocarbamol (ROBAXIN) 500 mg tablet [Pharmacy Med Name: METHOCARBAM 500MG   TAB] 75 Tab 0     Sig: TAKE ONE TABLET BY MOUTH TWO TO THREE TIMES DAILY AS NEEDED FOR SPASMS

## 2018-03-29 ENCOUNTER — TELEPHONE (OUTPATIENT)
Dept: FAMILY MEDICINE CLINIC | Age: 63
End: 2018-03-29

## 2018-03-29 NOTE — TELEPHONE ENCOUNTER
----- Message from SHEILA Blackburn sent at 3/29/2018  8:28 AM EDT -----  Please let patient know that her occult blood was negative. Thank you.

## 2018-04-04 ENCOUNTER — HOSPITAL ENCOUNTER (OUTPATIENT)
Dept: MRI IMAGING | Age: 63
Discharge: HOME | End: 2018-04-04
Attending: PHYSICAL MEDICINE & REHABILITATION
Payer: SELF-PAY

## 2018-04-04 DIAGNOSIS — M47.14 OSTEOARTHRITIS OF THORACIC SPINE WITH MYELOPATHY: ICD-10-CM

## 2018-04-04 DIAGNOSIS — M51.34 DDD (DEGENERATIVE DISC DISEASE), THORACIC: ICD-10-CM

## 2018-04-04 PROCEDURE — 72146 MRI CHEST SPINE W/O DYE: CPT

## 2018-04-09 RX ORDER — INSULIN LISPRO 100 [IU]/ML
10 INJECTION, SOLUTION INTRAVENOUS; SUBCUTANEOUS
Qty: 1 VIAL | Refills: 2
Start: 2018-04-09 | End: 2018-10-03 | Stop reason: SDDI

## 2018-04-10 ENCOUNTER — OFFICE VISIT (OUTPATIENT)
Dept: ORTHOPEDIC SURGERY | Age: 63
End: 2018-04-10

## 2018-04-10 VITALS
WEIGHT: 173 LBS | SYSTOLIC BLOOD PRESSURE: 144 MMHG | RESPIRATION RATE: 14 BRPM | DIASTOLIC BLOOD PRESSURE: 83 MMHG | BODY MASS INDEX: 28.82 KG/M2 | HEIGHT: 65 IN | TEMPERATURE: 98.1 F | OXYGEN SATURATION: 99 % | HEART RATE: 66 BPM

## 2018-04-10 DIAGNOSIS — M47.816 LUMBAR FACET ARTHROPATHY: Primary | ICD-10-CM

## 2018-04-10 DIAGNOSIS — F17.200 TOBACCO USE DISORDER: ICD-10-CM

## 2018-04-10 DIAGNOSIS — M51.34 DDD (DEGENERATIVE DISC DISEASE), THORACIC: ICD-10-CM

## 2018-04-10 DIAGNOSIS — M62.838 MUSCLE SPASM: ICD-10-CM

## 2018-04-10 RX ORDER — DICLOFENAC SODIUM 75 MG/1
75 TABLET, DELAYED RELEASE ORAL 2 TIMES DAILY
Qty: 60 TAB | Refills: 2 | Status: SHIPPED | OUTPATIENT
Start: 2018-04-10 | End: 2018-07-10 | Stop reason: SDUPTHER

## 2018-04-10 NOTE — PROGRESS NOTES
[facility]  [address], [facility] [address]  Phone: [phone]  Fax: [phone]    Pt's YOB: 1955    ASSESSMENT   Diagnoses and all orders for this visit:    1. Lumbar facet arthropathy (HCC)  -     diclofenac EC (VOLTAREN) 75 mg EC tablet; Take 1 Tab by mouth two (2) times a day. Take with food as needed for pain  -     REFERRAL TO PHYSICAL THERAPY    2. Muscle spasm  -     diclofenac EC (VOLTAREN) 75 mg EC tablet; Take 1 Tab by mouth two (2) times a day. Take with food as needed for pain  -     REFERRAL TO PHYSICAL THERAPY    3. DDD (degenerative disc disease), thoracic  -     REFERRAL TO PHYSICAL THERAPY    4. Tobacco use disorder         IMPRESSION AND PLAN:  Sherly Wilkes is a 63 y.o. female with history of cervical, thoracic, and right shoulder pain. Pt complains of pain in the middle back that wraps around to the anterior chest wall just below the breasts. She takes Neurontin 300 mg 1 tab QAM and 1 tab QHS and Mobic 15 mg 1 tab QAM. Pt denies ever trying dry needling and tried electrical stimulation years ago. 1) Pt was given information on upper back exercises. 2) She was referred to physical therapy with evaluation for dry needling. 3) Pt was referred to lumbar/thoracic aqua physical therapy with evaluation for dry needling. 4) She was prescribed Voltaren 75 mg 1 tab BID prn pain with food to take in place of Mobic 15 mg.  5) Ms. Renetta Arriaga has a reminder for a \"due or due soon\" health maintenance. I have asked that she contact her primary care provider, SHEILA Morillo, for follow-up on this health maintenance. 6)  demonstrated consistency with prescribing. 7) Pt will follow-up in 4-6 weeks or sooner if needed. HISTORY OF PRESENT ILLNESS:  Sherly Wilkes is a 63 y.o. female with history of cervical, thoracic, and right shoulder pain. She presents to the office today for thoracic spine MRI follow up.  Pt complains of pain in the middle back that wraps around to the anterior chest wall just below the breasts. Her pain is generally better when laying down. She denies any rashes but admits that she is unable to wear a bra due to the pain. Pt has been prescribed Neurontin 300 mg and takes 1 tab QAM and 1 tab QHS. She admits to some sedation when taking the Neurontin and generally goes to sleep after taking the medication. Of note, she does not work at this time. Pt states that she generally wakes up at 7:30 and takes her Neurontin around 9:00 AM. She also takes Mobic 15 mg 1 tab QAM and denies any stomach issues with the medication. Pt admits to improvement in her stiffness and pain when taking the Mobic. She denies ever trying dry needling and tried electrical stimulation years ago. Of note, patient is diabetic and states that her blood sugars are well controlled at this time. Pt at this time desires to proceed with medication evaluation and lumbar/thoracic aqua physical therapy with evaluation for dry needling. Pain Scale: 8/10    PCP: SHEILA Navas     Past Medical History:   Diagnosis Date    CAD (coronary artery disease)     Hypertension     Uterine fibroid         Social History     Social History    Marital status: SINGLE     Spouse name: N/A    Number of children: N/A    Years of education: N/A     Occupational History    Not on file. Social History Main Topics    Smoking status: Current Every Day Smoker     Packs/day: 0.25     Years: 2.00    Smokeless tobacco: Never Used    Alcohol use No    Drug use: Yes     Special: Marijuana      Comment: rarely     Sexual activity: Not on file     Other Topics Concern    Not on file     Social History Narrative       Current Outpatient Prescriptions   Medication Sig Dispense Refill    diclofenac EC (VOLTAREN) 75 mg EC tablet Take 1 Tab by mouth two (2) times a day.  Take with food as needed for pain 60 Tab 2    insulin lispro (HUMALOG) 100 unit/mL injection 10 Units by SubCUTAneous route Before breakfast, lunch, and dinner. 1 Vial 2    pravastatin (PRAVACHOL) 40 mg tablet TAKE ONE TABLET BY MOUTH ONCE DAILY AT  NIGHT 90 Tab 1    methocarbamol (ROBAXIN) 500 mg tablet TAKE ONE TABLET BY MOUTH TWO TO THREE TIMES DAILY AS NEEDED FOR SPASMS 75 Tab 0    gabapentin (NEURONTIN) 300 mg capsule Take 1-2 po q am and 1-2 po q evening 120 Cap 3    sertraline (ZOLOFT) 50 mg tablet Take 1 Tab by mouth daily. 30 Tab 3    glucose blood VI test strips (FREESTYLE LITE STRIPS) strip Check glucose ACHS. 200 Strip 11    Lancets misc Check glucose ACHS 200 Each 11    insulin glargine (LANTUS) 100 unit/mL injection 20 Units by SubCUTAneous route every twelve (12) hours. 2 Vial 2    Blood-Glucose Meter (FREESTYLE LITE METER) monitoring kit accucheck ACHS and record in log. Take log to every doctor visit. **MEDICALLY NECESSARY** 1 Kit 0    alcohol swabs padm 1 Each by Apply Externally route Before breakfast, lunch, dinner and at bedtime. accucheck ACHS and record in log. Take log to every doctor visit. **MEDICALLY NECESSARY** 100 Pad 2    traZODone (DESYREL) 150 mg tablet Take 600 mg by mouth nightly.  sertraline (ZOLOFT) 100 mg tablet Take 100 mg by mouth daily.  perphenazine (TRILAFON) 4 mg tablet Take 4 mg by mouth three (3) times daily.  benztropine (COGENTIN) 1 mg tablet Take 1 mg by mouth daily.  LANTUS 100 unit/mL injection INJECT 20 UNITS SUBCUTANEOUSLY EVERY 12 HOURS 10 Vial 5       Allergies   Allergen Reactions    Latex Rash    Penicillins Nausea and Vomiting         REVIEW OF SYSTEMS    Constitutional: Negative for fever, chills, or weight change. Respiratory: Negative for cough or shortness of breath. Cardiovascular: Negative for chest pain or palpitations. Gastrointestinal: Negative for acid reflux, change in bowel habits, or constipation. Genitourinary: Negative for dysuria and flank pain.    Musculoskeletal: Positive for cervical, thoracic, and lumbar pain. Skin: Negative for rash. Neurological: Negative for headaches, dizziness, or numbness. Endo/Heme/Allergies: Negative for increased bruising. Psychiatric/Behavioral: Negative for difficulty with sleep. PHYSICAL EXAMINATION  Visit Vitals    /83    Pulse 66    Temp 98.1 °F (36.7 °C) (Oral)    Resp 14    Ht 5' 5\" (1.651 m)    Wt 173 lb (78.5 kg)    SpO2 99%    BMI 28.79 kg/m2       Constitutional: Awake, alert, and in no acute distress. Neurological: 1+ symmetrical DTRs in the upper extremities. 1+ symmetrical DTRs in the lower extremities. Sensation to light touch is intact. Negative Sofia's sign bilaterally. Skin: warm, dry, and intact. Musculoskeletal: Diffuse tenderness to palpation in the mid thoracic region and upper trapezii. Tenderness to palpation in the lower lumbar region. Pain with extension and axial loading. Improved with forward flexion. No pain with internal or external rotation of her hips. Negative straight leg raise bilaterally. Biceps  Triceps Deltoids Wrist Ext Wrist Flex Hand Intrin   Right +4/5 +4/5 +4/5 +4/5 +4/5 +4/5   Left +4/5 +4/5 +4/5 +4/5 +4/5 +4/5      Hip Flex  Quads Hamstrings Ankle DF EHL Ankle PF   Right +4/5 +4/5 +4/5 +4/5 +4/5 +4/5   Left +4/5 +4/5 +4/5 +4/5 +4/5 +4/5     IMAGING:    Thoracic spine MRI from 04/04/2018 was personally reviewed with the patient and demonstrated:    Results from [place] encounter on 04/04/18    [facility] CONT   Narrative MRI THORACIC WITHOUT CONTRAST    CPT CODE: 03264    HISTORY: Mid back pain, neck pain, right leg weakness since 2001. COMPARISON: None. TECHNIQUE: Sagittal T1, T2 and stir and axial T2 weighted sequences were  acquired through the thoracic spine without contrast.        FINDINGS:     There is normal anatomic alignment of the thoracic spine. Vertebral body heights  are maintained.  No infiltrative marrow replacement process or marrow edema.  Thoracic spinal cord maintains normal caliber, signal intensity and morphology  throughout. Small bilateral pleural effusions. There are multilevel tiny disc protrusions with contact of the ventral cord at  T6/T7 and T7/T8, but no impingement. No critical foraminal compromise. Incidentally imaged surrounding soft tissues demonstrate no acute abnormalities  otherwise. Impression IMPRESSION:    Small disc protrusions are most pronounced at T6/T7 and T7/T8 where there is  mild flattening of the ventral cord, but no cord impingement. Small bilateral pleural effusions. Right shoulder x-rays from 03/18/2017 were personally reviewed with the patient and demonstrated:  Results from [facility] on 03/18/17   XR SHOULDER RT AP/LAT MIN 2 V    Narrative Shoulder series. CPT code: 87277    Clinical history: Arthritis in the shoulder and pain in the back under the  shoulder blade without known injury of unspecified chronicity. Technique: Two shoulder views. Comparison: No priors. Findings: There are no fractures. Joint relationships at glenohumeral joint are  normal.  Acromioclavicular joint is normal. There is convex appearance of the  underside of the distal acromion process. There are no soft tissue  calcifications.           Impression IMPRESSION:    No acute abnormalities. Convex appearance of the inferior aspect of the acromion  process could increased risk of rotator cuff impingement.           Cervical Spine X-rays from 12/27/2016 were personally reviewed with the Pt and demonstrated:  FINDINGS:       Vertebral body height and alignment is intact. Slight straightening of the  cervical lordosis. Moderate disc height loss endplate sclerosis and vertebral  body osteophytes from C3 through C7. Alignment is intact. No fracture. Mild  facet joint arthropathy at multiple levels.   The prevertebral space appears normal.       IMPRESSION:      Moderate cervical spondylosis. Written by Mumtaz Encarnacion, as dictated by Jerry Archer MD.  I, Dr. Jerry Archer confirm that all documentation is accurate.

## 2018-04-10 NOTE — PATIENT INSTRUCTIONS
Healthy Upper Back: Exercises  Your Care Instructions  Here are some examples of exercises for your upper back. Start each exercise slowly. Ease off the exercise if you start to have pain. Your doctor or physical therapist will tell you when you can start these exercises and which ones will work best for you. How to do the exercises  Lower neck and upper back stretch    1. Stretch your arms out in front of your body. Clasp one hand on top of your other hand. 2. Gently reach out so that you feel your shoulder blades stretching away from each other. 3. Gently bend your head forward. 4. Hold for 15 to 30 seconds. 5. Repeat 2 to 4 times. Midback stretch    If you have knee pain, do not do this exercise. 1. Kneel on the floor, and sit back on your ankles. 2. Lean forward, place your hands on the floor, and stretch your arms out in front of you. Rest your head between your arms. 3. Gently push your chest toward the floor, reaching as far in front of you as possible. 4. Hold for 15 to 30 seconds. 5. Repeat 2 to 4 times. Shoulder rolls    1. Sit comfortably with your feet shoulder-width apart. You can also do this exercise while standing. 2. Roll your shoulders up, then back, and then down in a smooth, circular motion. 3. Repeat 2 to 4 times. Wall push-up    1. Stand against a wall with your feet about 12 to 24 inches back from the wall. If you feel any pain when you do this exercise, stand closer to the wall. 2. Place your hands on the wall slightly wider apart than your shoulders, and lean forward. 3. Gently lean your body toward the wall. Then push back to your starting position. Keep the motion smooth and controlled. 4. Repeat 8 to 12 times. Resisted shoulder blade squeeze    For this exercise, you will need elastic exercise material, such as surgical tubing or Thera-Band. 1. Sit or stand, holding the band in both hands in front of you.  Keep your elbows close to your sides, bent at a 90-degree angle. Your palms should face up. 2. Squeeze your shoulder blades together, and move your arms to the outside, stretching the band. Be sure to keep your elbows at your sides while you do this. 3. Relax. 4. Repeat 8 to 12 times. Resisted rows    For this exercise, you will need elastic exercise material, such as surgical tubing or Thera-Band. 1. Put the band around a solid object, such as a bedpost, at about waist level. Hold one end of the band in each hand. 2. With your elbows at your sides and bent to 90 degrees, pull the band back to move your shoulder blades toward each other. Return to the starting position. 3. Repeat 8 to 12 times. Follow-up care is a key part of your treatment and safety. Be sure to make and go to all appointments, and call your doctor if you are having problems. It's also a good idea to know your test results and keep a list of the medicines you take. Where can you learn more? Go to http://bernardino-prateek.info/. Enter B940 in the search box to learn more about \"Healthy Upper Back: Exercises. \"  Current as of: March 21, 2017  Content Version: 11.4  © 3287-0091 Healthwise, Incorporated. Care instructions adapted under license by [employer] (which disclaims liability or warranty for this information). If you have questions about a medical condition or this instruction, always ask your healthcare professional. [place] [address] any warranty or liability for your use of this information.

## 2018-04-10 NOTE — MR AVS SNAPSHOT
[address] [place] [address] [facility] [address] [place] [address] 
[phone] Patient: Mary Ly MRN: [id number] [facility]:[birth year] Visit Information Date & Time Provider Department Dept. Phone Encounter #  
 4/10/2018 10:50 AM Elina Mackey MD [facility] [phone] [id number] Follow-up Instructions Return in about 6 weeks (around 5/22/2018) for PT follow up, Medication follow up. Upcoming Health Maintenance Date Due Hepatitis C Screening 1955 EYE EXAM RETINAL OR DILATED Q1 9/7/1967 Pneumococcal 19-64 Medium Risk (1 of 1 - PPSV23) 9/7/1976 DTaP/Tdap/Td series (1 - Tdap) 9/7/1978 PAP AKA CERVICAL CYTOLOGY 9/7/1978 BREAST CANCER SCRN MAMMOGRAM 4/14/2011 ZOSTER VACCINE AGE 60> 7/7/2017 FOOT EXAM Q1 4/11/2018 MICROALBUMIN Q1 4/11/2018 HEMOGLOBIN A1C Q6M 9/19/2018 LIPID PANEL Q1 9/23/2018 FOBT Q 1 YEAR AGE 50-75 3/23/2019 Allergies as of 4/10/2018  Review Complete On: 4/10/2018 By: Elina Mackey MD  
  
 Severity Noted Reaction Type Reactions Latex Medium 08/12/2012   Topical Rash Penicillins Medium 08/12/2012   Side Effect Nausea and Vomiting Current Immunizations  Reviewed on 9/11/2017 Name Date Influenza Vaccine (Quad) PF 9/11/2017 Not reviewed this visit You Were Diagnosed With   
  
 Codes Comments Lumbar facet arthropathy (HCC)    -  Primary ICD-10-CM: M46.96 
ICD-9-CM: 721.3 Muscle spasm     ICD-10-CM: E78.273 ICD-9-CM: 728.85 DDD (degenerative disc disease), thoracic     ICD-10-CM: M51.34 
ICD-9-CM: 722.51 Tobacco use disorder     ICD-10-CM: F17.200 ICD-9-CM: 305.1 Vitals BP Pulse Temp Resp Height(growth percentile) Weight(growth percentile) 144/83 66 98.1 °F (36.7 °C) (Oral) 14 5' 5\" (1.651 m) 173 lb (78.5 kg) SpO2 BMI OB Status Smoking Status 99% 28.79 kg/m2 Menopause Current Every Day Smoker Vitals History BMI and BSA Data Body Mass Index Body Surface Area 28.79 kg/m 2 1.9 m 2 Preferred Pharmacy Pharmacy Name Phone [facility] Judy Bustos [address] [place] [address] [phone] Your Updated Medication List  
  
   
This list is accurate as of 4/10/18 11:41 AM.  Always use your most recent med list.  
  
  
  
  
 alcohol swabs Padm 1 Each by Apply Externally route Before breakfast, lunch, dinner and at bedtime. accucheck ACHS and record in log. Take log to every doctor visit. **MEDICALLY NECESSARY**  
  
 benztropine 1 mg tablet Commonly known as:  COGENTIN Take 1 mg by mouth daily. Blood-Glucose Meter monitoring kit Commonly known as:  FREESTYLE LITE METER  
accucheck ACHS and record in log. Take log to every doctor visit. **MEDICALLY NECESSARY**  
  
 diclofenac EC 75 mg EC tablet Commonly known as:  VOLTAREN Take 1 Tab by mouth two (2) times a day. Take with food as needed for pain  
  
 gabapentin 300 mg capsule Commonly known as:  NEURONTIN Take 1-2 po q am and 1-2 po q evening  
  
 glucose blood VI test strips strip Commonly known as:  FREESTYLE LITE STRIPS Check glucose ACHS. * insulin glargine 100 unit/mL injection Commonly known as:  LANTUS  
20 Units by SubCUTAneous route every twelve (12) hours. * LANTUS U-100 INSULIN 100 unit/mL injection Generic drug:  insulin glargine INJECT 20 UNITS SUBCUTANEOUSLY EVERY 12 HOURS  
  
 insulin lispro 100 unit/mL injection Commonly known as:  HUMALOG  
10 Units by SubCUTAneous route Before breakfast, lunch, and dinner. Lancets Misc Check glucose ACHS  
  
 methocarbamol 500 mg tablet Commonly known as:  ROBAXIN  
TAKE ONE TABLET BY MOUTH TWO TO THREE TIMES DAILY AS NEEDED FOR SPASMS  
  
 perphenazine 4 mg tablet Commonly known as:  TRILAFON  
 Take 4 mg by mouth three (3) times daily. pravastatin 40 mg tablet Commonly known as:  PRAVACHOL  
TAKE ONE TABLET BY MOUTH ONCE DAILY AT  NIGHT * sertraline 100 mg tablet Commonly known as:  ZOLOFT Take 100 mg by mouth daily. * sertraline 50 mg tablet Commonly known as:  ZOLOFT Take 1 Tab by mouth daily. traZODone 150 mg tablet Commonly known as:  Georganna Quiver Take 600 mg by mouth nightly. * Notice: This list has 4 medication(s) that are the same as other medications prescribed for you. Read the directions carefully, and ask your doctor or other care provider to review them with you. Prescriptions Sent to Pharmacy Refills  
 diclofenac EC (VOLTAREN) 75 mg EC tablet 2 Sig: Take 1 Tab by mouth two (2) times a day. Take with food as needed for pain  
 Class: Normal  
 Pharmacy: [facility] [address] [place] [address] #: [phone] Route: Oral  
  
We Performed the Following REFERRAL TO PHYSICAL THERAPY [QEO72 Custom] Comments:  
 Eval/Treat Aqua PT for Thoracic/Lumbar, when Aqua Therapy complete, please Eval/Treat Dry Needling. Follow-up Instructions Return in about 6 weeks (around 5/22/2018) for PT follow up, Medication follow up. Referral Information Referral ID Referred By Referred To  
  
 [id number] Kishore Mosley [address] [place] [address] 32. [address] Phone: [phone] Fax: [phone] Visits Status Start Date End Date 1 New Request 4/10/18 4/10/19 If your referral has a status of pending review or denied, additional information will be sent to support the outcome of this decision. Patient Instructions Healthy Upper Back: Exercises Your Care Instructions Here are some examples of exercises for your upper back. Start each exercise slowly. Ease off the exercise if you start to have pain. Your doctor or physical therapist will tell you when you can start these exercises and which ones will work best for you. How to do the exercises Lower neck and upper back stretch 1. Stretch your arms out in front of your body. Clasp one hand on top of your other hand. 2. Gently reach out so that you feel your shoulder blades stretching away from each other. 3. Gently bend your head forward. 4. Hold for 15 to 30 seconds. 5. Repeat 2 to 4 times. Midback stretch If you have knee pain, do not do this exercise. 1. Kneel on the floor, and sit back on your ankles. 2. Lean forward, place your hands on the floor, and stretch your arms out in front of you. Rest your head between your arms. 3. Gently push your chest toward the floor, reaching as far in front of you as possible. 4. Hold for 15 to 30 seconds. 5. Repeat 2 to 4 times. Shoulder rolls 1. Sit comfortably with your feet shoulder-width apart. You can also do this exercise while standing. 2. Roll your shoulders up, then back, and then down in a smooth, circular motion. 3. Repeat 2 to 4 times. Wall push-up 1. Stand against a wall with your feet about 12 to 24 inches back from the wall. If you feel any pain when you do this exercise, stand closer to the wall. 2. Place your hands on the wall slightly wider apart than your shoulders, and lean forward. 3. Gently lean your body toward the wall. Then push back to your starting position. Keep the motion smooth and controlled. 4. Repeat 8 to 12 times. Resisted shoulder blade squeeze For this exercise, you will need elastic exercise material, such as surgical tubing or Thera-Band. 1. Sit or stand, holding the band in both hands in front of you. Keep your elbows close to your sides, bent at a 90-degree angle. Your palms should face up. 2. Squeeze your shoulder blades together, and move your arms to the outside, stretching the band. Be sure to keep your elbows at your sides while you do this. 3. Relax. 4. Repeat 8 to 12 times. Resisted rows For this exercise, you will need elastic exercise material, such as surgical tubing or Thera-Band. 1. Put the band around a solid object, such as a bedpost, at about waist level. Hold one end of the band in each hand. 2. With your elbows at your sides and bent to 90 degrees, pull the band back to move your shoulder blades toward each other. Return to the starting position. 3. Repeat 8 to 12 times. Follow-up care is a key part of your treatment and safety. Be sure to make and go to all appointments, and call your doctor if you are having problems. It's also a good idea to know your test results and keep a list of the medicines you take. Where can you learn more? Go to http://bernardino-prateek.info/. Enter D172 in the search box to learn more about \"Healthy Upper Back: Exercises. \" Current as of: March 21, 2017 Content Version: 11.4 © 7156-5361 [employer]. Care instructions adapted under license by [employer] (which disclaims liability or warranty for this information). If you have questions about a medical condition or this instruction, always ask your healthcare professional. [address] any warranty or liability for your use of this information. Introducing [facility]! Danitza Wolf introduces CapsoVision patient portal. Now you can access parts of your medical record, email your doctor's office, and request medication refills online. 1. In your internet browser, go to [email or web address] 2. Click on the First Time User? Click Here link in the Sign In box. You will see the New Member Sign Up page. 3. Enter your CapsoVision Access Code exactly as it appears below. You will not need to use this code after youve completed the sign-up process. If you do not sign up before the expiration date, you must request a new code. · CapsoVision Access Code: [email or web address] Expires: 6/18/2018  4:18 PM 
 
4. Enter the last four digits of your Social Security Number (xxxx) and Date of Birth (mm/dd/yyyy) as indicated and click Submit. You will be taken to the next sign-up page. 5. Create a Artomatix ID. This will be your Art[employer]ix login ID and cannot be changed, so think of one that is secure and easy to remember. 6. Create a Artomatix password. You can change your password at any time. 7. Enter your Password Reset Question and Answer. This can be used at a later time if you forget your password. 8. Enter your e-mail address. You will receive e-mail notification when new information is available in [address] 9. Click Sign Up. You can now view and download portions of your medical record. 10. Click the Download Summary menu link to download a portable copy of your medical information. If you have questions, please visit the Frequently Asked Questions section of the Art[employer] website. Remember, Art[employer]ix is NOT to be used for urgent needs. For medical emergencies, dial 911. Now available from your iPhone and Android! Please provide this summary of care documentation to your next provider. Your primary care clinician is listed as Brianna Castellon. If you have any questions after today's visit, please call [phone].

## 2018-04-20 DIAGNOSIS — M62.838 MUSCLE SPASM: ICD-10-CM

## 2018-04-20 RX ORDER — METHOCARBAMOL 500 MG/1
TABLET, FILM COATED ORAL
Qty: 75 TAB | Refills: 0 | Status: SHIPPED | OUTPATIENT
Start: 2018-04-20 | End: 2018-05-18 | Stop reason: SDUPTHER

## 2018-04-30 ENCOUNTER — HOSPITAL ENCOUNTER (OUTPATIENT)
Dept: MAMMOGRAPHY | Age: 63
Discharge: HOME | End: 2018-04-30
Attending: NURSE PRACTITIONER
Payer: SELF-PAY

## 2018-04-30 DIAGNOSIS — Z12.39 SCREENING FOR BREAST CANCER: ICD-10-CM

## 2018-04-30 PROCEDURE — 77067 SCR MAMMO BI INCL CAD: CPT

## 2018-05-01 ENCOUNTER — TELEPHONE (OUTPATIENT)
Dept: FAMILY MEDICINE CLINIC | Age: 63
End: 2018-05-01

## 2018-05-01 NOTE — TELEPHONE ENCOUNTER
----- Message from SHEILA Thorpe sent at 5/1/2018 11:33 AM EDT -----  Please let patient know that mammogram was negative. Thank you.

## 2018-05-01 NOTE — PROGRESS NOTES
I called and spoke with pt to discuss mammogram results which were negative. Pt verbalized understanding.

## 2018-05-18 DIAGNOSIS — M62.838 MUSCLE SPASM: ICD-10-CM

## 2018-05-18 RX ORDER — METHOCARBAMOL 500 MG/1
TABLET, FILM COATED ORAL
Qty: 75 TAB | Refills: 0 | Status: SHIPPED | OUTPATIENT
Start: 2018-05-18 | End: 2018-06-18 | Stop reason: SDUPTHER

## 2018-06-18 DIAGNOSIS — M62.838 MUSCLE SPASM: ICD-10-CM

## 2018-06-18 NOTE — TELEPHONE ENCOUNTER
Last Visit: 04/10/2018 with MD Whitaker    Next Appointment: 07/10/2018 with MD Whitaker   Previous Refill Encounters: 05/18/2018 per NP [place] Ji #[id number]    Requested Prescriptions     Pending Prescriptions Disp Refills    methocarbamol (ROBAXIN) 500 mg tablet 75 Tab 0     Sig: Take 1 tablet by mouth bid-tid prn spasms

## 2018-06-19 RX ORDER — METHOCARBAMOL 500 MG/1
TABLET, FILM COATED ORAL
Qty: 75 TAB | Refills: 0 | Status: SHIPPED | OUTPATIENT
Start: 2018-06-19 | End: 2018-07-17 | Stop reason: SDUPTHER

## 2018-07-03 ENCOUNTER — TELEPHONE (OUTPATIENT)
Dept: ORTHOPEDIC SURGERY | Age: 63
End: 2018-07-03

## 2018-07-10 ENCOUNTER — OFFICE VISIT (OUTPATIENT)
Dept: ORTHOPEDIC SURGERY | Age: 63
End: 2018-07-10

## 2018-07-10 VITALS
BODY MASS INDEX: 28.32 KG/M2 | HEART RATE: 103 BPM | TEMPERATURE: 97.5 F | DIASTOLIC BLOOD PRESSURE: 82 MMHG | HEIGHT: 65 IN | OXYGEN SATURATION: 96 % | SYSTOLIC BLOOD PRESSURE: 140 MMHG | RESPIRATION RATE: 17 BRPM | WEIGHT: 170 LBS

## 2018-07-10 DIAGNOSIS — F17.200 TOBACCO USE DISORDER: ICD-10-CM

## 2018-07-10 DIAGNOSIS — M47.22 OSTEOARTHRITIS OF SPINE WITH RADICULOPATHY, CERVICAL REGION: ICD-10-CM

## 2018-07-10 DIAGNOSIS — M47.816 LUMBAR FACET ARTHROPATHY: Primary | ICD-10-CM

## 2018-07-10 DIAGNOSIS — M50.30 DDD (DEGENERATIVE DISC DISEASE), CERVICAL: ICD-10-CM

## 2018-07-10 DIAGNOSIS — M79.18 MYOFASCIAL PAIN: ICD-10-CM

## 2018-07-10 DIAGNOSIS — M62.838 MUSCLE SPASM: ICD-10-CM

## 2018-07-10 RX ORDER — GABAPENTIN 300 MG/1
CAPSULE ORAL
Qty: 60 CAP | Refills: 5 | Status: SHIPPED | OUTPATIENT
Start: 2018-07-10 | End: 2019-01-03 | Stop reason: SDUPTHER

## 2018-07-10 RX ORDER — DICLOFENAC SODIUM 75 MG/1
75 TABLET, DELAYED RELEASE ORAL 2 TIMES DAILY
Qty: 60 TAB | Refills: 5 | Status: SHIPPED | OUTPATIENT
Start: 2018-07-10 | End: 2019-01-28 | Stop reason: SDUPTHER

## 2018-07-10 NOTE — PATIENT INSTRUCTIONS

## 2018-07-10 NOTE — MR AVS SNAPSHOT
Albertina Azar 
 
 
 [facility] [address] [facility] [address] 
[phone] Patient: Jhon Manning MRN: [id number] Visit Information Date & Time Provider Department Dept. Phone Encounter #  
 7/10/2018  2:30 PM Mello Ortega MD [facility] [phone] [id number] Follow-up Instructions Return in about 5 months (around 12/10/2018) for Medication follow up. Upcoming Health Maintenance Date Due Hepatitis C Screening 1955 EYE EXAM RETINAL OR DILATED Q1 9/7/1967 Pneumococcal 19-64 Medium Risk (1 of 1 - PPSV23) 9/7/1976 DTaP/Tdap/Td series (1 - Tdap) 9/7/1978 PAP AKA CERVICAL CYTOLOGY 9/7/1978 ZOSTER VACCINE AGE 60> 7/7/2017 FOOT EXAM Q1 4/11/2018 MICROALBUMIN Q1 4/11/2018 Influenza Age 5 to Adult 8/1/2018 HEMOGLOBIN A1C Q6M 9/19/2018 LIPID PANEL Q1 9/23/2018 FOBT Q 1 YEAR AGE 50-75 3/23/2019 BREAST CANCER SCRN MAMMOGRAM 4/30/2020 Allergies as of 7/10/2018  Review Complete On: 7/10/2018 By: Mello Ortega MD  
  
 Severity Noted Reaction Type Reactions Latex Medium 08/12/2012   Topical Rash Penicillins Medium 08/12/2012   Side Effect Nausea and Vomiting Current Immunizations  Reviewed on 9/11/2017 Name Date Influenza Vaccine (Quad) PF 9/11/2017 Not reviewed this visit You Were Diagnosed With   
  
 Codes Comments Lumbar pain    -  Primary ICD-10-CM: M54.5 ICD-9-CM: 724.2 Osteoarthritis of spine with radiculopathy, cervical region     ICD-10-CM: M47.22 
ICD-9-CM: 721.0 Chronic right shoulder pain     ICD-10-CM: M25.511, G89.29 ICD-9-CM: 719.41, 338.29 Muscle spasm     ICD-10-CM: L44.822 ICD-9-CM: 728.85 Tobacco use disorder     ICD-10-CM: F17.200 ICD-9-CM: 305.1 Lumbar facet arthropathy (HCC)     ICD-10-CM: M46.96 
ICD-9-CM: 721.3 DDD (degenerative disc disease), cervical     ICD-10-CM: M50.30 ICD-9-CM: 722.4 Myofascial pain     ICD-10-CM: M79.1 ICD-9-CM: 729.1 Vitals BP Pulse Temp Resp Height(growth percentile) Weight(growth percentile) 140/82 (!) 103 97.5 °F (36.4 °C) 17 5' 5\" (1.651 m) 170 lb (77.1 kg) SpO2 BMI OB Status Smoking Status 96% 28.29 kg/m2 Menopause Current Every Day Smoker Vitals History BMI and BSA Data Body Mass Index Body Surface Area  
 28.29 kg/m 2 1.88 m 2 Preferred Pharmacy Pharmacy Name Phone [facility] Judy Bustos [address] [place] [address] [phone] Your Updated Medication List  
  
   
This list is accurate as of 7/10/18  3:09 PM.  Always use your most recent med list.  
  
  
  
  
 alcohol swabs Padm 1 Each by Apply Externally route Before breakfast, lunch, dinner and at bedtime. accucheck ACHS and record in log. Take log to every doctor visit. **MEDICALLY NECESSARY**  
  
 benztropine 1 mg tablet Commonly known as:  COGENTIN Take 1 mg by mouth daily. Blood-Glucose Meter monitoring kit Commonly known as:  FREESTYLE LITE METER  
accucheck ACHS and record in log. Take log to every doctor visit. **MEDICALLY NECESSARY**  
  
 diclofenac EC 75 mg EC tablet Commonly known as:  VOLTAREN Take 1 Tab by mouth two (2) times a day. Take with food as needed for pain  
  
 gabapentin 300 mg capsule Commonly known as:  NEURONTIN Take 1 po q am and 1 po q evening  
  
 glucose blood VI test strips strip Commonly known as:  FREESTYLE LITE STRIPS Check glucose ACHS. * insulin glargine 100 unit/mL injection Commonly known as:  LANTUS  
20 Units by SubCUTAneous route every twelve (12) hours. * LANTUS U-100 INSULIN 100 unit/mL injection Generic drug:  insulin glargine INJECT 20 UNITS SUBCUTANEOUSLY EVERY 12 HOURS  
  
 insulin lispro 100 unit/mL injection Commonly known as:  HUMALOG 10 Units by SubCUTAneous route Before breakfast, lunch, and dinner. Lancets Misc Check glucose ACHS  
  
 methocarbamol 500 mg tablet Commonly known as:  ROBAXIN Take 1 tablet by mouth bid-tid prn spasms  
  
 perphenazine 4 mg tablet Commonly known as:  TRILAFON Take 4 mg by mouth three (3) times daily. pravastatin 40 mg tablet Commonly known as:  PRAVACHOL  
TAKE ONE TABLET BY MOUTH ONCE DAILY AT  NIGHT * sertraline 100 mg tablet Commonly known as:  ZOLOFT Take 100 mg by mouth daily. * sertraline 50 mg tablet Commonly known as:  ZOLOFT Take 1 Tab by mouth daily. traZODone 150 mg tablet Commonly known as:  Gilbert Askew Take 600 mg by mouth nightly. * Notice: This list has 4 medication(s) that are the same as other medications prescribed for you. Read the directions carefully, and ask your doctor or other care provider to review them with you. Prescriptions Sent to Pharmacy Refills  
 gabapentin (NEURONTIN) 300 mg capsule 5 Sig: Take 1 po q am and 1 po q evening Class: Normal  
 Pharmacy: [facility] [address] [place] [address] Ph #: [phone]  
 diclofenac EC (VOLTAREN) 75 mg EC tablet 5 Sig: Take 1 Tab by mouth two (2) times a day. Take with food as needed for pain  
 Class: Normal  
 Pharmacy: [facility] [address] [place] [address] [address] Ph #: [phone] Route: Oral  
  
Follow-up Instructions Return in about 5 months (around 12/10/2018) for Medication follow up. Patient Instructions Neck Arthritis: Exercises Your Care Instructions Here are some examples of typical rehabilitation exercises for your condition. Start each exercise slowly. Ease off the exercise if you start to have pain. Your doctor or physical therapist will tell you when you can start these exercises and which ones will work best for you. How to do the exercises Neck stretches to the side 1. This stretch works best if you keep your shoulder down as you lean away from it. To help you remember to do this, start by relaxing your shoulders and lightly holding on to your thighs or your chair. 2. Tilt your head toward your shoulder and hold for 15 to 30 seconds. Let the weight of your head stretch your muscles. 3. Repeat 2 to 4 times toward each shoulder. Chin tuck 1. Lie on the floor with a rolled-up towel under your neck. Your head should be touching the floor. 2. Slowly bring your chin toward your chest. 
3. Hold for a count of 6, and then relax for up to 10 seconds. 4. Repeat 8 to 12 times. Active cervical rotation 1. Sit in a firm chair, or stand up straight. 2. Keeping your chin level, turn your head to the right, and hold for 15 to 30 seconds. 3. Turn your head to the left and hold for 15 to 30 seconds. 4. Repeat 2 to 4 times to each side. Shoulder blade squeeze 1. While standing, squeeze your shoulder blades together. 2. Do not raise your shoulders up as you are squeezing. 3. Hold for 6 seconds. 4. Repeat 8 to 12 times. Shoulder rolls 1. Sit comfortably with your feet shoulder-width apart. You can also do this exercise standing up. 2. Roll your shoulders up, then back, and then down in a smooth, circular motion. 3. Repeat 2 to 4 times. Follow-up care is a key part of your treatment and safety. Be sure to make and go to all appointments, and call your doctor if you are having problems. It's also a good idea to know your test results and keep a list of the medicines you take. Where can you learn more? Go to http://bernardino-prateek.info/. Enter J733 in the search box to learn more about \"Neck Arthritis: Exercises. \" Current as of: November 29, 2017 Content Version: 11.7 © 7478-5316 [employer].  Care instructions adapted under license by [address] (which disclaims liability or warranty for this information). If you have questions about a medical condition or this instruction, always ask your healthcare professional. [address] any warranty or liability for your use of this information. Low Back Arthritis: Exercises Your Care Instructions Here are some examples of typical rehabilitation exercises for your condition. Start each exercise slowly. Ease off the exercise if you start to have pain. Your doctor or physical therapist will tell you when you can start these exercises and which ones will work best for you. When you are not being active, find a comfortable position for rest. Some people are comfortable on the floor or a medium-firm bed with a small pillow under their head and another under their knees. Some people prefer to lie on their side with a pillow between their knees. Don't stay in one position for too long. Take short walks (10 to 20 minutes) every 2 to 3 hours. Avoid slopes, hills, and stairs until you feel better. Walk only distances you can manage without pain, especially leg pain. How to do the exercises Pelvic tilt 4. Lie on your back with your knees bent. 5. \"Brace\" your stomach-tighten your muscles by pulling in and imagining your belly button moving toward your spine. 6. Press your lower back into the floor. You should feel your hips and pelvis rock back. 7. Hold for 6 seconds while breathing smoothly. 8. Relax and allow your pelvis and hips to rock forward. 9. Repeat 8 to 12 times. Back stretches 5. Get down on your hands and knees on the floor. 6. Relax your head and allow it to droop. Round your back up toward the ceiling until you feel a nice stretch in your upper, middle, and lower back. Hold this stretch for as long as it feels comfortable, or about 15 to 30 seconds.  
7. Return to the starting position with a flat back while you are on your hands and knees. 8. Let your back sway by pressing your stomach toward the floor. Lift your buttocks toward the ceiling. 9. Hold this position for 15 to 30 seconds. 10. Repeat 2 to 4 times. Follow-up care is a key part of your treatment and safety. Be sure to make and go to all appointments, and call your doctor if you are having problems. It's also a good idea to know your test results and keep a list of the medicines you take. Where can you learn more? Go to http://bernardino-prateek.info/. Enter Y243 in the search box to learn more about \"Low Back Arthritis: Exercises. \" Current as of: November 29, 2017 Content Version: 11.7 © 4234-5457 [facility]. Care instructions adapted under license by [employer] (which disclaims liability or warranty for this information). If you have questions about a medical condition or this instruction, always ask your healthcare professional. [place] [address] any warranty or liability for your use of this information. Introducing [facility]! Anya Hoff introduces MonkeyFind patient portal. Now you can access parts of your medical record, email your doctor's office, and request medication refills online. 1. In your internet browser, go to [email or web address] 2. Click on the First Time User? Click Here link in the Sign In box. You will see the New Member Sign Up page. 3. Enter your MonkeyFind Access Code exactly as it appears below. You will not need to use this code after youve completed the sign-up process. If you do not sign up before the expiration date, you must request a new code. · MonkeyFind Access Code: [email or web address] Expires: 10/3/2018  1:12 PM 
 
4. Enter the last four digits of your Social Security Number (xxxx) and Date of Birth (mm/dd/yyyy) as indicated and click Submit. You will be taken to the next sign-up page. 5. Create a Giferent ID. This will be your Giferent login ID and cannot be changed, so think of one that is secure and easy to remember. 6. Create a Giferent password. You can change your password at any time. 7. Enter your Password Reset Question and Answer. This can be used at a later time if you forget your password. 8. Enter your e-mail address. You will receive e-mail notification when new information is available in [address] 9. Click Sign Up. You can now view and download portions of your medical record. 10. Click the Download Summary menu link to download a portable copy of your medical information. If you have questions, please visit the Frequently Asked Questions section of the Giferent website. Remember, Giferent is NOT to be used for urgent needs. For medical emergencies, dial 911. Now available from your iPhone and Android! Please provide this summary of care documentation to your next provider. Your primary care clinician is listed as Erickson Kumar. If you have any questions after today's visit, please call [phone].

## 2018-07-10 NOTE — PROGRESS NOTES
[facility]  [address] [place], [facility] [address]  Phone: [phone]  Fax: [phone]    Pt's YOB: 1955    ASSESSMENT   Diagnoses and all orders for this visit:    1. Lumbar facet arthropathy (HCC)  -     diclofenac EC (VOLTAREN) 75 mg EC tablet; Take 1 Tab by mouth two (2) times a day. Take with food as needed for pain    2. DDD (degenerative disc disease), cervical  -     gabapentin (NEURONTIN) 300 mg capsule; Take 1 po q am and 1 po q evening    3. Myofascial pain  -     gabapentin (NEURONTIN) 300 mg capsule; Take 1 po q am and 1 po q evening    4. Osteoarthritis of spine with radiculopathy, cervical region  -     diclofenac EC (VOLTAREN) 75 mg EC tablet; Take 1 Tab by mouth two (2) times a day. Take with food as needed for pain    5. Muscle spasm    6. Tobacco use disorder         IMPRESSION AND PLAN:  Leandro Pereira is a 63 y.o. female with history of cervical, thoracic, and right shoulder pain. She was unable to try aqua physical therapy with dry needling since her last office visit since it was not covered by her insurance. Pt experiences relief with Voltaren 75 mg and Neurontin 300 mg 1 tab QAM and 1 tab QHS. 1) Pt was given information on cervical and lumbar arthritis exercises. 2) I recommended the patient try water exercise. 3) She received a refill of Voltaren 75 mg 1 tab BID prn pain with food. 4) Pt received a refill of Neurontin 300 mg 1 tab QAM and 1 tab QHS. 5) Ms. Randall Saavedra has a reminder for a \"due or due soon\" health maintenance. I have asked that she contact her primary care provider, SHEILA Riddle, for follow-up on this health maintenance. 6)  demonstrated consistency with prescribing. 7) Pt will follow-up in 5 months or sooner if needed. 8) Smoking cessation also recommended      HISTORY OF PRESENT ILLNESS:  Leandro Pereira is a 63 y.o. female with history of cervical, thoracic, and right shoulder pain. She was unable to try aqua physical therapy with dry needling since her last office visit since it was not covered by her insurance. Pt notes that she has been exercising more frequently and has a stationary bicycle. She has been taking Voltaren 75 mg 1 tab QAM and 1 tab around 6:00 PM with benefit and notes that she ran out of the medication since her last office visit. Pt also takes Neurontin 300 mg 1 tab QAM and 1 tab QHS with benefit and requests a refill at this time. Pt at this time desires to continue with current care. She notes that she has cut back on smoking since her last office visit. Pt states that it generally takes her 2 weeks to smoke a pack of cigarettes. Pain Scale: 7/10    PCP: SHEILA Stanford     Past Medical History:   Diagnosis Date    CAD (coronary artery disease)     Hypertension     Uterine fibroid         Social History     Social History    Marital status: SINGLE     Spouse name: N/A    Number of children: N/A    Years of education: N/A     Occupational History    Not on file. Social History Main Topics    Smoking status: Current Every Day Smoker     Packs/day: 0.25     Years: 2.00    Smokeless tobacco: Never Used    Alcohol use No    Drug use: Yes     Special: Marijuana      Comment: rarely     Sexual activity: Not on file     Other Topics Concern    Not on file     Social History Narrative       Current Outpatient Prescriptions   Medication Sig Dispense Refill    gabapentin (NEURONTIN) 300 mg capsule Take 1 po q am and 1 po q evening 60 Cap 5    diclofenac EC (VOLTAREN) 75 mg EC tablet Take 1 Tab by mouth two (2) times a day. Take with food as needed for pain 60 Tab 5    methocarbamol (ROBAXIN) 500 mg tablet Take 1 tablet by mouth bid-tid prn spasms 75 Tab 0    insulin lispro (HUMALOG) 100 unit/mL injection 10 Units by SubCUTAneous route Before breakfast, lunch, and dinner.  1 Vial 2    LANTUS 100 unit/mL injection INJECT 20 UNITS SUBCUTANEOUSLY EVERY 12 HOURS 10 Vial 5    glucose blood VI test strips (FREESTYLE LITE STRIPS) strip Check glucose ACHS. 200 Strip 11    Lancets misc Check glucose ACHS 200 Each 11    insulin glargine (LANTUS) 100 unit/mL injection 20 Units by SubCUTAneous route every twelve (12) hours. 2 Vial 2    Blood-Glucose Meter (FREESTYLE LITE METER) monitoring kit accucheck ACHS and record in log. Take log to every doctor visit. **MEDICALLY NECESSARY** 1 Kit 0    alcohol swabs padm 1 Each by Apply Externally route Before breakfast, lunch, dinner and at bedtime. accucheck ACHS and record in log. Take log to every doctor visit. **MEDICALLY NECESSARY** 100 Pad 2    traZODone (DESYREL) 150 mg tablet Take 600 mg by mouth nightly.  sertraline (ZOLOFT) 100 mg tablet Take 100 mg by mouth daily.  benztropine (COGENTIN) 1 mg tablet Take 1 mg by mouth daily.  pravastatin (PRAVACHOL) 40 mg tablet TAKE ONE TABLET BY MOUTH ONCE DAILY AT  NIGHT 90 Tab 1    sertraline (ZOLOFT) 50 mg tablet Take 1 Tab by mouth daily. 30 Tab 3    perphenazine (TRILAFON) 4 mg tablet Take 4 mg by mouth three (3) times daily. Allergies   Allergen Reactions    Latex Rash    Penicillins Nausea and Vomiting         REVIEW OF SYSTEMS    Constitutional: Negative for fever, chills, or weight change. Respiratory: Negative for cough or shortness of breath. Cardiovascular: Negative for chest pain or palpitations. Gastrointestinal: Negative for acid reflux, change in bowel habits, or constipation. Genitourinary: Negative for dysuria and flank pain. Musculoskeletal: Positive for cervical, thoracic, and right shoulder pain. Skin: Negative for rash. Neurological: Negative for headaches, dizziness, or numbness. Endo/Heme/Allergies: Negative for increased bruising. Psychiatric/Behavioral: Negative for difficulty with sleep.       PHYSICAL EXAMINATION  Visit Vitals    /82    Pulse (!) 103    Temp 97.5 °F (36.4 °C)    Resp 17    Ht 5' 5\" (1.651 m)    Wt 170 lb (77.1 kg)    SpO2 96%    BMI 28.29 kg/m2       Constitutional: Awake, alert, and in no acute distress. Neurological: 1+ symmetrical DTRs in the upper extremities. 1+ symmetrical DTRs in the lower extremities. Sensation to light touch is intact. Negative Sofia's sign bilaterally. Skin: warm, dry, and intact. Musculoskeletal: Moderately tight across the upper trapezius. Tenderness to palpation in the lower lumbar region. Moderate pain with extension and axial loading. No pain with internal or external rotation of her hips. Negative straight leg raise bilaterally. Biceps  Triceps Deltoids Wrist Ext Wrist Flex Hand Intrin   Right +4/5 +4/5 +4/5 +4/5 +4/5 +4/5   Left +4/5 +4/5 +4/5 +4/5 +4/5 +4/5      Hip Flex  Quads Hamstrings Ankle DF EHL Ankle PF   Right +4/5 +4/5 +4/5 +4/5 +4/5 +4/5   Left +4/5 +4/5 +4/5 +4/5 +4/5 +4/5     IMAGING:    Thoracic spine MRI from 04/04/2018 was personally reviewed with the patient and demonstrated:  Results from [facility] on 04/04/18    [facility] CONT    Narrative MRI THORACIC WITHOUT CONTRAST    CPT CODE: 99719    HISTORY: Mid back pain, neck pain, right leg weakness since 2001. COMPARISON: None. TECHNIQUE: Sagittal T1, T2 and stir and axial T2 weighted sequences were  acquired through the thoracic spine without contrast.        FINDINGS:     There is normal anatomic alignment of the thoracic spine. Vertebral body heights  are maintained. No infiltrative marrow replacement process or marrow edema. Thoracic spinal cord maintains normal caliber, signal intensity and morphology  throughout. Small bilateral pleural effusions. There are multilevel tiny disc protrusions with contact of the ventral cord at  T6/T7 and T7/T8, but no impingement. No critical foraminal compromise.   Incidentally imaged surrounding soft tissues demonstrate no acute abnormalities  otherwise.           Impression IMPRESSION:    Small disc protrusions are most pronounced at T6/T7 and T7/T8 where there is  mild flattening of the ventral cord, but no cord impingement. Small bilateral pleural effusions.                         Right shoulder x-rays from 03/18/2017 were personally reviewed with the patient and demonstrated:  Results from [facility] on 03/18/17   XR SHOULDER RT AP/LAT MIN 2 V     Narrative Shoulder series. CPT code: 75305    Clinical history: Arthritis in the shoulder and pain in the back under the  shoulder blade without known injury of unspecified chronicity. Technique: Two shoulder views. Comparison: No priors. Findings: There are no fractures.  Joint relationships at glenohumeral joint are  normal.  Acromioclavicular joint is normal. There is convex appearance of the  underside of the distal acromion process.  There are no soft tissue  calcifications.              Impression IMPRESSION:    No acute abnormalities. Convex appearance of the inferior aspect of the acromion  process could increased risk of rotator cuff impingement.             Cervical Spine X-rays from 12/27/2016 were personally reviewed with the Pt and demonstrated:  FINDINGS:       Vertebral body height and alignment is intact. Slight straightening of the  cervical lordosis. Moderate disc height loss endplate sclerosis and vertebral  body osteophytes from C3 through C7. Alignment is intact. No fracture. Mild  facet joint arthropathy at multiple levels. The prevertebral space appears normal.       IMPRESSION:      Moderate cervical spondylosis.        Written by Mo Reddy, as dictated by Collette Dowling MD.  I, Dr. Collette Dowling confirm that all documentation is accurate.

## 2018-07-17 DIAGNOSIS — E78.2 MIXED HYPERLIPIDEMIA: Chronic | ICD-10-CM

## 2018-07-17 DIAGNOSIS — M62.838 MUSCLE SPASM: ICD-10-CM

## 2018-07-18 DIAGNOSIS — M62.838 MUSCLE SPASM: ICD-10-CM

## 2018-07-19 RX ORDER — METHOCARBAMOL 500 MG/1
TABLET, FILM COATED ORAL
Qty: 60 TAB | Refills: 0 | Status: SHIPPED | OUTPATIENT
Start: 2018-07-19 | End: 2018-10-03 | Stop reason: ALTCHOICE

## 2018-07-19 RX ORDER — PRAVASTATIN SODIUM 40 MG/1
TABLET ORAL
Qty: 90 TAB | Refills: 0 | Status: SHIPPED | OUTPATIENT
Start: 2018-07-19 | End: 2018-12-04 | Stop reason: SDUPTHER

## 2018-07-20 RX ORDER — METHOCARBAMOL 500 MG/1
TABLET, FILM COATED ORAL
Qty: 75 TAB | Refills: 0 | Status: SHIPPED | OUTPATIENT
Start: 2018-07-20 | End: 2018-10-03 | Stop reason: SDUPTHER

## 2018-07-20 NOTE — TELEPHONE ENCOUNTER
Per Dr. Whitaker, ok to refill robaxin. Called patient, verified , informed of above, per patient ok to send to [facility] on [address]. Med sent to pharmacy as listed above. No further action required at this time.

## 2018-10-03 ENCOUNTER — OFFICE VISIT (OUTPATIENT)
Dept: FAMILY MEDICINE CLINIC | Age: 63
End: 2018-10-03

## 2018-10-03 VITALS
HEART RATE: 74 BPM | RESPIRATION RATE: 18 BRPM | WEIGHT: 172 LBS | TEMPERATURE: 98.2 F | SYSTOLIC BLOOD PRESSURE: 149 MMHG | HEIGHT: 65 IN | BODY MASS INDEX: 28.66 KG/M2 | DIASTOLIC BLOOD PRESSURE: 79 MMHG | OXYGEN SATURATION: 98 %

## 2018-10-03 DIAGNOSIS — Z11.59 NEED FOR HEPATITIS C SCREENING TEST: ICD-10-CM

## 2018-10-03 DIAGNOSIS — E11.65 UNCONTROLLED TYPE 2 DIABETES MELLITUS WITH HYPERGLYCEMIA (HCC): Primary | Chronic | ICD-10-CM

## 2018-10-03 DIAGNOSIS — E78.2 MIXED HYPERLIPIDEMIA: Chronic | ICD-10-CM

## 2018-10-03 NOTE — MR AVS SNAPSHOT
[address] [facility] 
 
 
 [address] [facility] [address] [address] 
[phone] Patient: Paresh Ngo MRN: [id number] KVF:[id number] Visit Information Date & Time Provider Department Dept. Phone Encounter #  
 10/3/2018  1:20 PM Phyllis Case, [address] [facility] [address] [facility] [phone] [id number] Follow-up Instructions Return in about 3 months (around 1/3/2019) for DM/HLD. Your Appointments 12/3/2018  2:45 PM  
Follow Up with Joce Thornton MD  
[facility] [address]) Appt Note: 5 MO FU MED REFILL  
 [address] [facility] [address]  
[phone]  
  
   
 [address] [facility] [address] Upcoming Health Maintenance Date Due Hepatitis C Screening 1955 Pneumococcal 19-64 Medium Risk (1 of 1 - PPSV23) 9/7/1976 DTaP/Tdap/Td series (1 - Tdap) 9/7/1978 PAP AKA CERVICAL CYTOLOGY 9/7/1978 Shingrix Vaccine Age 50> (1 of 2) 9/7/2007 FOOT EXAM Q1 4/11/2018 MICROALBUMIN Q1 4/11/2018 Influenza Age 5 to Adult 8/1/2018 HEMOGLOBIN A1C Q6M 9/19/2018 LIPID PANEL Q1 9/23/2018 FOBT Q 1 YEAR AGE 50-75 3/23/2019 EYE EXAM RETINAL OR DILATED Q1 5/3/2019 BREAST CANCER SCRN MAMMOGRAM 4/30/2020 Allergies as of 10/3/2018  Review Complete On: 10/3/2018 By: Phyllis Case NP Severity Noted Reaction Type Reactions Latex Medium 08/12/2012   Topical Rash Penicillins Medium 08/12/2012   Side Effect Nausea and Vomiting Current Immunizations  Reviewed on 9/11/2017 Name Date Influenza Vaccine (Quad) PF 9/11/2017 Not reviewed this visit You Were Diagnosed With   
  
 Codes Comments Uncontrolled type 2 diabetes mellitus with hyperglycemia (HCC)    -  Primary ICD-10-CM: E11.65 ICD-9-CM: 250.02 Mixed hyperlipidemia     ICD-10-CM: E78.2 ICD-9-CM: 272.2 Vitals BP Pulse Temp Resp Height(growth percentile) Weight(growth percentile) 149/79 (BP 1 Location: Left arm, BP Patient Position: Sitting) 74 98.2 °F (36.8 °C) (Oral) 18 5' 5\" (1.651 m) 172 lb (78 kg) SpO2 BMI OB Status Smoking Status 98% 28.62 kg/m2 Menopause Current Every Day Smoker BMI and BSA Data Body Mass Index Body Surface Area  
 28.62 kg/m 2 1.89 m 2 Preferred Pharmacy Pharmacy Name Phone [facility] Judy Bustos [address] [place] [phone] Your Updated Medication List  
  
   
This list is accurate as of 10/3/18  1:47 PM.  Always use your most recent med list.  
  
  
  
  
 alcohol swabs Padm 1 Each by Apply Externally route Before breakfast, lunch, dinner and at bedtime. accucheck ACHS and record in log. Take log to every doctor visit. **MEDICALLY NECESSARY**  
  
 benztropine 1 mg tablet Commonly known as:  COGENTIN Take 1 mg by mouth daily. Blood-Glucose Meter monitoring kit Commonly known as:  FREESTYLE LITE METER  
accucheck ACHS and record in log. Take log to every doctor visit. **MEDICALLY NECESSARY**  
  
 diclofenac EC 75 mg EC tablet Commonly known as:  VOLTAREN Take 1 Tab by mouth two (2) times a day. Take with food as needed for pain  
  
 gabapentin 300 mg capsule Commonly known as:  NEURONTIN Take 1 po q am and 1 po q evening  
  
 glucose blood VI test strips strip Commonly known as:  FREESTYLE LITE STRIPS Check glucose ACHS. insulin lispro 100 unit/mL injection Commonly known as:  HUMALOG  
10 Units by SubCUTAneous route Before breakfast, lunch, and dinner. Lancets Misc Check glucose ACHS  
  
 LANTUS U-100 INSULIN 100 unit/mL injection Generic drug:  insulin glargine INJECT 20 UNITS SUBCUTANEOUSLY EVERY 12 HOURS MELOXICAM PO Take  by mouth. methocarbamol 500 mg tablet Commonly known as:  ROBAXIN  
 Take 1 tablet by mouth bid-tid prn spasms  
  
 perphenazine 4 mg tablet Commonly known as:  TRILAFON Take 4 mg by mouth three (3) times daily. pravastatin 40 mg tablet Commonly known as:  PRAVACHOL  
TAKE ONE TABLET BY MOUTH ONCE DAILY AT  NIGHT * sertraline 100 mg tablet Commonly known as:  ZOLOFT Take 100 mg by mouth daily. * sertraline 50 mg tablet Commonly known as:  ZOLOFT Take 1 Tab by mouth daily. traZODone 150 mg tablet Commonly known as:  Chucho Purple Take 600 mg by mouth nightly. * Notice: This list has 2 medication(s) that are the same as other medications prescribed for you. Read the directions carefully, and ask your doctor or other care provider to review them with you. Follow-up Instructions Return in about 3 months (around 1/3/2019) for DM/HLD. Patient Instructions Learning About Foot and Toenail Care Learning About Foot and Toenail Care Checking your loved one's feet and keeping them clean and soft can help prevent cracks and infection in the skin. This is especially important for people who have diabetes. Keeping toenails trimmedand polished if that's what the person likesalso helps the person feel well-groomed. If the person you care for has diabetes or has foot problems, such as bad bunions and corns, think about taking them to see a podiatrist. This is a doctor who specializes in the care of the feet. Sometimes a podiatrist will come to the home if the person can't go out for visits. Try to take the person for salon pedicures if that is what they want. It's a chance to get out and see people and continue a favorite activity. You can do basic nail care at home. Usually all you need to do is keep the nails clean and at a safe length. How do you trim someone's toenails? Try to trim the person's nails every week. Or check the nails each week to see if they need to be trimmed.  It's easiest to trim nails after the person has had a shower or foot bath. It makes the nails softer and easier to trim. Start by gathering your supplies. You will need toenail clippers and a nail file. You may also need nail polish and nail polish remover. To trim the nails: 
1. Wash and dry your hands. You don't need to wear gloves. 2. Use nail polish remover to take off any polish. 3. Hold the person's foot and toe steady with one hand while you trim the nail with your other hand. Trim the nails straight across. Leave the nails a little longer at the corners so that the sharp ends don't cut into the skin. 4. Keep the nails no longer than the tip of the toes. 5. Let the nails dry if they are still damp and soft. 6. Use a nail file to gently smooth the edges of the nails, especially at the corners. They may be sharp after the nails are cut straight. 7. Apply nail polish, if the person wants it. If the person's nails are thick and discolored, it may be safest to have a podiatrist cut them. What else do you need to know? When you're caring for someone's nails, it is important to remember not to trim or cut the cuticles. A minor cut in a cuticle could lead to an infection. Wash the feet daily in the shower or bath or in a basin made for washing feet. It's extra important to wash the feet carefully if the person has diabetes. After washing the feet, dry gently. Put lotion on the feet, especially on the heels. But don't put it between the toes. If the person doesn't have diabetes and you see signs of athlete's foot (such as dry, cracking, or itchy skin between the toes), you can try an over-the-counter medicine. These medicines can kill the fungus that causes athlete's foot. If the problem doesn't go away, talk to the person's doctor. Look every day for cuts or signs of infection, such as pain, swelling, redness, or warmth. If you see any of these signsespecially in someone who has diabetescall the doctor. Where can you learn more? Go to http://bernardino-prateek.info/. Enter A726 in the search box to learn more about \"Learning About Foot and Toenail Care. \" Current as of: April 19, 2018 Content Version: 11.8 © 9090-7807 Healthwise, Incorporated. Care instructions adapted under license by [email or web address] (which disclaims liability or warranty for this information). If you have questions about a medical condition or this instruction, always ask your healthcare professional. [address] any warranty or liability for your use of this information. Introducing [facility]! [employer] introduces [email or web address] patient portal. Now you can access parts of your medical record, email your doctor's office, and request medication refills online. 1. In your internet browser, go to [email or web address] 2. Click on the First Time User? Click Here link in the Sign In box. You will see the New Member Sign Up page. 3. Enter your [email or web address] Access Code exactly as it appears below. You will not need to use this code after youve completed the sign-up process. If you do not sign up before the expiration date, you must request a new code. · [email or web address] Access Code: [email or web address] Expires: 1/1/2019  1:47 PM 
 
4. Enter the last four digits of your Social Security Number (xxxx) and Date of Birth (mm/dd/yyyy) as indicated and click Submit. You will be taken to the next sign-up page. 5. Create a [email or web address] ID. This will be your [email or web address] login ID and cannot be changed, so think of one that is secure and easy to remember. 6. Create a [email or web address] password. You can change your password at any time. 7. Enter your Password Reset Question and Answer. This can be used at a later time if you forget your password. 8. Enter your e-mail address. You will receive e-mail notification when new information is available in [address] 9. Click Sign Up.  You can now view and download portions of your medical record. 10. Click the Download Summary menu link to download a portable copy of your medical information. If you have questions, please visit the Frequently Asked Questions section of the Pipette website. Remember, Pipette is NOT to be used for urgent needs. For medical emergencies, dial 911. Now available from your iPhone and Android! Please provide this summary of care documentation to your next provider. Your primary care clinician is listed as Marya Julio. If you have any questions after today's visit, please call [phone].

## 2018-10-03 NOTE — PROGRESS NOTES
1. Have you been to the ER, urgent care clinic since your last visit? Hospitalized since your last visit? no    2. Have you seen or consulted any other health care providers outside of the [address] [place] [address] since your last visit? Include any pap smears or colon screening.   no

## 2018-10-03 NOTE — PROGRESS NOTES
Subjective:    Chencho Valdivia is a 66y.o. year old female seen for follow up of diabetes. She also has hyperlipidemia. Diabetic Review of Systems - medication compliance: compliant most of the time, diabetic diet compliance: compliant most of the time, home glucose monitoring: fasting values range 91, further diabetic ROS: no polyuria or polydipsia, no chest pain, dyspnea or TIA's, numbness/tingling in feet due to DDD, last eye exam approximately 5/3/18 with Dr. Gregory Sanchez , acute symptoms are none. Other symptoms and concerns: patient states she only takes lantus 10 units daily. If she takes prescribed dosage her glucose will drop. Has not been using humalog     Patient Active Problem List   Diagnosis Code    Type II diabetes mellitus, uncontrolled ([facility] [address] E11.65    Mixed hyperlipidemia E78.2    Myofascial pain M79.18    DDD (degenerative disc disease), cervical M50.30    Osteoarthritis of spine with radiculopathy, cervical region M47.22    Right shoulder pain M25.511    Tobacco use disorder F17.200    Muscle spasm M62.838     Current Outpatient Prescriptions   Medication Sig Dispense Refill    MELOXICAM PO Take  by mouth.  pravastatin (PRAVACHOL) 40 mg tablet TAKE ONE TABLET BY MOUTH ONCE DAILY AT  NIGHT 90 Tab 0    gabapentin (NEURONTIN) 300 mg capsule Take 1 po q am and 1 po q evening 60 Cap 5    diclofenac EC (VOLTAREN) 75 mg EC tablet Take 1 Tab by mouth two (2) times a day. Take with food as needed for pain 60 Tab 5    insulin lispro (HUMALOG) 100 unit/mL injection 10 Units by SubCUTAneous route Before breakfast, lunch, and dinner. 1 Vial 2    LANTUS 100 unit/mL injection INJECT 20 UNITS SUBCUTANEOUSLY EVERY 12 HOURS 10 Vial 5    sertraline (ZOLOFT) 50 mg tablet Take 1 Tab by mouth daily. 30 Tab 3    glucose blood VI test strips (FREESTYLE LITE STRIPS) strip Check glucose ACHS.  200 Strip 11    Lancets misc Check glucose ACHS 200 Each 11    Blood-Glucose Meter (FREESTYLE LITE METER) monitoring kit accucheck ACHS and record in log. Take log to every doctor visit. **MEDICALLY NECESSARY** 1 Kit 0    alcohol swabs padm 1 Each by Apply Externally route Before breakfast, lunch, dinner and at bedtime. accucheck ACHS and record in log. Take log to every doctor visit. **MEDICALLY NECESSARY** 100 Pad 2    traZODone (DESYREL) 150 mg tablet Take 600 mg by mouth nightly.  sertraline (ZOLOFT) 100 mg tablet Take 100 mg by mouth daily.  perphenazine (TRILAFON) 4 mg tablet Take 4 mg by mouth three (3) times daily.  methocarbamol (ROBAXIN) 500 mg tablet Take 1 tablet by mouth bid-tid prn spasms 60 Tab 0    benztropine (COGENTIN) 1 mg tablet Take 1 mg by mouth daily. Allergies   Allergen Reactions    Latex Rash    Penicillins Nausea and Vomiting     No past surgical history on file. Family History   Problem Relation Age of Onset    Diabetes Mother     Stroke Mother     Diabetes Father     Cancer Paternal Grandfather     Breast Cancer Cousin       Lab Results   Component Value Date/Time    Cholesterol, total 191 09/23/2017 10:58 AM    HDL Cholesterol 65 (H) 09/23/2017 10:58 AM    LDL, calculated 98.8 09/23/2017 10:58 AM    VLDL, calculated 27.2 09/23/2017 10:58 AM    Triglyceride 136 09/23/2017 10:58 AM    CHOL/HDL Ratio 2.9 09/23/2017 10:58 AM     Lab Results   Component Value Date/Time    Sodium 141 09/23/2017 10:58 AM    Potassium 3.9 09/23/2017 10:58 AM    Chloride 106 09/23/2017 10:58 AM    CO2 28 09/23/2017 10:58 AM    Anion gap 7 09/23/2017 10:58 AM    Glucose 91 09/23/2017 10:58 AM    BUN 13 09/23/2017 10:58 AM    Creatinine 0.89 09/23/2017 10:58 AM    BUN/Creatinine ratio 15 09/23/2017 10:58 AM    GFR est AA >60 09/23/2017 10:58 AM    GFR est non-AA >60 09/23/2017 10:58 AM    Calcium 9.4 09/23/2017 10:58 AM    Bilirubin, total 0.6 09/23/2017 10:58 AM    AST (SGOT) 21 09/23/2017 10:58 AM    Alk.  phosphatase 61 09/23/2017 10:58 AM    Protein, total 7.9 09/23/2017 10:58 AM Albumin 3.8 09/23/2017 10:58 AM    Globulin 4.1 (H) 09/23/2017 10:58 AM    A-G Ratio 0.9 09/23/2017 10:58 AM    ALT (SGPT) 40 09/23/2017 10:58 AM     Lab Results   Component Value Date/Time    WBC 7.0 07/25/2016 12:47 AM    HGB 11.3 (L) 07/25/2016 12:47 AM    HCT 32.6 (L) 07/25/2016 12:47 AM    PLATELET 590 74/79/7379 12:47 AM    MCV 87.6 07/25/2016 12:47 AM     Wt Readings from Last 3 Encounters:   10/03/18 172 lb (78 kg)   07/10/18 170 lb (77.1 kg)   04/10/18 173 lb (78.5 kg)     Last Point of Care HGB A1C  Hemoglobin A1c (POC)   Date Value Ref Range Status   03/19/2018 6.4 % Final      BP Readings from Last 3 Encounters:   10/03/18 149/79   07/10/18 140/82   04/10/18 144/83       Last Point of Care Urine mircoalbumin  Results for orders placed or performed during the hospital encounter of 03/23/18   OCCULT BLOOD, IMMUNOASSAY (FIT)   Result Value Ref Range    Occult blood fecal, by IA NEGATIVE  NEGATIVE       Lab Results   Component Value Date/Time    Microalbumin urine (POC) 30 04/11/2017 04:00 PM       Last Diabetic Foot Exam on: 4/11/17        Objective:  Visit Vitals    /79 (BP 1 Location: Left arm, BP Patient Position: Sitting)    Pulse 74    Temp 98.2 °F (36.8 °C) (Oral)    Resp 18    Ht 5' 5\" (1.651 m)    Wt 172 lb (78 kg)    SpO2 98%    BMI 28.62 kg/m2     Awake and alert in no acute distress   Neck supple without lymphadenopathy, no carotid artery bruits auscultated bilaterally. No thyromegaly   Lungs clear throughout   S1 S2 RRR without ectopy or murmur auscultated.    Extremities: no clubbing, cyanosis, peripheral edema   Foot exam: monofilament no abnormalities, DP/PT pulses +2 bilaterally, ankle reflex +2 bilaterally   Integumentary: no rashes   Reviewed vital signs       Diabetic foot exam:     Left Foot:   Visual Exam: normal partial avulsion of left great toenail   Pulse DP: 2+ (normal)   Filament test: normal sensation    Vibratory sensation: normal      Right Foot:   Visual Exam: normal    Pulse DP: 2+ (normal)   Filament test: normal sensation    Vibratory sensation: normal        Assessment/Plan:    ICD-10-CM ICD-9-CM    1. Uncontrolled type 2 diabetes mellitus with hyperglycemia (HCC) E11.65 250.02 LIPID PANEL      METABOLIC PANEL, COMPREHENSIVE      MICROALBUMIN, UR, RAND W/ MICROALB/CREAT RATIO       DIABETES FOOT EXAM      REFERRAL TO PODIATRY   2. Mixed hyperlipidemia E78.2 272.2 LIPID PANEL      METABOLIC PANEL, COMPREHENSIVE   3. Need for hepatitis C screening test Z11.59 V73.89 HEPATITIS C AB      no significant medication side effects noted, control uncertain  Issues reviewed with her: foot care discussed and Podiatry visits discussed. I have discussed the diagnosis with the patient and the intended plan as seen in the above orders. The patient has received an after-visit summary and questions were answered concerning future plans. I have discussed medication side effects and warnings with the patient as well. Patient agreeable with above plan and verbalizes understanding. Follow-up Disposition:  Return in about 3 months (around 1/3/2019) for DM/HLD.

## 2018-10-03 NOTE — PATIENT INSTRUCTIONS
Learning About Foot and [address] your loved one's feet and keeping them clean and soft can help prevent cracks and infection in the skin. This is especially important for people who have diabetes. Keeping toenails trimmed--and polished if that's what the person likes--also helps the person feel well-groomed. If the person you care for has diabetes or has foot problems, such as bad bunions and corns, think about taking them to see a podiatrist. This is a doctor who specializes in the care of the feet. Sometimes a podiatrist will come to the home if the person can't go out for visits. Try to take the person for salon pedicures if that is what they want. It's a chance to get out and see people and continue a favorite activity. You can do basic nail care at home. Usually all you need to do is keep the nails clean and at a safe length. How do you trim someone's toenails? Try to trim the person's nails every week. Or check the nails each week to see if they need to be trimmed. It's easiest to trim nails after the person has had a shower or foot bath. It makes the nails softer and easier to trim. Start by gathering your supplies. You will need toenail clippers and a nail file. You may also need nail polish and nail polish remover. To trim the nails:  1. Wash and dry your hands. You don't need to wear gloves. 2. Use nail polish remover to take off any polish. 3. Hold the person's foot and toe steady with one hand while you trim the nail with your other hand. Trim the nails straight across. Leave the nails a little longer at the corners so that the sharp ends don't cut into the skin. 4. Keep the nails no longer than the tip of the toes. 5. Let the nails dry if they are still damp and soft. 6. Use a nail file to gently smooth the edges of the nails, especially at the corners. They may be sharp after the nails are cut straight.   7. Apply nail polish, if the person wants it. If the person's nails are thick and discolored, it may be safest to have a podiatrist cut them. What else do you need to know? When you're caring for someone's nails, it is important to remember not to trim or cut the cuticles. A minor cut in a cuticle could lead to an infection. Wash the feet daily in the shower or bath or in a basin made for washing feet. It's extra important to wash the feet carefully if the person has diabetes. After washing the feet, dry gently. Put lotion on the feet, especially on the heels. But don't put it between the toes. If the person doesn't have diabetes and you see signs of athlete's foot (such as dry, cracking, or itchy skin between the toes), you can try an over-the-counter medicine. These medicines can kill the fungus that causes athlete's foot. If the problem doesn't go away, talk to the person's doctor. Look every day for cuts or signs of infection, such as pain, swelling, redness, or warmth. If you see any of these signs--especially in someone who has diabetes--call the doctor. Where can you learn more? Go to http://bernardino-prateek.info/. Enter A726 in the search box to learn more about \"Learning About Foot and Toenail Care. \"  Current as of: April 19, 2018  Content Version: 11.8  © 6046-2843 [employer]. Care instructions adapted under license by [employer] (which disclaims liability or warranty for this information). If you have questions about a medical condition or this instruction, always ask your healthcare professional. [place] [address] any warranty or liability for your use of this information.

## 2018-10-18 RX ORDER — MELOXICAM 15 MG/1
15 TABLET ORAL DAILY
Qty: 90 TAB | Refills: 1 | Status: CANCELLED | OUTPATIENT
Start: 2018-10-18

## 2018-10-18 NOTE — TELEPHONE ENCOUNTER
I would recommend she choose one or the other. I do not want to prescribe 2 NSAIDs at the same time.

## 2018-10-18 NOTE — TELEPHONE ENCOUNTER
Returned call to patient, verified , per patient she is taking both medications, however is alternating days as to when she is taking medication. Per patient she is not taking both medications in the same day. Please review/advise.

## 2018-10-18 NOTE — TELEPHONE ENCOUNTER
Last Visit: 07/10/2018 with MD Gray Garcia    Next Appointment: 12/03/2018 with MD Gray Garcia   Previous Refill Encounters: 11/28/2017 per MD Gray Garcia #[phone] with 1 refill     Requested Prescriptions     Pending Prescriptions Disp Refills    meloxicam (MOBIC) 15 mg tablet 90 Tab 1     Sig: Take 1 Tab by mouth daily.

## 2018-10-20 ENCOUNTER — HOSPITAL ENCOUNTER (OUTPATIENT)
Dept: LAB | Age: 63
Discharge: HOME | End: 2018-10-20
Payer: MEDICAID

## 2018-10-20 LAB
ALBUMIN SERPL-MCNC: 4.1 G/DL (ref 3.4–5)
ALBUMIN/GLOB SERPL: 1.1 {RATIO} (ref 0.8–1.7)
ALP SERPL-CCNC: 75 U/L (ref 45–117)
ALT SERPL-CCNC: 82 U/L (ref 13–56)
ANION GAP SERPL CALC-SCNC: 2 MMOL/L (ref 3–18)
AST SERPL-CCNC: 33 U/L (ref 15–37)
BILIRUB SERPL-MCNC: 0.2 MG/DL (ref 0.2–1)
BUN SERPL-MCNC: 15 MG/DL (ref 7–18)
BUN/CREAT SERPL: 16 (ref 12–20)
CALCIUM SERPL-MCNC: 9.1 MG/DL (ref 8.5–10.1)
CHLORIDE SERPL-SCNC: 106 MMOL/L (ref 100–108)
CHOLEST SERPL-MCNC: 185 MG/DL
CO2 SERPL-SCNC: 33 MMOL/L (ref 21–32)
CREAT SERPL-MCNC: 0.92 MG/DL (ref 0.6–1.3)
GLOBULIN SER CALC-MCNC: 3.8 G/DL (ref 2–4)
GLUCOSE SERPL-MCNC: 105 MG/DL (ref 74–99)
HDLC SERPL-MCNC: 63 MG/DL (ref 40–60)
HDLC SERPL: 2.9 {RATIO} (ref 0–5)
LDLC SERPL CALC-MCNC: 90.4 MG/DL (ref 0–100)
LIPID PROFILE,FLP: ABNORMAL
POTASSIUM SERPL-SCNC: 5 MMOL/L (ref 3.5–5.5)
PROT SERPL-MCNC: 7.9 G/DL (ref 6.4–8.2)
SODIUM SERPL-SCNC: 141 MMOL/L (ref 136–145)
TRIGL SERPL-MCNC: 158 MG/DL (ref ?–150)
VLDLC SERPL CALC-MCNC: 31.6 MG/DL

## 2018-10-20 PROCEDURE — 80061 LIPID PANEL: CPT | Performed by: NURSE PRACTITIONER

## 2018-10-20 PROCEDURE — 36415 COLL VENOUS BLD VENIPUNCTURE: CPT | Performed by: NURSE PRACTITIONER

## 2018-10-20 PROCEDURE — 86803 HEPATITIS C AB TEST: CPT | Performed by: NURSE PRACTITIONER

## 2018-10-20 PROCEDURE — 80053 COMPREHEN METABOLIC PANEL: CPT | Performed by: NURSE PRACTITIONER

## 2018-10-20 NOTE — LETTER
10/23/2018 7:42 AM 
 
[facility] [id number] [facility] [id number] Dear Chencho Valdivia: 
 
Please find your most recent results below. Resulted Orders HEPATITIS C AB Result Value Ref Range Hepatitis C virus Ab <0.02 <0.80 Index Hep C  virus Ab Interp. NEGATIVE  NEG Hep C  virus Ab comment Comment:  
   Index <0.80. ......................... Syed Espinoza Negative Index > or = to 0.80 and <1.00. .... [facility] Equivocal 
Index >1.00. ......................... Syed [place] Positive For Equivocal or Positive results, confirmation with Hepatitis C RNA by PCR or bDNA is suggested. LIPID PANEL Result Value Ref Range LIPID PROFILE Cholesterol, total 185 <200 MG/DL Triglyceride 158 (H) <150 MG/DL Comment:  
   The drugs N-acetylcysteine (NAC) and 
Metamiszole have been found to cause falsely 
low results in this chemical assay. Please 
be sure to submit blood samples obtained BEFORE administration of either of these 
drugs to assure correct results. HDL Cholesterol 63 (H) 40 - 60 MG/DL  
 LDL, calculated 90.4 0 - 100 MG/DL VLDL, calculated 31.6 MG/DL  
 CHOL/HDL Ratio 2.9 0 - 5.0 METABOLIC PANEL, COMPREHENSIVE Result Value Ref Range Sodium 141 136 - 145 mmol/L Potassium 5.0 3.5 - 5.5 mmol/L Chloride 106 100 - 108 mmol/L  
 CO2 33 (H) 21 - 32 mmol/L Anion gap 2 (L) 3.0 - 18 mmol/L Glucose 105 (H) 74 - 99 mg/dL BUN 15 7.0 - 18 MG/DL Creatinine 0.92 0.6 - 1.3 MG/DL  
 BUN/Creatinine ratio 16 12 - 20 GFR est AA >60 >60 ml/min/1.73m2 GFR est non-AA >60 >60 ml/min/1.73m2 Comment:  
   (NOTE) Estimated GFR is calculated using the Modification of Diet in Renal  
Disease (MDRD) Study equation, reported for both  [facility]) and non- Americans (GFRNA), and normalized to 1.73m2  
body surface area. The physician must decide which value applies to  
the patient.  The MDRD study equation should only be used in  
 individuals age 25 or older. It has not been validated for the  
following: pregnant women, patients with serious comorbid conditions,  
or on certain medications, or persons with extremes of body size,  
muscle mass, or nutritional status. Calcium 9.1 8.5 - 10.1 MG/DL Bilirubin, total 0.2 0.2 - 1.0 MG/DL  
 ALT (SGPT) 82 (H) 13 - 56 U/L  
 AST (SGOT) 33 15 - 37 U/L Alk. phosphatase 75 45 - 117 U/L Protein, total 7.9 6.4 - 8.2 g/dL Albumin 4.1 3.4 - 5.0 g/dL Globulin 3.8 2.0 - 4.0 g/dL A-G Ratio 1.1 0.8 - 1.7 RECOMMENDATIONS: 
Your triglycerides(bad cholesterol) are slightly elevated. Please decrease your intake of carbohydrates/sugar and this should decrease. Also one of your liver enzymes is slightly elevated. This will be monitored, no treatment or referral is warranted at this time. All other labs are normal or clinically insignificant. Please call me if you have any questions: [phone] Sincerely, 
 
ART Byers

## 2018-10-22 LAB
HCV AB SER IA-ACNC: <0.02 INDEX
HCV AB SERPL QL IA: NEGATIVE
HCV COMMENT,HCGAC: NORMAL

## 2018-10-23 NOTE — TELEPHONE ENCOUNTER
Attempted to return call to patient, Reached voicemail identified as \"Rosalia\", left message, identified myself/facility/callback number, requested return call to facility.

## 2018-10-26 NOTE — TELEPHONE ENCOUNTER
Returned call to patient, verified , informed of below. Patient verbalized agreement/understanding. Per patient she will continue taking Diclofenac. No further action required at this time.

## 2018-10-29 ENCOUNTER — DOCUMENTATION ONLY (OUTPATIENT)
Dept: ORTHOPEDIC SURGERY | Age: 63
End: 2018-10-29

## 2018-10-31 ENCOUNTER — DOCUMENTATION ONLY (OUTPATIENT)
Dept: ORTHOPEDIC SURGERY | Age: 63
End: 2018-10-31

## 2018-10-31 NOTE — PROGRESS NOTES
Spoke with the pt an informed that we would not be able to complete and that pt would need to do so. ..

## 2018-11-06 ENCOUNTER — OFFICE VISIT (OUTPATIENT)
Dept: ORTHOPEDIC SURGERY | Age: 63
End: 2018-11-06

## 2018-11-06 VITALS
OXYGEN SATURATION: 95 % | HEART RATE: 73 BPM | DIASTOLIC BLOOD PRESSURE: 96 MMHG | BODY MASS INDEX: 29.09 KG/M2 | WEIGHT: 174.6 LBS | TEMPERATURE: 98.2 F | SYSTOLIC BLOOD PRESSURE: 161 MMHG | RESPIRATION RATE: 17 BRPM | HEIGHT: 65 IN

## 2018-11-06 DIAGNOSIS — M47.816 LUMBAR FACET ARTHROPATHY: Primary | ICD-10-CM

## 2018-11-06 DIAGNOSIS — M47.22 OSTEOARTHRITIS OF SPINE WITH RADICULOPATHY, CERVICAL REGION: ICD-10-CM

## 2018-11-06 DIAGNOSIS — G89.29 CHRONIC RIGHT SHOULDER PAIN: ICD-10-CM

## 2018-11-06 DIAGNOSIS — M25.511 CHRONIC RIGHT SHOULDER PAIN: ICD-10-CM

## 2018-11-06 DIAGNOSIS — M72.2 PLANTAR FASCIITIS, BILATERAL: ICD-10-CM

## 2018-11-06 DIAGNOSIS — M62.838 MUSCLE SPASM: ICD-10-CM

## 2018-11-06 RX ORDER — PREDNISONE 10 MG/1
TABLET ORAL
Qty: 11 TAB | Refills: 0 | Status: SHIPPED | OUTPATIENT
Start: 2018-11-06 | End: 2018-12-04 | Stop reason: ALTCHOICE

## 2018-11-06 NOTE — PATIENT INSTRUCTIONS
Low Back Arthritis: Exercises  Your Care Instructions  Here are some examples of typical rehabilitation exercises for your condition. Start each exercise slowly. Ease off the exercise if you start to have pain. Your doctor or physical therapist will tell you when you can start these exercises and which ones will work best for you. When you are not being active, find a comfortable position for rest. Some people are comfortable on the floor or a medium-firm bed with a small pillow under their head and another under their knees. Some people prefer to lie on their side with a pillow between their knees. Don't stay in one position for too long. Take short walks (10 to 20 minutes) every 2 to 3 hours. Avoid slopes, hills, and stairs until you feel better. Walk only distances you can manage without pain, especially leg pain. How to do the exercises  Pelvic tilt    1. Lie on your back with your knees bent. 2. \"Brace\" your stomach--tighten your muscles by pulling in and imagining your belly button moving toward your spine. 3. Press your lower back into the floor. You should feel your hips and pelvis rock back. 4. Hold for 6 seconds while breathing smoothly. 5. Relax and allow your pelvis and hips to rock forward. 6. Repeat 8 to 12 times. Back stretches    1. Get down on your hands and knees on the floor. 2. Relax your head and allow it to droop. Round your back up toward the ceiling until you feel a nice stretch in your upper, middle, and lower back. Hold this stretch for as long as it feels comfortable, or about 15 to 30 seconds. 3. Return to the starting position with a flat back while you are on your hands and knees. 4. Let your back sway by pressing your stomach toward the floor. Lift your buttocks toward the ceiling. 5. Hold this position for 15 to 30 seconds. 6. Repeat 2 to 4 times. Follow-up care is a key part of your treatment and safety.  Be sure to make and go to all appointments, and call your doctor if you are having problems. It's also a good idea to know your test results and keep a list of the medicines you take. Where can you learn more? Go to http://bernardino-prateek.info/. Enter K374 in the search box to learn more about \"Low Back Arthritis: Exercises. \"  Current as of: November 29, 2017  Content Version: 11.8  © 4560-2091 [employer]. Care instructions adapted under license by [employer] (which disclaims liability or warranty for this information). If you have questions about a medical condition or this instruction, always ask your healthcare professional. [address] any warranty or liability for your use of this information. Plantar Fasciitis: Exercises  Your Care Instructions  Here are some examples of typical rehabilitation exercises for your condition. Start each exercise slowly. Ease off the exercise if you start to have pain. Your doctor or physical therapist will tell you when you can start these exercises and which ones will work best for you. How to do the exercises  Towel stretch    7. Sit with your legs extended and knees straight. 8. Place a towel around your foot just under the toes. 9. Hold each end of the towel in each hand, with your hands above your knees. 10. Pull back with the towel so that your foot stretches toward you. 11. Hold the position for at least 15 to 30 seconds. 12. Repeat 2 to 4 times a session, up to 5 sessions a day. Calf stretch    7. Stand facing a wall with your hands on the wall at about eye level. Put the leg you want to stretch about a step behind your other leg. 8. Keeping your back heel on the floor, bend your front knee until you feel a stretch in the back leg. 9. Hold the stretch for 15 to 30 seconds. Repeat 2 to 4 times. Plantar fascia and calf stretch    1. Stand on a step as shown above. Be sure to hold on to the banister.   2. Slowly let your heels down over the edge of the step as you relax your calf muscles. You should feel a gentle stretch across the bottom of your foot and up the back of your leg to your knee. 3. Hold the stretch about 15 to 30 seconds, and then tighten your calf muscle a little to bring your heel back up to the level of the step. Repeat 2 to 4 times. Towel curls    1. While sitting, place your foot on a towel on the floor and scrunch the towel toward you with your toes. 2. Then, also using your toes, push the towel away from you. Collins pickups    1. Put marbles on the floor next to a cup.  2. Using your toes, try to lift the marbles up from the floor and put them in the cup. Follow-up care is a key part of your treatment and safety. Be sure to make and go to all appointments, and call your doctor if you are having problems. It's also a good idea to know your test results and keep a list of the medicines you take. Where can you learn more? Go to http://bernardino-prateek.info/. Omar Diego in the search box to learn more about \"Plantar Fasciitis: Exercises. \"  Current as of: November 29, 2017  Content Version: 11.8  © 5772-6948 [employer]. Care instructions adapted under license by [employer] (which disclaims liability or warranty for this information). If you have questions about a medical condition or this instruction, always ask your healthcare professional. [place] [address] any warranty or liability for your use of this information.

## 2018-11-06 NOTE — PROGRESS NOTES
[facility]  [address] [place], [address]  Phone: [phone]  Fax: [phone]    Pt's YOB: 1955    ASSESSMENT   Diagnoses and all orders for this visit:    1. Lumbar facet arthropathy  -     predniSONE (DELTASONE) 10 mg tablet; 2 pills in am for 3 days, then 1 pill in am for 3 days and 1/2 pill in am for remainder  -     REFERRAL TO PHYSICAL THERAPY    2. Muscle spasm  -     REFERRAL TO PHYSICAL THERAPY    3. Osteoarthritis of spine with radiculopathy, cervical region  -     predniSONE (DELTASONE) 10 mg tablet; 2 pills in am for 3 days, then 1 pill in am for 3 days and 1/2 pill in am for remainder  -     REFERRAL TO PHYSICAL THERAPY    4. Plantar fasciitis, bilateral  -     predniSONE (DELTASONE) 10 mg tablet; 2 pills in am for 3 days, then 1 pill in am for 3 days and 1/2 pill in am for remainder  -     REFERRAL TO PHYSICAL THERAPY    5. Chronic right shoulder pain         IMPRESSION AND PLAN:  Noah Evans is a 66 y.o. female with history of cervical, thoracic, and right shoulder pain. Pt complains of neck pain and mid thoracic pain that extends into the lumbar region. Pt denies any pain in the legs but admits to pain in the plantar aspect of both feet. Pt takes Voltaren 75 mg and Neurontin 300 mg 1 tab QAM and 1 tab QHS with benefit. 1) Pt was given information on lumbar arthritis and plantar fasciitis exercises. 2) She was referred to physical therapy to complete her forms. 3) Pt was prescribed a prednisone taper. 4) I recommended she roll her foot over a frozen water bottle for 10-15 minutes -. 5) Ms. Dorothyann Harada has a reminder for a \"due or due soon\" health maintenance. I have asked that she contact her primary care provider, Breonna Akbar NP, for follow-up on this health maintenance. 6)  demonstrated consistency with prescribing. 7) Pt will follow-up in 2 months or sooner if needed.       HISTORY OF PRESENT ILLNESS:  Karla Dunlap is a 66 y.o. female with history of cervical, thoracic, and right shoulder pain. Pt complains of neck pain and mid thoracic pain that extends into the lumbar region. Pt denies any pain in the legs but admits to pain in the plantar aspect of both feet. She reports that she is diabetic and is unsure if this could be causing her foot pain. Of note, her blood sugars are well controlled at this time. Pt notes that her foot pain is severe upon waking and worse after sitting or laying down for prolonged periods of time. She no longer takes Mobic since she was prescribed Voltaren 75 mg 1 tab BID prn. Pt reports greater relief when taking the Voltaren 75 mg. She also takes Neurontin 300 mg 1 tab QAM and 1 tab QHS with some sedation. Pt notes that her pharmacist told her to discontinue the Robaxin but she is unsure why. She has not recently taken oral steroids. Pt at this time desires to proceed with medication evaluation.     Pain Scale: 10 - Worst pain ever/10    PCP: Roxann Melo NP     Past Medical History:   Diagnosis Date    CAD (coronary artery disease)     Hypertension     Uterine fibroid         Social History     Socioeconomic History    Marital status: SINGLE     Spouse name: Not on file    Number of children: Not on file    Years of education: Not on file    Highest education level: Not on file   Social Needs    Financial resource strain: Not on file    Food insecurity - worry: Not on file    Food insecurity - inability: Not on file    Transportation needs - medical: Not on file   Keep[employer] needs - non-medical: Not on file   Occupational History    Not on file   Tobacco Use    Smoking status: Current Every Day Smoker     Packs/day: 0.25     Years: 2.00     Pack years: 0.50    Smokeless tobacco: Never Used   Substance and Sexual Activity    Alcohol use: No     Alcohol/week: 0.5 oz     Types: 1 Cans of beer per week    Drug use: Yes     Types: Marijuana     Comment: rarely     Sexual activity: Not on file   Other Topics Concern    Not on file   Social History Narrative    Not on file       Current Outpatient Medications   Medication Sig Dispense Refill    predniSONE (DELTASONE) 10 mg tablet 2 pills in am for 3 days, then 1 pill in am for 3 days and 1/2 pill in am for remainder 11 Tab 0    pravastatin (PRAVACHOL) 40 mg tablet TAKE ONE TABLET BY MOUTH ONCE DAILY AT  NIGHT 90 Tab 0    gabapentin (NEURONTIN) 300 mg capsule Take 1 po q am and 1 po q evening 60 Cap 5    diclofenac EC (VOLTAREN) 75 mg EC tablet Take 1 Tab by mouth two (2) times a day. Take with food as needed for pain 60 Tab 5    LANTUS 100 unit/mL injection INJECT 20 UNITS SUBCUTANEOUSLY EVERY 12 HOURS 10 Vial 5    sertraline (ZOLOFT) 50 mg tablet Take 1 Tab by mouth daily. 30 Tab 3    glucose blood VI test strips (FREESTYLE LITE STRIPS) strip Check glucose ACHS. 200 Strip 11    Lancets misc Check glucose ACHS 200 Each 11    Blood-Glucose Meter (FREESTYLE LITE METER) monitoring kit accucheck ACHS and record in log. Take log to every doctor visit. **MEDICALLY NECESSARY** 1 Kit 0    alcohol swabs padm 1 Each by Apply Externally route Before breakfast, lunch, dinner and at bedtime. accucheck ACHS and record in log. Take log to every doctor visit. **MEDICALLY NECESSARY** 100 Pad 2    traZODone (DESYREL) 150 mg tablet Take 600 mg by mouth nightly.  sertraline (ZOLOFT) 100 mg tablet Take 100 mg by mouth daily.  perphenazine (TRILAFON) 4 mg tablet Take 4 mg by mouth three (3) times daily.  benztropine (COGENTIN) 1 mg tablet Take 1 mg by mouth daily. Allergies   Allergen Reactions    Latex Rash    Penicillins Nausea and Vomiting         REVIEW OF SYSTEMS    Constitutional: Negative for fever, chills, or weight change. Respiratory: Negative for cough or shortness of breath. Cardiovascular: Negative for chest pain or palpitations.   Gastrointestinal: Negative for acid reflux, change in bowel habits, or constipation. Genitourinary: Negative for dysuria and flank pain. Musculoskeletal: Positive for cervical, thoracic, lumbar, and foot pain. Skin: Negative for rash. Neurological: Negative for headaches, dizziness, or numbness. Endo/Heme/Allergies: Negative for increased bruising. Psychiatric/Behavioral: Negative for difficulty with sleep. PHYSICAL EXAMINATION  Visit Vitals  BP (!) 161/96   Pulse 73   Temp 98.2 °F (36.8 °C)   Resp 17   Ht 5' 5\" (1.651 m)   Wt 174 lb 9.6 oz (79.2 kg)   SpO2 95%   BMI 29.05 kg/m²       Constitutional: Awake, alert, and in no acute distress. Neurological: 1+ symmetrical DTRs in the upper extremities. 1+ symmetrical DTRs in the lower extremities. Sensation to light touch is intact. Negative Sofia's sign bilaterally. Skin: warm, dry, and intact. Musculoskeletal: Tight across the upper trapezii. Tenderness to palpation in the lower lumbar region. Moderate pain with extension and axial loading. No pain with internal or external rotation of her hips. Negative straight leg raise bilaterally; pain with dorsiflexion bilaterally on plantar surface      Biceps  Triceps Deltoids Wrist Ext Wrist Flex Hand Intrin   Right +4/5 +4/5 +4/5 +4/5 +4/5 +4/5   Left +4/5 +4/5 +4/5 +4/5 +4/5 +4/5      Hip Flex  Quads Hamstrings Ankle DF EHL Ankle PF   Right +4/5 +4/5 +4/5 +4/5 +4/5 +4/5   Left +4/5 +4/5 +4/5 +4/5 +4/5 +4/5     IMAGING:    Thoracic spine MRI from 04/04/2018 was personally reviewed with the patient and demonstrated:  Results from [facility] on 04/04/18    [facility] CONT    Narrative MRI THORACIC WITHOUT CONTRAST    CPT CODE: 80414    HISTORY: Mid back pain, neck pain, right leg weakness since 2001. COMPARISON: None. TECHNIQUE: Sagittal T1, T2 and stir and axial T2 weighted sequences were  acquired through the thoracic spine without contrast.        FINDINGS:     There is normal anatomic alignment of the thoracic spine.  Vertebral body heights  are maintained. No infiltrative marrow replacement process or marrow edema. Thoracic spinal cord maintains normal caliber, signal intensity and morphology  throughout. Small bilateral pleural effusions. There are multilevel tiny disc protrusions with contact of the ventral cord at  T6/T7 and T7/T8, but no impingement. No critical foraminal compromise. Incidentally imaged surrounding soft tissues demonstrate no acute abnormalities  otherwise.           Impression IMPRESSION:    Small disc protrusions are most pronounced at T6/T7 and T7/T8 where there is  mild flattening of the ventral cord, but no cord impingement. Small bilateral pleural effusions.                         Right shoulder x-rays from 03/18/2017 were personally reviewed and demonstrated:  Results from Hospital Encounter on 03/18/17   XR SHOULDER RT AP/LAT MIN 2 V     Narrative Shoulder series. CPT code: 49766    Clinical history: Arthritis in the shoulder and pain in the back under the  shoulder blade without known injury of unspecified chronicity. Technique: Two shoulder views. Comparison: No priors. Findings: There are no fractures.  Joint relationships at glenohumeral joint are  normal.  Acromioclavicular joint is normal. There is convex appearance of the  underside of the distal acromion process.  There are no soft tissue  calcifications.              Impression IMPRESSION:    No acute abnormalities. Convex appearance of the inferior aspect of the acromion  process could increased risk of rotator cuff impingement.             Cervical Spine X-rays from 12/27/2016 were personally reviewed with the Pt and demonstrated:  FINDINGS:       Vertebral body height and alignment is intact. Slight straightening of the  cervical lordosis. Moderate disc height loss endplate sclerosis and vertebral  body osteophytes from C3 through C7. Alignment is intact. No fracture. Mild  facet joint arthropathy at multiple levels.   The prevertebral space appears normal.       IMPRESSION:      Moderate cervical spondylosis.      Written by Grady Senior, as dictated by Jh Benitez MD.  I, Dr. Jh Benitez confirm that all documentation is accurate.

## 2018-12-01 ENCOUNTER — HOSPITAL ENCOUNTER (OUTPATIENT)
Dept: LAB | Age: 63
Discharge: HOME | End: 2018-12-01
Payer: MEDICAID

## 2018-12-01 DIAGNOSIS — E11.65 UNCONTROLLED TYPE 2 DIABETES MELLITUS WITH HYPERGLYCEMIA (HCC): Chronic | ICD-10-CM

## 2018-12-01 PROCEDURE — 82043 UR ALBUMIN QUANTITATIVE: CPT

## 2018-12-02 LAB
CREAT UR-MCNC: 28 MG/DL (ref 30–125)
MICROALBUMIN UR-MCNC: <0.5 MG/DL (ref 0–3)
MICROALBUMIN/CREAT UR-RTO: ABNORMAL MG/G (ref 0–30)

## 2018-12-04 ENCOUNTER — OFFICE VISIT (OUTPATIENT)
Dept: FAMILY MEDICINE CLINIC | Age: 63
End: 2018-12-04

## 2018-12-04 VITALS
TEMPERATURE: 98.2 F | BODY MASS INDEX: 29.22 KG/M2 | RESPIRATION RATE: 16 BRPM | OXYGEN SATURATION: 98 % | HEART RATE: 88 BPM | HEIGHT: 65 IN | WEIGHT: 175.4 LBS | DIASTOLIC BLOOD PRESSURE: 81 MMHG | SYSTOLIC BLOOD PRESSURE: 149 MMHG

## 2018-12-04 DIAGNOSIS — R10.11 RIGHT UPPER QUADRANT ABDOMINAL PAIN: ICD-10-CM

## 2018-12-04 DIAGNOSIS — Z23 ENCOUNTER FOR IMMUNIZATION: ICD-10-CM

## 2018-12-04 DIAGNOSIS — E78.2 MIXED HYPERLIPIDEMIA: Chronic | ICD-10-CM

## 2018-12-04 DIAGNOSIS — I10 ESSENTIAL HYPERTENSION: ICD-10-CM

## 2018-12-04 LAB — HBA1C MFR BLD HPLC: 6.5 %

## 2018-12-04 RX ORDER — METHOCARBAMOL 500 MG/1
500 TABLET, FILM COATED ORAL 4 TIMES DAILY
Qty: 40 TAB | Refills: 0 | Status: SHIPPED | OUTPATIENT
Start: 2018-12-04 | End: 2019-01-07 | Stop reason: SDUPTHER

## 2018-12-04 RX ORDER — PRAVASTATIN SODIUM 40 MG/1
TABLET ORAL
Qty: 90 TAB | Refills: 0 | Status: SHIPPED | OUTPATIENT
Start: 2018-12-04 | End: 2019-03-21 | Stop reason: SDUPTHER

## 2018-12-04 NOTE — PATIENT INSTRUCTIONS
Vaccine Information Statement    Influenza (Flu) Vaccine (Inactivated or Recombinant): What you need to know    Many Vaccine Information Statements are available in Armenian and other languages. See www.immunize.org/vis  Hojas de Información Sobre Vacunas están disponibles en Español y en muchos otros idiomas. Visite www.immunize.org/vis    1. Why get vaccinated? Influenza (flu) is a contagious disease that spreads around the [place] every year, usually between October and May. Flu is caused by influenza viruses, and is spread mainly by coughing, sneezing, and close contact. Anyone can get flu. Flu strikes suddenly and can last several days. Symptoms vary by age, but can include:   fever/chills   sore throat   muscle aches   fatigue   cough   headache    runny or stuffy nose    Flu can also lead to pneumonia and blood infections, and cause diarrhea and seizures in children. If you have a medical condition, such as heart or lung disease, flu can make it worse. Flu is more dangerous for some people. Infants and young children, people 72years of age and older, pregnant women, and people with certain health conditions or a weakened immune system are at greatest risk. Each year thousands of people in the [facility] die from flu, and many more are hospitalized. Flu vaccine can:   keep you from getting flu,   make flu less severe if you do get it, and   keep you from spreading flu to your family and other people. 2. Inactivated and recombinant flu vaccines    A dose of flu vaccine is recommended every flu season. Children 6 months through 6years of age may need two doses during the same flu season. Everyone else needs only one dose each flu season.        Some inactivated flu vaccines contain a very small amount of a mercury-based preservative called thimerosal. Studies have not shown thimerosal in vaccines to be harmful, but flu vaccines that do not contain thimerosal are available. There is no live flu virus in flu shots. They cannot cause the flu. There are many flu viruses, and they are always changing. Each year a new flu vaccine is made to protect against three or four viruses that are likely to cause disease in the upcoming flu season. But even when the vaccine doesnt exactly match these viruses, it may still provide some protection    Flu vaccine cannot prevent:   flu that is caused by a virus not covered by the vaccine, or   illnesses that look like flu but are not. It takes about 2 weeks for protection to develop after vaccination, and protection lasts through the flu season. 3. Some people should not get this vaccine    Tell the person who is giving you the vaccine:     If you have any severe, life-threatening allergies. If you ever had a life-threatening allergic reaction after a dose of flu vaccine, or have a severe allergy to any part of this vaccine, you may be advised not to get vaccinated. Most, but not all, types of flu vaccine contain a small amount of egg protein.  If you ever had Guillain-Barré Syndrome (also called GBS). Some people with a history of GBS should not get this vaccine. This should be discussed with your doctor.  If you are not feeling well. It is usually okay to get flu vaccine when you have a mild illness, but you might be asked to come back when you feel better. 4. Risks of a vaccine reaction    With any medicine, including vaccines, there is a chance of reactions. These are usually mild and go away on their own, but serious reactions are also possible. Most people who get a flu shot do not have any problems with it.      Minor problems following a flu shot include:    soreness, redness, or swelling where the shot was given     hoarseness   sore, red or itchy eyes   cough   fever   aches   headache   itching   fatigue  If these problems occur, they usually begin soon after the shot and last 1 or 2 days. More serious problems following a flu shot can include the following:     There may be a small increased risk of Guillain-Barré Syndrome (GBS) after inactivated flu vaccine. This risk has been estimated at 1 or 2 additional cases per million people vaccinated. This is much lower than the risk of severe complications from flu, which can be prevented by flu vaccine.  Young children who get the flu shot along with pneumococcal vaccine (PCV13) and/or DTaP vaccine at the same time might be slightly more likely to have a seizure caused by fever. Ask your doctor for more information. Tell your doctor if a child who is getting flu vaccine has ever had a seizure. Problems that could happen after any injected vaccine:      People sometimes faint after a medical procedure, including vaccination. Sitting or lying down for about 15 minutes can help prevent fainting, and injuries caused by a fall. Tell your doctor if you feel dizzy, or have vision changes or ringing in the ears.  Some people get severe pain in the shoulder and have difficulty moving the arm where a shot was given. This happens very rarely.  Any medication can cause a severe allergic reaction. Such reactions from a vaccine are very rare, estimated at about 1 in a million doses, and would happen within a few minutes to a few hours after the vaccination. As with any medicine, there is a very remote chance of a vaccine causing a serious injury or death. The safety of vaccines is always being monitored. For more information, visit: www.cdc.gov/vaccinesafety/    5. What if there is a serious reaction? What should I look for?  Look for anything that concerns you, such as signs of a severe allergic reaction, very high fever, or unusual behavior.     Signs of a severe allergic reaction can include hives, swelling of the face and throat, difficulty breathing, a fast heartbeat, dizziness, and weakness - usually within a few minutes to a few hours after the vaccination. What should I do?  If you think it is a severe allergic reaction or other emergency that cant wait, call 9-1-1 and get the person to the nearest hospital. Otherwise, call your doctor.  Reactions should be reported to the Vaccine Adverse Event Reporting System (VAERS). Your doctor should file this report, or you can do it yourself through  the VAERS web site at www.vaers. [facility].gov, or by calling 4-960.297.1725. VAERS does not give medical advice. 6. The National Vaccine Injury Compensation Program    The [facility] Vaccine Injury Compensation Program (VICP) is a federal program that was created to compensate people who may have been injured by certain vaccines. Persons who believe they may have been injured by a vaccine can learn about the program and about filing a claim by calling 6-966.305.7092 or visiting the [employer] website at www.[facility].gov/vaccinecompensation. There is a time limit to file a claim for compensation. 7. How can I learn more?  Ask your healthcare provider. He or she can give you the vaccine package insert or suggest other sources of information.  Call your local or state health department.  Contact the Centers for Disease Control and Prevention (CDC):  - Call 0-840.459.1181 (1-800-CDC-INFO) or  - Visit CDCs website at www.cdc.gov/flu    Vaccine Information Statement   Inactivated Influenza Vaccine   8/7/2015  42 ALISTAIR Lafleur [id number]    Department of Health and Human Services  Centers for Disease Control and Prevention    Office Use Only       Learning About Diabetes Food Guidelines  Your Care Instructions    Meal planning is important to manage diabetes. It helps keep your blood sugar at a target level (which you set with your doctor). You don't have to eat special foods. You can eat what your family eats, including sweets once in a while.  But you do have to pay attention to how often you eat and how much you eat of certain foods.  You may want to work with a dietitian or a certified diabetes educator (CDE) to help you plan meals and snacks. A dietitian or CDE can also help you lose weight if that is one of your goals. What should you know about eating carbs? Managing the amount of carbohydrate (carbs) you eat is an important part of healthy meals when you have diabetes. Carbohydrate is found in many foods. · Learn which foods have carbs. And learn the amounts of carbs in different foods. ? Bread, cereal, pasta, and rice have about 15 grams of carbs in a serving. A serving is 1 slice of bread (1 ounce), ½ cup of cooked cereal, or 1/3 cup of cooked pasta or rice. ? Fruits have 15 grams of carbs in a serving. A serving is 1 small fresh fruit, such as an apple or orange; ½ of a banana; ½ cup of cooked or canned fruit; ½ cup of fruit juice; 1 cup of melon or raspberries; or 2 tablespoons of dried fruit. ? Milk and no-sugar-added yogurt have 15 grams of carbs in a serving. A serving is 1 cup of milk or 2/3 cup of no-sugar-added yogurt. ? Starchy vegetables have 15 grams of carbs in a serving. A serving is ½ cup of mashed potatoes or sweet potato; 1 cup winter squash; ½ of a small baked potato; ½ cup of cooked beans; or ½ cup cooked corn or green peas. · Learn how much carbs to eat each day and at each meal. A dietitian or CDE can teach you how to keep track of the amount of carbs you eat. This is called carbohydrate counting. · If you are not sure how to count carbohydrate grams, use the Plate Method to plan meals. It is a good, quick way to make sure that you have a balanced meal. It also helps you spread carbs throughout the day. ? Divide your plate by types of foods. Put non-starchy vegetables on half the plate, meat or other protein food on one-quarter of the plate, and a grain or starchy vegetable in the final quarter of the plate.  To this you can add a small piece of fruit and 1 cup of milk or yogurt, depending on how many carbs you are supposed to eat at a meal.  · Try to eat about the same amount of carbs at each meal. Do not \"save up\" your daily allowance of carbs to eat at one meal.  · Proteins have very little or no carbs per serving. Examples of proteins are beef, chicken, turkey, fish, eggs, tofu, cheese, cottage cheese, and peanut butter. A serving size of meat is 3 ounces, which is about the size of a deck of cards. Examples of meat substitute serving sizes (equal to 1 ounce of meat) are 1/4 cup of cottage cheese, 1 egg, 1 tablespoon of peanut butter, and ½ cup of tofu. How can you eat out and still eat healthy? · Learn to estimate the serving sizes of foods that have carbohydrate. If you measure food at home, it will be easier to estimate the amount in a serving of restaurant food. · If the meal you order has too much carbohydrate (such as potatoes, corn, or baked beans), ask to have a low-carbohydrate food instead. Ask for a salad or green vegetables. · If you use insulin, check your blood sugar before and after eating out to help you plan how much to eat in the future. · If you eat more carbohydrate at a meal than you had planned, take a walk or do other exercise. This will help lower your blood sugar. What else should you know? · Limit saturated fat, such as the fat from meat and dairy products. This is a healthy choice because people who have diabetes are at higher risk of heart disease. So choose lean cuts of meat and nonfat or low-fat dairy products. Use olive or canola oil instead of butter or shortening when cooking. · Don't skip meals. Your blood sugar may drop too low if you skip meals and take insulin or certain medicines for diabetes. · Check with your doctor before you drink alcohol. Alcohol can cause your blood sugar to drop too low. Alcohol can also cause a bad reaction if you take certain diabetes medicines. Follow-up care is a key part of your treatment and safety.  Be sure to make and go to all appointments, and call your doctor if you are having problems. It's also a good idea to know your test results and keep a list of the medicines you take. Where can you learn more? Go to http://bernardino-prateek.info/. Enter I027 in the search box to learn more about \"Learning About Diabetes Food Guidelines. \"  Current as of: December 7, 2017  Content Version: 11.8  © 2041-6688 [employer]wise[employer]. Care instructions adapted under license by [employer] (which disclaims liability or warranty for this information). If you have questions about a medical condition or this instruction, always ask your healthcare professional. [address] any warranty or liability for your use of this information.

## 2018-12-04 NOTE — PROGRESS NOTES
Pt given Flu vaccine in L deltoid per verbral read back order SHEILA Quintero. Pt tolerated procedure w/o reaction after wait time.

## 2018-12-04 NOTE — PROGRESS NOTES
Pt is here for f/u Diabetes & Cholesterol    1. Have you been to the ER, urgent care clinic since your last visit? Hospitalized since your last visit? No    2. Have you seen or consulted any other health care providers outside of the [address] [place] [address] since your last visit? Include any pap smears or colon screening.  No

## 2018-12-04 NOTE — PROGRESS NOTES
Subjective:    Susan Bean is a 66y.o. year old female seen for follow up of diabetes. She also has hyperlipidemia and coronary artery disease. Diabetic Review of Systems - medication compliance: compliant all of the time, diabetic diet compliance: noncompliant some of the time, home glucose monitoring: fasting values range 91, minimum reading is 62, maximum reading is 160s, fasting glucose today 136, further diabetic ROS: no polyuria or polydipsia, no chest pain, dyspnea or TIA's, no numbness, tingling or pain in extremities, last eye exam approximately 5/3/18 ago, acute symptoms are none. Other symptoms and concerns: states she has been having increase right upper back pain. States she was placed on prednisone taper for back pain which did help with the symptoms. However, after completing taper pain returned. Requesting muscle relaxer to help with pain. Has taken flexeril, robaxin with improvement. Patient Active Problem List   Diagnosis Code    Type II diabetes mellitus, uncontrolled [facility] E11.65    Mixed hyperlipidemia E78.2    Myofascial pain M79.18    DDD (degenerative disc disease), cervical M50.30    Osteoarthritis of spine with radiculopathy, cervical region M47.22    Right shoulder pain M25.511    Tobacco use disorder F17.200    Muscle spasm M62.838     Current Outpatient Medications   Medication Sig Dispense Refill    gabapentin (NEURONTIN) 300 mg capsule Take 1 po q am and 1 po q evening 60 Cap 5    diclofenac EC (VOLTAREN) 75 mg EC tablet Take 1 Tab by mouth two (2) times a day. Take with food as needed for pain 60 Tab 5    LANTUS 100 unit/mL injection INJECT 20 UNITS SUBCUTANEOUSLY EVERY 12 HOURS 10 Vial 5    sertraline (ZOLOFT) 50 mg tablet Take 1 Tab by mouth daily. 30 Tab 3    glucose blood VI test strips (FREESTYLE LITE STRIPS) strip Check glucose ACHS.  200 Strip 11    Lancets misc Check glucose ACHS 200 Each 11    Blood-Glucose Meter (FREESTYLE LITE METER) monitoring kit accucheck ACHS and record in log. Take log to every doctor visit. **MEDICALLY NECESSARY** 1 Kit 0    alcohol swabs padm 1 Each by Apply Externally route Before breakfast, lunch, dinner and at bedtime. accucheck ACHS and record in log. Take log to every doctor visit. **MEDICALLY NECESSARY** 100 Pad 2    traZODone (DESYREL) 150 mg tablet Take 600 mg by mouth nightly.  sertraline (ZOLOFT) 100 mg tablet Take 100 mg by mouth daily.  perphenazine (TRILAFON) 4 mg tablet Take 4 mg by mouth three (3) times daily.  benztropine (COGENTIN) 1 mg tablet Take 1 mg by mouth daily.  pravastatin (PRAVACHOL) 40 mg tablet TAKE ONE TABLET BY MOUTH ONCE DAILY AT  NIGHT 90 Tab 0      Allergies   Allergen Reactions    Latex Rash    Penicillins Nausea and Vomiting     No past surgical history on file. Family History   Problem Relation Age of Onset    Diabetes Mother     Stroke Mother     Diabetes Father     Cancer Paternal Grandfather     Breast Cancer Cousin       Lab Results   Component Value Date/Time    Cholesterol, total 185 10/20/2018 08:33 AM    HDL Cholesterol 63 (H) 10/20/2018 08:33 AM    LDL, calculated 90.4 10/20/2018 08:33 AM    VLDL, calculated 31.6 10/20/2018 08:33 AM    Triglyceride 158 (H) 10/20/2018 08:33 AM    CHOL/HDL Ratio 2.9 10/20/2018 08:33 AM     Lab Results   Component Value Date/Time    Sodium 141 10/20/2018 08:33 AM    Potassium 5.0 10/20/2018 08:33 AM    Chloride 106 10/20/2018 08:33 AM    CO2 33 (H) 10/20/2018 08:33 AM    Anion gap 2 (L) 10/20/2018 08:33 AM    Glucose 105 (H) 10/20/2018 08:33 AM    BUN 15 10/20/2018 08:33 AM    Creatinine 0.92 10/20/2018 08:33 AM    BUN/Creatinine ratio 16 10/20/2018 08:33 AM    GFR est AA >60 10/20/2018 08:33 AM    GFR est non-AA >60 10/20/2018 08:33 AM    Calcium 9.1 10/20/2018 08:33 AM    Bilirubin, total 0.2 10/20/2018 08:33 AM    AST (SGOT) 33 10/20/2018 08:33 AM    Alk.  phosphatase 75 10/20/2018 08:33 AM    Protein, total 7.9 10/20/2018 08:33 AM    Albumin 4.1 10/20/2018 08:33 AM    Globulin 3.8 10/20/2018 08:33 AM    A-G Ratio 1.1 10/20/2018 08:33 AM    ALT (SGPT) 82 (H) 10/20/2018 08:33 AM     Lab Results   Component Value Date/Time    WBC 7.0 07/25/2016 12:47 AM    HGB 11.3 (L) 07/25/2016 12:47 AM    HCT 32.6 (L) 07/25/2016 12:47 AM    PLATELET 027 01/85/4944 12:47 AM    MCV 87.6 07/25/2016 12:47 AM     Wt Readings from Last 3 Encounters:   12/04/18 175 lb 6.4 oz (79.6 kg)   11/06/18 174 lb 9.6 oz (79.2 kg)   10/03/18 172 lb (78 kg)     Last Point of Care HGB A1C  Hemoglobin A1c (POC)   Date Value Ref Range Status   03/19/2018 6.4 % Final      BP Readings from Last 3 Encounters:   12/04/18 149/81   11/06/18 (!) 161/96   10/03/18 149/79       Last Point of Care Urine mircoalbumin  Results for orders placed or performed during the hospital encounter of 12/01/18   MICROALBUMIN, UR, RAND W/ MICROALB/CREAT RATIO   Result Value Ref Range    Microalbumin,urine random <0.50 0 - 3.0 MG/DL    Creatinine, urine 28.00 (L) 30 - 125 mg/dL    Microalbumin/Creat ratio (mg/g creat)  0 - 30 mg/g     Cannot calculate ratio due to microalbumin result outside reportable range. Lab Results   Component Value Date/Time    Microalbumin/Creat ratio (mg/g creat)  12/01/2018 09:43 AM     Cannot calculate ratio due to microalbumin result outside reportable range. Microalbumin,urine random <0.50 12/01/2018 09:43 AM    Microalbumin urine (POC) 30 04/11/2017 04:00 PM       Last Diabetic Foot Exam on: 10/3/18        Objective:  Visit Vitals  /81 (BP 1 Location: Left arm)   Pulse 88   Temp 98.2 °F (36.8 °C) (Oral)   Resp 16   Ht 5' 5\" (1.651 m)   Wt 175 lb 6.4 oz (79.6 kg)   SpO2 98%   BMI 29.19 kg/m²     Awake and alert in no acute distress   Neck supple without lymphadenopathy, no carotid artery bruits auscultated bilaterally. No thyromegaly   Lungs clear throughout   S1 S2 RRR without ectopy or murmur auscultated.    Extremities: no clubbing, cyanosis, peripheral edema   Foot exam: monofilament no abnormalities, DP/PT pulses +2 bilaterally, ankle reflex +2 bilaterally   Integumentary: no rashes   Right trapezius tender to palpation  Abdomen - soft, nondistended, no masses or organomegaly  tenderness noted RUQ    Reviewed vital signs         Assessment/Plan:    ICD-10-CM ICD-9-CM    1. Uncontrolled type 2 diabetes mellitus without complication, with long-term current use of insulin (HCC) E11.65 250.02 AMB POC HEMOGLOBIN A1C    Z79.4 V58.67    2. Mixed hyperlipidemia E78.2 272.2 pravastatin (PRAVACHOL) 40 mg tablet   3. Essential hypertension I10 401.9    4. Encounter for immunization Z23 V03.89 INFLUENZA VIRUS VAC QUAD,SPLIT,PRESV FREE SYRINGE IM      MD IMMUNIZ ADMIN,1 SINGLE/COMB VAC/TOXOID   5. Right upper quadrant abdominal pain R10.11 789.01 US GALLBLADDER   I have discussed the diagnosis with the patient and the intended plan as seen in the above orders. The patient has received an after-visit summary and questions were answered concerning future plans. I have discussed medication side effects and warnings with the patient as well. Patient agreeable with above plan and verbalizes understanding. Follow-up Disposition:  Return in about 4 weeks (around 1/1/2019) for RUQ abd pain .

## 2018-12-05 NOTE — PROGRESS NOTES
Please advise patient her microalbumin is mildly elevated. She will need to continue to take her medication as prescribed and this will be monitored closely.  Thanks, Karen Bower NP-C

## 2018-12-06 RX ORDER — INSULIN GLARGINE 100 [IU]/ML
INJECTION, SOLUTION SUBCUTANEOUS
Qty: 10 VIAL | Refills: 5 | Status: SHIPPED | OUTPATIENT
Start: 2018-12-06 | End: 2019-09-18 | Stop reason: SDUPTHER

## 2018-12-12 ENCOUNTER — HOSPITAL ENCOUNTER (OUTPATIENT)
Dept: ULTRASOUND IMAGING | Age: 63
Discharge: HOME | End: 2018-12-12
Attending: NURSE PRACTITIONER
Payer: MEDICAID

## 2018-12-12 DIAGNOSIS — R10.11 RIGHT UPPER QUADRANT ABDOMINAL PAIN: ICD-10-CM

## 2018-12-12 PROCEDURE — 76705 ECHO EXAM OF ABDOMEN: CPT

## 2018-12-12 NOTE — PROGRESS NOTES
Please advise patient her ultrasound is negative for any abnormal findings.   Thanks, Mumtaz Swanson, ESTELA-C

## 2019-01-03 DIAGNOSIS — M79.18 MYOFASCIAL PAIN: ICD-10-CM

## 2019-01-03 DIAGNOSIS — M50.30 DDD (DEGENERATIVE DISC DISEASE), CERVICAL: ICD-10-CM

## 2019-01-03 RX ORDER — GABAPENTIN 300 MG/1
CAPSULE ORAL
Qty: 60 CAP | Refills: 0 | Status: SHIPPED | OUTPATIENT
Start: 2019-01-03 | End: 2019-01-28 | Stop reason: SDUPTHER

## 2019-01-07 ENCOUNTER — TELEPHONE (OUTPATIENT)
Dept: FAMILY MEDICINE CLINIC | Age: 64
End: 2019-01-07

## 2019-01-07 DIAGNOSIS — E78.2 MIXED HYPERLIPIDEMIA: Chronic | ICD-10-CM

## 2019-01-07 RX ORDER — PRAVASTATIN SODIUM 40 MG/1
TABLET ORAL
Qty: 90 TAB | Refills: 0 | Status: CANCELLED | OUTPATIENT
Start: 2019-01-07

## 2019-01-08 RX ORDER — METHOCARBAMOL 500 MG/1
500 TABLET, FILM COATED ORAL 4 TIMES DAILY
Qty: 40 TAB | Refills: 0 | Status: SHIPPED | OUTPATIENT
Start: 2019-01-08 | End: 2019-01-28 | Stop reason: SDUPTHER

## 2019-01-28 ENCOUNTER — OFFICE VISIT (OUTPATIENT)
Dept: ORTHOPEDIC SURGERY | Age: 64
End: 2019-01-28

## 2019-01-28 VITALS
DIASTOLIC BLOOD PRESSURE: 91 MMHG | SYSTOLIC BLOOD PRESSURE: 143 MMHG | HEIGHT: 65 IN | BODY MASS INDEX: 28.82 KG/M2 | HEART RATE: 72 BPM | WEIGHT: 173 LBS

## 2019-01-28 DIAGNOSIS — F17.200 TOBACCO USE DISORDER: ICD-10-CM

## 2019-01-28 DIAGNOSIS — M62.838 MUSCLE SPASM: ICD-10-CM

## 2019-01-28 DIAGNOSIS — M79.18 MYOFASCIAL PAIN: ICD-10-CM

## 2019-01-28 DIAGNOSIS — M47.22 OSTEOARTHRITIS OF SPINE WITH RADICULOPATHY, CERVICAL REGION: ICD-10-CM

## 2019-01-28 DIAGNOSIS — M47.816 LUMBAR FACET ARTHROPATHY: Primary | ICD-10-CM

## 2019-01-28 DIAGNOSIS — M50.30 DDD (DEGENERATIVE DISC DISEASE), CERVICAL: ICD-10-CM

## 2019-01-28 RX ORDER — KETOROLAC TROMETHAMINE 15 MG/ML
60 INJECTION, SOLUTION INTRAMUSCULAR; INTRAVENOUS ONCE
Qty: 1 VIAL | Refills: 0
Start: 2019-01-28 | End: 2019-01-28

## 2019-01-28 RX ORDER — METHOCARBAMOL 500 MG/1
500 TABLET, FILM COATED ORAL 4 TIMES DAILY
Qty: 60 TAB | Refills: 3 | Status: SHIPPED | OUTPATIENT
Start: 2019-01-28 | End: 2019-05-15 | Stop reason: SDUPTHER

## 2019-01-28 RX ORDER — DICLOFENAC SODIUM 75 MG/1
75 TABLET, DELAYED RELEASE ORAL 2 TIMES DAILY
Qty: 60 TAB | Refills: 5 | Status: SHIPPED | OUTPATIENT
Start: 2019-01-28 | End: 2019-07-15 | Stop reason: SDUPTHER

## 2019-01-28 RX ORDER — GABAPENTIN 300 MG/1
CAPSULE ORAL
Qty: 60 CAP | Refills: 4 | Status: SHIPPED | OUTPATIENT
Start: 2019-01-28 | End: 2019-07-08 | Stop reason: SDUPTHER

## 2019-01-28 NOTE — PROGRESS NOTES
[facility]  [address] [place], [facility] [address]  Phone: [phone]  Fax: [phone]    Pt's YOB: 1955    ASSESSMENT   Diagnoses and all orders for this visit:    1. Lumbar facet arthropathy  -     diclofenac EC (VOLTAREN) 75 mg EC tablet; Take 1 Tab by mouth two (2) times a day. Take with food as needed for pain  -     KETOROLAC TROMETHAMINE INJ  -     ketorolac (TORADOL) 15 mg/mL soln injection; 4 mL by IntraMUSCular route once for 1 dose. -     ID THER/PROPH/DIAG INJECTION, SUBCUT/IM    2. Osteoarthritis of spine with radiculopathy, cervical region  -     diclofenac EC (VOLTAREN) 75 mg EC tablet; Take 1 Tab by mouth two (2) times a day. Take with food as needed for pain    3. DDD (degenerative disc disease), cervical  -     gabapentin (NEURONTIN) 300 mg capsule; TAKE ONE CAPSULE BY MOUTH EVERY MORNING AND 1 EVERY EVENING    4. Myofascial pain  -     gabapentin (NEURONTIN) 300 mg capsule; TAKE ONE CAPSULE BY MOUTH EVERY MORNING AND 1 EVERY EVENING    5. Muscle spasm  -     methocarbamol (ROBAXIN) 500 mg tablet; Take 1 Tab by mouth four (4) times daily. 6. Tobacco use disorder         IMPRESSION AND PLAN:  Benny Mark is a 66 y.o. female with history of cervical, thoracic, and right shoulder pain. Pt states that her insurance did not approve physical therapy so she was unable to attend sessions. She notes diffuse pain since she ran out of Neurontin 300 mg, Robaxin 500 mg, and Voltaren 75 mg.     1) Pt was given information on cervical arthritis exercises. 2) She received a refill of Neurontin 300 mg 1 tab QAM and 1 tab QHS. 3) Pt also received a refill of Voltaren 75 mg 1 tab BID prn with food. She was instructed not to take the Voltaren more than twice daily,  4) She received a refill of Robaxin 500 mg 1 tab 4 ax daily. 5) Pt received a Toradol injection in the office today. 6) Ms. Kerwin Brown has a reminder for a \"due or due soon\" health maintenance. I have asked that she contact her primary care provider, Jos Pitt NP, for follow-up on this health maintenance. 7)  demonstrated consistency with prescribing. 8) Pt will follow-up in 6 weeks or sooner if needed. 9) Smoking cessation recommended      HISTORY OF PRESENT ILLNESS:  Ministerio Simental is a 66 y.o. female with history of cervical, thoracic, and right shoulder pain and presents to the office today for follow up. She notes that she was unable to enroll in physical therapy since her last office visit because it was not covered by her insurance. Pt notes that \"my whole body hurts right now\" as she ran out of Neurontin 300 mg, Robaxin 500 mg, and Voltaren 75 mg. Pt admits that prior to running out of her Voltaren 75 mg 3-4 days ago, she was taking it 4 x daily due to her pain. She reports that she has not taken Neurontin 300 mg in quite some time. Pt denies any history of renal issues. She is diabetic and notes that her blood sugars are well controlled at this time. Pt notes that her blood sugars did well she previously took a Medrol Dosepak. Pt at this time desires to proceed with medication evaluation.     Pain Scale: 10 - Worst pain ever/10    PCP: Jos Pitt NP     Past Medical History:   Diagnosis Date    CAD (coronary artery disease)     Hypertension     Uterine fibroid         Social History     Socioeconomic History    Marital status: SINGLE     Spouse name: Not on file    Number of children: Not on file    Years of education: Not on file    Highest education level: Not on file   Social Needs    Financial resource strain: Not on file    Food insecurity - worry: Not on file    Food insecurity - inability: Not on file    Transportation needs - medical: Not on file   [employer] needs - non-medical: Not on file   Occupational History    Not on file   Tobacco Use    Smoking status: Current Every Day Smoker     Packs/day: 0.25     Years: 2.00     Pack years: 0.50    Smokeless tobacco: Never Used   Substance and Sexual Activity    Alcohol use: No     Alcohol/week: 0.5 oz     Types: 1 Cans of beer per week    Drug use: Yes     Types: Marijuana     Comment: rarely     Sexual activity: Not on file   Other Topics Concern    Not on file   Social History Narrative    Not on file       Current Outpatient Medications   Medication Sig Dispense Refill    diclofenac EC (VOLTAREN) 75 mg EC tablet Take 1 Tab by mouth two (2) times a day. Take with food as needed for pain 60 Tab 5    gabapentin (NEURONTIN) 300 mg capsule TAKE ONE CAPSULE BY MOUTH EVERY MORNING AND 1 EVERY EVENING 60 Cap 4    methocarbamol (ROBAXIN) 500 mg tablet Take 1 Tab by mouth four (4) times daily. 60 Tab 3    insulin glargine (LANTUS U-100 INSULIN) 100 unit/mL injection INJECT 20 UNITS SUBCUTANEOUSLY EVERY 12 HOURS 10 Vial 5    pravastatin (PRAVACHOL) 40 mg tablet TAKE ONE TABLET BY MOUTH ONCE DAILY AT  NIGHT 90 Tab 0    glucose blood VI test strips (FREESTYLE LITE STRIPS) strip Check glucose ACHS. 200 Strip 11    Lancets misc Check glucose ACHS 200 Each 11    Blood-Glucose Meter (FREESTYLE LITE METER) monitoring kit accucheck ACHS and record in log. Take log to every doctor visit. **MEDICALLY NECESSARY** 1 Kit 0    alcohol swabs padm 1 Each by Apply Externally route Before breakfast, lunch, dinner and at bedtime. accucheck ACHS and record in log. Take log to every doctor visit. **MEDICALLY NECESSARY** 100 Pad 2    traZODone (DESYREL) 150 mg tablet Take 600 mg by mouth nightly.  sertraline (ZOLOFT) 100 mg tablet Take 150 mg by mouth daily.  perphenazine (TRILAFON) 4 mg tablet Take 4 mg by mouth three (3) times daily.  benztropine (COGENTIN) 1 mg tablet Take 1 mg by mouth daily. Allergies   Allergen Reactions    Latex Rash    Penicillins Nausea and Vomiting         REVIEW OF SYSTEMS    Constitutional: Negative for fever, chills, or weight change.    Respiratory: Negative for cough or shortness of breath. Cardiovascular: Negative for chest pain or palpitations. Gastrointestinal: Negative for acid reflux, change in bowel habits, or constipation. Genitourinary: Negative for dysuria and flank pain. Musculoskeletal: Positive for cervical and lumbar pain. Skin: Negative for rash. Neurological: Negative for headaches, dizziness, or numbness. Endo/Heme/Allergies: Negative for increased bruising. Psychiatric/Behavioral: Negative for difficulty with sleep. PHYSICAL EXAMINATION  Visit Vitals  BP (!) 143/91   Pulse 72   Ht 5' 5\" (1.651 m)   Wt 173 lb (78.5 kg)   BMI 28.79 kg/m²       Constitutional: Awake, alert, and in no acute distress. Neurological: 1+ symmetrical DTRs in the upper extremities. 1+ symmetrical DTRs in the lower extremities. Sensation to light touch is intact. Negative Sofia's sign bilaterally. Skin: warm, dry, and intact. Musculoskeletal: Decreased range of motion with side to side cervical flexion, L>R. Tight across the upper trapezius bilaterally. No pain with extension, axial loading, or forward flexion. No pain with internal or external rotation of her hips. Negative straight leg raise bilaterally. Biceps  Triceps Deltoids Wrist Ext Wrist Flex Hand Intrin   Right 4/5 4/5 4/5 4/5 4/5 4/5   Left 4/5 4/5 4/5 4/5 4/5 4/5      Hip Flex  Quads Hamstrings Ankle DF EHL Ankle PF   Right +4/5 +4/5 +4/5 +4/5 +4/5 +4/5   Left +4/5 +4/5 +4/5 +4/5 +4/5 +4/5     IMAGING:    Thoracic spine MRI from 04/04/2018 was personally reviewed with the patient and demonstrated:  Results from [facility] on 04/04/18    [facility] CONT    Narrative MRI THORACIC WITHOUT CONTRAST    CPT CODE: 07373    HISTORY: Mid back pain, neck pain, right leg weakness since 2001. COMPARISON: None.     TECHNIQUE: Sagittal T1, T2 and stir and axial T2 weighted sequences were  acquired through the thoracic spine without contrast.        FINDINGS:     There is normal anatomic alignment of the thoracic spine. Vertebral body heights  are maintained. No infiltrative marrow replacement process or marrow edema. Thoracic spinal cord maintains normal caliber, signal intensity and morphology  throughout. Small bilateral pleural effusions. There are multilevel tiny disc protrusions with contact of the ventral cord at  T6/T7 and T7/T8, but no impingement. No critical foraminal compromise. Incidentally imaged surrounding soft tissues demonstrate no acute abnormalities  otherwise.           Impression IMPRESSION:    Small disc protrusions are most pronounced at T6/T7 and T7/T8 where there is  mild flattening of the ventral cord, but no cord impingement. Small bilateral pleural effusions.                         Right shoulder x-rays from 03/18/2017 were personally reviewed and demonstrated:  Results from Hospital Encounter on 03/18/17   XR SHOULDER RT AP/LAT MIN 2 V     Narrative Shoulder series. CPT code: 28530    Clinical history: Arthritis in the shoulder and pain in the back under the  shoulder blade without known injury of unspecified chronicity. Technique: Two shoulder views. Comparison: No priors. Findings: There are no fractures.  Joint relationships at glenohumeral joint are  normal.  Acromioclavicular joint is normal. There is convex appearance of the  underside of the distal acromion process.  There are no soft tissue  calcifications.              Impression IMPRESSION:    No acute abnormalities. Convex appearance of the inferior aspect of the acromion  process could increased risk of rotator cuff impingement.             Cervical Spine X-rays from 12/27/2016 were personally reviewed with the Pt and demonstrated:  FINDINGS:       Vertebral body height and alignment is intact. Slight straightening of the  cervical lordosis. Moderate disc height loss endplate sclerosis and vertebral  body osteophytes from C3 through C7. Alignment is intact. No fracture. Mild  facet joint arthropathy at multiple levels. The prevertebral space appears normal.       IMPRESSION:      Moderate cervical spondylosis. Written by Maryse Wesley MD, [address], as dictated by Mary Gan MD.  I, Dr. Mary Gan confirm that all documentation is accurate.

## 2019-01-28 NOTE — PROGRESS NOTES
1. Have you been to the ER, urgent care clinic since your last visit? Hospitalized since your last visit? No    2. Have you seen or consulted any other health care providers outside of the [address] [place] [address] since your last visit? Include any pap smears or colon screening.  No

## 2019-01-28 NOTE — PATIENT INSTRUCTIONS
Neck Arthritis: Exercises  Your Care Instructions  Here are some examples of typical rehabilitation exercises for your condition. Start each exercise slowly. Ease off the exercise if you start to have pain. Your doctor or physical therapist will tell you when you can start these exercises and which ones will work best for you. How to do the exercises  Neck stretches to the side    1. This stretch works best if you keep your shoulder down as you lean away from it. To help you remember to do this, start by relaxing your shoulders and lightly holding on to your thighs or your chair. 2. Tilt your head toward your shoulder and hold for 15 to 30 seconds. Let the weight of your head stretch your muscles. 3. Repeat 2 to 4 times toward each shoulder. Chin tuck    1. Lie on the floor with a rolled-up towel under your neck. Your head should be touching the floor. 2. Slowly bring your chin toward your chest.  3. Hold for a count of 6, and then relax for up to 10 seconds. 4. Repeat 8 to 12 times. Active cervical rotation    1. Sit in a firm chair, or stand up straight. 2. Keeping your chin level, turn your head to the right, and hold for 15 to 30 seconds. 3. Turn your head to the left and hold for 15 to 30 seconds. 4. Repeat 2 to 4 times to each side. Shoulder blade squeeze    1. While standing, squeeze your shoulder blades together. 2. Do not raise your shoulders up as you are squeezing. 3. Hold for 6 seconds. 4. Repeat 8 to 12 times. Shoulder rolls    1. Sit comfortably with your feet shoulder-width apart. You can also do this exercise standing up. 2. Roll your shoulders up, then back, and then down in a smooth, circular motion. 3. Repeat 2 to 4 times. Follow-up care is a key part of your treatment and safety. Be sure to make and go to all appointments, and call your doctor if you are having problems.  It's also a good idea to know your test results and keep a list of the medicines you take.  Where can you learn more? Go to http://bernardion-prateek.info/. Enter F483 in the search box to learn more about \"Neck Arthritis: Exercises. \"  Current as of: September 20, 2018  Content Version: 11.9  © 9136-1674 [employer]. Care instructions adapted under license by [employer] (which disclaims liability or warranty for this information). If you have questions about a medical condition or this instruction, always ask your healthcare professional. [place] [address] any warranty or liability for your use of this information.

## 2019-01-28 NOTE — PROGRESS NOTES
Toradol 60 mg Im inj. given to pt right glute, pt tolerated the inj and was observed for 15 minutes after inj. No readness swelling noted.

## 2019-01-31 ENCOUNTER — OFFICE VISIT (OUTPATIENT)
Dept: FAMILY MEDICINE CLINIC | Age: 64
End: 2019-01-31

## 2019-01-31 VITALS
HEART RATE: 79 BPM | HEIGHT: 65 IN | BODY MASS INDEX: 28.82 KG/M2 | SYSTOLIC BLOOD PRESSURE: 138 MMHG | DIASTOLIC BLOOD PRESSURE: 89 MMHG | RESPIRATION RATE: 16 BRPM | TEMPERATURE: 98.4 F | OXYGEN SATURATION: 97 % | WEIGHT: 173 LBS

## 2019-01-31 DIAGNOSIS — J34.89 SINUS PRESSURE: ICD-10-CM

## 2019-01-31 DIAGNOSIS — R10.11 RUQ ABDOMINAL PAIN: Primary | ICD-10-CM

## 2019-01-31 RX ORDER — FLUTICASONE PROPIONATE 50 MCG
2 SPRAY, SUSPENSION (ML) NASAL
Qty: 1 BOTTLE | Refills: 2 | Status: SHIPPED | OUTPATIENT
Start: 2019-01-31 | End: 2019-11-08 | Stop reason: SDUPTHER

## 2019-01-31 NOTE — PROGRESS NOTES
Pt is here for 4 week F/I abd pain. Pt states her abd pain is better. 1. Have you been to the ER, urgent care clinic since your last visit? Hospitalized since your last visit? No    2. Have you seen or consulted any other health care providers outside of the [address] [place] [address] since your last visit? Include any pap smears or colon screening.  No

## 2019-01-31 NOTE — PATIENT INSTRUCTIONS
Low-Fat Diet for Gallbladder Disease: Care Instructions  Your Care Instructions    When you eat, the gallbladder releases bile, which helps you digest the fat in food. If you have an inflamed gallbladder, this may cause pain. A low-fat diet may give your gallbladder a rest so you can start to heal. Your doctor and dietitian can help you make an eating plan that does not irritate your digestive system. Always talk with your doctor or dietitian before you make changes in your diet. Follow-up care is a key part of your treatment and safety. Be sure to make and go to all appointments, and call your doctor if you are having problems. It's also a good idea to know your test results and keep a list of the medicines you take. How can you care for yourself at home? · Eat many small meals and snacks each day instead of three large meals. · Choose lean meats. ? Eat no more than 5 to 6½ ounces of meat a day. ? Cut off all fat you can see. ? Eat chicken and turkey without the skin. ? Many types of fish, such as salmon, lake trout, tuna, and herring, provide healthy omega-3 fat. But, avoid fish canned in oil, such as sardines in olive oil. ? Bake, broil, or grill meats, poultry, or fish instead of frying them in butter or fat. · Drink or eat nonfat or low-fat milk, yogurt, cheese, or other milk products each day. ? Read the labels on cheeses, and choose those with less than 5 grams of fat an ounce. ? Try fat-free sour cream, cream cheese, or yogurt. ? Avoid cream soups and cream sauces on pasta. ? Eat low-fat ice cream, frozen yogurt, or sorbet. Avoid regular ice cream.  · Eat whole-grain cereals, breads, crackers, rice, or pasta. Avoid high-fat foods such as croissants, scones, biscuits, waffles, doughnuts, muffins, granola, and high-fat breads. · Flavor your foods with herbs and spices (such as basil, tarragon, or mint), fat-free sauces, or lemon juice instead of butter.  You can also use butter substitutes, fat-free mayonnaise, or fat-free dressing. · Try applesauce, prune puree, or mashed bananas to replace some or all of the fat when you bake. · Limit fats and oils, such as butter, margarine, mayonnaise, and salad dressing, to no more than 1 tablespoon a meal.  · Avoid high-fat foods, such as:  ? Chocolate, whole milk, ice cream, and processed cheese. ? Fried or buttered foods. ? Sausage, salami, and fajardo. ? Cinnamon rolls, cakes, pies, cookies, and other pastries. ? Prepared snack foods, such as potato chips, nut and granola bars, and mixed nuts. ? Coconut and avocado. · Learn how to read food labels for serving sizes and ingredients. Fast-food and convenience-food meals often have lots of fat. Where can you learn more? Go to http://bernardino-prateek.info/. Enter H439 in the search box to learn more about \"Low-Fat Diet for Gallbladder Disease: Care Instructions. \"  Current as of: March 28, 2018  Content Version: 11.9  © 4299-2737 [employer]. Care instructions adapted under license by [employer] (which disclaims liability or warranty for this information). If you have questions about a medical condition or this instruction, always ask your healthcare professional. [address] any warranty or liability for your use of this information.

## 2019-01-31 NOTE — PROGRESS NOTES
HISTORY OF PRESENT ILLNESS  Starla Holter is a 66 y.o. female. Patient presents today to follow up on RUQ abd pain. States pain has resolved. Patient had negative gallbladder ultrasound. Patient reports she has been waking up with frontal headaches since Monday. She also admits to nasal congestion. Denies taking any medications for allergies. US Results (most recent):  Results from [place] encounter on 12/12/18   US GALLBLADDER    Narrative ULTRASOUND ABDOMEN LIMITED RIGHT UPPER QUADRANT    CPT CODE: 66486    INDICATION: Right upper quadrant pain. TECHNIQUE: Selected images from limited abdominal ultrasound provided for  interpretation:      COMPARISON: None. FINDINGS:     The pancreas head and body appear unremarkable. The tail is  not well visualized  due to bowel gas. The liver is sonographically unremarkable in echotexture and normal in size. The  portal vein is of normal size with antegrade flow measuring 9 mm. There is no  intrahepatic biliary dilation. The gallbladder is sonographically unremarkable. The common bile duct measures approximately 4 mm in greatest diameter. The right kidney measures 10 x 4.2 x 3.8 cm and is sonographically unremarkable. .     There is no free fluid in the upper abdomen. The included portions of the IVC and aorta look unremarkable. The aorta measures  2.2 cm proximally tapering distally. .      Impression IMPRESSION:    Sonographically unremarkable right upper quadrant ultrasound. Allergies   Allergen Reactions    Latex Rash    Penicillins Nausea and Vomiting     Current Outpatient Medications   Medication Sig Dispense Refill    diclofenac EC (VOLTAREN) 75 mg EC tablet Take 1 Tab by mouth two (2) times a day.  Take with food as needed for pain 60 Tab 5    gabapentin (NEURONTIN) 300 mg capsule TAKE ONE CAPSULE BY MOUTH EVERY MORNING AND 1 EVERY EVENING 60 Cap 4    methocarbamol (ROBAXIN) 500 mg tablet Take 1 Tab by mouth four (4) times daily. 60 Tab 3    insulin glargine (LANTUS U-100 INSULIN) 100 unit/mL injection INJECT 20 UNITS SUBCUTANEOUSLY EVERY 12 HOURS 10 Vial 5    pravastatin (PRAVACHOL) 40 mg tablet TAKE ONE TABLET BY MOUTH ONCE DAILY AT  NIGHT 90 Tab 0    glucose blood VI test strips (FREESTYLE LITE STRIPS) strip Check glucose ACHS. 200 Strip 11    Lancets misc Check glucose ACHS 200 Each 11    Blood-Glucose Meter (FREESTYLE LITE METER) monitoring kit accucheck ACHS and record in log. Take log to every doctor visit. **MEDICALLY NECESSARY** 1 Kit 0    alcohol swabs padm 1 Each by Apply Externally route Before breakfast, lunch, dinner and at bedtime. accucheck ACHS and record in log. Take log to every doctor visit. **MEDICALLY NECESSARY** 100 Pad 2    traZODone (DESYREL) 150 mg tablet Take 600 mg by mouth nightly.  sertraline (ZOLOFT) 100 mg tablet Take 150 mg by mouth daily.  perphenazine (TRILAFON) 4 mg tablet Take 4 mg by mouth three (3) times daily.  benztropine (COGENTIN) 1 mg tablet Take 1 mg by mouth daily.        Past Medical History:   Diagnosis Date    CAD (coronary artery disease)     Hypertension     Uterine fibroid      Social History     Socioeconomic History    Marital status: SINGLE     Spouse name: Not on file    Number of children: Not on file    Years of education: Not on file    Highest education level: Not on file   Social Needs    Financial resource strain: Not on file    Food insecurity - worry: Not on file    Food insecurity - inability: Not on file    Transportation needs - medical: Not on file   [employer] needs - non-medical: Not on file   Occupational History    Not on file   Tobacco Use    Smoking status: Current Every Day Smoker     Packs/day: 0.25     Years: 2.00     Pack years: 0.50    Smokeless tobacco: Never Used   Substance and Sexual Activity    Alcohol use: No     Alcohol/week: 0.5 oz     Types: 1 Cans of beer per week    Drug use: Yes     Types: Marijuana     Comment: rarely     Sexual activity: Not on file   Other Topics Concern    Not on file   Social History Narrative    Not on file     Review of Systems   Constitutional: Negative for chills and fever. HENT: Negative for congestion, ear pain, sinus pain and sore throat. Cardiovascular: Negative for chest pain and palpitations. Gastrointestinal: Negative for abdominal pain. Neurological: Positive for headaches (frontal ). /89 (BP 1 Location: Left arm)   Pulse 79   Temp 98.4 °F (36.9 °C) (Oral)   Resp 16   Ht 5' 5\" (1.651 m)   Wt 173 lb (78.5 kg)   SpO2 97%   BMI 28.79 kg/m²   Physical Exam   Constitutional: She appears well-developed and well-nourished. HENT:   Head: Normocephalic and atraumatic. Nose: Mucosal edema present. Right sinus exhibits no maxillary sinus tenderness and no frontal sinus tenderness. Left sinus exhibits no maxillary sinus tenderness and no frontal sinus tenderness. Neck: Normal range of motion. Neck supple. Cardiovascular: Normal rate, regular rhythm and normal heart sounds. Exam reveals no gallop and no friction rub. No murmur heard. Pulmonary/Chest: Effort normal and breath sounds normal. She has no wheezes. She has no rhonchi. She has no rales. Abdominal: Soft. Bowel sounds are normal. There is no tenderness. Lymphadenopathy:     She has no cervical adenopathy. Skin: Skin is warm and dry. ASSESSMENT and PLAN    ICD-10-CM ICD-9-CM    1. RUQ abdominal pain R10.11 789.01    2. Sinus pressure J34.89 478.19      Orders Placed This Encounter    fluticasone (FLONASE) 50 mcg/actuation nasal spray     I have discussed the diagnosis with the patient and the intended plan as seen in the above orders. The patient has received an after-visit summary and questions were answered concerning future plans. I have discussed medication side effects and warnings with the patient as well.  Patient agreeable with above plan and verbalizes understanding. Follow-up Disposition:  Return in about 3 months (around 4/30/2019) for DM/HLD.

## 2019-03-25 ENCOUNTER — OFFICE VISIT (OUTPATIENT)
Dept: ORTHOPEDIC SURGERY | Age: 64
End: 2019-03-25

## 2019-03-25 VITALS
HEIGHT: 65 IN | HEART RATE: 71 BPM | BODY MASS INDEX: 29.09 KG/M2 | DIASTOLIC BLOOD PRESSURE: 91 MMHG | SYSTOLIC BLOOD PRESSURE: 157 MMHG | OXYGEN SATURATION: 97 % | TEMPERATURE: 97 F | RESPIRATION RATE: 16 BRPM | WEIGHT: 174.6 LBS

## 2019-03-25 DIAGNOSIS — M51.24 PROTRUSION OF THORACIC INTERVERTEBRAL DISC: ICD-10-CM

## 2019-03-25 DIAGNOSIS — M47.14 OSTEOARTHRITIS OF THORACIC SPINE WITH MYELOPATHY: ICD-10-CM

## 2019-03-25 DIAGNOSIS — M47.22 OSTEOARTHRITIS OF SPINE WITH RADICULOPATHY, CERVICAL REGION: ICD-10-CM

## 2019-03-25 DIAGNOSIS — M62.838 MUSCLE SPASM: Primary | ICD-10-CM

## 2019-03-25 RX ORDER — METHOCARBAMOL 500 MG/1
500 TABLET, FILM COATED ORAL 4 TIMES DAILY
Qty: 120 TAB | Refills: 5 | Status: SHIPPED | OUTPATIENT
Start: 2019-03-25 | End: 2019-07-15 | Stop reason: SDUPTHER

## 2019-03-25 NOTE — PROGRESS NOTES
1. Have you been to the ER, urgent care clinic since your last visit? Hospitalized since your last visit? No    2. Have you seen or consulted any other health care providers outside of the [address] [place] [address] since your last visit? Include any pap smears or colon screening.  No

## 2019-03-25 NOTE — PROGRESS NOTES
Chief complaint/History of Present Illness:  Chief Complaint   Patient presents with    Neck Pain    Back Pain     Lumbar     Follow-up     HPI  Sonia Nolan is a  66 y.o.  female      HISTORY OF PRESENT ILLNESS:  The patient comes in today for follow-up of her chronic neck and thoracic pain. She states she still has thoracic pain. She does have a known disc protrusion there. She also has spondylosis in her neck and in her back. She is taking Voltaren 75 mg b.i.d., Neurontin 300 mg b.i.d. and Robaxin 500 mg q.i.d. This does seem to help, but she runs out of different ones at different times. She does have enough refills, however, I see on the Robaxin that she was only given enough for b.i.d. She does not work. She last worked in 2011 at [employer]. She smokes 1 pack of cigarettes per week. She is diabetic. She denies fever or bowel or bladder dysfunction. No new medical issues. PHYSICAL EXAMINATION:  Ms. Elias Sanches is a 74-year-old female. She is alert and oriented with normal mood and affect. She has a full weightbearing nonantalgic gait. No assistive device. Fairly normal tandem gait. She has 4/5 strength bilateral upper extremities. Negative Sofia. ASSESSMENT/PLAN:  This is a patient with cervicothoracic spondylosis along with thoracic disc protrusion and muscle spasms. She does get some right arm pain. I explained to her the rationale for taking her medications on a regular basis. She verbalized understanding. I have rewritten the Robaxin for q.i.d. and sent it to her pharmacy. We will see her back in 3 months, sooner if needed. Review of systems:    Past Medical History:   Diagnosis Date    CAD (coronary artery disease)     Hypertension     Uterine fibroid      History reviewed. No pertinent surgical history.   Social History     Socioeconomic History    Marital status: SINGLE     Spouse name: Not on file    Number of children: Not on file    Years of education: Not on file    Highest education level: Not on file   Occupational History    Not on file   Social Needs    Financial resource strain: Not on file    Food insecurity:     Worry: Not on file     Inability: Not on file    Transportation needs:     Medical: Not on file     Non-medical: Not on file   Tobacco Use    Smoking status: Current Every Day Smoker     Packs/day: 0.25     Years: 2.00     Pack years: 0.50    Smokeless tobacco: Never Used   Substance and Sexual Activity    Alcohol use: No     Alcohol/week: 0.5 oz     Types: 1 Cans of beer per week    Drug use: Yes     Types: Marijuana     Comment: rarely     Sexual activity: Not on file   Lifestyle    Physical activity:     Days per week: Not on file     Minutes per session: Not on file    Stress: Not on file   Relationships    Social connections:     Talks on phone: Not on file     Gets together: Not on file     Attends [employer] service: Not on file     Active member of club or organization: Not on file     Attends meetings of clubs or organizations: Not on file     Relationship status: Not on file    Intimate partner violence:     Fear of current or ex partner: Not on file     Emotionally abused: Not on file     Physically abused: Not on file     Forced sexual activity: Not on file   Other Topics Concern    Not on file   Social History Narrative    Not on file     Family History   Problem Relation Age of Onset    Diabetes Mother     Stroke Mother     Diabetes Father     Cancer Paternal Grandfather     Breast Cancer Cousin        Physical Exam:  Visit Vitals  BP (!) 157/91   Pulse 71   Temp 97 °F (36.1 °C)   Resp 16   Ht 5' 5\" (1.651 m)   Wt 174 lb 9.6 oz (79.2 kg)   SpO2 97%   BMI 29.05 kg/m²     Pain Scale: 8/10       has been . reviewed and is appropriate          Diagnoses and all orders for this visit:    1. Muscle spasm  -     methocarbamol (ROBAXIN) 500 mg tablet; Take 1 Tab by mouth four (4) times daily.     2. Osteoarthritis of spine with radiculopathy, cervical region    3. Osteoarthritis of thoracic spine with myelopathy    4. Protrusion of thoracic intervertebral disc            Follow-up and Dispositions    · Return in about 3 months (around 6/25/2019) for Dr Misa Pedraza.              We have informed Lige Crew to notify us for immediate appointment if she has any worsening neurogical symptoms or if an emergency situation presents, then call [phone]

## 2019-05-15 ENCOUNTER — OFFICE VISIT (OUTPATIENT)
Dept: FAMILY MEDICINE CLINIC | Age: 64
End: 2019-05-15

## 2019-05-15 VITALS
HEART RATE: 73 BPM | WEIGHT: 173.4 LBS | DIASTOLIC BLOOD PRESSURE: 86 MMHG | RESPIRATION RATE: 18 BRPM | BODY MASS INDEX: 28.89 KG/M2 | SYSTOLIC BLOOD PRESSURE: 121 MMHG | OXYGEN SATURATION: 94 % | TEMPERATURE: 98.1 F | HEIGHT: 65 IN

## 2019-05-15 DIAGNOSIS — Z79.4 CONTROLLED TYPE 2 DIABETES MELLITUS WITHOUT COMPLICATION, WITH LONG-TERM CURRENT USE OF INSULIN (HCC): ICD-10-CM

## 2019-05-15 DIAGNOSIS — I10 ESSENTIAL HYPERTENSION: ICD-10-CM

## 2019-05-15 DIAGNOSIS — E78.2 MIXED HYPERLIPIDEMIA: ICD-10-CM

## 2019-05-15 DIAGNOSIS — E78.2 MIXED HYPERLIPIDEMIA: Primary | ICD-10-CM

## 2019-05-15 DIAGNOSIS — G89.29 CHRONIC RIGHT-SIDED THORACIC BACK PAIN: ICD-10-CM

## 2019-05-15 DIAGNOSIS — E11.9 CONTROLLED TYPE 2 DIABETES MELLITUS WITHOUT COMPLICATION, WITH LONG-TERM CURRENT USE OF INSULIN (HCC): ICD-10-CM

## 2019-05-15 DIAGNOSIS — M54.6 CHRONIC RIGHT-SIDED THORACIC BACK PAIN: ICD-10-CM

## 2019-05-15 NOTE — PATIENT INSTRUCTIONS
Back Pain: Care Instructions  Your Care Instructions    Back pain has many possible causes. It is often related to problems with muscles and ligaments of the back. It may also be related to problems with the nerves, discs, or bones of the back. Moving, lifting, standing, sitting, or sleeping in an awkward way can strain the back. Sometimes you don't notice the injury until later. Arthritis is another common cause of back pain. Although it may hurt a lot, back pain usually improves on its own within several weeks. Most people recover in 12 weeks or less. Using good home treatment and being careful not to stress your back can help you feel better sooner. Follow-up care is a key part of your treatment and safety. Be sure to make and go to all appointments, and call your doctor if you are having problems. It's also a good idea to know your test results and keep a list of the medicines you take. How can you care for yourself at home? · Sit or lie in positions that are most comfortable and reduce your pain. Try one of these positions when you lie down:  ? Lie on your back with your knees bent and supported by large pillows. ? Lie on the floor with your legs on the seat of a sofa or chair. ? Lie on your side with your knees and hips bent and a pillow between your legs. ? Lie on your stomach if it does not make pain worse. · Do not sit up in bed, and avoid soft couches and twisted positions. Bed rest can help relieve pain at first, but it delays healing. Avoid bed rest after the first day of back pain. · Change positions every 30 minutes. If you must sit for long periods of time, take breaks from sitting. Get up and walk around, or lie in a comfortable position. · Try using a heating pad on a low or medium setting for 15 to 20 minutes every 2 or 3 hours. Try a warm shower in place of one session with the heating pad. · You can also try an ice pack for 10 to 15 minutes every 2 to 3 hours.  Put a thin cloth between the ice pack and your skin. · Take pain medicines exactly as directed. ? If the doctor gave you a prescription medicine for pain, take it as prescribed. ? If you are not taking a prescription pain medicine, ask your doctor if you can take an over-the-counter medicine. · Take short walks several times a day. You can start with 5 to 10 minutes, 3 or 4 times a day, and work up to longer walks. Walk on level surfaces and avoid hills and stairs until your back is better. · Return to work and other activities as soon as you can. Continued rest without activity is usually not good for your back. · To prevent future back pain, do exercises to stretch and strengthen your back and stomach. Learn how to use good posture, safe lifting techniques, and proper body mechanics. When should you call for help? Call your doctor now or seek immediate medical care if:    · You have new or worsening numbness in your legs.     · You have new or worsening weakness in your legs. (This could make it hard to stand up.)     · You lose control of your bladder or bowels.    Watch closely for changes in your health, and be sure to contact your doctor if:    · You have a fever, lose weight, or don't feel well.     · You do not get better as expected. Where can you learn more? Go to http://bernardino-prateek.info/. Enter I813 in the search box to learn more about \"Back Pain: Care Instructions. \"  Current as of: September 20, 2018  Content Version: 11.9  © 1191-2795 [employer]. Care instructions adapted under license by [employer] (which disclaims liability or warranty for this information). If you have questions about a medical condition or this instruction, always ask your healthcare professional. [place] [address] any warranty or liability for your use of this information.

## 2019-05-15 NOTE — PROGRESS NOTES
Subjective:    Sadia Fonseca is a 66y.o. year old female seen for follow up of diabetes. She also has hypertension and hyperlipidemia. Diabetic Review of Systems - medication compliance: compliant all of the time, diabetic diet compliance: compliant most of the time, home glucose monitoring: is performed regularly, non fasting values range 111, further diabetic ROS: no polyuria or polydipsia, no chest pain, dyspnea or TIA's, no numbness, tingling or pain in extremities, last eye exam 5/3/18. Other symptoms and concerns: patient reports she had increased back pain yesterday. Patient is followed by spine center, has follow up on 6/24/19. Patient Active Problem List   Diagnosis Code    Type II diabetes mellitus, uncontrolled (HCC) E11.65    Mixed hyperlipidemia E78.2    Myofascial pain M79.18    DDD (degenerative disc disease), cervical M50.30    Osteoarthritis of spine with radiculopathy, cervical region M47.22    Right shoulder pain M25.511    Tobacco use disorder F17.200    Muscle spasm M62.838     Current Outpatient Medications   Medication Sig Dispense Refill    pravastatin (PRAVACHOL) 40 mg tablet TAKE 1 TABLET BY MOUTH ONCE DAILY AT NIGHT 90 Tab 0    methocarbamol (ROBAXIN) 500 mg tablet Take 1 Tab by mouth four (4) times daily. 120 Tab 5    fluticasone (FLONASE) 50 mcg/actuation nasal spray 2 Sprays by Both Nostrils route daily as needed for Rhinitis. 1 Bottle 2    gabapentin (NEURONTIN) 300 mg capsule TAKE ONE CAPSULE BY MOUTH EVERY MORNING AND 1 EVERY EVENING 60 Cap 4    insulin glargine (LANTUS U-100 INSULIN) 100 unit/mL injection INJECT 20 UNITS SUBCUTANEOUSLY EVERY 12 HOURS (Patient taking differently: 20 Units by SubCUTAneous route daily. INJECT 20 UNITS SUBCUTANEOUSLY EVERY 12 HOURS) 10 Vial 5    glucose blood VI test strips (FREESTYLE LITE STRIPS) strip Check glucose ACHS.  200 Strip 11    Lancets misc Check glucose ACHS 200 Each 11    Blood-Glucose Meter (FREESTYLE LITE METER) monitoring kit accucheck ACHS and record in log. Take log to every doctor visit. **MEDICALLY NECESSARY** 1 Kit 0    alcohol swabs padm 1 Each by Apply Externally route Before breakfast, lunch, dinner and at bedtime. accucheck ACHS and record in log. Take log to every doctor visit. **MEDICALLY NECESSARY** 100 Pad 2    traZODone (DESYREL) 150 mg tablet Take 150-300 mg by mouth nightly.  sertraline (ZOLOFT) 100 mg tablet Take 150 mg by mouth daily.  perphenazine (TRILAFON) 4 mg tablet Take 4 mg by mouth three (3) times daily.  benztropine (COGENTIN) 1 mg tablet Take 1 mg by mouth daily.  diclofenac EC (VOLTAREN) 75 mg EC tablet Take 1 Tab by mouth two (2) times a day. Take with food as needed for pain 60 Tab 5      Allergies   Allergen Reactions    Latex Rash    Penicillins Nausea and Vomiting     No past surgical history on file. Family History   Problem Relation Age of Onset    Diabetes Mother     Stroke Mother     Diabetes Father     Cancer Paternal Grandfather     Breast Cancer Cousin       Lab Results   Component Value Date/Time    Cholesterol, total 185 10/20/2018 08:33 AM    HDL Cholesterol 63 (H) 10/20/2018 08:33 AM    LDL, calculated 90.4 10/20/2018 08:33 AM    VLDL, calculated 31.6 10/20/2018 08:33 AM    Triglyceride 158 (H) 10/20/2018 08:33 AM    CHOL/HDL Ratio 2.9 10/20/2018 08:33 AM     Lab Results   Component Value Date/Time    Sodium 141 10/20/2018 08:33 AM    Potassium 5.0 10/20/2018 08:33 AM    Chloride 106 10/20/2018 08:33 AM    CO2 33 (H) 10/20/2018 08:33 AM    Anion gap 2 (L) 10/20/2018 08:33 AM    Glucose 105 (H) 10/20/2018 08:33 AM    BUN 15 10/20/2018 08:33 AM    Creatinine 0.92 10/20/2018 08:33 AM    BUN/Creatinine ratio 16 10/20/2018 08:33 AM    GFR est AA >60 10/20/2018 08:33 AM    GFR est non-AA >60 10/20/2018 08:33 AM    Calcium 9.1 10/20/2018 08:33 AM    Bilirubin, total 0.2 10/20/2018 08:33 AM    AST (SGOT) 33 10/20/2018 08:33 AM    Alk.  phosphatase 75 10/20/2018 08:33 AM    Protein, total 7.9 10/20/2018 08:33 AM    Albumin 4.1 10/20/2018 08:33 AM    Globulin 3.8 10/20/2018 08:33 AM    A-G Ratio 1.1 10/20/2018 08:33 AM    ALT (SGPT) 82 (H) 10/20/2018 08:33 AM     Lab Results   Component Value Date/Time    WBC 7.0 07/25/2016 12:47 AM    HGB 11.3 (L) 07/25/2016 12:47 AM    HCT 32.6 (L) 07/25/2016 12:47 AM    PLATELET 392 15/54/4884 12:47 AM    MCV 87.6 07/25/2016 12:47 AM     Wt Readings from Last 3 Encounters:   05/15/19 173 lb 6.4 oz (78.7 kg)   03/25/19 174 lb 9.6 oz (79.2 kg)   01/31/19 173 lb (78.5 kg)     Last Point of Care HGB A1C  Hemoglobin A1c (POC)   Date Value Ref Range Status   12/04/2018 6.5 % Final      BP Readings from Last 3 Encounters:   05/15/19 121/86   03/25/19 (!) 157/91   01/31/19 138/89       Last Point of Care Urine mircoalbumin  Results for orders placed or performed in visit on 12/04/18   AMB POC HEMOGLOBIN A1C   Result Value Ref Range    Hemoglobin A1c (POC) 6.5 %     Lab Results   Component Value Date/Time    Microalbumin/Creat ratio (mg/g creat)  12/01/2018 09:43 AM     Cannot calculate ratio due to microalbumin result outside reportable range. Microalbumin,urine random <0.50 12/01/2018 09:43 AM    Microalbumin urine (POC) 30 04/11/2017 04:00 PM       Last Diabetic Foot Exam on: 10/3/18      Objective:  Visit Vitals  /86   Pulse 73   Temp 98.1 °F (36.7 °C) (Oral)   Resp 18   Ht 5' 5\" (1.651 m)   Wt 173 lb 6.4 oz (78.7 kg)   SpO2 94%   BMI 28.86 kg/m²     Awake and alert in no acute distress   Neck supple without lymphadenopathy, no carotid artery bruits auscultated bilaterally. No thyromegaly   Lungs clear throughout   S1 S2 RRR without ectopy or murmur auscultated.    Extremities: no clubbing, cyanosis, peripheral edema   Foot exam: monofilament no abnormalities, DP/PT pulses +2 bilaterally, ankle reflex +2 bilaterally   Integumentary: no rashes   Abdomen - soft, nondistended, no masses or organomegaly  tenderness noted right upper quadrant  Reviewed vital signs           Assessment/Plan:    ICD-10-CM ICD-9-CM    1. Mixed hyperlipidemia E78.2 272.2 LIPID PANEL      METABOLIC PANEL, COMPREHENSIVE   2. Essential hypertension I10 401.9    3. Chronic right-sided thoracic back pain M54.6 724.1 REFERRAL TO PHYSICAL THERAPY    G89.29 338.29 REFERRAL TO ORTHOPEDICS   4. Controlled type 2 diabetes mellitus without complication, with long-term current use of insulin (HCC) E11.9 250.00 HEMOGLOBIN A1C WITH EAG    A62.5 P64.00 METABOLIC PANEL, COMPREHENSIVE     Orders Placed This Encounter    LIPID PANEL    HEMOGLOBIN A1C WITH EAG    METABOLIC PANEL, COMPREHENSIVE    [facility]    REFERRAL TO ORTHOPEDICS     Issues reviewed with her: foot care discussed and Podiatry visits discussed and annual eye examinations at Ophthalmology discussed. I have discussed the diagnosis with the patient and the intended plan as seen in the above orders. The patient has received an after-visit summary and questions were answered concerning future plans. I have discussed medication side effects and warnings with the patient as well. Patient agreeable with above plan and verbalizes understanding. Follow-up and Dispositions    · Return in about 3 months (around 8/15/2019) for DM/HLD.

## 2019-05-15 NOTE — PROGRESS NOTES
Keke Lowe is a  66 y.o. female presents today for office visit for routine follow up. 1. Have you been to the ER, urgent care clinic or hospitalized since your last visit? NO      2. Have you seen or consulted any other health care providers outside of the [address] [place] [address] since your last visit (Include any pap smears or colon screening)? NO

## 2019-05-23 ENCOUNTER — HOSPITAL ENCOUNTER (OUTPATIENT)
Dept: PHYSICAL THERAPY | Age: 64
Discharge: HOME | End: 2019-05-23
Payer: MEDICAID

## 2019-05-23 PROCEDURE — 97530 THERAPEUTIC ACTIVITIES: CPT

## 2019-05-23 PROCEDURE — 97162 PT EVAL MOD COMPLEX 30 MIN: CPT

## 2019-05-23 NOTE — PROGRESS NOTES
PT DAILY TREATMENT NOTE/CERVICAL SHIP31-05    Patient Name: Cherie Agudelo  Date:2019  : 1955  [x]  Patient  Verified  Payor: MEDICAID OF [place] / Plan: 1500 / Product Type: Medicaid /    In time:9:42  Out time:10:25  Total Treatment Time (min): 43  Visit #: 1 of     Treatment Area: Pain in thoracic spine [M54.6]    SUBJECTIVE  Pain Level (0-10 scale): 7/10  []constant []intermittent []improving []worsening []no change since onset    Any medication changes, allergies to medications, adverse drug reactions, diagnosis change, or new procedure performed?: [x] No    [] Yes (see summary sheet for update)  Subjective functional status/changes:       Pt is a 67 yo F who presents with right-sided thoracic pain that began in  after she turned the wrong way and \"popped a muscle out of her back\" while driving. The pain subsided over time until exacerbation with insidious onset in  with progressive worsening. Subjective reports of pain increased with activity and relieved with rest and medication.       Barriers: []pain []financial []time []transportation []other  Motivation: high  Substance use: []Alcohol []Tobacco []other:   FABQ Score: []low []elevate  Cognition: A & O x 4    Other:    OBJECTIVE/EXAMINATION    19 min [x]Eval                  []Re-Eval       24 min Therapeutic Activity:  []  See flow sheet :   Rationale: Educated patient regarding findings of evaluation, anatomy of spine using spine model, aging process of spine, self DTM/TPR with tennis ball against wall, heat use 10-15 minutes, importance of upright posture with relaxed shoulders, purpose of dry needling/what to expect, BP norms, advised monitoring BP 3-4x per day and keeping a log, follow up with MD if BP remained elevated, signs/sx of heart attack/stroke and go to emergency room immediately if signs/sx, verbal review of HEP (pt performed 1-2 reps of each intervention to ensure correct form), purpose of therapy, and therapy plan in order to ensure pt understanding/compliance with therapy              With   [] TE   [] TA   [] neuro   [] other: Patient Education: [x] Review HEP    [] Progressed/Changed HEP based on:   [] positioning   [] body mechanics   [] transfers   [] heat/ice application    [] other:      Other Objective/Functional Measures:     /100 taken manually prior to therapy  /88 taken manually following session  HR 70bpm, O2 97%     Physical Therapy Evaluation Cervical Spine     SUBJECTIVE    General Health:  Red Flags Indicated? [] Yes    [] No  [] Yes [x] No Recent weight change (If yes, due to dieting? [] Yes  [] No)   [x] Yes [] No Persistent cough - d/t coughing  [] Yes [x] No Unremitting pain at night  [] Yes [x] No Dizziness  [x] Yes [] No Blurred vision - reports she needs glasses   [x] Yes [] No Hands more cold or painful in cold weather  [] Yes [x] No Ringing in ears  [] Yes [x] No Difficulty swallowing  [] Yes [x] No Dysfunction of bowel or bladder  [] Yes [x] No Recent illness within past 3 weeks (i.e, cold, flu)  [x] Yes [] No Jaw pain - reports d/t arthritis     Past History/Treatments:    Diagnostic Tests: [] Lab work [] X-rays    [] CT [x] MRI     [] Other:  Results: per electronic medical record 4/4/18  MRI THORACIC WITHOUT CONTRAST     CPT CODE: 65080     HISTORY: Mid back pain, neck pain, right leg weakness since 2001.     COMPARISON: None.     TECHNIQUE: Sagittal T1, T2 and stir and axial T2 weighted sequences were  acquired through the thoracic spine without contrast.         FINDINGS:      There is normal anatomic alignment of the thoracic spine. Vertebral body heights  are maintained. No infiltrative marrow replacement process or marrow edema. Thoracic spinal cord maintains normal caliber, signal intensity and morphology  throughout.  Small bilateral pleural effusions.     There are multilevel tiny disc protrusions with contact of the ventral cord at  T6/T7 and T7/T8, but no impingement. No critical foraminal compromise. Incidentally imaged surrounding soft tissues demonstrate no acute abnormalities  otherwise.     IMPRESSION  IMPRESSION:     Small disc protrusions are most pronounced at T6/T7 and T7/T8 where there is  mild flattening of the ventral cord, but no cord impingement.     Small bilateral pleural effusions.  :    Headaches: Do you have headaches?  [] Yes   [x] No    OBJECTIVE  Posture: [] WNL  Head Position: forward   Shoulder/Scapular Position: protracted   C-Kyphosis:  [x] increased   [] decreased - lower c/s  C-Lordosis:   [x] increased   [] decreased - upper c/s   T-Kyphosis:  [x] increased   [] decreased  T-Lordosis:   [] increased   [] decreased     Shoulder/Scapular Screen: [] WNL    [] Abnormal:decreased upward rotation of B scapula with shoulder elevation, UT substitution B     Active Movements: [] N/A   [] Too acute   [] Other:  ROM % AROM % PROM Comments:pain, area   Forward flexion 100     Extension 50     SB right 25  Pain over right    SB left  50     Rotation right 75     Rotation left 75       Shoulder AROM  Flexion WFL B  Abduction WFL B  Functional ER: C6 B  Functional IR L4 B     Palpation:  [] Min  [x] Mod  [] Severe    Location: hypertonicity and trigger points right > left infraspinatus, rhomboids, supraspinatus, UT, levator; TTP all cervical and thoracic spinous processes       Neuro Screen (myotome/dematome/felexes): [] WNL  Dermatomes:intact to light touch BUE    Myotomes:   Shoulder Flexion 3+/5 B  Shoulder Abduction 3+/5 B   Shoulder IR 4+/5 B  Shoulder ER  Left 4+/5, Right 4/5   Elbow Flexion 4/5 B  Elbow Extension 4/5 B     UT Right 3/5, Left 3-/5  MT Right 3-/5, Left 3/5  LT  3-/5 B     Special Tests:  Cervical:        Spurling's:  [x] R    [x] L    [x] +    [] -       Distraction:  [] R    [] L    [] +    [x] -       Compression: [x] R    [x] L    [x] +    [] -    Pain increase with Spurling's, Cervical Distraction, and Cervical Compression    Muscle Flexibility: [] N/A   Scalenes: [] WNL    [x] Tight    [x] R    [x] L   Upper Trap: [] WNL    [x] Tight    [x] R    [x] L   Levator: [] WNL    [x] Tight    [x] R    [x] L   Pect. Minor: [] WNL    [x] Tight    [x] R    [x] L    Other tests/comments:  No pain with interventions in HEP  Discomfort with stretches, instructed pt to perform in pain-free range     Pain Level (0-10 scale) post treatment: 7/10    ASSESSMENT/Changes in Function: See POC    Patient will continue to benefit from skilled PT services to modify and progress therapeutic interventions, address ROM deficits, address strength deficits, analyze and address soft tissue restrictions, analyze and cue movement patterns, analyze and modify body mechanics/ergonomics, assess and modify postural abnormalities and instruct in home and community integration to attain remaining goals.      [x]  See Plan of Care  []  See progress note/recertification  []  See Discharge Summary         Progress towards goals / Updated goals:  See POC     PLAN  [x]  Upgrade activities as tolerated     []  Continue plan of care  [x]  Update interventions per flow sheet       []  Discharge due to:_  []  Other:_      Amna Grewal PT 5/23/2019  9:45 AM

## 2019-05-23 NOTE — PROGRESS NOTES
[facility] [employer]  [address] [facility]  [phone] fax    Plan of Care/ Statement of Necessity for Physical Therapy Services    Patient name: Sdaia Fonseca Start of Care: 2019   Referral source: Stefan Schmidt NP : 1955    Medical Diagnosis: Pain in thoracic spine [M54.6]  Payor: [address] [place] [address] / Plan: Elzbieta Leonila / Product Type: Medicaid /  Onset [id number], exacerbation 2017     Treatment Diagnosis: Cervicalgia, Thoracic Pain    Prior Hospitalization: see medical history Provider#: [id number]   Medications: Verified on Patient summary List    Comorbidities: depression, diabetes, arthritis, tobacco use (3-4 cigarettes per day), latex allergy, heart attack ()    Prior Level of Function: lives with family, right-handed, Ind with ADLs,  (currently not working)      The Plan of Care and following information is based on the information from the initial evaluation. Assessment/ key information: Pt is a 65 yo F who presents with right-sided thoracic pain that began in  after she turned the wrong way and \"popped a muscle out of her back\". The pain subsided over time until exacerbation with insidious onset in  with progressive worsening. Subjective reports of pain increased with activity and relieved with rest and medication. MRI from 18 reveals small disc protrusions are most pronounced at T6/T7 and T7/T8 where there is mild flattening of the ventral cord, but no cord impingement. Pt presents with forward head and protracted shoulders. Hypertonicity and trigger points are evident right > left infraspinatus, rhomboids, supraspinatus, UT, levator, and pt is TTP all cervical and thoracic spinous processes. Cervical AROM deficits are present, most evident with SB B. Shoulder AROM is WFL, with decreased upward rotation of scapula and UT substitution evident with shoulder elevation B.   Dermatome screen WNL to light touch BUE. Posterior kinetic chain and shoulder strength are decreased B without a clear myotome distribution present in BUE. Findings consistent with postural pain syndrome. Pt will benefit from skilled PT to address strength, ROM, flexibility, hypertonicity, and postural deficits in order to reduce pain with daily tasks and improve QOL. Include dry needling to address trigger points likely contributing to pain. BP elevated at therapy evaluation  /100 taken manually prior to therapy  /88 taken manually following session  HR 70bpm, O2 97%      Evaluation Complexity History HIGH Complexity :3+ comorbidities / personal factors will impact the outcome/ POC ; Examination HIGH Complexity : 4+ Standardized tests and measures addressing body structure, function, activity limitation and / or participation in recreation  ;Presentation MEDIUM Complexity : Evolving with changing characteristics  ; Clinical Decision Making MEDIUM Complexity : FOTO score of 26-74 - based on clinical judgment   Overall Complexity Rating: MEDIUM  Problem List: pain affecting function, decrease ROM, decrease strength, decrease ADL/ functional abilitiies, decrease activity tolerance and decrease flexibility/ joint mobility   Treatment Plan may include any combination of the following: Therapeutic exercise, Therapeutic activities, Neuromuscular re-education, Physical agent/modality, Gait/balance training, Manual therapy, Patient education, Self Care training, Functional mobility training and Home safety training  Patient / Family readiness to learn indicated by: asking questions, trying to perform skills and interest  Persons(s) to be included in education: patient (P)  Barriers to Learning/Limitations: None  Patient Goal (s): find out what's going on, less pain  Patient Self Reported Health Status: poor  Rehabilitation Potential: good    Short Term Goals: To be accomplished in 1 weeks:  1.  Pt will be compliant with initial HEP in order to optimize therapy outcomes   Long Term Goals: To be accomplished in 8 weeks:  1. Pt will improve FOTO by 5 points in order to demonstrate functional improvement. 2. Pt will report 50% improvement in sx in order to improve QOL. 3. Pt will improve B UT/MT/LT strength to 4/5 in order to improve ease of upright posture for improved ease of driving/ADLs. Frequency / Duration: Patient to be seen 2-3 times per week for 8 weeks. Patient/ CarPatient/ Caregiver education and instruction: Diagnosis, prognosis, activity modification and exercises   [x]  Plan of care has been reviewed with PTA    Maude Contreras, PT 5/23/2019 9:45 AM    ________________________________________________________________________    I certify that the above Therapy Services are being furnished while the patient is under my care. I agree with the treatment plan and certify that this therapy is necessary.     Physician's Signature:____________Date:_________TIME:________    ** Signature, Date and Time must be completed for valid certification **    Please sign and return to [facility]  [address] [place] [address]  [phone] fax

## 2019-05-24 LAB
ALBUMIN SERPL-MCNC: 4.6 G/DL (ref 3.6–4.8)
ALBUMIN/GLOB SERPL: 1.4 {RATIO} (ref 1.2–2.2)
ALP SERPL-CCNC: 73 IU/L (ref 39–117)
ALT SERPL-CCNC: 56 IU/L (ref 0–32)
AST SERPL-CCNC: 34 IU/L (ref 0–40)
BILIRUB SERPL-MCNC: <0.2 MG/DL (ref 0–1.2)
BUN SERPL-MCNC: 14 MG/DL (ref 8–27)
BUN/CREAT SERPL: 16 (ref 12–28)
CALCIUM SERPL-MCNC: 9.7 MG/DL (ref 8.7–10.3)
CHLORIDE SERPL-SCNC: 102 MMOL/L (ref 96–106)
CHOLEST SERPL-MCNC: 206 MG/DL (ref 100–199)
CO2 SERPL-SCNC: 27 MMOL/L (ref 20–29)
CREAT SERPL-MCNC: 0.9 MG/DL (ref 0.57–1)
EST. AVERAGE GLUCOSE BLD GHB EST-MCNC: 134 MG/DL
GLOBULIN SER CALC-MCNC: 3.2 G/DL (ref 1.5–4.5)
GLUCOSE SERPL-MCNC: 90 MG/DL (ref 65–99)
HBA1C MFR BLD: 6.3 % (ref 4.8–5.6)
HDLC SERPL-MCNC: 54 MG/DL
LDLC SERPL CALC-MCNC: 118 MG/DL (ref 0–99)
POTASSIUM SERPL-SCNC: 5.1 MMOL/L (ref 3.5–5.2)
PROT SERPL-MCNC: 7.8 G/DL (ref 6–8.5)
SODIUM SERPL-SCNC: 142 MMOL/L (ref 134–144)
TRIGL SERPL-MCNC: 171 MG/DL (ref 0–149)
VLDLC SERPL CALC-MCNC: 34 MG/DL (ref 5–40)

## 2019-07-04 DIAGNOSIS — E78.2 MIXED HYPERLIPIDEMIA: Chronic | ICD-10-CM

## 2019-07-05 RX ORDER — PRAVASTATIN SODIUM 40 MG/1
TABLET ORAL
Qty: 90 TAB | Refills: 0 | Status: SHIPPED | OUTPATIENT
Start: 2019-07-05 | End: 2019-10-21 | Stop reason: SDUPTHER

## 2019-07-08 DIAGNOSIS — M50.30 DDD (DEGENERATIVE DISC DISEASE), CERVICAL: ICD-10-CM

## 2019-07-08 DIAGNOSIS — M79.18 MYOFASCIAL PAIN: ICD-10-CM

## 2019-07-08 NOTE — TELEPHONE ENCOUNTER
Last Visit: 3/25/19 with ESTELA Back  Next Appointment: 7/15/19 with MD Camelia Bravo  Previous Refill Encounter(s): 1/28/19 #60 with 4 refills    Requested Prescriptions     Pending Prescriptions Disp Refills    gabapentin (NEURONTIN) 300 mg capsule 60 Cap 0     Sig: Take 1 Cap by mouth every morning AND 1 Cap every evening. Max Daily Amount: 600 mg.  Indications: Neuropathic Pain

## 2019-07-08 NOTE — TELEPHONE ENCOUNTER
Returned call to patient, verified Name/. Per patient, she does not have enough medication to get her through to her f/u appt on 07/15. Patient aware, gabapentin is now a controlled, will be a printed Rx. Patient aware, she will need to come into office to  Rx. Patient declined sooner appt. Please review and advise.

## 2019-07-09 RX ORDER — GABAPENTIN 300 MG/1
CAPSULE ORAL
Qty: 60 CAP | Refills: 0 | Status: SHIPPED | OUTPATIENT
Start: 2019-07-09 | End: 2019-07-15 | Stop reason: SDUPTHER

## 2019-07-09 NOTE — TELEPHONE ENCOUNTER
Patient called, verified , informed that Rx is available for  at the [facility] of the [facility] Office. Reminded patient to bring photo id when picking up Rx. Patient verbalized understanding. No further action required at this time.

## 2019-07-15 ENCOUNTER — OFFICE VISIT (OUTPATIENT)
Dept: ORTHOPEDIC SURGERY | Age: 64
End: 2019-07-15

## 2019-07-15 VITALS
BODY MASS INDEX: 28.09 KG/M2 | DIASTOLIC BLOOD PRESSURE: 76 MMHG | HEART RATE: 77 BPM | RESPIRATION RATE: 12 BRPM | SYSTOLIC BLOOD PRESSURE: 135 MMHG | TEMPERATURE: 98.5 F | OXYGEN SATURATION: 96 % | WEIGHT: 168.6 LBS | HEIGHT: 65 IN

## 2019-07-15 DIAGNOSIS — M62.838 MUSCLE SPASM: ICD-10-CM

## 2019-07-15 DIAGNOSIS — M50.30 DDD (DEGENERATIVE DISC DISEASE), CERVICAL: Primary | ICD-10-CM

## 2019-07-15 DIAGNOSIS — M47.816 LUMBAR FACET ARTHROPATHY: ICD-10-CM

## 2019-07-15 DIAGNOSIS — F17.200 TOBACCO USE DISORDER: ICD-10-CM

## 2019-07-15 DIAGNOSIS — M79.18 MYOFASCIAL PAIN: ICD-10-CM

## 2019-07-15 DIAGNOSIS — M47.22 OSTEOARTHRITIS OF SPINE WITH RADICULOPATHY, CERVICAL REGION: ICD-10-CM

## 2019-07-15 RX ORDER — DICLOFENAC SODIUM 75 MG/1
75 TABLET, DELAYED RELEASE ORAL 2 TIMES DAILY
Qty: 60 TAB | Refills: 5 | Status: SHIPPED | OUTPATIENT
Start: 2019-07-15 | End: 2019-11-18 | Stop reason: SDUPTHER

## 2019-07-15 RX ORDER — SERTRALINE HYDROCHLORIDE 50 MG/1
1 TABLET, FILM COATED ORAL DAILY
Refills: 2 | COMMUNITY
Start: 2019-07-01 | End: 2022-01-19

## 2019-07-15 RX ORDER — METHOCARBAMOL 500 MG/1
500 TABLET, FILM COATED ORAL 4 TIMES DAILY
Qty: 120 TAB | Refills: 5 | Status: SHIPPED | OUTPATIENT
Start: 2019-07-15 | End: 2019-11-18 | Stop reason: SDUPTHER

## 2019-07-15 RX ORDER — GABAPENTIN 300 MG/1
CAPSULE ORAL
Qty: 60 CAP | Refills: 4 | Status: SHIPPED | OUTPATIENT
Start: 2019-07-15 | End: 2019-11-18 | Stop reason: SDUPTHER

## 2019-07-15 RX ORDER — KETOROLAC TROMETHAMINE 15 MG/ML
60 INJECTION, SOLUTION INTRAMUSCULAR; INTRAVENOUS ONCE
Qty: 1 VIAL | Refills: 0
Start: 2019-07-15 | End: 2019-07-15

## 2019-07-15 NOTE — LETTER
7/16/19 Patient: Drake Wise YOB: 1955 Date of Visit: 7/15/2019 Ortega Dominguez NP 
[address] [facility] [address] [facility] [address] VIA In Basket Dear Ortega Dominguez NP, Thank you for referring Ms. Chandu Clark to [facility] for evaluation. My notes for this consultation are attached. If you have questions, please do not hesitate to call me. I look forward to following your patient along with you. Sincerely, Eli Torres MD

## 2019-07-15 NOTE — PATIENT INSTRUCTIONS

## 2019-07-15 NOTE — PROGRESS NOTES
Tuan Peacock presents today for   Chief Complaint   Patient presents with    Back Pain     \"whole back\"    Follow-up       Is someone accompanying this pt? no    Is the patient using any DME equipment during OV? no    Depression Screening:  3 most recent PHQ Screens 7/15/2019   Little interest or pleasure in doing things Several days   Feeling down, depressed, irritable, or hopeless Several days   Total Score PHQ 2 2   Trouble falling or staying asleep, or sleeping too much -   Feeling tired or having little energy -   Poor appetite, weight loss, or overeating -   Feeling bad about yourself - or that you are a failure or have let yourself or your family down -   Trouble concentrating on things such as school, work, reading, or watching TV -   Moving or speaking so slowly that other people could have noticed; or the opposite being so fidgety that others notice -   Thoughts of being better off dead, or hurting yourself in some way -   PHQ 9 Score -   How difficult have these problems made it for you to do your work, take care of your home and get along with others -       Learning Assessment:  Learning Assessment 11/25/2016   PRIMARY LEARNER Patient   HIGHEST LEVEL OF EDUCATION - PRIMARY LEARNER  GRADUATED HIGH SCHOOL OR GED   BARRIERS PRIMARY LEARNER OTHER   CO-LEARNER CAREGIVER No   PRIMARY LANGUAGE ENGLISH   LEARNER PREFERENCE PRIMARY DEMONSTRATION   ANSWERED BY patient   RELATIONSHIP SELF       Abuse Screening:  Abuse Screening Questionnaire 3/19/2018   Do you ever feel afraid of your partner? N   Are you in a relationship with someone who physically or mentally threatens you? N   Is it safe for you to go home? Y       OPIOID RISK TOOL  No flowsheet data found. Pt currently taking Antiplatelet therapy? no    Coordination of Care:  1. Have you been to the ER, urgent care clinic since your last visit? no  Hospitalized since your last visit? no    2.  Have you seen or consulted any other health care providers outside of the [address] [place] [address] since your last visit? no Include any pap smears or colon screening.  no      Last  Checked 07/15/19

## 2019-07-15 NOTE — PROGRESS NOTES
[facility]  [address] [place] [address], [facility] [address]  Phone: [phone]  Fax: [phone]    Pt's YOB: 1955    ASSESSMENT   Diagnoses and all orders for this visit:    1. DDD (degenerative disc disease), cervical  -     gabapentin (NEURONTIN) 300 mg capsule; Take 1 Cap by mouth every morning AND 1 Cap every evening. Max Daily Amount: 600 mg. Indications: Neuropathic Pain    2. Myofascial pain  -     gabapentin (NEURONTIN) 300 mg capsule; Take 1 Cap by mouth every morning AND 1 Cap every evening. Max Daily Amount: 600 mg. Indications: Neuropathic Pain    3. Muscle spasm  -     methocarbamol (ROBAXIN) 500 mg tablet; Take 1 Tab by mouth four (4) times daily. 4. Lumbar facet arthropathy  -     diclofenac EC (VOLTAREN) 75 mg EC tablet; Take 1 Tab by mouth two (2) times a day. Take with food as needed for pain  -     KETOROLAC TROMETHAMINE INJ  -     ketorolac (TORADOL) 15 mg/mL soln injection; 4 mL by IntraMUSCular route once for 1 dose. -     HI THER/PROPH/DIAG INJECTION, SUBCUT/IM    5. Osteoarthritis of spine with radiculopathy, cervical region  -     diclofenac EC (VOLTAREN) 75 mg EC tablet; Take 1 Tab by mouth two (2) times a day. Take with food as needed for pain  -     KETOROLAC TROMETHAMINE INJ  -     ketorolac (TORADOL) 15 mg/mL soln injection; 4 mL by IntraMUSCular route once for 1 dose. -     HI THER/PROPH/DIAG INJECTION, SUBCUT/IM    6. Tobacco use disorder         IMPRESSION AND PLAN:  Mindy Hernandez is a 66 y.o. female with history of lumbar arthritis pain. She complains of constant lower back pain and denies any change since in symptoms since her last office visit. Pt takes Voltaren 75 mg and Robaxin 500 mg as needed and Neurontin 300 mg 1 tab QAM and 1 tab QHS. 1) Pt was given information on cervical and lumbar arthritis exercises. 2) She received a refill of Voltaren 75 mg 1 tab BID prn with food.   3) Pt also received a refill of Robaxin 500 mg 1 tab 4 x daily prn muscle spasm. 4) She received a refill of Neurontin 300 mg 1 tab QAM and 1 tab QHS. 5) Pt was given information on how to use a TheraCane. 6) Pt received a Toradol injection in the office today. 7) Ms. Tia Madison has a reminder for a \"due or due soon\" health maintenance. I have asked that she contact her primary care provider, Vijay Lopez NP, for follow-up on this health maintenance. 8)  demonstrated consistency with prescribing. 9) Smoking cessation discussed and recommended  Follow-up and Dispositions    · Return in about 4 months (around 11/15/2019) for Medication follow up. HISTORY OF PRESENT ILLNESS:  Ghazala May is a 66 y.o. female with history of lumbar arthritis pain and presents to the office today for follow up. She complains of constant lower back pain and denies any change since in symptoms since her last office visit. Pt takes Voltaren 75 mg and Robaxin 500 mg as needed and requests refills at this time. She also takes Neurontin 300 mg 1 tab QAM and 1 tab QHS. Pt notes that she refilled the Neurontin last week since she ran out of the medication. She notes that her insurance did not approve a lumbar RFA. Pt at this time desires to continue with current care. Of note, she is a smoker. 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     Pain Scale: 10 - Worst pain ever/10    PCP: Vijay Lopez NP     Past Medical History:   Diagnosis Date    CAD (coronary artery disease)     Hypertension     Uterine fibroid         Social History     Socioeconomic History    Marital status: SINGLE     Spouse name: Not on file    Number of children: Not on file    Years of education: Not on file    Highest education level: Not on file   Occupational History    Not on file   Social Needs    Financial resource strain: Not on file    Food insecurity:     Worry: Not on file     Inability: Not on file  Transportation needs:     Medical: Not on file     Non-medical: Not on file   Tobacco Use    Smoking status: Current Every Day Smoker     Packs/day: 0.25     Years: 2.00     Pack years: 0.50    Smokeless tobacco: Never Used   Substance and Sexual Activity    Alcohol use: No     Alcohol/week: 0.5 oz     Types: 1 Cans of beer per week    Drug use: Yes     Types: Marijuana     Comment: rarely     Sexual activity: Not on file   Lifestyle    Physical activity:     Days per week: Not on file     Minutes per session: Not on file    Stress: Not on file   Relationships    Social connections:     Talks on phone: Not on file     Gets together: Not on file     Attends Gnosticist service: Not on file     Active member of club or organization: Not on file     Attends meetings of clubs or organizations: Not on file     Relationship status: Not on file    Intimate partner violence:     Fear of current or ex partner: Not on file     Emotionally abused: Not on file     Physically abused: Not on file     Forced sexual activity: Not on file   Other Topics Concern    Not on file   Social History Narrative    Not on file       Current Outpatient Medications   Medication Sig Dispense Refill    sertraline (ZOLOFT) 50 mg tablet Take 1 Tab by mouth daily. 2    gabapentin (NEURONTIN) 300 mg capsule Take 1 Cap by mouth every morning AND 1 Cap every evening. Max Daily Amount: 600 mg. Indications: Neuropathic Pain 60 Cap 4    methocarbamol (ROBAXIN) 500 mg tablet Take 1 Tab by mouth four (4) times daily. 120 Tab 5    diclofenac EC (VOLTAREN) 75 mg EC tablet Take 1 Tab by mouth two (2) times a day. Take with food as needed for pain 60 Tab 5    pravastatin (PRAVACHOL) 40 mg tablet TAKE 1 TABLET BY MOUTH ONCE DAILY AT NIGHT 90 Tab 0    fluticasone (FLONASE) 50 mcg/actuation nasal spray 2 Sprays by Both Nostrils route daily as needed for Rhinitis.  1 Bottle 2    insulin glargine (LANTUS U-100 INSULIN) 100 unit/mL injection INJECT 20 UNITS SUBCUTANEOUSLY EVERY 12 HOURS (Patient taking differently: 20 Units by SubCUTAneous route daily. INJECT 20 UNITS SUBCUTANEOUSLY EVERY 12 HOURS) 10 Vial 5    glucose blood VI test strips (FREESTYLE LITE STRIPS) strip Check glucose ACHS. 200 Strip 11    Lancets misc Check glucose ACHS 200 Each 11    Blood-Glucose Meter (FREESTYLE LITE METER) monitoring kit accucheck ACHS and record in log. Take log to every doctor visit. **MEDICALLY NECESSARY** 1 Kit 0    alcohol swabs padm 1 Each by Apply Externally route Before breakfast, lunch, dinner and at bedtime. accucheck ACHS and record in log. Take log to every doctor visit. **MEDICALLY NECESSARY** 100 Pad 2    traZODone (DESYREL) 150 mg tablet Take 150-300 mg by mouth nightly.  sertraline (ZOLOFT) 100 mg tablet Take 100 mg by mouth daily.  perphenazine (TRILAFON) 4 mg tablet Take 4 mg by mouth three (3) times daily.  benztropine (COGENTIN) 1 mg tablet Take 1 mg by mouth daily. Allergies   Allergen Reactions    Latex Rash    Penicillins Nausea and Vomiting         REVIEW OF SYSTEMS    Constitutional: Negative for fever, chills, or weight change. Respiratory: Negative for cough or shortness of breath. Cardiovascular: Negative for chest pain or palpitations. Gastrointestinal: Negative for acid reflux, change in bowel habits, or constipation. Genitourinary: Negative for dysuria and flank pain. Musculoskeletal: Positive for cervical and lumbar pain. Skin: Negative for rash. Neurological: Negative for headaches, dizziness, or numbness. Endo/Heme/Allergies: Negative for increased bruising. Psychiatric/Behavioral: Negative for difficulty with sleep. PHYSICAL EXAMINATION  Visit Vitals  /76   Pulse 77   Temp 98.5 °F (36.9 °C) (Oral)   Resp 12   Ht 5' 5\" (1.651 m)   Wt 168 lb 9.6 oz (76.5 kg)   SpO2 96%   BMI 28.06 kg/m²       Constitutional: Awake, alert, and in no acute distress.   Neurological: 1+ symmetrical DTRs in the upper extremities. 1+ symmetrical DTRs in the lower extremities. Sensation to light touch is intact. Negative Gaffney's sign bilaterally. Skin: warm, dry, and intact. Musculoskeletal: Tenderness to palpation in the lower lumbar region. Moderate pain with extension and axial loading. Improvement with forward flexion. No pain with internal or external rotation of her hips. Negative straight leg raise bilaterally. Biceps  Triceps Deltoids Wrist Ext Wrist Flex Hand Intrin   Right +4/5 +4/5 +4/5 +4/5 +4/5 +4/5   Left +4/5 +4/5 +4/5 +4/5 +4/5 +4/5      Hip Flex  Quads Hamstrings Ankle DF EHL Ankle PF   Right +4/5 +4/5 +4/5 +4/5 +4/5 +4/5   Left +4/5 +4/5 +4/5 +4/5 +4/5 +4/5     IMAGING:    Thoracic spine MRI from 04/04/2018 was personally reviewed with the patient and demonstrated:  Results from [facility] on 04/04/18    [facility] CONT    Narrative MRI THORACIC WITHOUT CONTRAST    CPT CODE: 10887    HISTORY: Mid back pain, neck pain, right leg weakness since 2001. COMPARISON: None. TECHNIQUE: Sagittal T1, T2 and stir and axial T2 weighted sequences were  acquired through the thoracic spine without contrast.        FINDINGS:     There is normal anatomic alignment of the thoracic spine. Vertebral body heights  are maintained. No infiltrative marrow replacement process or marrow edema. Thoracic spinal cord maintains normal caliber, signal intensity and morphology  throughout. Small bilateral pleural effusions. There are multilevel tiny disc protrusions with contact of the ventral cord at  T6/T7 and T7/T8, but no impingement. No critical foraminal compromise. Incidentally imaged surrounding soft tissues demonstrate no acute abnormalities  otherwise.           Impression IMPRESSION:    Small disc protrusions are most pronounced at T6/T7 and T7/T8 where there is  mild flattening of the ventral cord, but no cord impingement.     Small bilateral pleural effusions.                       Right shoulder x-rays from 03/18/2017 were personally reviewed and demonstrated:  Results from Hospital Encounter on 03/18/17   XR SHOULDER RT AP/LAT MIN 2 V     Narrative Shoulder series. CPT code: 15249    Clinical history: Arthritis in the shoulder and pain in the back under the  shoulder blade without known injury of unspecified chronicity. Technique: Two shoulder views. Comparison: No priors. Findings: There are no fractures.  Joint relationships at glenohumeral joint are  normal.  Acromioclavicular joint is normal. There is convex appearance of the  underside of the distal acromion process.  There are no soft tissue  calcifications.              Impression IMPRESSION:    No acute abnormalities. Convex appearance of the inferior aspect of the acromion  process could increased risk of rotator cuff impingement.             Cervical spine x-rays from 12/27/2016 were personally reviewed with the Pt and demonstrated:  FINDINGS:       Vertebral body height and alignment is intact. Slight straightening of the  cervical lordosis. Moderate disc height loss endplate sclerosis and vertebral  body osteophytes from C3 through C7. Alignment is intact. No fracture. Mild  facet joint arthropathy at multiple levels. The prevertebral space appears normal.       IMPRESSION:      Moderate cervical spondylosis.        Written by Ramiro Arizmendi, as dictated by Josefina Sifuentes MD.  I, Dr. Josefina Sifuentes confirm that all documentation is accurate.

## 2019-08-16 NOTE — PROGRESS NOTES
[facility] [employer] [facility] [employer]  [address] [facility]  [phone] fax    Physical Therapy Discharge Summary    Patient name: Flor Cuello Start of Care: 19   Referral source: Jyotsna Alexis NP : 1955   Medical/Treatment Diagnosis: Pain in thoracic spine [M54.6]  Payor: [address] [place] [address] / Plan: [employer] / Product Type: Medicaid /  Onset [id number], exacerbation 2017        Prior Hospitalization: see medical history Provider#: [id number]   Medications: Verified on Patient Summary List    Comorbidities: depression, diabetes, arthritis, tobacco use (3-4 cigarettes per day), latex allergy, heart attack ()    Prior Level of Function: lives with family, right-handed, Ind with ADLs,  (currently not working)       Visits from Start of Care: 1    Missed Visits: 0    Reporting Period : 19 to 19    Summary of Care:  Short Term Goals: To be accomplished in 1 weeks:  1. Pt will be compliant with initial HEP in order to optimize therapy outcomes   Long Term Goals: To be accomplished in 8 weeks:  1. Pt will improve FOTO by 5 points in order to demonstrate functional improvement. 2. Pt will report 50% improvement in sx in order to improve QOL. 3. Pt will improve B UT/MT/LT strength to 4/5 in order to improve ease of upright posture for improved ease of driving/ADLs. No goals met as pt did not return to therapy following initial evaluation     ASSESSMENT/RECOMMENDATIONS:  Pt did not return to therapy after initial evaluation d/t lack of insurance authorization.      [x]Discontinue therapy: []Patient has reached or is progressing toward set goals      []Patient is non-compliant or has abdicated      [x]Due to lack of insurance authorization     John Murdock PT 2019 11:52 AM

## 2019-09-18 ENCOUNTER — OFFICE VISIT (OUTPATIENT)
Dept: FAMILY MEDICINE CLINIC | Age: 64
End: 2019-09-18

## 2019-09-18 VITALS
DIASTOLIC BLOOD PRESSURE: 82 MMHG | TEMPERATURE: 98.1 F | OXYGEN SATURATION: 98 % | BODY MASS INDEX: 27.99 KG/M2 | HEART RATE: 68 BPM | SYSTOLIC BLOOD PRESSURE: 137 MMHG | HEIGHT: 65 IN | WEIGHT: 168 LBS | RESPIRATION RATE: 16 BRPM

## 2019-09-18 DIAGNOSIS — Z79.4 CONTROLLED TYPE 2 DIABETES MELLITUS WITHOUT COMPLICATION, WITH LONG-TERM CURRENT USE OF INSULIN (HCC): ICD-10-CM

## 2019-09-18 DIAGNOSIS — I10 ESSENTIAL HYPERTENSION: ICD-10-CM

## 2019-09-18 DIAGNOSIS — E11.9 CONTROLLED TYPE 2 DIABETES MELLITUS WITHOUT COMPLICATION, WITH LONG-TERM CURRENT USE OF INSULIN (HCC): ICD-10-CM

## 2019-09-18 DIAGNOSIS — E78.2 MIXED HYPERLIPIDEMIA: ICD-10-CM

## 2019-09-18 DIAGNOSIS — E11.65 UNCONTROLLED TYPE 2 DIABETES MELLITUS WITH HYPERGLYCEMIA (HCC): Primary | Chronic | ICD-10-CM

## 2019-09-18 RX ORDER — INSULIN GLARGINE 100 [IU]/ML
10 INJECTION, SOLUTION SUBCUTANEOUS DAILY
Qty: 10 VIAL | Refills: 5
Start: 2019-09-18 | End: 2019-11-25 | Stop reason: SDUPTHER

## 2019-09-18 RX ORDER — PEN NEEDLE, DIABETIC 30 GX3/16"
NEEDLE, DISPOSABLE MISCELLANEOUS
Qty: 1 PACKAGE | Refills: 11 | Status: SHIPPED | OUTPATIENT
Start: 2019-09-18 | End: 2022-01-19

## 2019-09-18 RX ORDER — BLOOD-GLUCOSE METER
EACH MISCELLANEOUS
Qty: 1 EACH | Refills: 0 | Status: SHIPPED | OUTPATIENT
Start: 2019-09-18

## 2019-09-18 RX ORDER — METFORMIN HYDROCHLORIDE 500 MG/1
1000 TABLET, EXTENDED RELEASE ORAL 2 TIMES DAILY WITH MEALS
Qty: 30 TAB | Refills: 2 | Status: SHIPPED | OUTPATIENT
Start: 2019-09-18 | End: 2019-11-25 | Stop reason: SDUPTHER

## 2019-09-18 RX ORDER — LANCETS
EACH MISCELLANEOUS
Qty: 1 EACH | Refills: 11 | Status: SHIPPED | OUTPATIENT
Start: 2019-09-18

## 2019-09-18 NOTE — PROGRESS NOTES
Subjective:    Kyaw Billings is a 60y.o. year old female seen for follow up of diabetes. She also has hypertension and hyperlipidemia. Diabetic Review of Systems - medication compliance: compliant all of the time, diabetic diet compliance: compliant most of the time, home glucose monitoring: is performed regularly, fasting values range , further diabetic ROS: no polyuria or polydipsia, no chest pain, dyspnea or TIA's, no numbness, tingling or pain in extremities, last eye exam approximately 1 year ago. Other symptoms and concerns: patient would like to try to get off of insulin. Patient states she has only been taking 10 units of lantus daily. Would like to begin an oral medication. Patient also reports she has been having neck pain. Denies numbness tingling in upper extremities but does report she has tightness. Patient Active Problem List   Diagnosis Code    Type II diabetes mellitus, uncontrolled ([facility] [address] E11.65    Mixed hyperlipidemia E78.2    Myofascial pain M79.18    DDD (degenerative disc disease), cervical M50.30    Osteoarthritis of spine with radiculopathy, cervical region M47.22    Right shoulder pain M25.511    Tobacco use disorder F17.200    Muscle spasm M62.838     Current Outpatient Medications   Medication Sig Dispense Refill    sertraline (ZOLOFT) 50 mg tablet Take 1 Tab by mouth daily. 2    gabapentin (NEURONTIN) 300 mg capsule Take 1 Cap by mouth every morning AND 1 Cap every evening. Max Daily Amount: 600 mg. Indications: Neuropathic Pain 60 Cap 4    methocarbamol (ROBAXIN) 500 mg tablet Take 1 Tab by mouth four (4) times daily. 120 Tab 5    diclofenac EC (VOLTAREN) 75 mg EC tablet Take 1 Tab by mouth two (2) times a day.  Take with food as needed for pain 60 Tab 5    pravastatin (PRAVACHOL) 40 mg tablet TAKE 1 TABLET BY MOUTH ONCE DAILY AT NIGHT 90 Tab 0    fluticasone (FLONASE) 50 mcg/actuation nasal spray 2 Sprays by Both Nostrils route daily as needed for Rhinitis. 1 Bottle 2    insulin glargine (LANTUS U-100 INSULIN) 100 unit/mL injection INJECT 20 UNITS SUBCUTANEOUSLY EVERY 12 HOURS (Patient taking differently: 20 Units by SubCUTAneous route daily. INJECT 20 UNITS SUBCUTANEOUSLY EVERY 12 HOURS) 10 Vial 5    glucose blood VI test strips (FREESTYLE LITE STRIPS) strip Check glucose ACHS. 200 Strip 11    Lancets misc Check glucose ACHS 200 Each 11    Blood-Glucose Meter (FREESTYLE LITE METER) monitoring kit accucheck ACHS and record in log. Take log to every doctor visit. **MEDICALLY NECESSARY** 1 Kit 0    alcohol swabs padm 1 Each by Apply Externally route Before breakfast, lunch, dinner and at bedtime. accucheck ACHS and record in log. Take log to every doctor visit. **MEDICALLY NECESSARY** 100 Pad 2    traZODone (DESYREL) 150 mg tablet Take 150-300 mg by mouth nightly.  sertraline (ZOLOFT) 100 mg tablet Take 100 mg by mouth daily.  perphenazine (TRILAFON) 4 mg tablet Take 4 mg by mouth three (3) times daily.  benztropine (COGENTIN) 1 mg tablet Take 1 mg by mouth daily. Allergies   Allergen Reactions    Latex Rash    Penicillins Nausea and Vomiting     No past surgical history on file.   Family History   Problem Relation Age of Onset    Diabetes Mother     Stroke Mother     Diabetes Father     Cancer Paternal Grandfather     Breast Cancer Cousin       Lab Results   Component Value Date/Time    Cholesterol, total 206 (H) 05/23/2019 08:37 AM    HDL Cholesterol 54 05/23/2019 08:37 AM    LDL, calculated 118 (H) 05/23/2019 08:37 AM    VLDL, calculated 34 05/23/2019 08:37 AM    Triglyceride 171 (H) 05/23/2019 08:37 AM    CHOL/HDL Ratio 2.9 10/20/2018 08:33 AM     Lab Results   Component Value Date/Time    Sodium 142 05/23/2019 08:37 AM    Potassium 5.1 05/23/2019 08:37 AM    Chloride 102 05/23/2019 08:37 AM    CO2 27 05/23/2019 08:37 AM    Anion gap 2 (L) 10/20/2018 08:33 AM    Glucose 90 05/23/2019 08:37 AM    BUN 14 05/23/2019 08:37 AM Creatinine 0.90 05/23/2019 08:37 AM    BUN/Creatinine ratio 16 05/23/2019 08:37 AM    GFR est AA 80 05/23/2019 08:37 AM    GFR est non-AA 69 05/23/2019 08:37 AM    Calcium 9.7 05/23/2019 08:37 AM    Bilirubin, total <0.2 05/23/2019 08:37 AM    AST (SGOT) 34 05/23/2019 08:37 AM    Alk. phosphatase 73 05/23/2019 08:37 AM    Protein, total 7.8 05/23/2019 08:37 AM    Albumin 4.6 05/23/2019 08:37 AM    Globulin 3.8 10/20/2018 08:33 AM    A-G Ratio 1.4 05/23/2019 08:37 AM    ALT (SGPT) 56 (H) 05/23/2019 08:37 AM     Lab Results   Component Value Date/Time    WBC 7.0 07/25/2016 12:47 AM    HGB 11.3 (L) 07/25/2016 12:47 AM    HCT 32.6 (L) 07/25/2016 12:47 AM    PLATELET 364 76/48/3115 12:47 AM    MCV 87.6 07/25/2016 12:47 AM     Lab Results   Component Value Date/Time    Hemoglobin A1c 6.3 (H) 05/23/2019 08:37 AM    Hemoglobin A1c (POC) 6.5 12/04/2018 11:26 AM     Lab Results   Component Value Date/Time    Microalbumin/Creat ratio (mg/g creat)  12/01/2018 09:43 AM     Cannot calculate ratio due to microalbumin result outside reportable range.     Microalbumin,urine random <0.50 12/01/2018 09:43 AM    Microalbumin urine (POC) 30 04/11/2017 04:00 PM     Wt Readings from Last 3 Encounters:   09/18/19 168 lb (76.2 kg)   07/15/19 168 lb 9.6 oz (76.5 kg)   05/15/19 173 lb 6.4 oz (78.7 kg)     Last Point of Care HGB A1C  Hemoglobin A1c (POC)   Date Value Ref Range Status   12/04/2018 6.5 % Final      BP Readings from Last 3 Encounters:   09/18/19 137/82   07/15/19 135/76   05/15/19 121/86     Results for orders placed or performed in visit on 05/15/19   LIPID PANEL   Result Value Ref Range    Cholesterol, total 206 (H) 100 - 199 mg/dL    Triglyceride 171 (H) 0 - 149 mg/dL    HDL Cholesterol 54 >39 mg/dL    VLDL, calculated 34 5 - 40 mg/dL    LDL, calculated 118 (H) 0 - 99 mg/dL   HEMOGLOBIN A1C WITH EAG   Result Value Ref Range    Hemoglobin A1c 6.3 (H) 4.8 - 5.6 %    Estimated average glucose 721 mg/dL   METABOLIC PANEL, COMPREHENSIVE   Result Value Ref Range    Glucose 90 65 - 99 mg/dL    BUN 14 8 - 27 mg/dL    Creatinine 0.90 0.57 - 1.00 mg/dL    GFR est non-AA 69 >59 mL/min/1.73    GFR est AA 80 >59 mL/min/1.73    BUN/Creatinine ratio 16 12 - 28    Sodium 142 134 - 144 mmol/L    Potassium 5.1 3.5 - 5.2 mmol/L    Chloride 102 96 - 106 mmol/L    CO2 27 20 - 29 mmol/L    Calcium 9.7 8.7 - 10.3 mg/dL    Protein, total 7.8 6.0 - 8.5 g/dL    Albumin 4.6 3.6 - 4.8 g/dL    GLOBULIN, TOTAL 3.2 1.5 - 4.5 g/dL    A-G Ratio 1.4 1.2 - 2.2    Bilirubin, total <0.2 0.0 - 1.2 mg/dL    Alk. phosphatase 73 39 - 117 IU/L    AST (SGOT) 34 0 - 40 IU/L    ALT (SGPT) 56 (H) 0 - 32 IU/L       Last Diabetic Foot Exam on: 10/3/18      Objective:  Visit Vitals  /82 (BP 1 Location: Left arm, BP Patient Position: Sitting)   Pulse 68   Temp 98.1 °F (36.7 °C) (Oral)   Resp 16   Ht 5' 5\" (1.651 m)   Wt 168 lb (76.2 kg)   SpO2 98%   BMI 27.96 kg/m²     Awake and alert in no acute distress   Neck supple without lymphadenopathy, no carotid artery bruits auscultated bilaterally. No thyromegaly   Lungs clear throughout   S1 S2 RRR without ectopy or murmur auscultated. Extremities: no clubbing, cyanosis, peripheral edema   Abdomen - soft, nontender, nondistended, no masses or organomegaly  Integumentary: no rashes   Musculoskeletal: Cervical Spine: palpable tenderness on right  Reviewed vital signs       Diabetic foot exam:     Left Foot:   Visual Exam: normal    Pulse DP: 2+ (normal)   Filament test: normal sensation    Vibratory sensation: normal      Right Foot:   Visual Exam: normal    Pulse DP: 2+ (normal)   Filament test: normal sensation    Vibratory sensation: normal        Assessment/Plan:    ICD-10-CM ICD-9-CM    1. Uncontrolled type 2 diabetes mellitus with hyperglycemia (HCC) E11.65 250.02    2.  Controlled type 2 diabetes mellitus without complication, with long-term current use of insulin ([facility]) E11.9 250.00  DIABETES FOOT EXAM    Z79.4 V58.67 HEMOGLOBIN A1C WITH EAG      LIPID PANEL      METABOLIC PANEL, COMPREHENSIVE   3. Essential hypertension I10 401.9 LIPID PANEL      METABOLIC PANEL, COMPREHENSIVE   4. Mixed hyperlipidemia E78.2 272.2 LIPID PANEL      METABOLIC PANEL, COMPREHENSIVE     Orders Placed This Encounter    HEMOGLOBIN A1C WITH EAG    LIPID PANEL    METABOLIC PANEL, COMPREHENSIVE    metFORMIN ER (GLUCOPHAGE XR) 500 mg tablet     Decrease lantus to 10 units daily and begin metformin as above bid  Issues reviewed with her: low cholesterol diet, weight control and daily exercise discussed and all medications, side effects and compliance discussed carefully. I have discussed the diagnosis with the patient and the intended plan as seen in the above orders. The patient has received an after-visit summary and questions were answered concerning future plans. I have discussed medication side effects and warnings with the patient as well. Patient agreeable with above plan and verbalizes understanding. Follow-up and Dispositions    · Return in about 6 weeks (around 10/30/2019) for DM since medication adjustments.

## 2019-09-18 NOTE — PROGRESS NOTES
HISTORY OF PRESENT ILLNESS  Darlene Kang is a 60 y.o. female. HPI   Pt presents for follow-up of  Type 2 DM. BS are running between  fasting and she is only checking them 1 a day every few days due to the cost of testing supplies. She is checking her feet daily, and has no polydipsia, polyuria, or polyphagia. She denies dizziness, CP, SOB. She has not had an eye exam since last year by Dr. Tabatha Rubalcava, and the prescription is now . Pt is also concerned about quitting smoking. She says she is ready to quit and has significantly decreased the amount of cigarettes to two per day. She had not tried anything else and wants to stick with her method of trying to quit. New concerns: the right side of her neck has been bothering her. She states that the pain has been there for over a year with increase in pain over the last few months. She has not tried any new medications for treatment and feels as though her neck is tighter than usual.   Diabetic foot exam:     Left Foot:   Visual Exam: normal    Pulse DP: 2+ (normal)   Filament test: normal sensation    Vibratory sensation: normal      Right Foot:   Visual Exam: normal    Pulse DP: 2+ (normal)   Filament test: normal sensation    Vibratory sensation: normal      Review of Systems   Constitutional: Negative for chills and fever. Eyes: Negative. Respiratory: Negative for cough and shortness of breath. Cardiovascular: Negative for chest pain, palpitations and leg swelling. Gastrointestinal: Negative for abdominal pain. Genitourinary: Negative for frequency and urgency. Musculoskeletal: Positive for neck pain (right sternocleidomastoid muscle tenderness. ). Skin: Negative. Neurological: Negative for dizziness, tingling, weakness and headaches. Physical Exam   Constitutional: She is oriented to person, place, and time. She appears well-developed and well-nourished. HENT:   Head: Normocephalic and atraumatic.    Neck: Normal range of motion. Neck supple. Muscular tenderness present. Cardiovascular: Normal rate, regular rhythm and normal heart sounds. Exam reveals no gallop and no friction rub. No murmur heard. Pulmonary/Chest: Effort normal and breath sounds normal. No respiratory distress. Abdominal: Soft. Bowel sounds are normal.   Musculoskeletal: Normal range of motion. Neurological: She is alert and oriented to person, place, and time. She has normal reflexes. Skin: Skin is warm and dry. Psychiatric: She has a normal mood and affect. Nursing note and vitals reviewed.

## 2019-09-18 NOTE — PATIENT INSTRUCTIONS
DECREASE LANTUS TO 10 UNITS DAILYMetformin (By mouth)   Metformin Hydrochloride (met-FOR-min elaine-droe-KLOR-justin)  Treats type 2 diabetes. Brand Name(s): DM2, Fortamet, Glucophage, Glucophage XR, Glumetza, Riomet   There may be other brand names for this medicine. When This Medicine Should Not Be Used: This medicine is not right for everyone. Do not use if you had an allergic reaction to metformin. How to Use This Medicine:   Liquid, Tablet, Long Acting Tablet  · Take your medicine as directed. Your dose may need to be changed several times to find what works best for you. · It is best to take this medicine with food or milk. · Swallow the extended-release tablet whole. Do not crush, break, or chew it. Tell your doctor if you have trouble swallowing the tablets whole. · Measure the oral liquid medicine with a marked measuring spoon, oral syringe, or medicine cup. · Read and follow the patient instructions that come with this medicine. Talk to your doctor or pharmacist if you have any questions. · Missed dose: Take a dose as soon as you remember. If it is almost time for your next dose, wait until then and take a regular dose. Do not take extra medicine to make up for a missed dose. · Store the medicine in a closed container at room temperature, away from heat, moisture, and direct light. Drugs and Foods to Avoid:   Ask your doctor or pharmacist before using any other medicine, including over-the-counter medicines, vitamins, and herbal products. · Some medicines can affect how metformin works. Tell your doctor if you are using any of the following:  ¨ Acetazolamide, dichlorphenamide, dolutegravir, isoniazid, nicotinic acid, phenytoin, ranolazine, topiramate, vandetanib, zonisamide  ¨ Birth control pills  ¨ Blood pressure medicine  ¨ Diuretic (water pill)  ¨ Phenothiazine medicine  ¨ Steroid medicine  ¨ Thyroid medicine  · Do not drink alcohol while you are using this medicine.   Warnings While Using This Medicine:   · Tell your doctor if you are pregnant or breastfeeding, or if you have kidney disease, liver disease, heart or blood vessel disease, heart failure, blood circulation problems, anemia, metabolic acidosis, an adrenal gland or pituitary gland disorder, vitamin B12 deficiency, or had a heart attack. Tell your doctor if you drink alcohol. · Too much of this medicine can cause a rare, but serious condition called lactic acidosis. · Part of the extended-release tablet may pass in your stool. This is normal.  · Make sure any doctor or dentist who treats you knows that you are using this medicine. You may need to stop using this medicine before you have surgery, an x-ray, CT scan, or other medical test.  · Your doctor will do lab tests at regular visits to check on the effects of this medicine. Keep all appointments. · Keep all medicine out of the reach of children. Never share your medicine with anyone. Possible Side Effects While Using This Medicine:   Call your doctor right away if you notice any of these side effects:  · Allergic reaction: Itching or hives, swelling in your face or hands, swelling or tingling in your mouth or throat, chest tightness, trouble breathing  · Confusion, fast heartbeat, increased hunger, shakiness  · Fever or chills  · Stomach pain, nausea, vomiting, muscle pain or cramping  · Trouble breathing, slow heartbeat, lightheadedness, dizziness  · Unusual tiredness or weakness  If you notice these less serious side effects, talk with your doctor:   · Diarrhea, gas  If you notice other side effects that you think are caused by this medicine, tell your doctor. Call your doctor for medical advice about side effects. You may report side effects to FDA at 3-087-FDA-0730  © 2017 [facility] Information is for End User's use only and may not be sold, redistributed or otherwise used for commercial purposes. The above information is an  only.  It is not intended as medical advice for individual conditions or treatments. Talk to your doctor, nurse or pharmacist before following any medical regimen to see if it is safe and effective for you.

## 2019-09-18 NOTE — PROGRESS NOTES
Darlene Kang presents today for   Chief Complaint   Patient presents with    Diabetes    Cholesterol Problem       Is someone accompanying this pt? no    Is the patient using any DME equipment during [address]? no    Depression Screening:  3 most recent PHQ Screens 7/15/2019   Little interest or pleasure in doing things Several days   Feeling down, depressed, irritable, or hopeless Several days   Total Score PHQ 2 2   Trouble falling or staying asleep, or sleeping too much -   Feeling tired or having little energy -   Poor appetite, weight loss, or overeating -   Feeling bad about yourself - or that you are a failure or have let yourself or your family down -   Trouble concentrating on things such as school, work, reading, or watching TV -   Moving or speaking so slowly that other people could have noticed; or the opposite being so fidgety that others notice -   Thoughts of being better off dead, or hurting yourself in some way -   PHQ 9 Score -   How difficult have these problems made it for you to do your work, take care of your home and get along with others -       Learning Assessment:  Learning Assessment 11/25/2016   PRIMARY LEARNER Patient   HIGHEST LEVEL OF EDUCATION - PRIMARY LEARNER  GRADUATED HIGH SCHOOL OR GED   BARRIERS PRIMARY LEARNER OTHER   CO-LEARNER CAREGIVER No   PRIMARY LANGUAGE ENGLISH   LEARNER PREFERENCE PRIMARY DEMONSTRATION   ANSWERED BY patient   RELATIONSHIP SELF       Abuse Screening:  Abuse Screening Questionnaire 3/19/2018   Do you ever feel afraid of your partner? N   Are you in a relationship with someone who physically or mentally threatens you? N   Is it safe for you to go home? Y       Fall Risk  No flowsheet data found. Health Maintenance reviewed and discussed and ordered per Provider.     Health Maintenance Due   Topic Date Due    Pneumococcal 0-64 years (1 of 1 - PPSV23) 09[birth year]07/1963    DTaP/Tdap/Td series (1 - Tdap) 09/07/1978    PAP AKA CERVICAL CYTOLOGY  09/07/1978    Shingrix Vaccine Age 50> (1 of 2) 09/07/2007    FOBT Q 1 YEAR AGE 50-75  03/23/2019    Influenza Age 9 to Adult  08/01/2019    FOOT EXAM Q1  10/03/2019           Coordination of Care:  1. Have you been to the ER, urgent care clinic since your last visit? Hospitalized since your last visit? no    2. Have you seen or consulted any other health care providers outside of the [address] [place] [address] since your last visit? Include any pap smears or colon screening.  no

## 2019-09-19 ENCOUNTER — TELEPHONE (OUTPATIENT)
Dept: ORTHOPEDIC SURGERY | Age: 64
End: 2019-09-19

## 2019-09-19 LAB
ALBUMIN SERPL-MCNC: 4.3 G/DL (ref 3.6–4.8)
ALBUMIN/GLOB SERPL: 1.3 {RATIO} (ref 1.2–2.2)
ALP SERPL-CCNC: 70 IU/L (ref 39–117)
ALT SERPL-CCNC: 48 IU/L (ref 0–32)
AST SERPL-CCNC: 32 IU/L (ref 0–40)
BILIRUB SERPL-MCNC: <0.2 MG/DL (ref 0–1.2)
BUN SERPL-MCNC: 16 MG/DL (ref 8–27)
BUN/CREAT SERPL: 16 (ref 12–28)
CALCIUM SERPL-MCNC: 9.6 MG/DL (ref 8.7–10.3)
CHLORIDE SERPL-SCNC: 103 MMOL/L (ref 96–106)
CHOLEST SERPL-MCNC: 199 MG/DL (ref 100–199)
CO2 SERPL-SCNC: 25 MMOL/L (ref 20–29)
CREAT SERPL-MCNC: 0.98 MG/DL (ref 0.57–1)
EST. AVERAGE GLUCOSE BLD GHB EST-MCNC: 126 MG/DL
GLOBULIN SER CALC-MCNC: 3.2 G/DL (ref 1.5–4.5)
GLUCOSE SERPL-MCNC: 111 MG/DL (ref 65–99)
HBA1C MFR BLD: 6 % (ref 4.8–5.6)
HDLC SERPL-MCNC: 53 MG/DL
LDLC SERPL CALC-MCNC: 109 MG/DL (ref 0–99)
POTASSIUM SERPL-SCNC: 4.7 MMOL/L (ref 3.5–5.2)
PROT SERPL-MCNC: 7.5 G/DL (ref 6–8.5)
SODIUM SERPL-SCNC: 143 MMOL/L (ref 134–144)
TRIGL SERPL-MCNC: 185 MG/DL (ref 0–149)
VLDLC SERPL CALC-MCNC: 37 MG/DL (ref 5–40)

## 2019-09-19 NOTE — TELEPHONE ENCOUNTER
PA is required for Diclofenac tabs    Cover My Meds Key:  Merline Roulette KeyJacolyn Bushman - PA Case ID: [id number] - Rx #: [id number]     Outcome   Approved today   Questionnaire submitted. PA Case [id number] Status: Approved. Authorization and Notifications Completed.     Pharmacy notified via fax

## 2019-10-18 ENCOUNTER — TELEPHONE (OUTPATIENT)
Dept: ORTHOPEDIC SURGERY | Age: 64
End: 2019-10-18

## 2019-10-18 DIAGNOSIS — E78.2 MIXED HYPERLIPIDEMIA: Chronic | ICD-10-CM

## 2019-10-18 NOTE — TELEPHONE ENCOUNTER
Returned call to patient, verified Name/, informed patient of below message per NP Justice. Per patient, she has been trying to call her PCP Magi Espinoza, ESTELA, for over a week now. Per patient, when she calls she waits on hold for \"a long time\", the message states they have \"a lot calls coming in at this time\". Informed patient I will route this message to NP Yassine Chacon on her behalf. Patient very grateful, verbalized agreement/understanding.

## 2019-10-18 NOTE — TELEPHONE ENCOUNTER
Patient is asking if Dr. Roma Hargrove can refill pravastatin (PRAVACHOL) 40 mg for her because she can't get in touch with her PCP Xiomara Link. Please advise and pend new Rx if appropriate.

## 2019-10-21 RX ORDER — PRAVASTATIN SODIUM 40 MG/1
40 TABLET ORAL
Qty: 90 TAB | Refills: 0 | Status: SHIPPED | OUTPATIENT
Start: 2019-10-21 | End: 2019-11-25 | Stop reason: SDUPTHER

## 2019-11-13 RX ORDER — FLUTICASONE PROPIONATE 50 MCG
SPRAY, SUSPENSION (ML) NASAL
Qty: 16 G | Refills: 2 | Status: SHIPPED | OUTPATIENT
Start: 2019-11-13 | End: 2019-11-25 | Stop reason: SDUPTHER

## 2019-11-18 ENCOUNTER — OFFICE VISIT (OUTPATIENT)
Dept: ORTHOPEDIC SURGERY | Age: 64
End: 2019-11-18

## 2019-11-18 VITALS
OXYGEN SATURATION: 97 % | BODY MASS INDEX: 28.09 KG/M2 | HEIGHT: 65 IN | SYSTOLIC BLOOD PRESSURE: 139 MMHG | WEIGHT: 168.6 LBS | TEMPERATURE: 98.1 F | RESPIRATION RATE: 14 BRPM | HEART RATE: 74 BPM | DIASTOLIC BLOOD PRESSURE: 73 MMHG

## 2019-11-18 DIAGNOSIS — M16.0 BILATERAL PRIMARY OSTEOARTHRITIS OF HIP: ICD-10-CM

## 2019-11-18 DIAGNOSIS — F17.200 TOBACCO USE DISORDER: ICD-10-CM

## 2019-11-18 DIAGNOSIS — M47.816 LUMBAR FACET ARTHROPATHY: ICD-10-CM

## 2019-11-18 DIAGNOSIS — M79.2 NEURITIS: ICD-10-CM

## 2019-11-18 DIAGNOSIS — M62.838 MUSCLE SPASM: ICD-10-CM

## 2019-11-18 DIAGNOSIS — M54.50 LUMBAR PAIN: ICD-10-CM

## 2019-11-18 DIAGNOSIS — M79.18 MYOFASCIAL PAIN: ICD-10-CM

## 2019-11-18 DIAGNOSIS — M25.552 LEFT HIP PAIN: Primary | ICD-10-CM

## 2019-11-18 DIAGNOSIS — M47.22 OSTEOARTHRITIS OF SPINE WITH RADICULOPATHY, CERVICAL REGION: ICD-10-CM

## 2019-11-18 RX ORDER — KETOROLAC TROMETHAMINE 15 MG/ML
60 INJECTION, SOLUTION INTRAMUSCULAR; INTRAVENOUS ONCE
Qty: 1 VIAL | Refills: 0
Start: 2019-11-18 | End: 2019-11-18

## 2019-11-18 RX ORDER — DICLOFENAC SODIUM 75 MG/1
75 TABLET, DELAYED RELEASE ORAL 2 TIMES DAILY
Qty: 60 TAB | Refills: 4 | Status: SHIPPED | OUTPATIENT
Start: 2019-11-18 | End: 2020-03-16 | Stop reason: SDUPTHER

## 2019-11-18 RX ORDER — METHOCARBAMOL 500 MG/1
500 TABLET, FILM COATED ORAL 4 TIMES DAILY
Qty: 120 TAB | Refills: 4 | Status: SHIPPED | OUTPATIENT
Start: 2019-11-18 | End: 2020-07-13

## 2019-11-18 RX ORDER — GABAPENTIN 300 MG/1
CAPSULE ORAL
Qty: 90 CAP | Refills: 4 | Status: SHIPPED | OUTPATIENT
Start: 2019-11-18 | End: 2020-05-26 | Stop reason: SDUPTHER

## 2019-11-18 NOTE — PROGRESS NOTES
Toradol 60 mg IM administered to patient's right gluteus. Aspiration technique performed, no blood return noted. Consent signed, Patient tolerated injection well. No redness/swelling/drainage noted to injection site. Patient declined remaining in office for observation. Patient aware to call 911 immediately with any s/s of throat/lip/tongue swelling and/or shortness of breath. No further action required at this time.

## 2019-11-18 NOTE — PATIENT INSTRUCTIONS
Neck Arthritis: Exercises  Introduction  Here are some examples of exercises for you to try. The exercises may be suggested for a condition or for rehabilitation. Start each exercise slowly. Ease off the exercises if you start to have pain. You will be told when to start these exercises and which ones will work best for you. How to do the exercises  Neck stretches to the side    1. This stretch works best if you keep your shoulder down as you lean away from it. To help you remember to do this, start by relaxing your shoulders and lightly holding on to your thighs or your chair. 2. Tilt your head toward your shoulder and hold for 15 to 30 seconds. Let the weight of your head stretch your muscles. 3. Repeat 2 to 4 times toward each shoulder. Chin tuck    1. Lie on the floor with a rolled-up towel under your neck. Your head should be touching the floor. 2. Slowly bring your chin toward your chest.  3. Hold for a count of 6, and then relax for up to 10 seconds. 4. Repeat 8 to 12 times. Active cervical rotation    1. Sit in a firm chair, or stand up straight. 2. Keeping your chin level, turn your head to the right, and hold for 15 to 30 seconds. 3. Turn your head to the left and hold for 15 to 30 seconds. 4. Repeat 2 to 4 times to each side. Shoulder blade squeeze    1. While standing, squeeze your shoulder blades together. 2. Do not raise your shoulders up as you are squeezing. 3. Hold for 6 seconds. 4. Repeat 8 to 12 times. Shoulder rolls    1. Sit comfortably with your feet shoulder-width apart. You can also do this exercise standing up. 2. Roll your shoulders up, then back, and then down in a smooth, circular motion. 3. Repeat 2 to 4 times. Follow-up care is a key part of your treatment and safety. Be sure to make and go to all appointments, and call your doctor if you are having problems.  It's also a good idea to know your test results and keep a list of the medicines you take.  Where can you learn more? Go to http://bernardino-prateek.info/. Enter H680 in the search box to learn more about \"Neck Arthritis: Exercises. \"  Current as of: June 26, 2019  Content Version: 12.2  © 1878-4832 [employer]. Care instructions adapted under license by [employer] (which disclaims liability or warranty for this information). If you have questions about a medical condition or this instruction, always ask your healthcare professional. [address] any warranty or liability for your use of this information. Low Back Arthritis: Exercises  Introduction  Here are some examples of typical rehabilitation exercises for your condition. Start each exercise slowly. Ease off the exercise if you start to have pain. Your doctor or physical therapist will tell you when you can start these exercises and which ones will work best for you. When you are not being active, find a comfortable position for rest. Some people are comfortable on the floor or a medium-firm bed with a small pillow under their head and another under their knees. Some people prefer to lie on their side with a pillow between their knees. Don't stay in one position for too long. Take short walks (10 to 20 minutes) every 2 to 3 hours. Avoid slopes, hills, and stairs until you feel better. Walk only distances you can manage without pain, especially leg pain. How to do the exercises  Pelvic tilt    4. Lie on your back with your knees bent. 5. \"Brace\" your stomach--tighten your muscles by pulling in and imagining your belly button moving toward your spine. 6. Press your lower back into the floor. You should feel your hips and pelvis rock back. 7. Hold for 6 seconds while breathing smoothly. 8. Relax and allow your pelvis and hips to rock forward. 9. Repeat 8 to 12 times. Back stretches    5. Get down on your hands and knees on the floor.   6. Relax your head and allow it to droop. Round your back up toward the ceiling until you feel a nice stretch in your upper, middle, and lower back. Hold this stretch for as long as it feels comfortable, or about 15 to 30 seconds. 7. Return to the starting position with a flat back while you are on your hands and knees. 8. Let your back sway by pressing your stomach toward the floor. Lift your buttocks toward the ceiling. 9. Hold this position for 15 to 30 seconds. 10. Repeat 2 to 4 times. Follow-up care is a key part of your treatment and safety. Be sure to make and go to all appointments, and call your doctor if you are having problems. It's also a good idea to know your test results and keep a list of the medicines you take. Where can you learn more? Go to http://bernardino-prateek.info/. Enter H185 in the search box to learn more about \"Low Back Arthritis: Exercises. \"  Current as of: June 26, 2019  Content Version: 12.2  © 4814-2956 [employer]. Care instructions adapted under license by [employer] (which disclaims liability or warranty for this information). If you have questions about a medical condition or this instruction, always ask your healthcare professional. [place] [address] any warranty or liability for your use of this information. Hip Arthritis: Exercises  Introduction  Here are some examples of exercises for you to try. The exercises may be suggested for a condition or for rehabilitation. Start each exercise slowly. Ease off the exercises if you start to have pain. You will be told when to start these exercises and which ones will work best for you. How to do the exercises  Straight-leg raises to the outside    1. Lie on your side, with your affected hip on top. 2. Tighten the front thigh muscles of your top leg to keep your knee straight. 3. Keep your hip and your leg straight in line with the rest of your body, and keep your knee pointing forward. Do not drop your hip back. 4. Lift your top leg straight up toward the ceiling, about 12 inches off the floor. Hold for about 6 seconds, then slowly lower your leg. 5. Repeat 8 to 12 times. 6. Switch legs and repeat steps 1 through 5, even if only one hip is sore. Straight-leg raises to the inside    1. Lie on your side with your affected hip on the floor. 2. You can either prop up your other leg on a chair, or you can bend that knee and put that foot in front of your other knee. Do not drop your hip back. 3. Tighten the muscles on the front thigh of your bottom leg to straighten that knee. 4. Keep your kneecap pointing forward and your leg straight, and lift your bottom leg up toward the ceiling about 6 inches. Hold for about 6 seconds, then lower slowly. 5. Repeat 8 to 12 times. 6. Switch legs and repeat steps 1 through 5, even if only one hip is sore. Hip hike    1. Stand sideways on the bottom step of a staircase, and hold on to the banister or wall. 2. Keeping both knees straight, lift your good leg off the step and let it hang down. Then hike your good hip up to the same level as your affected hip or a little higher. 3. Repeat 8 to 12 times. 4. Switch legs and repeat steps 1 through 3, even if only one hip is sore. Bridging    1. Lie on your back with both knees bent. Your knees should be bent about 90 degrees. 2. Then push your feet into the floor, squeeze your buttocks, and lift your hips off the floor until your shoulders, hips, and knees are all in a straight line. 3. Hold for about 6 seconds as you continue to breathe normally, and then slowly lower your hips back down to the floor and rest for up to 10 seconds. 4. Repeat 8 to 12 times. Hamstring stretch (lying down)    1. Lie flat on your back with your legs straight. If you feel discomfort in your back, place a small towel roll under your lower back.   2. Holding the back of your affected leg, lift your leg straight up and toward your body until you feel a stretch at the back of your thigh. 3. Hold the stretch for at least 30 seconds. 4. Repeat 2 to 4 times. 5. Switch legs and repeat steps 1 through 4, even if only one hip is sore. Standing quadriceps stretch    1. If you are not steady on your feet, hold on to a chair, counter, or wall. You can also lie on your stomach or your side to do this exercise. 2. Bend the knee of the leg you want to stretch, and reach behind you to grab the front of your foot or ankle with the hand on the same side. For example, if you are stretching your right leg, use your right hand. 3. Keeping your knees next to each other, pull your foot toward your buttock until you feel a gentle stretch across the front of your hip and down the front of your thigh. Your knee should be pointed directly to the ground, and not out to the side. 4. Hold the stretch for at least 15 to 30 seconds. 5. Repeat 2 to 4 times. 6. Switch legs and repeat steps 1 through 5, even if only one hip is sore. Hip rotator stretch    1. Lie on your back with both knees bent and your feet flat on the floor. 2. Put the ankle of your affected leg on your opposite thigh near your knee. 3. Use your hand to gently push your knee away from your body until you feel a gentle stretch around your hip. 4. Hold the stretch for 15 to 30 seconds. 5. Repeat 2 to 4 times. 6. Repeat steps 1 through 5, but this time use your hand to gently pull your knee toward your opposite shoulder. 7. Switch legs and repeat steps 1 through 6, even if only one hip is sore. Knee-to-chest    1. Lie on your back with your knees bent and your feet flat on the floor. 2. Bring your affected leg to your chest, keeping the other foot flat on the floor (or keeping the other leg straight, whichever feels better on your lower back). 3. Keep your lower back pressed to the floor. Hold for at least 15 to 30 seconds.   4. Relax, and lower the knee to the starting position. 5. Repeat 2 to 4 times. 6. Switch legs and repeat steps 1 through 5, even if only one hip is sore. 7. To get more stretch, put your other leg flat on the floor while pulling your knee to your chest.    Clamshell    1. Lie on your side, with your affected hip on top. Keep your feet and knees together and your knees bent. 2. Raise your top knee, but keep your feet together. Do not let your hips roll back. Your legs should open up like a clamshell. 3. Hold for 6 seconds. 4. Slowly lower your knee back down. Rest for 10 seconds. 5. Repeat 8 to 12 times. 6. Switch legs and repeat steps 1 through 5, even if only one hip is sore. Follow-up care is a key part of your treatment and safety. Be sure to make and go to all appointments, and call your doctor if you are having problems. It's also a good idea to know your test results and keep a list of the medicines you take. Where can you learn more? Go to http://bernardino-prateek.info/. Enter Z941 in the search box to learn more about \"Hip Arthritis: Exercises. \"  Current as of: June 26, 2019  Content Version: 12.2  © 9930-5485 [employer]. Care instructions adapted under license by [employer] (which disclaims liability or warranty for this information). If you have questions about a medical condition or this instruction, always ask your healthcare professional. [address] any warranty or liability for your use of this information.

## 2019-11-18 NOTE — LETTER
11/19/19 Patient: Katherine Kolb YOB: 1955 Date of Visit: 11/18/2019 Jimenez Acevedo NP 
[address] [facility] [address] [facility] [address] VIA In Basket Dear Jimenez Acevedo NP, Thank you for referring Ms. Oleg Weems to [facility] for evaluation. My notes for this consultation are attached. If you have questions, please do not hesitate to call me. I look forward to following your patient along with you. Sincerely, Carolina Rose MD

## 2019-11-18 NOTE — PROGRESS NOTES
[facility]  [address] [address]  Phone: [phone]  Fax: [phone]    Pt's YOB: 1955    ASSESSMENT   Diagnoses and all orders for this visit:    1. Left hip pain  -     AMB POC X-RAY RADEX HIP UNI WITH PELVIS 2-3 VIEWS  -     KETOROLAC TROMETHAMINE INJ  -     ketorolac (TORADOL) 15 mg/mL soln injection; 4 mL by IntraMUSCular route once for 1 dose. -     NE THER/PROPH/DIAG INJECTION, SUBCUT/IM  -     diclofenac EC (VOLTAREN) 75 mg EC tablet; Take 1 Tab by mouth two (2) times a day. Take with food as needed for pain    2. Myofascial pain  -     gabapentin (NEURONTIN) 300 mg capsule; 1 po tid  Indications: Neuropathic Pain    3. Neuritis  -     gabapentin (NEURONTIN) 300 mg capsule; 1 po tid  Indications: Neuropathic Pain    4. Muscle spasm  -     methocarbamol (ROBAXIN) 500 mg tablet; Take 1 Tab by mouth four (4) times daily. 5. Bilateral primary osteoarthritis of hip  -     diclofenac EC (VOLTAREN) 75 mg EC tablet; Take 1 Tab by mouth two (2) times a day. Take with food as needed for pain    6. Lumbar facet arthropathy  -     KETOROLAC TROMETHAMINE INJ  -     ketorolac (TORADOL) 15 mg/mL soln injection; 4 mL by IntraMUSCular route once for 1 dose. -     NE THER/PROPH/DIAG INJECTION, SUBCUT/IM  -     diclofenac EC (VOLTAREN) 75 mg EC tablet; Take 1 Tab by mouth two (2) times a day. Take with food as needed for pain    7. Osteoarthritis of spine with radiculopathy, cervical region  -     diclofenac EC (VOLTAREN) 75 mg EC tablet; Take 1 Tab by mouth two (2) times a day. Take with food as needed for pain    8. Lumbar pain  -     AMB POC XRAY, SPINE, LUMBOSACRAL; 2 O  -     KETOROLAC TROMETHAMINE INJ  -     ketorolac (TORADOL) 15 mg/mL soln injection; 4 mL by IntraMUSCular route once for 1 dose. -     NE THER/PROPH/DIAG INJECTION, SUBCUT/IM  -     diclofenac EC (VOLTAREN) 75 mg EC tablet;  Take 1 Tab by mouth two (2) times a day. Take with food as needed for pain    9. Tobacco use disorder         IMPRESSION AND PLAN:  Michael Garsia is a 66 y.o. female with history of lumbar arthritis pain. She complains of constant lower back pain and denies any change since in symptoms since her last office visit. Pt takes Voltaren 75 mg and Robaxin 500 mg as needed and Neurontin 300 mg 1 tab QAM and 1 tab QHS. 1) Pt was given information on hip and lumbar arthritis exercises. 2) She received a refill of Voltaren 75 mg 1 tab BID prn with food. 3) Pt also received a refill of Robaxin 500 mg 1 tab 4 x daily prn muscle spasm. 4) She will increase her Neurontin 300 mg to TID and she received refills. 5) I recommended that the patient lose weight. 6) She received a Toradol 60 injection. 7) Ms. Jane Patel has a reminder for a \"due or due soon\" health maintenance. I have asked that she contact her primary care provider, Arianne Briggs NP, for follow-up on this health maintenance. 8)  demonstrated consistency with prescribing. 9) She was offered PT but declined    Follow-up and Dispositions    · Return in about 4 months (around 3/18/2020) for Medication follow up. HISTORY OF PRESENT ILLNESS:  Michael Garsia is a 60 y.o. female with history of lumbar arthritis pain and presents to the office today for follow up. She complains of constant lower back pain and denies any change since in symptoms since her last office visit. She is having increased hip pain and difficulty with standing and walking; she has not had any significant evaluation of her hip or lumbar region recently. Pt takes Voltaren 75 mg and Robaxin 500 mg as needed and requests refills at this time. She also takes Neurontin 300 mg 1 tab QAM and 1 tab QHS and this has been effective for her neuropathic symptoms. Pt at this time desires to continue with current care. Of note, she is a smoker.  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 Pain Scale: 9/10    PCP: Arianne Briggs NP     Past Medical History:   Diagnosis Date    CAD (coronary artery disease)     Hypertension     Uterine fibroid         Social History     Socioeconomic History    Marital status: SINGLE     Spouse name: Not on file    Number of children: Not on file    Years of education: Not on file    Highest education level: Not on file   Occupational History    Not on file   Social Needs    Financial resource strain: Not on file    Food insecurity:     Worry: Not on file     Inability: Not on file    Transportation needs:     Medical: Not on file     Non-medical: Not on file   Tobacco Use    Smoking status: Current Every Day Smoker     Packs/day: 0.25     Years: 2.00     Pack years: 0.50    Smokeless tobacco: Never Used   Substance and Sexual Activity    Alcohol use: No     Alcohol/week: 0.8 standard drinks     Types: 1 Cans of beer per week    Drug use: Yes     Types: Marijuana     Comment: rarely     Sexual activity: Not on file   Lifestyle    Physical activity:     Days per week: Not on file     Minutes per session: Not on file    Stress: Not on file   Relationships    Social connections:     Talks on phone: Not on file     Gets together: Not on file     Attends [employer] service: Not on file     Active member of club or organization: Not on file     Attends meetings of clubs or organizations: Not on file     Relationship status: Not on file    Intimate partner violence:     Fear of current or ex partner: Not on file     Emotionally abused: Not on file     Physically abused: Not on file     Forced sexual activity: Not on file   Other Topics Concern    Not on file   Social History Narrative    Not on file       Current Outpatient Medications   Medication Sig Dispense Refill    gabapentin (NEURONTIN) 300 mg capsule 1 po tid  Indications: Neuropathic Pain 90 Cap 4    methocarbamol (ROBAXIN) 500 mg tablet Take 1 Tab by mouth four (4) times daily.  [address] Tab 4    diclofenac EC (VOLTAREN) 75 mg EC tablet Take 1 Tab by mouth two (2) times a day. Take with food as needed for pain 60 Tab 4    fluticasone propionate (FLONASE) 50 mcg/actuation nasal spray USE 2 SPRAYS IN EACH NOSTRIL ONCE DAILY AS NEEDED FOR  RHINITIS 16 g 2    pravastatin (PRAVACHOL) 40 mg tablet Take 1 Tab by mouth nightly. 90 Tab 0    metFORMIN ER (GLUCOPHAGE XR) 500 mg tablet Take 2 Tabs by mouth two (2) times daily (with meals). 30 Tab 2    Blood-Glucose Meter (TRUE METRIX GLUCOSE METER) misc Use to check blood sugar 2 times per day. 1 Each 0    glucose blood VI test strips (TRUE METRIX GLUCOSE TEST STRIP) strip Use to check blood sugar 2 times per day. 100 Strip 11    lancets misc Use to check blood sugar 2 times daily 1 Each 11    insulin glargine (LANTUS U-100 INSULIN) 100 unit/mL injection 10 Units by SubCUTAneous route daily. 10 Vial 5    Insulin Needles, Disposable, 31 gauge x 5/16\" ndle Use to inject Lantus daily. 1 Package 11    sertraline (ZOLOFT) 50 mg tablet Take 1 Tab by mouth daily. 2    Lancets misc Check glucose ACHS 200 Each 11    alcohol swabs padm 1 Each by Apply Externally route Before breakfast, lunch, dinner and at bedtime. accucheck ACHS and record in log. Take log to every doctor visit. **MEDICALLY NECESSARY** 100 Pad 2    traZODone (DESYREL) 150 mg tablet Take 150-300 mg by mouth nightly.  sertraline (ZOLOFT) 100 mg tablet Take 100 mg by mouth daily.  perphenazine (TRILAFON) 4 mg tablet Take 4 mg by mouth three (3) times daily.  benztropine (COGENTIN) 1 mg tablet Take 1 mg by mouth daily. Allergies   Allergen Reactions    Latex Rash    Penicillins Nausea and Vomiting         REVIEW OF SYSTEMS    Constitutional: Negative for fever, chills, or weight change. Respiratory: Negative for cough or shortness of breath. Cardiovascular: Negative for chest pain or palpitations.   Gastrointestinal: Negative for acid reflux, change in bowel habits, or constipation. Genitourinary: Negative for dysuria and flank pain. Musculoskeletal: Positive for cervical and lumbar pain. Skin: Negative for rash. Neurological: Negative for headaches, dizziness, or numbness. Endo/Heme/Allergies: Negative for increased bruising. Psychiatric/Behavioral: Positive for difficulty with sleep. PHYSICAL EXAMINATION  Visit Vitals  /73   Pulse 74   Temp 98.1 °F (36.7 °C) (Oral)   Resp 14   Ht 5' 5\" (1.651 m)   Wt 168 lb 9.6 oz (76.5 kg)   SpO2 97%   BMI 28.06 kg/m²       Constitutional: Awake, alert, and in no acute distress. Neurological: 1+ symmetrical DTRs in the upper extremities. 1+ symmetrical DTRs in the lower extremities. Sensation to light touch is intact. Negative Gaffney's sign bilaterally. Skin: warm, dry, and intact. Musculoskeletal: Tenderness to palpation in the lower lumbar region. Moderate pain with extension and axial loading. Improvement with forward flexion. Pain with internal rotation of the left hip. No pain with external rotation of her hips. Negative straight leg raise bilaterally. Biceps  Triceps Deltoids Wrist Ext Wrist Flex Hand Intrin   Right +4/5 +4/5 +4/5 +4/5 +4/5 +4/5   Left +4/5 +4/5 +4/5 +4/5 +4/5 +4/5      Hip Flex  Quads Hamstrings Ankle DF EHL Ankle PF   Right +4/5 +4/5 +4/5 +4/5 +4/5 +4/5   Left  4/5 +4/5 +4/5 +4/5 +4/5 +4/5     IMAGING:    Thoracic spine MRI from 04/04/2018 was personally reviewed with the patient and demonstrated:  Results from [facility] on 04/04/18    [facility] CONT    Narrative MRI THORACIC WITHOUT CONTRAST    CPT CODE: 87016    HISTORY: Mid back pain, neck pain, right leg weakness since 2001. COMPARISON: None. TECHNIQUE: Sagittal T1, T2 and stir and axial T2 weighted sequences were  acquired through the thoracic spine without contrast.        FINDINGS:     There is normal anatomic alignment of the thoracic spine. Vertebral body heights  are maintained.  No infiltrative marrow replacement process or marrow edema. Thoracic spinal cord maintains normal caliber, signal intensity and morphology  throughout. Small bilateral pleural effusions. There are multilevel tiny disc protrusions with contact of the ventral cord at  T6/T7 and T7/T8, but no impingement. No critical foraminal compromise. Incidentally imaged surrounding soft tissues demonstrate no acute abnormalities  otherwise.           Impression IMPRESSION:    Small disc protrusions are most pronounced at T6/T7 and T7/T8 where there is  mild flattening of the ventral cord, but no cord impingement. Small bilateral pleural effusions.                         Lumbar spine x-rays from 11/18/2019 were personally reviewed and demonstrated:   Multilevel degenerative facet, some straightening of lumbar spine, degenerative disc at L5-S1, no instability    Left hip AP x-rays from 11/18/2019 were personally reviewed with the patient and demonstrated:   Degenerative joint findings bilaterally. Right shoulder x-rays from 03/18/2017 were personally reviewed and demonstrated:  Results from Hospital Encounter on 03/18/17   XR SHOULDER RT AP/LAT MIN 2 V     Narrative Shoulder series. CPT code: 59654    Clinical history: Arthritis in the shoulder and pain in the back under the  shoulder blade without known injury of unspecified chronicity. Technique: Two shoulder views. Comparison: No priors. Findings: There are no fractures.  Joint relationships at glenohumeral joint are  normal.  Acromioclavicular joint is normal. There is convex appearance of the  underside of the distal acromion process.  There are no soft tissue  calcifications.              Impression IMPRESSION:    No acute abnormalities.  Convex appearance of the inferior aspect of the acromion  process could increased risk of rotator cuff impingement.                    Cervical spine x-rays from 12/27/2016 were personally reviewed with the Pt and demonstrated:  FINDINGS:       Vertebral body height and alignment is intact. Slight straightening of the  cervical lordosis. Moderate disc height loss endplate sclerosis and vertebral  body osteophytes from C3 through C7. Alignment is intact. No fracture. Mild  facet joint arthropathy at multiple levels. The prevertebral space appears normal.       IMPRESSION:      Moderate cervical spondylosis.        Written by Katie Hernandez, as dictated by Marcela Gayle MD.  I, Dr. Marcela Gayle confirm that all documentation is accurate.

## 2019-11-25 ENCOUNTER — TELEPHONE (OUTPATIENT)
Dept: FAMILY MEDICINE CLINIC | Age: 64
End: 2019-11-25

## 2019-11-25 ENCOUNTER — OFFICE VISIT (OUTPATIENT)
Dept: FAMILY MEDICINE CLINIC | Age: 64
End: 2019-11-25

## 2019-11-25 VITALS
RESPIRATION RATE: 16 BRPM | DIASTOLIC BLOOD PRESSURE: 76 MMHG | TEMPERATURE: 98.4 F | HEIGHT: 65 IN | BODY MASS INDEX: 27.66 KG/M2 | OXYGEN SATURATION: 98 % | WEIGHT: 166 LBS | SYSTOLIC BLOOD PRESSURE: 142 MMHG | HEART RATE: 80 BPM

## 2019-11-25 DIAGNOSIS — I10 ESSENTIAL HYPERTENSION: ICD-10-CM

## 2019-11-25 DIAGNOSIS — E78.2 MIXED HYPERLIPIDEMIA: Chronic | ICD-10-CM

## 2019-11-25 DIAGNOSIS — E11.65 CONTROLLED TYPE 2 DIABETES MELLITUS WITH HYPERGLYCEMIA, UNSPECIFIED WHETHER LONG TERM INSULIN USE (HCC): Primary | ICD-10-CM

## 2019-11-25 LAB — HBA1C MFR BLD HPLC: 5.8 %

## 2019-11-25 RX ORDER — INSULIN GLARGINE 100 [IU]/ML
10 INJECTION, SOLUTION SUBCUTANEOUS DAILY
Qty: 10 VIAL | Refills: 5 | Status: SHIPPED | OUTPATIENT
Start: 2019-11-25 | End: 2019-11-25

## 2019-11-25 RX ORDER — PRAVASTATIN SODIUM 40 MG/1
40 TABLET ORAL
Qty: 90 TAB | Refills: 1 | Status: SHIPPED | OUTPATIENT
Start: 2019-11-25 | End: 2020-07-21 | Stop reason: SDUPTHER

## 2019-11-25 RX ORDER — METFORMIN HYDROCHLORIDE 500 MG/1
1000 TABLET, EXTENDED RELEASE ORAL 2 TIMES DAILY WITH MEALS
Qty: 360 TAB | Refills: 1 | Status: SHIPPED | OUTPATIENT
Start: 2019-11-25 | End: 2020-07-21

## 2019-11-25 RX ORDER — FLUTICASONE PROPIONATE 50 MCG
SPRAY, SUSPENSION (ML) NASAL
Qty: 16 G | Refills: 5 | Status: SHIPPED | OUTPATIENT
Start: 2019-11-25 | End: 2020-12-02

## 2019-11-25 NOTE — PROGRESS NOTES
Subjective:    Ricky Mathtews is a 60y.o. year old female seen for follow up of diabetes. She also has hypertension and hyperlipidemia. Diabetic Review of Systems - medication compliance: compliant all of the time, diabetic diet compliance: compliant most of the time, home glucose monitoring: is performed regularly, fasting values range 92, nonfasting values range 140s, further diabetic ROS: no polyuria or polydipsia, no chest pain, dyspnea or TIA's, no numbness, tingling or pain in extremities States she has not taken lantus since her last visit. Comments she has only been taking metformin 1 tab, states she didn't want her sugar to drop. rpeorts she thought the metformin was her insulin. Other symptoms and concerns: none. Patient Active Problem List   Diagnosis Code    Type II diabetes mellitus, uncontrolled (HCC) E11.65    Mixed hyperlipidemia E78.2    Myofascial pain M79.18    DDD (degenerative disc disease), cervical M50.30    Osteoarthritis of spine with radiculopathy, cervical region M47.22    Right shoulder pain M25.511    Tobacco use disorder F17.200    Muscle spasm M62.838     Current Outpatient Medications   Medication Sig Dispense Refill    gabapentin (NEURONTIN) 300 mg capsule 1 po tid  Indications: Neuropathic Pain 90 Cap 4    methocarbamol (ROBAXIN) 500 mg tablet Take 1 Tab by mouth four (4) times daily. 120 Tab 4    diclofenac EC (VOLTAREN) 75 mg EC tablet Take 1 Tab by mouth two (2) times a day. Take with food as needed for pain 60 Tab 4    fluticasone propionate (FLONASE) 50 mcg/actuation nasal spray USE 2 SPRAYS IN EACH NOSTRIL ONCE DAILY AS NEEDED FOR  RHINITIS 16 g 2    pravastatin (PRAVACHOL) 40 mg tablet Take 1 Tab by mouth nightly. 90 Tab 0    metFORMIN ER (GLUCOPHAGE XR) 500 mg tablet Take 2 Tabs by mouth two (2) times daily (with meals). 30 Tab 2    Blood-Glucose Meter (TRUE METRIX GLUCOSE METER) misc Use to check blood sugar 2 times per day.  1 Each 0    glucose blood VI test strips (TRUE METRIX GLUCOSE TEST STRIP) strip Use to check blood sugar 2 times per day. 100 Strip 11    lancets misc Use to check blood sugar 2 times daily 1 Each 11    insulin glargine (LANTUS U-100 INSULIN) 100 unit/mL injection 10 Units by SubCUTAneous route daily. 10 Vial 5    Insulin Needles, Disposable, 31 gauge x 5/16\" ndle Use to inject Lantus daily. 1 Package 11    sertraline (ZOLOFT) 50 mg tablet Take 1 Tab by mouth daily. 2    Lancets misc Check glucose ACHS 200 Each 11    alcohol swabs padm 1 Each by Apply Externally route Before breakfast, lunch, dinner and at bedtime. accucheck ACHS and record in log. Take log to every doctor visit. **MEDICALLY NECESSARY** 100 Pad 2    traZODone (DESYREL) 150 mg tablet Take 150-300 mg by mouth nightly.  sertraline (ZOLOFT) 100 mg tablet Take 100 mg by mouth daily.  perphenazine (TRILAFON) 4 mg tablet Take 4 mg by mouth three (3) times daily.  benztropine (COGENTIN) 1 mg tablet Take 1 mg by mouth daily. Allergies   Allergen Reactions    Latex Rash    Penicillins Nausea and Vomiting     No past surgical history on file.   Family History   Problem Relation Age of Onset    Diabetes Mother     Stroke Mother     Diabetes Father     Cancer Paternal Grandfather     Breast Cancer Cousin       Lab Results   Component Value Date/Time    Cholesterol, total 199 09/18/2019 10:27 AM    HDL Cholesterol 53 09/18/2019 10:27 AM    LDL, calculated 109 (H) 09/18/2019 10:27 AM    VLDL, calculated 37 09/18/2019 10:27 AM    Triglyceride 185 (H) 09/18/2019 10:27 AM    CHOL/HDL Ratio 2.9 10/20/2018 08:33 AM     Lab Results   Component Value Date/Time    Sodium 143 09/18/2019 10:27 AM    Potassium 4.7 09/18/2019 10:27 AM    Chloride 103 09/18/2019 10:27 AM    CO2 25 09/18/2019 10:27 AM    Anion gap 2 (L) 10/20/2018 08:33 AM    Glucose 111 (H) 09/18/2019 10:27 AM    BUN 16 09/18/2019 10:27 AM    Creatinine 0.98 09/18/2019 10:27 AM BUN/Creatinine ratio 16 09/18/2019 10:27 AM    GFR est AA 71 09/18/2019 10:27 AM    GFR est non-AA 62 09/18/2019 10:27 AM    Calcium 9.6 09/18/2019 10:27 AM    Bilirubin, total <0.2 09/18/2019 10:27 AM    AST (SGOT) 32 09/18/2019 10:27 AM    Alk. phosphatase 70 09/18/2019 10:27 AM    Protein, total 7.5 09/18/2019 10:27 AM    Albumin 4.3 09/18/2019 10:27 AM    Globulin 3.8 10/20/2018 08:33 AM    A-G Ratio 1.3 09/18/2019 10:27 AM    ALT (SGPT) 48 (H) 09/18/2019 10:27 AM     Lab Results   Component Value Date/Time    WBC 7.0 07/25/2016 12:47 AM    HGB 11.3 (L) 07/25/2016 12:47 AM    HCT 32.6 (L) 07/25/2016 12:47 AM    PLATELET 893 59/30/9887 12:47 AM    MCV 87.6 07/25/2016 12:47 AM     Lab Results   Component Value Date/Time    Hemoglobin A1c 6.0 (H) 09/18/2019 10:27 AM    Hemoglobin A1c (POC) 6.5 12/04/2018 11:26 AM     Lab Results   Component Value Date/Time    Microalbumin/Creat ratio (mg/g creat)  12/01/2018 09:43 AM     Cannot calculate ratio due to microalbumin result outside reportable range.     Microalbumin,urine random <0.50 12/01/2018 09:43 AM    Microalbumin urine (POC) 30 04/11/2017 04:00 PM     Wt Readings from Last 3 Encounters:   11/25/19 166 lb (75.3 kg)   11/18/19 168 lb 9.6 oz (76.5 kg)   09/18/19 168 lb (76.2 kg)     Last Point of Care HGB A1C  Hemoglobin A1c (POC)   Date Value Ref Range Status   12/04/2018 6.5 % Final      BP Readings from Last 3 Encounters:   11/25/19 152/83   11/18/19 139/73   09/18/19 137/82       Results for orders placed or performed in visit on 83/73/39   METABOLIC PANEL, COMPREHENSIVE   Result Value Ref Range    Glucose 111 (H) 65 - 99 mg/dL    BUN 16 8 - 27 mg/dL    Creatinine 0.98 0.57 - 1.00 mg/dL    GFR est non-AA 62 >59 mL/min/1.73    GFR est AA 71 >59 mL/min/1.73    BUN/Creatinine ratio 16 12 - 28    Sodium 143 134 - 144 mmol/L    Potassium 4.7 3.5 - 5.2 mmol/L    Chloride 103 96 - 106 mmol/L    CO2 25 20 - 29 mmol/L    Calcium 9.6 8.7 - 10.3 mg/dL    Protein, total 7.5 6.0 - 8.5 g/dL    Albumin 4.3 3.6 - 4.8 g/dL    GLOBULIN, TOTAL 3.2 1.5 - 4.5 g/dL    A-G Ratio 1.3 1.2 - 2.2    Bilirubin, total <0.2 0.0 - 1.2 mg/dL    Alk. phosphatase 70 39 - 117 IU/L    AST (SGOT) 32 0 - 40 IU/L    ALT (SGPT) 48 (H) 0 - 32 IU/L   LIPID PANEL   Result Value Ref Range    Cholesterol, total 199 100 - 199 mg/dL    Triglyceride 185 (H) 0 - 149 mg/dL    HDL Cholesterol 53 >39 mg/dL    VLDL, calculated 37 5 - 40 mg/dL    LDL, calculated 109 (H) 0 - 99 mg/dL   HEMOGLOBIN A1C WITH EAG   Result Value Ref Range    Hemoglobin A1c 6.0 (H) 4.8 - 5.6 %    Estimated average glucose 126 mg/dL     Last Diabetic Foot Exam on: 9/18/19    Objective:  Visit Vitals  /83 (BP 1 Location: Left arm, BP Patient Position: Sitting)   Pulse 80   Temp 98.4 °F (36.9 °C) (Oral)   Resp 16   Ht 5' 5\" (1.651 m)   Wt 166 lb (75.3 kg)   SpO2 98%   BMI 27.62 kg/m²     Awake and alert in no acute distress   Neck supple without lymphadenopathy, no carotid artery bruits auscultated bilaterally. No thyromegaly   Lungs clear throughout   S1 S2 RRR without ectopy or murmur auscultated. Extremities: no clubbing, cyanosis, peripheral edema   Abdomen - soft, nontender, nondistended, no masses or organomegaly  Integumentary: no rashes   Reviewed vital signs     Results for orders placed or performed in visit on 11/25/19   AMB POC HEMOGLOBIN A1C   Result Value Ref Range    Hemoglobin A1c (POC) 5.8 %       Assessment/Plan:    ICD-10-CM ICD-9-CM    1. Controlled type 2 diabetes mellitus with hyperglycemia, unspecified whether long term insulin use (HCC) E11.65 250.80 AMB POC HEMOGLOBIN A1C     790.29    2. Mixed hyperlipidemia E78.2 272.2 pravastatin (PRAVACHOL) 40 mg tablet      LIPID PANEL      METABOLIC PANEL, COMPREHENSIVE   3.  Essential hypertension I10 401.9 LIPID PANEL      METABOLIC PANEL, COMPREHENSIVE     Orders Placed This Encounter    LIPID PANEL    METABOLIC PANEL, COMPREHENSIVE    AMB POC HEMOGLOBIN A1C    pravastatin (PRAVACHOL) 40 mg tablet    DISCONTD: insulin glargine (LANTUS U-100 INSULIN) 100 unit/mL injection    metFORMIN ER (GLUCOPHAGE XR) 500 mg tablet    fluticasone propionate (FLONASE) 50 mcg/actuation nasal spray     Will discontinue lantus  A1C WNL, may continue with Metformin once daily. Issues reviewed with her: low cholesterol diet, weight control and daily exercise discussed. I have discussed the diagnosis with the patient and the intended plan as seen in the above orders. The patient has received an after-visit summary and questions were answered concerning future plans. I have discussed medication side effects and warnings with the patient as well. Patient agreeable with above plan and verbalizes understanding. Follow-up and Dispositions    · Return in about 3 months (around 2/25/2020) for DM/HLD with fasting labs 1 week prior.

## 2019-11-25 NOTE — PROGRESS NOTES
Elian Dominguez presents today for   Chief Complaint   Patient presents with    Diabetes       Is someone accompanying this pt? no    Is the patient using any DME equipment during OV? no    Depression Screening:  3 most recent PHQ Screens 11/18/2019   Little interest or pleasure in doing things Not at all   Feeling down, depressed, irritable, or hopeless Not at all   Total Score PHQ 2 0   Trouble falling or staying asleep, or sleeping too much -   Feeling tired or having little energy -   Poor appetite, weight loss, or overeating -   Feeling bad about yourself - or that you are a failure or have let yourself or your family down -   Trouble concentrating on things such as school, work, reading, or watching TV -   Moving or speaking so slowly that other people could have noticed; or the opposite being so fidgety that others notice -   Thoughts of being better off dead, or hurting yourself in some way -   PHQ 9 Score -   How difficult have these problems made it for you to do your work, take care of your home and get along with others -       Learning Assessment:  Learning Assessment 11/25/2016   PRIMARY LEARNER Patient   HIGHEST LEVEL OF EDUCATION - PRIMARY LEARNER  GRADUATED HIGH SCHOOL OR GED   BARRIERS PRIMARY LEARNER OTHER   CO-LEARNER CAREGIVER No   PRIMARY LANGUAGE ENGLISH   LEARNER PREFERENCE PRIMARY DEMONSTRATION   ANSWERED BY patient   RELATIONSHIP SELF       Abuse Screening:  Abuse Screening Questionnaire 3/19/2018   Do you ever feel afraid of your partner? N   Are you in a relationship with someone who physically or mentally threatens you? N   Is it safe for you to go home? Y       Fall Risk  No flowsheet data found. Health Maintenance reviewed and discussed and ordered per Provider.     Health Maintenance Due   Topic Date Due    Pneumococcal 0-64 years (1 of 1 - PPSV23) 09/07/1963    DTaP/Tdap/Td series (1 - Tdap) 09/07/1968    PAP AKA CERVICAL CYTOLOGY  09/07/1978    Shingrix Vaccine Age 50> (1 of 2) 09/07/2007    FOBT Q 1 YEAR AGE 50-75  03/23/2019    Influenza Age 5 to Adult  08/01/2019    MICROALBUMIN Q1  12/01/2019           Coordination of Care:  1. Have you been to the ER, urgent care clinic since your last visit? Hospitalized since your last visit? no    2. Have you seen or consulted any other health care providers outside of the [address] [place] [address] since your last visit? Include any pap smears or colon screening.  no

## 2020-01-05 ENCOUNTER — HOSPITAL ENCOUNTER (EMERGENCY)
Age: 65
Discharge: HOME | End: 2020-01-05
Attending: EMERGENCY MEDICINE
Payer: MEDICAID

## 2020-01-05 ENCOUNTER — APPOINTMENT (OUTPATIENT)
Dept: GENERAL RADIOLOGY | Age: 65
End: 2020-01-05
Attending: EMERGENCY MEDICINE
Payer: MEDICAID

## 2020-01-05 VITALS
WEIGHT: 166 LBS | HEIGHT: 65 IN | HEART RATE: 72 BPM | BODY MASS INDEX: 27.66 KG/M2 | SYSTOLIC BLOOD PRESSURE: 156 MMHG | TEMPERATURE: 97.7 F | OXYGEN SATURATION: 95 % | DIASTOLIC BLOOD PRESSURE: 89 MMHG | RESPIRATION RATE: 12 BRPM

## 2020-01-05 DIAGNOSIS — J06.9 ACUTE UPPER RESPIRATORY INFECTION: Primary | ICD-10-CM

## 2020-01-05 LAB — GLUCOSE BLD STRIP.AUTO-MCNC: 127 MG/DL (ref 70–110)

## 2020-01-05 PROCEDURE — 82962 GLUCOSE BLOOD TEST: CPT

## 2020-01-05 PROCEDURE — 99283 EMERGENCY DEPT VISIT LOW MDM: CPT

## 2020-01-05 PROCEDURE — 71046 X-RAY EXAM CHEST 2 VIEWS: CPT

## 2020-01-05 RX ORDER — GUAIFENESIN 600 MG/1
600 TABLET, EXTENDED RELEASE ORAL 2 TIMES DAILY
Qty: 20 TAB | Refills: 0 | Status: SHIPPED | OUTPATIENT
Start: 2020-01-05 | End: 2020-02-25

## 2020-01-05 RX ORDER — DOXYCYCLINE 100 MG/1
100 CAPSULE ORAL 2 TIMES DAILY
Qty: 20 CAP | Refills: 0 | Status: SHIPPED | OUTPATIENT
Start: 2020-01-05 | End: 2020-01-15

## 2020-01-05 NOTE — ED PROVIDER NOTES
EMERGENCY DEPARTMENT HISTORY AND PHYSICAL EXAM    Date: 1/5/2020  Patient Name: Bahman Cedillo    History of Presenting Illness     Chief Complaint   Patient presents with    Cough         History Provided By: Patient    Additional History (Context): Bahman Cedillo is a 60 y.o. female with diabetes and hypertension who presents with productive cough for a week. Denies fever. She wonders if she has pneumonia based on the fact that she has had like a little discomfort that began with her coughing initially. Like a dull ache. PCP: Lady Mei NP    Current Outpatient Medications   Medication Sig Dispense Refill    doxycycline (MONODOX) 100 mg capsule Take 1 Cap by mouth two (2) times a day for 10 days. 20 Cap 0    guaiFENesin ER (MUCINEX) 600 mg ER tablet Take 1 Tab by mouth two (2) times a day. 20 Tab 0    pravastatin (PRAVACHOL) 40 mg tablet Take 1 Tab by mouth nightly. 90 Tab 1    metFORMIN ER (GLUCOPHAGE XR) 500 mg tablet Take 2 Tabs by mouth two (2) times daily (with meals). 360 Tab 1    fluticasone propionate (FLONASE) 50 mcg/actuation nasal spray USE 2 SPRAYS IN EACH NOSTRIL ONCE DAILY AS NEEDED FOR  RHINITIS 16 g 5    gabapentin (NEURONTIN) 300 mg capsule 1 po tid  Indications: Neuropathic Pain 90 Cap 4    methocarbamol (ROBAXIN) 500 mg tablet Take 1 Tab by mouth four (4) times daily. 120 Tab 4    diclofenac EC (VOLTAREN) 75 mg EC tablet Take 1 Tab by mouth two (2) times a day. Take with food as needed for pain 60 Tab 4    Blood-Glucose Meter (TRUE METRIX GLUCOSE METER) misc Use to check blood sugar 2 times per day. 1 Each 0    glucose blood VI test strips (TRUE METRIX GLUCOSE TEST STRIP) strip Use to check blood sugar 2 times per day. 100 Strip 11    lancets misc Use to check blood sugar 2 times daily 1 Each 11    Insulin Needles, Disposable, 31 gauge x 5/16\" ndle Use to inject Lantus daily. 1 Package 11    sertraline (ZOLOFT) 50 mg tablet Take 1 Tab by mouth daily.   2    Lancets misc Check glucose ACHS 200 Each 11    alcohol swabs padm 1 Each by Apply Externally route Before breakfast, lunch, dinner and at bedtime. accucheck ACHS and record in log. Take log to every doctor visit. **MEDICALLY NECESSARY** 100 Pad 2    traZODone (DESYREL) 150 mg tablet Take 150-300 mg by mouth nightly.  sertraline (ZOLOFT) 100 mg tablet Take 100 mg by mouth daily.  perphenazine (TRILAFON) 4 mg tablet Take 4 mg by mouth three (3) times daily.  benztropine (COGENTIN) 1 mg tablet Take 1 mg by mouth daily. Past History     Past Medical History:  Past Medical History:   Diagnosis Date    CAD (coronary artery disease)     Hypertension     Uterine fibroid        Past Surgical History:  History reviewed. No pertinent surgical history. Family History:  Family History   Problem Relation Age of Onset    Diabetes Mother     Stroke Mother     Diabetes Father     Cancer Paternal Grandfather     Breast Cancer Cousin        Social History:  Social History     Tobacco Use    Smoking status: Current Every Day Smoker     Packs/day: 0.25     Years: 2.00     Pack years: 0.50    Smokeless tobacco: Never Used   Substance Use Topics    Alcohol use: No     Alcohol/week: 0.8 standard drinks     Types: 1 Cans of beer per week    Drug use: Yes     Types: Marijuana     Comment: rarely        Allergies: Allergies   Allergen Reactions    Latex Rash    Penicillins Nausea and Vomiting         Review of Systems   Review of Systems   Constitutional: Negative for fever. Respiratory: Positive for cough. All Other Systems Negative  Physical Exam     Vitals:    01/05/20 0933   BP: 156/89   Pulse: 72   Resp: 12   Temp: 97.7 °F (36.5 °C)   SpO2: 95%   Weight: 75.3 kg (166 lb)   Height: 5' 5\" (1.651 m)     Physical Exam  Vitals signs and nursing note reviewed. Constitutional:       Appearance: She is well-developed. HENT:      Head: Normocephalic and atraumatic.    Eyes:      Pupils: Pupils are equal, round, and reactive to light. Neck:      Thyroid: No thyromegaly. Vascular: No JVD. Trachea: No tracheal deviation. Cardiovascular:      Rate and Rhythm: Normal rate and regular rhythm. Heart sounds: Normal heart sounds. No murmur. No friction rub. No gallop. Pulmonary:      Effort: Pulmonary effort is normal. No respiratory distress. Breath sounds: Normal breath sounds. No stridor. No wheezing or rales. Chest:      Chest wall: No tenderness. Abdominal:      General: There is no distension. Palpations: Abdomen is soft. There is no mass. Tenderness: There is no tenderness. There is no guarding or rebound. Musculoskeletal:         General: No tenderness. Lymphadenopathy:      Cervical: No cervical adenopathy. Skin:     General: Skin is warm and dry. Coloration: Skin is not pale. Findings: No erythema or rash. Neurological:      Mental Status: She is alert and oriented to person, place, and time. Psychiatric:         Behavior: Behavior normal.         Thought Content: Thought content normal.          Diagnostic Study Results     Labs -     Recent Results (from the past 12 hour(s))   GLUCOSE, POC    Collection Time: 01/05/20  9:38 AM   Result Value Ref Range    Glucose (POC) 127 (H) 70 - 110 mg/dL       Radiologic Studies -   XR CHEST PA LAT   Final Result   IMPRESSION:      Small linear opacity left midlung laterally, likely atelectasis or scar. Otherwise no evidence of acute pulmonary disease. CT Results  (Last 48 hours)    None        CXR Results  (Last 48 hours)               01/05/20 0950  XR CHEST PA LAT Final result    Impression:  IMPRESSION:       Small linear opacity left midlung laterally, likely atelectasis or scar. Otherwise no evidence of acute pulmonary disease. Narrative:  CHEST, PA AND LATERAL       CPT CODE: 45810       INDICATION: Above. Cough and \"sweats\" for the past week. Smoker. COMPARISON: None. TECHNIQUE: PA and lateral chest radiographs are reviewed. FINDINGS:       Small linear opacity left midlung laterally, likely atelectasis or scar. No   evidence of focal pulmonary consolidation, pulmonary edema or pleural effusion. The cardiomediastinal contours are within normal limits. No acute osseous   abnormalities identified. Medical Decision Making   I am the first provider for this patient. I reviewed the vital signs, available nursing notes, past medical history, past surgical history, family history and social history. Vital Signs-Reviewed the patient's vital signs. Records Reviewed: Nursing Notes    Procedures:  Procedures    Provider Notes (Medical Decision Making): Cover her with doxycycline. There is a left lung streak that could be artifact or atelectasis but could be also a developing pneumonia and with her clinical history of the cough for a week will cover. Have her follow-up with her PCP as an outpatient this week. MED RECONCILIATION:  No current facility-administered medications for this encounter. Current Outpatient Medications   Medication Sig    doxycycline (MONODOX) 100 mg capsule Take 1 Cap by mouth two (2) times a day for 10 days.  guaiFENesin ER (MUCINEX) 600 mg ER tablet Take 1 Tab by mouth two (2) times a day.  pravastatin (PRAVACHOL) 40 mg tablet Take 1 Tab by mouth nightly.  metFORMIN ER (GLUCOPHAGE XR) 500 mg tablet Take 2 Tabs by mouth two (2) times daily (with meals).  fluticasone propionate (FLONASE) 50 mcg/actuation nasal spray USE 2 SPRAYS IN EACH NOSTRIL ONCE DAILY AS NEEDED FOR  RHINITIS    gabapentin (NEURONTIN) 300 mg capsule 1 po tid  Indications: Neuropathic Pain    methocarbamol (ROBAXIN) 500 mg tablet Take 1 Tab by mouth four (4) times daily.  diclofenac EC (VOLTAREN) 75 mg EC tablet Take 1 Tab by mouth two (2) times a day.  Take with food as needed for pain    Blood-Glucose Meter (TRUE METRIX GLUCOSE METER) misc Use to check blood sugar 2 times per day.  glucose blood VI test strips (TRUE METRIX GLUCOSE TEST STRIP) strip Use to check blood sugar 2 times per day.  lancets misc Use to check blood sugar 2 times daily    Insulin Needles, Disposable, 31 gauge x 5/16\" ndle Use to inject Lantus daily.  sertraline (ZOLOFT) 50 mg tablet Take 1 Tab by mouth daily.  Lancets misc Check glucose ACHS    alcohol swabs padm 1 Each by Apply Externally route Before breakfast, lunch, dinner and at bedtime. accucheck ACHS and record in log. Take log to every doctor visit. **MEDICALLY NECESSARY**    traZODone (DESYREL) 150 mg tablet Take 150-300 mg by mouth nightly.  sertraline (ZOLOFT) 100 mg tablet Take 100 mg by mouth daily.  perphenazine (TRILAFON) 4 mg tablet Take 4 mg by mouth three (3) times daily.  benztropine (COGENTIN) 1 mg tablet Take 1 mg by mouth daily. Disposition:  home    DISCHARGE NOTE:   12:55 PM    Pt has been reexamined. Patient has no new complaints, changes, or physical findings. Care plan outlined and precautions discussed. Results of CXR were reviewed with the patient. All medications were reviewed with the patient; will d/c home with doxycycline, mucinex. All of pt's questions and concerns were addressed. Patient was instructed and agrees to follow up with PCP, as well as to return to the ED upon further deterioration. Patient is ready to go home.     Follow-up Information     Follow up With Specialties Details Why Contact Info    Mary Mckeon NP Nurse Practitioner Schedule an appointment as soon as possible for a visit in 1 day  [address]  [address]      [facility] EMERGENCY DEPT Emergency Medicine  If symptoms worsen return Monica  [phone]          Current Discharge Medication List      START taking these medications    Details   doxycycline (MONODOX) 100 mg capsule Take 1 Cap by mouth two (2) times a day for 10 days. Qty: 20 Cap, Refills: 0      guaiFENesin ER (MUCINEX) 600 mg ER tablet Take 1 Tab by mouth two (2) times a day. Qty: 20 Tab, Refills: 0                 Diagnosis     Clinical Impression:   1.  Acute upper respiratory infection

## 2020-01-05 NOTE — DISCHARGE INSTRUCTIONS
Patient Education        Upper Respiratory Infection (Cold): Care Instructions  Your Care Instructions    An upper respiratory infection, or URI, is an infection of the nose, sinuses, or throat. URIs are spread by coughs, sneezes, and direct contact. The common cold is the most frequent kind of URI. The flu and sinus infections are other kinds of URIs. Almost all URIs are caused by viruses. Antibiotics won't cure them. But you can treat most infections with home care. This may include drinking lots of fluids and taking over-the-counter pain medicine. You will probably feel better in 4 to 10 days. The doctor has checked you carefully, but problems can develop later. If you notice any problems or new symptoms, get medical treatment right away. Follow-up care is a key part of your treatment and safety. Be sure to make and go to all appointments, and call your doctor if you are having problems. It's also a good idea to know your test results and keep a list of the medicines you take. How can you care for yourself at home? · To prevent dehydration, drink plenty of fluids, enough so that your urine is light yellow or clear like water. Choose water and other caffeine-free clear liquids until you feel better. If you have kidney, heart, or liver disease and have to limit fluids, talk with your doctor before you increase the amount of fluids you drink. · Take an over-the-counter pain medicine, such as acetaminophen (Tylenol), ibuprofen (Advil, Motrin), or naproxen (Aleve). Read and follow all instructions on the label. · Before you use cough and cold medicines, check the label. These medicines may not be safe for young children or for people with certain health problems. · Be careful when taking over-the-counter cold or flu medicines and Tylenol at the same time. Many of these medicines have acetaminophen, which is Tylenol. Read the labels to make sure that you are not taking more than the recommended dose.  Too much acetaminophen (Tylenol) can be harmful. · Get plenty of rest.  · Do not smoke or allow others to smoke around you. If you need help quitting, talk to your doctor about stop-smoking programs and medicines. These can increase your chances of quitting for good. When should you call for help? Call 911 anytime you think you may need emergency care. For example, call if:    · You have severe trouble breathing.    Call your doctor now or seek immediate medical care if:    · You seem to be getting much sicker.     · You have new or worse trouble breathing.     · You have a new or higher fever.     · You have a new rash.    Watch closely for changes in your health, and be sure to contact your doctor if:    · You have a new symptom, such as a sore throat, an earache, or sinus pain.     · You cough more deeply or more often, especially if you notice more mucus or a change in the color of your mucus.     · You do not get better as expected. Where can you learn more? Go to http://bernardino-prateek.info/. Enter R335 in the search box to learn more about \"Upper Respiratory Infection (Cold): Care Instructions. \"  Current as of: June 9, 2019  Content Version: 12.2  © 5792-5971 [employer]. Care instructions adapted under license by [employer] (which disclaims liability or warranty for this information). If you have questions about a medical condition or this instruction, always ask your healthcare professional. [place] [address] any warranty or liability for your use of this information.

## 2020-01-21 ENCOUNTER — OFFICE VISIT (OUTPATIENT)
Dept: FAMILY MEDICINE CLINIC | Age: 65
End: 2020-01-21

## 2020-01-21 VITALS
TEMPERATURE: 98.1 F | HEIGHT: 65 IN | WEIGHT: 163 LBS | OXYGEN SATURATION: 99 % | RESPIRATION RATE: 20 BRPM | DIASTOLIC BLOOD PRESSURE: 86 MMHG | SYSTOLIC BLOOD PRESSURE: 165 MMHG | BODY MASS INDEX: 27.16 KG/M2 | HEART RATE: 72 BPM

## 2020-01-21 DIAGNOSIS — Z23 ENCOUNTER FOR IMMUNIZATION: ICD-10-CM

## 2020-01-21 DIAGNOSIS — J06.9 ACUTE UPPER RESPIRATORY INFECTION: Primary | ICD-10-CM

## 2020-01-21 DIAGNOSIS — M54.6 ACUTE RIGHT-SIDED THORACIC BACK PAIN: ICD-10-CM

## 2020-01-21 RX ORDER — METFORMIN HYDROCHLORIDE 500 MG/1
1000 TABLET, EXTENDED RELEASE ORAL 2 TIMES DAILY WITH MEALS
Qty: 360 TAB | Refills: 1 | Status: CANCELLED | OUTPATIENT
Start: 2020-01-21

## 2020-01-21 RX ORDER — LIDOCAINE 50 MG/G
PATCH TOPICAL
Qty: 15 EACH | Refills: 0 | Status: SHIPPED | OUTPATIENT
Start: 2020-01-21 | End: 2022-01-19 | Stop reason: SDUPTHER

## 2020-01-21 RX ORDER — PRAVASTATIN SODIUM 40 MG/1
40 TABLET ORAL
Qty: 90 TAB | Refills: 1 | Status: CANCELLED | OUTPATIENT
Start: 2020-01-21

## 2020-01-21 NOTE — PROGRESS NOTES
Patient is well today. No acute distress or concerns noted at this time. Patient received VIS statement. No questions or concerns verbalized at this time. Patient consents to receiving vaccinations. Patient received Tdap and PPSV23 vaccination. Patient tolerated well. General comfort measures in regards to injection site given. Patient verbalized understanding. Patient was observed no adverse effects noted at this time. Patient left ambulatory with no complaints of pain or distress noted at this time.

## 2020-01-21 NOTE — PATIENT INSTRUCTIONS
Pneumococcal Polysaccharide Vaccine: What You Need to Know  Why get vaccinated? Vaccination can protect older adults (and some children and younger adults) from pneumococcal disease. Pneumococcal disease is caused by bacteria that can spread from person to person through close contact. It can cause ear infections, and it can also lead to more serious infections of the:  · Lungs (pneumonia),  · Blood (bacteremia), and  · Covering of the brain and spinal cord (meningitis). Meningitis can cause deafness and brain damage, and it can be fatal.  Anyone can get pneumococcal disease, but children under 3years of age, people with certain medical conditions, adults over 72years of age, and cigarette smokers are at the highest risk. About 18,000 older adults die each year from pneumococcal disease in the [place]. Treatment of pneumococcal infections with penicillin and other drugs used to be more effective. But some strains of the disease have become resistant to these drugs. This makes prevention of the disease, through vaccination, even more important. Pneumococcal polysaccharide vaccine (PPSV23)  Pneumococcal polysaccharide vaccine (PPSV23) protects against 23 types of pneumococcal bacteria. It will not prevent all pneumococcal disease. PPSV23 is recommended for:  · All adults 72years of age and older,  · Anyone 2 through 59years of age with certain long-term health problems,  · Anyone 2 through 59years of age with a weakened immune system,  · Adults 23 through 59years of age who smoke cigarettes or have asthma. Most people need only one dose of PPSV. A second dose is recommended for certain high-risk groups. People 72 and older should get a dose even if they have gotten one or more doses of the vaccine before they turned 65. Your healthcare provider can give you more information about these recommendations. Most healthy adults develop protection within 2 to 3 weeks of getting the shot.   Some people should not get this vaccine  · Anyone who has had a life-threatening allergic reaction to PPSV should not get another dose. · Anyone who has a severe allergy to any component of PPSV should not receive it. Tell your provider if you have any severe allergies. · Anyone who is moderately or severely ill when the shot is scheduled may be asked to wait until they recover before getting the vaccine. Someone with a mild illness can usually be vaccinated. · Children less than 3years of age should not receive this vaccine. · There is no evidence that PPSV is harmful to either a pregnant woman or to her fetus. However, as a precaution, women who need the vaccine should be vaccinated before becoming pregnant, if possible. Risks of a vaccine reaction  With any medicine, including vaccines, there is a chance of side effects. These are usually mild and go away on their own, but serious reactions are also possible. About half of people who get PPSV have mild side effects, such as redness or pain where the shot is given, which go away within about two days. Less than 1 out of 100 people develop a fever, muscle aches, or more severe local reactions. Problems that could happen after any vaccine:  · People sometimes faint after a medical procedure, including vaccination. Sitting or lying down for about 15 minutes can help prevent fainting, and injuries caused by a fall. Tell your doctor if you feel dizzy, or have vision changes or ringing in the ears. · Some people get severe pain in the shoulder and have difficulty moving the arm where a shot was given. This happens very rarely. · Any medication can cause a severe allergic reaction. Such reactions from a vaccine are very rare, estimated at about 1 in a million doses, and would happen within a few minutes to a few hours after the vaccination. As with any medicine, there is a very remote chance of a vaccine causing a serious injury or death.   The safety of vaccines is always being monitored. For more information, visit: www.cdc.gov/vaccinesafety/  What if there is a serious reaction? What should I look for? Look for anything that concerns you, such as signs of a severe allergic reaction, very high fever, or unusual behavior. Signs of a severe allergic reaction can include hives, swelling of the face and throat, difficulty breathing, a fast heartbeat, dizziness, and weakness. These would usually start a few minutes to a few hours after the vaccination. What should I do? If you think it is a severe allergic reaction or other emergency that can't wait, call 9-1-1 or get to the nearest hospital. Otherwise, call your doctor. Afterward, the reaction should be reported to the Vaccine Adverse Event Reporting System (VAERS). Your doctor might file this report, or you can do it yourself through the VAERS web site at www.vaers. [place].gov, or by calling 4-204.276.2176. [employer] does not give medical advice. How can I learn more? · Ask your doctor. He or she can give you the vaccine package insert or suggest other sources of information. · Call your local or state health department. · Contact the Centers for Disease Control and Prevention (CDC):  ? Call 5-677.581.5940 (1-800-CDC-INFO) or  ? Visit CDC's website at www.cdc.gov/vaccines  Vaccine Information Statement  PPSV Vaccine  (04/24/2015)  Department of Health and Human Services  Centers for Disease Control and Prevention  Many Vaccine Information Statements are available in [place] and other languages. See www.immunize.org/vis. Hojas de información Sobre Vacunas están disponibles en español y en muchos otros idiomas. Visite Ambrosio.si. Care instructions adapted under license by [employer] (which disclaims liability or warranty for this information).  If you have questions about a medical condition or this instruction, always ask your healthcare professional. [place] any warranty or liability for your use of this information. Tdap (Tetanus, Diphtheria, Pertussis) Vaccine: What You Need to Know  Why get vaccinated? Tetanus, diphtheria, and pertussis are very serious diseases. Tdap vaccine can protect us from these diseases. And Tdap vaccine given to pregnant women can protect  babies against pertussis. Tetanus (lockjaw) is rare in the [facility] today. It causes painful muscle tightening and stiffness, usually all over the body. · It can lead to tightening of muscles in the head and neck so you can't open your mouth, swallow, or sometimes even breathe. Tetanus kills about 1 out of 10 people who are infected even after receiving the best medical care. Diphtheria is also rare in the [place] today. It can cause a thick coating to form in the back of the throat. · It can lead to breathing problems, heart failure, paralysis, and death. Pertussis (whooping cough) causes severe coughing spells, which can cause difficulty breathing, vomiting, and disturbed sleep. · It can also lead to weight loss, incontinence, and rib fractures. Up to 2 in 100 adolescents and 5 in 100 adults with pertussis are hospitalized or have complications, which could include pneumonia or death. These diseases are caused by bacteria. Diphtheria and pertussis are spread from person to person through secretions from coughing or sneezing. Tetanus enters the body through cuts, scratches, or wounds. Before vaccines, as many as 200,000 cases of diphtheria, 200,000 cases of pertussis, and hundreds of cases of tetanus were reported in the [place] each year. Since vaccination began, reports of cases for tetanus and diphtheria have dropped by about 99% and for pertussis by about 80%. Tdap vaccine  The Tdap vaccine can protect adolescents and adults from tetanus, diphtheria, and pertussis. One dose of Tdap is routinely given at age 6 or 15.  People who did not get Tdap at that age should get it as soon as possible. Tdap is especially important for health care professionals and anyone having close contact with a baby younger than 12 months. Pregnant women should get a dose of Tdap during every pregnancy, to protect the  from pertussis. Infants are most at risk for severe, life-threatening complications from pertussis. Another vaccine, called Td, protects against tetanus and diphtheria, but not pertussis. A Td booster should be given every 10 years. Tdap may be given as one of these boosters if you have never gotten Tdap before. Tdap may also be given after a severe cut or burn to prevent tetanus infection. Your doctor or the person giving you the vaccine can give you more information. Tdap may safely be given at the same time as other vaccines. Some people should not get this vaccine  · A person who has ever had a life-threatening allergic reaction after a previous dose of any diphtheria-, tetanus-, or pertussis-containing vaccine, OR has a severe allergy to any part of this vaccine, should not get Tdap vaccine. Tell the person giving the vaccine about any severe allergies. · Anyone who had coma or long repeated seizures within 7 days after a childhood dose of DTP or DTaP, or a previous dose of Tdap, should not get Tdap, unless a cause other than the vaccine was found. They can still get Td. · Talk to your doctor if you:  ? Have seizures or another nervous system problem. ? Had severe pain or swelling after any vaccine containing diphtheria, tetanus, or pertussis. ? Ever had a condition called Guillain-Barré Syndrome (GBS). ? Aren't feeling well on the day the shot is scheduled. Risks  With any medicine, including vaccines, there is a chance of side effects. These are usually mild and go away on their own. Serious reactions are also possible but are rare. Most people who get Tdap vaccine do not have any problems with it.   Mild problems following Tdap  (Did not interfere with activities)  · Pain where the shot was given (about 3 in 4 adolescents or 2 in 3 adults)  · Redness or swelling where the shot was given (about 1 person in 5)  · Mild fever of at least 100.4°F (up to about 1 in 25 adolescents or 1 in 100 adults)  · Headache (about 3 or 4 people in 10)  · Tiredness (about 1 person in 3 or 4)  · Nausea, vomiting, diarrhea, stomachache (up to 1 in 4 adolescents or 1 in 10 adults)  · Chills, sore joints (about 1 person in 10)  · Body aches (about 1 person in 3 or 4)  · Rash, swollen glands (uncommon)  Moderate problems following Tdap  (Interfered with activities, but did not require medical attention)  · Pain where the shot was given (up to 1 in 5 or 6)  · Redness or swelling where the shot was given (up to about 1 in 16 adolescents or 1 in 12 adults)  · Fever over 102°F (about 1 in 100 adolescents or 1 in 250 adults)  · Headache (about 1 in 7 adolescents or 1 in 10 adults)  · Nausea, vomiting, diarrhea, stomachache (up to 1 to 3 people in 100)  · Swelling of the entire arm where the shot was given (up to about 1 in 500)  Severe problems following Tdap  (Unable to perform usual activities; required medical attention)  · Swelling, severe pain, bleeding and redness in the arm where the shot was given (rare)  Problems that could happen after any vaccine:  · People sometimes faint after a medical procedure, including vaccination. Sitting or lying down for about 15 minutes can help prevent fainting, and injuries caused by a fall. Tell your doctor if you feel dizzy or have vision changes or ringing in the ears. · Some people get severe pain in the shoulder and have difficulty moving the arm where a shot was given. This happens very rarely. · Any medication can cause a severe allergic reaction. Such reactions from a vaccine are very rare, estimated at fewer than 1 in a million doses, and would happen within a few minutes to a few hours after the vaccination.   As with any medicine, there is a very remote chance of a vaccine causing a serious injury or death. The safety of vaccines is always being monitored. For more information, visit: www.cdc.gov/vaccinesafety. What if there is a serious problem? What should I look for? · Look for anything that concerns you, such as signs of a severe allergic reaction, very high fever, or unusual behavior. Signs of a severe allergic reaction can include hives, swelling of the face and throat, difficulty breathing, a fast heartbeat, dizziness, and weakness. These would usually start a few minutes to a few hours after the vaccination. What should I do? · If you think it is a severe allergic reaction or other emergency that can't wait, call 9-1-1 or get the person to the nearest hospital. Otherwise, call your doctor. · Afterward, the reaction should be reported to the Vaccine Adverse Event Reporting System (VAERS). Your doctor might file this report, or you can do it yourself through the VAERS web site at www.vaers. [facility].gov, or by calling 9-785.945.8903. VAERS does not give medical advice. The National Vaccine Injury Compensation Program  The National Vaccine Injury Compensation Program (VICP) is a federal program that was created to compensate people who may have been injured by certain vaccines. Persons who believe they may have been injured by a vaccine can learn about the program and about filing a claim by calling 2-983.697.5301 or visiting the [employer] website at www.[facility].gov/vaccinecompensation. There is a time limit to file a claim for compensation. How can I learn more? · Ask your doctor. He or she can give you the vaccine package insert or suggest other sources of information. · Call your local or state health department. · Contact the Centers for Disease Control and Prevention (CDC):  ? Call 5-587.914.6613 (1-841-SOJ-INFO) or  ? Visit CDC's website at www.cdc.gov/vaccines  Vaccine Information Statement (Interim)  Tdap Vaccine  (2/24/15)  [address] 425HO-16  [facility] and Replaced by [facility] for Disease Control and Prevention  Many Vaccine Information Statements are available in [place] and other languages. See www.immunize.org/vis. Muchas hojas de información sobre vacunas están disponibles en español y en otros idiomas. Visite www.immunize.org/vis. Care instructions adapted under license by [employer] (which disclaims liability or warranty for this information). If you have questions about a medical condition or this instruction, always ask your healthcare professional. [address] any warranty or liability for your use of this information.

## 2020-01-21 NOTE — PROGRESS NOTES
HISTORY OF PRESENT ILLNESS    Josefina Vogt is a 60y.o. year old female comes in today for ED follow up:  Acute upper respiratory infection    Patient was seen in the ED on 1/5/2020 for cough. Clint Tamayo is a 60 y.o. female with diabetes and hypertension who presents with productive cough for a week. Denies fever. She wonders if she has pneumonia based on the fact that she has had like a little discomfort that began with her coughing initially. Like a dull ache. Provider Notes (Medical Decision Making): Cover her with doxycycline. There is a left lung streak that could be artifact or atelectasis but could be also a developing pneumonia and with her clinical history of the cough for a week will cover. Have her follow-up with her PCP as an outpatient this week. Patient reports she does feel improved. She does have a mild lingering cough. States she has a burning thoracic back pain. She does have chronic back pain and is followed by Dr. Kristopher Carey. Comments back pain increased since she has been coughing. States her muscles feel sore. Allergies   Allergen Reactions    Latex Rash    Penicillins Nausea and Vomiting     Current Outpatient Medications   Medication Sig Dispense Refill    guaiFENesin ER (MUCINEX) 600 mg ER tablet Take 1 Tab by mouth two (2) times a day. 20 Tab 0    pravastatin (PRAVACHOL) 40 mg tablet Take 1 Tab by mouth nightly. 90 Tab 1    metFORMIN ER (GLUCOPHAGE XR) 500 mg tablet Take 2 Tabs by mouth two (2) times daily (with meals). 360 Tab 1    fluticasone propionate (FLONASE) 50 mcg/actuation nasal spray USE 2 SPRAYS IN EACH NOSTRIL ONCE DAILY AS NEEDED FOR  RHINITIS 16 g 5    gabapentin (NEURONTIN) 300 mg capsule 1 po tid  Indications: Neuropathic Pain 90 Cap 4    methocarbamol (ROBAXIN) 500 mg tablet Take 1 Tab by mouth four (4) times daily. 120 Tab 4    diclofenac EC (VOLTAREN) 75 mg EC tablet Take 1 Tab by mouth two (2) times a day.  Take with food as needed for pain 60 Tab 4    Blood-Glucose Meter (TRUE METRIX GLUCOSE METER) misc Use to check blood sugar 2 times per day. 1 Each 0    glucose blood VI test strips (TRUE METRIX GLUCOSE TEST STRIP) strip Use to check blood sugar 2 times per day. 100 Strip 11    lancets misc Use to check blood sugar 2 times daily 1 Each 11    Insulin Needles, Disposable, 31 gauge x 5/16\" ndle Use to inject Lantus daily. 1 Package 11    sertraline (ZOLOFT) 50 mg tablet Take 1 Tab by mouth daily. 2    Lancets misc Check glucose ACHS 200 Each 11    alcohol swabs padm 1 Each by Apply Externally route Before breakfast, lunch, dinner and at bedtime. accucheck ACHS and record in log. Take log to every doctor visit. **MEDICALLY NECESSARY** 100 Pad 2    traZODone (DESYREL) 150 mg tablet Take 150-300 mg by mouth nightly.  sertraline (ZOLOFT) 100 mg tablet Take 100 mg by mouth daily.  perphenazine (TRILAFON) 4 mg tablet Take 4 mg by mouth three (3) times daily.  benztropine (COGENTIN) 1 mg tablet Take 1 mg by mouth daily. Past Medical History:   Diagnosis Date    CAD (coronary artery disease)     Hypertension     Uterine fibroid        No visits with results within 1 Week(s) from this visit. Latest known visit with results is:   Admission on 01/05/2020, Discharged on 01/05/2020   Component Date Value Ref Range Status    Glucose (POC) 01/05/2020 127* 70 - 110 mg/dL Final    Comment: (NOTE)  The FDA has indicated that no capillary point of care blood glucose   monitoring systems are approved for use in \"critically ill\" patients,   however they have not defined this population. The College of   American [employer]ologists has recommended that these devices should not   be used in cases such as severe hypotension, dehydration, shock, and   hyperglycemic-hyperosmolar state, amongst others. Venous or arterial   collection is the recommended specimen for testing these patients. XR Results (maximum last 3):   [facility]   XR CHEST PA LAT    Impression IMPRESSION:    Small linear opacity left midlung laterally, likely atelectasis or scar. Otherwise no evidence of acute pulmonary disease. Results from [place] encounter on 03/18/17   XR SHOULDER RT AP/LAT MIN 2 V    Impression IMPRESSION:    No acute abnormalities. Convex appearance of the inferior aspect of the acromion  process could increased risk of rotator cuff impingement. Results from [place] encounter on 12/27/16   XR SPINE THORAC 2 V    Impression IMPRESSION:    Mild spondylosis at multiple levels. ROS:  Review of Systems   Constitutional: Negative for chills and fever. Respiratory: Positive for cough (improved). Negative for shortness of breath. Cardiovascular: Negative for chest pain and palpitations. Musculoskeletal: Positive for back pain (right thoracic region). Neurological: Negative for dizziness and headaches. /86 (BP 1 Location: Left arm, BP Patient Position: Sitting)   Pulse 72   Temp 98.1 °F (36.7 °C) (Oral)   Resp 20   Ht 5' 5\" (1.651 m)   Wt 163 lb (73.9 kg)   SpO2 99%   BMI 27.12 kg/m²     Objective:  Physical Exam  HENT:      Head: Normocephalic and atraumatic. Neck:      Musculoskeletal: Normal range of motion and neck supple. Cardiovascular:      Rate and Rhythm: Normal rate and regular rhythm. Heart sounds: Normal heart sounds. No murmur. No friction rub. No gallop. Pulmonary:      Effort: Pulmonary effort is normal.      Breath sounds: Normal breath sounds. No wheezing, rhonchi or rales. Musculoskeletal:      Thoracic back: She exhibits tenderness (right). Lymphadenopathy:      Cervical: No cervical adenopathy. Skin:     General: Skin is warm and dry. Neurological:      Mental Status: She is alert and oriented to person, place, and time. Assessment/Plan:     ICD-10-CM ICD-9-CM    1.  Encounter for immunization Z23 V03.89 PNEUMOCOCCAL POLYSACCHARIDE VACCINE, 23-VALENT, ADULT OR IMMUNOSUPPRESSED PT DOSE,      TETANUS, DIPHTHERIA TOXOIDS AND ACELLULAR PERTUSSIS VACCINE (TDAP), IN INDIVIDS. >=7, IM   2. Lower respiratory infection J22 519.8    3. Acute right-sided thoracic back pain M54.6 724.1      Orders Placed This Encounter    Pneumococcal polysaccharide vaccine, 23-valent, adult or immunosuppressed pt dose    Tetanus, diphtheria toxoids and acellular pertussis (TDAP) vaccine, in individuals >=7 years, IM    lidocaine (LIDODERM) 5 %       Anticipatory guidance for acute on chronic thoracic pain  I have discussed the diagnosis with the patient and the intended plan as seen in the above orders. The patient has received an after-visit summary and questions were answered concerning future plans. I have discussed medication side effects and warnings with the patient as well. Patient agreeable with above plan and verbalizes understanding. Follow-up and Dispositions    · Return if symptoms worsen or fail to improve, for keep scheduled appt.

## 2020-02-19 LAB
ALBUMIN SERPL-MCNC: 4.5 G/DL (ref 3.8–4.8)
ALBUMIN/GLOB SERPL: 1.5 {RATIO} (ref 1.2–2.2)
ALP SERPL-CCNC: 69 IU/L (ref 39–117)
ALT SERPL-CCNC: 36 IU/L (ref 0–32)
AST SERPL-CCNC: 21 IU/L (ref 0–40)
BILIRUB SERPL-MCNC: <0.2 MG/DL (ref 0–1.2)
BUN SERPL-MCNC: 16 MG/DL (ref 8–27)
BUN/CREAT SERPL: 23 (ref 12–28)
CALCIUM SERPL-MCNC: 9.3 MG/DL (ref 8.7–10.3)
CHLORIDE SERPL-SCNC: 103 MMOL/L (ref 96–106)
CHOLEST SERPL-MCNC: 208 MG/DL (ref 100–199)
CO2 SERPL-SCNC: 26 MMOL/L (ref 20–29)
CREAT SERPL-MCNC: 0.71 MG/DL (ref 0.57–1)
GLOBULIN SER CALC-MCNC: 3 G/DL (ref 1.5–4.5)
GLUCOSE SERPL-MCNC: 115 MG/DL (ref 65–99)
HDLC SERPL-MCNC: 58 MG/DL
LDLC SERPL CALC-MCNC: 116 MG/DL (ref 0–99)
POTASSIUM SERPL-SCNC: 4.4 MMOL/L (ref 3.5–5.2)
PROT SERPL-MCNC: 7.5 G/DL (ref 6–8.5)
SODIUM SERPL-SCNC: 142 MMOL/L (ref 134–144)
TRIGL SERPL-MCNC: 168 MG/DL (ref 0–149)
VLDLC SERPL CALC-MCNC: 34 MG/DL (ref 5–40)

## 2020-02-25 ENCOUNTER — OFFICE VISIT (OUTPATIENT)
Dept: FAMILY MEDICINE CLINIC | Age: 65
End: 2020-02-25

## 2020-02-25 VITALS
SYSTOLIC BLOOD PRESSURE: 123 MMHG | DIASTOLIC BLOOD PRESSURE: 80 MMHG | WEIGHT: 164 LBS | TEMPERATURE: 97.9 F | HEIGHT: 65 IN | RESPIRATION RATE: 20 BRPM | OXYGEN SATURATION: 98 % | HEART RATE: 68 BPM | BODY MASS INDEX: 27.32 KG/M2

## 2020-02-25 DIAGNOSIS — E78.2 MIXED HYPERLIPIDEMIA: ICD-10-CM

## 2020-02-25 DIAGNOSIS — E11.65 CONTROLLED TYPE 2 DIABETES MELLITUS WITH HYPERGLYCEMIA, UNSPECIFIED WHETHER LONG TERM INSULIN USE (HCC): Primary | ICD-10-CM

## 2020-02-25 DIAGNOSIS — I10 ESSENTIAL HYPERTENSION: ICD-10-CM

## 2020-02-25 DIAGNOSIS — E11.65 CONTROLLED TYPE 2 DIABETES MELLITUS WITH HYPERGLYCEMIA, UNSPECIFIED WHETHER LONG TERM INSULIN USE (HCC): ICD-10-CM

## 2020-02-25 LAB — HBA1C MFR BLD HPLC: 5.8 %

## 2020-02-25 RX ORDER — OMEPRAZOLE 20 MG/1
20 CAPSULE, DELAYED RELEASE ORAL
Qty: 30 CAP | Refills: 0 | Status: SHIPPED | OUTPATIENT
Start: 2020-02-25 | End: 2020-03-17

## 2020-02-25 NOTE — PROGRESS NOTES
Chief Complaint   Patient presents with    Diabetes    Cholesterol Problem     1. Have you been to the ER, urgent care clinic since your last visit? Hospitalized since your last visit?no    2. Have you seen or consulted any other health care providers outside of the [address] [place] [address] since your last visit? Include any pap smears or colon screening.  no  Okay to discontinue medications no longer taking/therapy completed per verbal order Tianna CORDOVA

## 2020-02-25 NOTE — PROGRESS NOTES
Subjective:    Joseph Guevara is a 60y.o. year old female seen for follow up of diabetes. She also has hypertension and hyperlipidemia. Diabetic Review of Systems - medication compliance: compliant all of the time, diabetic diet compliance: compliant most of the time, home glucose monitoring: nonfasting values range 131, further diabetic ROS: no polyuria or polydipsia, no chest pain, dyspnea or TIA's, no numbness, tingling or pain in extremities. Other symptoms and concerns: upper abd pain, drink shot glass of apple cider vinager 3 times daily after meals, followed by water. Patient Active Problem List   Diagnosis Code    Type II diabetes mellitus, uncontrolled ([facility] [address] E11.65    Mixed hyperlipidemia E78.2    Myofascial pain M79.18    DDD (degenerative disc disease), cervical M50.30    Osteoarthritis of spine with radiculopathy, cervical region M47.22    Right shoulder pain M25.511    Tobacco use disorder F17.200    Muscle spasm M62.838     Current Outpatient Medications   Medication Sig Dispense Refill    lidocaine (LIDODERM) 5 % Apply patch to the affected area for 12 hours a day and remove for 12 hours a day. 15 Each 0    guaiFENesin ER (MUCINEX) 600 mg ER tablet Take 1 Tab by mouth two (2) times a day. 20 Tab 0    pravastatin (PRAVACHOL) 40 mg tablet Take 1 Tab by mouth nightly. 90 Tab 1    metFORMIN ER (GLUCOPHAGE XR) 500 mg tablet Take 2 Tabs by mouth two (2) times daily (with meals). 360 Tab 1    fluticasone propionate (FLONASE) 50 mcg/actuation nasal spray USE 2 SPRAYS IN EACH NOSTRIL ONCE DAILY AS NEEDED FOR  RHINITIS 16 g 5    gabapentin (NEURONTIN) 300 mg capsule 1 po tid  Indications: Neuropathic Pain 90 Cap 4    methocarbamol (ROBAXIN) 500 mg tablet Take 1 Tab by mouth four (4) times daily. 120 Tab 4    diclofenac EC (VOLTAREN) 75 mg EC tablet Take 1 Tab by mouth two (2) times a day.  Take with food as needed for pain 60 Tab 4    Blood-Glucose Meter (TRUE METRIX GLUCOSE METER) misc Use to check blood sugar 2 times per day. 1 Each 0    glucose blood VI test strips (TRUE METRIX GLUCOSE TEST STRIP) strip Use to check blood sugar 2 times per day. 100 Strip 11    lancets misc Use to check blood sugar 2 times daily 1 Each 11    Insulin Needles, Disposable, 31 gauge x 5/16\" ndle Use to inject Lantus daily. 1 Package 11    sertraline (ZOLOFT) 50 mg tablet Take 1 Tab by mouth daily. 2    Lancets misc Check glucose ACHS 200 Each 11    alcohol swabs padm 1 Each by Apply Externally route Before breakfast, lunch, dinner and at bedtime. accucheck ACHS and record in log. Take log to every doctor visit. **MEDICALLY NECESSARY** 100 Pad 2    traZODone (DESYREL) 150 mg tablet Take 150-300 mg by mouth nightly.  sertraline (ZOLOFT) 100 mg tablet Take 100 mg by mouth daily.  perphenazine (TRILAFON) 4 mg tablet Take 4 mg by mouth three (3) times daily.  benztropine (COGENTIN) 1 mg tablet Take 1 mg by mouth daily. Allergies   Allergen Reactions    Latex Rash    Penicillins Nausea and Vomiting     No past surgical history on file.   Family History   Problem Relation Age of Onset    Diabetes Mother     Stroke Mother     Diabetes Father     Cancer Paternal Grandfather     Breast Cancer Cousin       Lab Results   Component Value Date/Time    Cholesterol, total 208 (H) 02/18/2020 08:10 AM    HDL Cholesterol 58 02/18/2020 08:10 AM    LDL, calculated 116 (H) 02/18/2020 08:10 AM    VLDL, calculated 34 02/18/2020 08:10 AM    Triglyceride 168 (H) 02/18/2020 08:10 AM    CHOL/HDL Ratio 2.9 10/20/2018 08:33 AM     Lab Results   Component Value Date/Time    Sodium 142 02/18/2020 08:10 AM    Potassium 4.4 02/18/2020 08:10 AM    Chloride 103 02/18/2020 08:10 AM    CO2 26 02/18/2020 08:10 AM    Anion gap 2 (L) 10/20/2018 08:33 AM    Glucose 115 (H) 02/18/2020 08:10 AM    BUN 16 02/18/2020 08:10 AM    Creatinine 0.71 02/18/2020 08:10 AM    BUN/Creatinine ratio 23 02/18/2020 08:10 AM    GFR est  02/18/2020 08:10 AM    GFR est non-AA 92 02/18/2020 08:10 AM    Calcium 9.3 02/18/2020 08:10 AM    Bilirubin, total <0.2 02/18/2020 08:10 AM    AST (SGOT) 21 02/18/2020 08:10 AM    Alk. phosphatase 69 02/18/2020 08:10 AM    Protein, total 7.5 02/18/2020 08:10 AM    Albumin 4.5 02/18/2020 08:10 AM    Globulin 3.8 10/20/2018 08:33 AM    A-G Ratio 1.5 02/18/2020 08:10 AM    ALT (SGPT) 36 (H) 02/18/2020 08:10 AM     Lab Results   Component Value Date/Time    WBC 7.0 07/25/2016 12:47 AM    HGB 11.3 (L) 07/25/2016 12:47 AM    HCT 32.6 (L) 07/25/2016 12:47 AM    PLATELET 152 03/12/2977 12:47 AM    MCV 87.6 07/25/2016 12:47 AM     Lab Results   Component Value Date/Time    Hemoglobin A1c 6.0 (H) 09/18/2019 10:27 AM    Hemoglobin A1c (POC) 5.8 11/25/2019 01:15 PM     Lab Results   Component Value Date/Time    Microalbumin/Creat ratio (mg/g creat)  12/01/2018 09:43 AM     Cannot calculate ratio due to microalbumin result outside reportable range.     Microalbumin,urine random <0.50 12/01/2018 09:43 AM    Microalbumin urine (POC) 30 04/11/2017 04:00 PM     Wt Readings from Last 3 Encounters:   01/21/20 163 lb (73.9 kg)   01/05/20 166 lb (75.3 kg)   11/25/19 166 lb (75.3 kg)     Last Point of Care HGB A1C  Hemoglobin A1c (POC)   Date Value Ref Range Status   11/25/2019 5.8 % Final      BP Readings from Last 3 Encounters:   01/21/20 165/86   01/05/20 156/89   11/25/19 142/76       Results for orders placed or performed in visit on 57/89/53   METABOLIC PANEL, COMPREHENSIVE   Result Value Ref Range    Glucose 115 (H) 65 - 99 mg/dL    BUN 16 8 - 27 mg/dL    Creatinine 0.71 0.57 - 1.00 mg/dL    GFR est non-AA 92 >59 mL/min/1.73    GFR est  >59 mL/min/1.73    BUN/Creatinine ratio 23 12 - 28    Sodium 142 134 - 144 mmol/L    Potassium 4.4 3.5 - 5.2 mmol/L    Chloride 103 96 - 106 mmol/L    CO2 26 20 - 29 mmol/L    Calcium 9.3 8.7 - 10.3 mg/dL    Protein, total 7.5 6.0 - 8.5 g/dL    Albumin 4.5 3.8 - 4.8 g/dL    GLOBULIN, TOTAL 3.0 1.5 - 4.5 g/dL    A-G Ratio 1.5 1.2 - 2.2    Bilirubin, total <0.2 0.0 - 1.2 mg/dL    Alk. phosphatase 69 39 - 117 IU/L    AST (SGOT) 21 0 - 40 IU/L    ALT (SGPT) 36 (H) 0 - 32 IU/L   LIPID PANEL   Result Value Ref Range    Cholesterol, total 208 (H) 100 - 199 mg/dL    Triglyceride 168 (H) 0 - 149 mg/dL    HDL Cholesterol 58 >39 mg/dL    VLDL, calculated 34 5 - 40 mg/dL    LDL, calculated 116 (H) 0 - 99 mg/dL         Last Diabetic Foot Exam on: 9/18/19        Objective:  Visit Vitals  /80 (BP 1 Location: Right arm, BP Patient Position: Sitting)   Pulse 68   Temp 97.9 °F (36.6 °C) (Oral)   Resp 20   Ht 5' 5\" (1.651 m)   Wt 164 lb (74.4 kg)   SpO2 98%   BMI 27.29 kg/m²     Awake and alert in no acute distress   Neck supple without lymphadenopathy, no carotid artery bruits auscultated bilaterally. No thyromegaly   Lungs clear throughout   S1 S2 RRR without ectopy or murmur auscultated. Extremities: no clubbing, cyanosis, peripheral edema   Abdomen - soft, mild epigastric tender, nondistended, no masses or organomegaly  Integumentary: no rashes   Reviewed vital signs           Assessment/Plan:    ICD-10-CM ICD-9-CM    1. Controlled type 2 diabetes mellitus with hyperglycemia, unspecified whether long term insulin use ([facility]) E11.65 250.80 MICROALBUMIN, UR, RAND W/ MICROALB/CREAT RATIO     790.29 AMB POC HEMOGLOBIN A1C   2. Essential hypertension I10 401.9    3. Mixed hyperlipidemia E78.2 272.2      Orders Placed This Encounter    MICROALBUMIN, UR, RAND W/ MICROALB/CREAT RATIO    MICROALBUMIN, UR, RAND W/ MICROALB/CREAT RATIO    AMB POC HEMOGLOBIN A1C    omeprazole (PRILOSEC) 20 mg capsule       Issues reviewed with her: low cholesterol diet, weight control and daily exercise discussed. I have discussed the diagnosis with the patient and the intended plan as seen in the above orders. The patient has received an after-visit summary and questions were answered concerning future plans.   I have discussed medication side effects and warnings with the patient as well. Patient agreeable with above plan and verbalizes understanding. Follow-up and Dispositions    · Return in about 5 months (around 7/25/2020), or 4 weeks epigastric pain, for DM/HTN/HLD with fasting labs 1 week prior.

## 2020-02-25 NOTE — PATIENT INSTRUCTIONS
Indigestion (Dyspepsia or Heartburn): Care Instructions  Your Care Instructions  Sometimes it can be hard to pinpoint the cause of indigestion. (It is also called dyspepsia or heartburn.) Most cases of an upset stomach with bloating, burning, burping, and nausea are minor and go away within several hours. Home treatment and over-the-counter medicine often are able to control symptoms. But if you take medicine to relieve your indigestion without making diet and lifestyle changes, your symptoms are likely to return again and again. If you get indigestion often, it may be a sign of a more serious medical problem. Be sure to follow up with your doctor, who may want to do tests to be sure of the cause of your indigestion. Follow-up care is a key part of your treatment and safety. Be sure to make and go to all appointments, and call your doctor if you are having problems. It's also a good idea to know your test results and keep a list of the medicines you take. How can you care for yourself at home? · Your doctor may recommend over-the-counter medicine. For mild or occasional indigestion, antacids such as Gaviscon, Mylanta, Maalox, or Tums, may help. Be safe with medicines. Be careful when you take over-the-counter antacid medicines. Many of these medicines have aspirin in them. Read the label to make sure that you are not taking more than the recommended dose. Too much aspirin can be harmful. · Your doctor also may recommend over-the-counter acid reducers, such as Pepcid AC, Tagamet HB, Zantac 75, or Prilosec. Read and follow all instructions on the label. If you use these medicines often, talk with your doctor. · Change your eating habits. ? It's best to eat several small meals instead of two or three large meals. ? After you eat, wait 2 to 3 hours before you lie down. ? Chocolate, mint, and alcohol can make GERD worse. ?  Spicy foods, foods that have a lot of acid (like tomatoes and oranges), and coffee can make GERD symptoms worse in some people. If your symptoms are worse after you eat a certain food, you may want to stop eating that food to see if your symptoms get better. · Do not smoke or chew tobacco. Smoking can make GERD worse. If you need help quitting, talk to your doctor about stop-smoking programs and medicines. These can increase your chances of quitting for good. · If you have GERD symptoms at night, raise the head of your bed 6 to 8 inches. You can do this by putting the frame on blocks or placing a foam wedge under the head of your mattress. (Adding extra pillows does not work.)  · Do not wear tight clothing around your middle. · Lose weight if you need to. Losing just 5 to 10 pounds can help. · Do not take anti-inflammatory medicines, such as aspirin, ibuprofen (Advil, Motrin), or naproxen (Aleve). These can irritate the stomach. If you need a pain medicine, try acetaminophen (Tylenol), which does not cause stomach upset. When should you call for help? Call your doctor now or seek immediate medical care if:    · You have new or worse belly pain.     · You are vomiting.    Watch closely for changes in your health, and be sure to contact your doctor if:    · You have new or worse symptoms of indigestion.     · You have trouble or pain swallowing.     · You are losing weight.     · You do not get better as expected. Where can you learn more? Go to http://bernardino-prateek.info/. Enter H665 in the search box to learn more about \"Indigestion (Dyspepsia or Heartburn): Care Instructions. \"  Current as of: November 7, 2018  Content Version: 12.2  © 2339-1758 [employer]. Care instructions adapted under license by [employer] (which disclaims liability or warranty for this information).  If you have questions about a medical condition or this instruction, always ask your healthcare professional. [facility] any warranty or liability for your use of this information.

## 2020-02-26 LAB
ALBUMIN/CREAT UR: 4 MG/G CREAT (ref 0–29)
CREAT UR-MCNC: 97.8 MG/DL
MICROALBUMIN UR-MCNC: 3.7 UG/ML

## 2020-03-16 ENCOUNTER — OFFICE VISIT (OUTPATIENT)
Dept: ORTHOPEDIC SURGERY | Age: 65
End: 2020-03-16

## 2020-03-16 VITALS
BODY MASS INDEX: 27.32 KG/M2 | RESPIRATION RATE: 18 BRPM | TEMPERATURE: 98.2 F | WEIGHT: 164 LBS | OXYGEN SATURATION: 98 % | HEIGHT: 65 IN | HEART RATE: 70 BPM | SYSTOLIC BLOOD PRESSURE: 161 MMHG | DIASTOLIC BLOOD PRESSURE: 83 MMHG

## 2020-03-16 DIAGNOSIS — M79.2 NEURITIS: ICD-10-CM

## 2020-03-16 DIAGNOSIS — M25.511 CHRONIC RIGHT SHOULDER PAIN: Primary | ICD-10-CM

## 2020-03-16 DIAGNOSIS — M47.816 LUMBAR FACET ARTHROPATHY: ICD-10-CM

## 2020-03-16 DIAGNOSIS — M62.838 MUSCLE SPASM: ICD-10-CM

## 2020-03-16 DIAGNOSIS — M79.18 MYOFASCIAL PAIN: ICD-10-CM

## 2020-03-16 DIAGNOSIS — G89.29 CHRONIC RIGHT SHOULDER PAIN: Primary | ICD-10-CM

## 2020-03-16 RX ORDER — KETOROLAC TROMETHAMINE 15 MG/ML
60 INJECTION, SOLUTION INTRAMUSCULAR; INTRAVENOUS ONCE
Qty: 1 VIAL | Refills: 0
Start: 2020-03-16 | End: 2020-03-16

## 2020-03-16 RX ORDER — DICLOFENAC SODIUM 75 MG/1
75 TABLET, DELAYED RELEASE ORAL 2 TIMES DAILY
Qty: 60 TAB | Refills: 4 | Status: SHIPPED | OUTPATIENT
Start: 2020-03-16 | End: 2020-11-13

## 2020-03-16 NOTE — PATIENT INSTRUCTIONS
Low Back Arthritis: Exercises  Introduction  Here are some examples of typical rehabilitation exercises for your condition. Start each exercise slowly. Ease off the exercise if you start to have pain. Your doctor or physical therapist will tell you when you can start these exercises and which ones will work best for you. When you are not being active, find a comfortable position for rest. Some people are comfortable on the floor or a medium-firm bed with a small pillow under their head and another under their knees. Some people prefer to lie on their side with a pillow between their knees. Don't stay in one position for too long. Take short walks (10 to 20 minutes) every 2 to 3 hours. Avoid slopes, hills, and stairs until you feel better. Walk only distances you can manage without pain, especially leg pain. How to do the exercises  Pelvic tilt   1. Lie on your back with your knees bent. 2. \"Brace\" your stomach--tighten your muscles by pulling in and imagining your belly button moving toward your spine. 3. Press your lower back into the floor. You should feel your hips and pelvis rock back. 4. Hold for 6 seconds while breathing smoothly. 5. Relax and allow your pelvis and hips to rock forward. 6. Repeat 8 to 12 times. Back stretches   1. Get down on your hands and knees on the floor. 2. Relax your head and allow it to droop. Round your back up toward the ceiling until you feel a nice stretch in your upper, middle, and lower back. Hold this stretch for as long as it feels comfortable, or about 15 to 30 seconds. 3. Return to the starting position with a flat back while you are on your hands and knees. 4. Let your back sway by pressing your stomach toward the floor. Lift your buttocks toward the ceiling. 5. Hold this position for 15 to 30 seconds. 6. Repeat 2 to 4 times. Follow-up care is a key part of your treatment and safety.  Be sure to make and go to all appointments, and call your doctor if you are having problems. It's also a good idea to know your test results and keep a list of the medicines you take. Where can you learn more? Go to http://bernardino-prateek.info/  Enter T094 in the search box to learn more about \"Low Back Arthritis: Exercises. \"  Current as of: June 26, 2019Content Version: 12.4  © 5917-7396 [employer]. Care instructions adapted under license by [employer] (which disclaims liability or warranty for this information). If you have questions about a medical condition or this instruction, always ask your healthcare professional. [place] [address] any warranty or liability for your use of this information. Healthy Upper Back: Exercises  Introduction  Here are some examples of exercises for your upper back. Start each exercise slowly. Ease off the exercise if you start to have pain. Your doctor or physical therapist will tell you when you can start these exercises and which ones will work best for you. How to do the exercises  Lower neck and upper back stretch   1. Stretch your arms out in front of your body. Clasp one hand on top of your other hand. 2. Gently reach out so that you feel your shoulder blades stretching away from each other. 3. Gently bend your head forward. 4. Hold for 15 to 30 seconds. 5. Repeat 2 to 4 times. Midback stretch   1. Kneel on the floor, and sit back on your ankles. 2. Lean forward, place your hands on the floor, and stretch your arms out in front of you. Rest your head between your arms. 3. Gently push your chest toward the floor, reaching as far in front of you as possible. 4. Hold for 15 to 30 seconds. 5. Repeat 2 to 4 times. Shoulder rolls   1. Sit comfortably with your feet shoulder-width apart. You can also do this exercise while standing. 2. Roll your shoulders up, then back, and then down in a smooth, circular motion. 3. Repeat 2 to 4 times.     Wall push-up 1. Stand against a wall with your feet about 12 to 24 inches back from the wall. If you feel any pain when you do this exercise, stand closer to the wall. 2. Place your hands on the wall slightly wider apart than your shoulders, and lean forward. 3. Gently lean your body toward the wall. Then push back to your starting position. Keep the motion smooth and controlled. 4. Repeat 8 to 12 times. Resisted shoulder blade squeeze   1. Sit or stand, holding the band in both hands in front of you. Keep your elbows close to your sides, bent at a 90-degree angle. Your palms should face up. 2. Squeeze your shoulder blades together, and move your arms to the outside, stretching the band. Be sure to keep your elbows at your sides while you do this. 3. Relax. 4. Repeat 8 to 12 times. Resisted rows   1. Put the band around a solid object, such as a bedpost, at about waist level. Hold one end of the band in each hand. 2. With your elbows at your sides and bent to 90 degrees, pull the band back to move your shoulder blades toward each other. Return to the starting position. 3. Repeat 8 to 12 times. Follow-up care is a key part of your treatment and safety. Be sure to make and go to all appointments, and call your doctor if you are having problems. It's also a good idea to know your test results and keep a list of the medicines you take. Where can you learn more? Go to http://bernardino-prateek.info/  Enter B796 in the search box to learn more about \"Healthy Upper Back: Exercises. \"  Current as of: June 26, 2019Content Version: 12.4  © 5060-8568 [employer]. Care instructions adapted under license by [employer] (which disclaims liability or warranty for this information).  If you have questions about a medical condition or this instruction, always ask your healthcare professional. [address] any warranty or liability for your use of this information.

## 2020-03-16 NOTE — PROGRESS NOTES
Toradol 60 mg IM administered to patient's right gluteus. Aspiration technique performed, no blood return noted. Consent signed, Patient tolerated injection well. No redness/swelling/drainage noted to injection site. Patient declined to remain in office for observation s/p toradol injection. Patient aware to call 911 immediately with any s/s of lip/tongue/throat tingling/swelling, and/or shortness of breath. No further action required at this time.

## 2020-03-16 NOTE — PROGRESS NOTES
[facility]  [address], [address]  Phone: [phone]  Fax: [phone]    Pt's YOB: 1955    ASSESSMENT   Diagnoses and all orders for this visit:    1. Chronic right shoulder pain  -     XR SHOULDER RT AP/LAT MIN 2 V; Future    2. Myofascial pain    3. Neuritis    4. Lumbar facet arthropathy  -     diclofenac EC (VOLTAREN) 75 mg EC tablet; Take 1 Tab by mouth two (2) times a day. Take with food as needed for pain  -     KETOROLAC TROMETHAMINE INJ  -     ketorolac (TORADOL) 15 mg/mL soln injection; 4 mL by IntraMUSCular route once for 1 dose. -     MT THER/PROPH/DIAG INJECTION, SUBCUT/IM    5. Muscle spasm         IMPRESSION AND PLAN:  Yo Colon is a 60 y.o. female with history of lumbar pain. She complains of pain in the lower back and tightness/pain in the right middle back. Pt takes Neurontin 300 mg 1 cap TID, Voltaren 75 mg 1 tab BID, and Robaxin 500 mg as needed up to 4 x daily. 1) Pt was given information on lumbar arthritis and upper back exercises. 2) She was offered a referral to shoulder physical therapy but deferred. Pt wishes to try home exercise first.  3) Pt received a Toradol injection in the office today. 4) Right shoulder x-rays were ordered. 5) She will continue taking Robaxin 500 mg and Neurontin 300 as prescribed and does not need refills at this time. 6) I recommended the patient only Robaxin 500 mg take it as needed to address her sedation. 7) She received a refill of Voltaren 75 mg 1 tab BID prn pain with food. 8) Ms. Tania Licona has a reminder for a \"due or due soon\" health maintenance. I have asked that she contact her primary care provider, Latrice Awad NP, for follow-up on this health maintenance. 9)  demonstrated consistency with prescribing. Follow-up and Dispositions    · Return in about 6 weeks (around 4/27/2020) for Medication follow up, Diagnostic Test follow up. HISTORY OF PRESENT ILLNESS:  Joseph Guevara is a 60 y.o. female with history of lumbar pain and presents to the office today for follow up. She complains of pain in the lower back and tightness/pain in the right scapular region. Pt denies recently attending physical therapy. She has been prescribed Neurontin 300 mg and takes 1 cap TID. Pt also takes Voltaren 75 mg 1 tab BID and Robaxin 500 mg as needed up to 4 x daily. She admits to sedation throughout the day and notes that she often drinks energy drinks. Pt at this time desires to proceed with medication evaluation and right shoulder x-rays and medication refills.     Pain Scale: 8/10    PCP: Yuliana Gautam NP     Past Medical History:   Diagnosis Date    CAD (coronary artery disease)     Hypertension     Uterine fibroid         Social History     Socioeconomic History    Marital status: SINGLE     Spouse name: Not on file    Number of children: Not on file    Years of education: Not on file    Highest education level: Not on file   Occupational History    Not on file   Social Needs    Financial resource strain: Not on file    Food insecurity     Worry: Not on file     Inability: Not on file    Transportation needs     Medical: Not on file     Non-medical: Not on file   Tobacco Use    Smoking status: Current Every Day Smoker     Packs/day: 0.25     Years: 2.00     Pack years: 0.50    Smokeless tobacco: Never Used   Substance and Sexual Activity    Alcohol use: No     Alcohol/week: 0.8 standard drinks     Types: 1 Cans of beer per week    Drug use: Yes     Types: Marijuana     Comment: rarely     Sexual activity: Not on file   Lifestyle    Physical activity     Days per week: Not on file     Minutes per session: Not on file    Stress: Not on file   Relationships    Social connections     Talks on phone: Not on file     Gets together: Not on file     Attends Denominational service: Not on file     Active member of club or organization: Not on file     Attends meetings of clubs or organizations: Not on file     Relationship status: Not on file    Intimate partner violence     Fear of current or ex partner: Not on file     Emotionally abused: Not on file     Physically abused: Not on file     Forced sexual activity: Not on file   Other Topics Concern    Not on file   Social History Narrative    Not on file       Current Outpatient Medications   Medication Sig Dispense Refill    diclofenac EC (VOLTAREN) 75 mg EC tablet Take 1 Tab by mouth two (2) times a day. Take with food as needed for pain 60 Tab 4    lidocaine (LIDODERM) 5 % Apply patch to the affected area for 12 hours a day and remove for 12 hours a day. 15 Each 0    pravastatin (PRAVACHOL) 40 mg tablet Take 1 Tab by mouth nightly. 90 Tab 1    metFORMIN ER (GLUCOPHAGE XR) 500 mg tablet Take 2 Tabs by mouth two (2) times daily (with meals). 360 Tab 1    fluticasone propionate (FLONASE) 50 mcg/actuation nasal spray USE 2 SPRAYS IN EACH NOSTRIL ONCE DAILY AS NEEDED FOR  RHINITIS 16 g 5    gabapentin (NEURONTIN) 300 mg capsule 1 po tid  Indications: Neuropathic Pain 90 Cap 4    methocarbamol (ROBAXIN) 500 mg tablet Take 1 Tab by mouth four (4) times daily. 120 Tab 4    Blood-Glucose Meter (TRUE METRIX GLUCOSE METER) misc Use to check blood sugar 2 times per day. 1 Each 0    glucose blood VI test strips (TRUE METRIX GLUCOSE TEST STRIP) strip Use to check blood sugar 2 times per day. 100 Strip 11    lancets misc Use to check blood sugar 2 times daily 1 Each 11    Insulin Needles, Disposable, 31 gauge x 5/16\" ndle Use to inject Lantus daily. 1 Package 11    sertraline (ZOLOFT) 50 mg tablet Take 1 Tab by mouth daily. 2    Lancets misc Check glucose ACHS 200 Each 11    alcohol swabs padm 1 Each by Apply Externally route Before breakfast, lunch, dinner and at bedtime. accucheck ACHS and record in log. Take log to every doctor visit.  **MEDICALLY NECESSARY** 100 Pad 2    traZODone (DESYREL) 150 mg tablet Take 150-300 mg by mouth nightly.  sertraline (ZOLOFT) 100 mg tablet Take 100 mg by mouth daily.  perphenazine (TRILAFON) 4 mg tablet Take 4 mg by mouth three (3) times daily.  benztropine (COGENTIN) 1 mg tablet Take 1 mg by mouth daily.  omeprazole (PRILOSEC) 20 mg capsule TAKE 1 CAPSULE BY MOUTH ONCE DAILY BEFORE BREAKFAST 30 Cap 2       Allergies   Allergen Reactions    Latex Rash    Penicillins Nausea and Vomiting         REVIEW OF SYSTEMS    Constitutional: Negative for fever, chills, or weight change. Respiratory: Negative for cough or shortness of breath. Cardiovascular: Negative for chest pain or palpitations. Gastrointestinal: Negative for acid reflux, change in bowel habits, or constipation. Genitourinary: Negative for dysuria and flank pain. Musculoskeletal: Positive for lumbar pain. Skin: Negative for rash. Neurological: Negative for headaches, dizziness, or numbness. Endo/Heme/Allergies: Negative for increased bruising. Psychiatric/Behavioral: Negative for difficulty with sleep. PHYSICAL EXAMINATION  Visit Vitals  /83   Pulse 70   Temp 98.2 °F (36.8 °C)   Resp 18   Ht 5' 5\" (1.651 m)   Wt 164 lb (74.4 kg)   SpO2 98%   BMI 27.29 kg/m²       Constitutional: Awake, alert, and in no acute distress. Neurological: 1+ symmetrical DTRs in the upper extremities. 1+ symmetrical DTRs in the lower extremities. Sensation to light touch is intact. Negative Gaffney's sign bilaterally. Skin: warm, dry, and intact. Musculoskeletal: Pain with abduction of the right shoulder. Positive Debbie Hippo' test on the right. Positive impingement sign on the right. Pain with palpation of the right AC joint. Negative empty can test bilaterally. Good strength with resisted abduction and adduction. Tight across the upper trapezius bilaterally, R>L, with scattered trigger points. Tenderness to palpation in the lower lumbar region. Pain with extension and axial loading. Improvement with forward flexion. Pain with internal rotation of the left hip. No pain with external rotation of her hips. Negative straight leg raise bilaterally.       Biceps  Triceps Deltoids Wrist Ext Wrist Flex Hand Intrin   Right +4/5 +4/5 +4/5 +4/5 +4/5 +4/5   Left +4/5 +4/5 +4/5 +4/5 +4/5 +4/5      Hip Flex  Quads Hamstrings Ankle DF EHL Ankle PF   Right +4/5 +4/5 +4/5 +4/5 +4/5 +4/5   Left +4/5 +4/5 +4/5 +4/5 +4/5 +4/5     IMAGING:    Thoracic spine MRI from 04/04/2018 was personally reviewed with the patient and demonstrated:  Results from [facility] on 04/04/18    [facility] CONT    Narrative MRI THORACIC WITHOUT CONTRAST    CPT CODE: 38695    HISTORY: Mid back pain, neck pain, right leg weakness since 2001. COMPARISON: None. TECHNIQUE: Sagittal T1, T2 and stir and axial T2 weighted sequences were  acquired through the thoracic spine without contrast.        FINDINGS:     There is normal anatomic alignment of the thoracic spine. Vertebral body heights  are maintained. No infiltrative marrow replacement process or marrow edema. Thoracic spinal cord maintains normal caliber, signal intensity and morphology  throughout. Small bilateral pleural effusions. There are multilevel tiny disc protrusions with contact of the ventral cord at  T6/T7 and T7/T8, but no impingement. No critical foraminal compromise. Incidentally imaged surrounding soft tissues demonstrate no acute abnormalities  otherwise.           Impression IMPRESSION:    Small disc protrusions are most pronounced at T6/T7 and T7/T8 where there is  mild flattening of the ventral cord, but no cord impingement.     Small bilateral pleural effusions.                         Lumbar spine x-rays from 11/18/2019 were personally reviewed and demonstrated:   Multilevel degenerative facet, some straightening of lumbar spine, degenerative disc at L5-S1, no instability     Left hip AP x-rays from 11/18/2019 were personally reviewed with the patient and demonstrated:   Degenerative joint findings bilaterally.       Right shoulder x-rays from 03/18/2017 were personally reviewed and demonstrated:  Results from Hospital Encounter on 03/18/17   XR SHOULDER RT AP/LAT MIN 2 V     Narrative Shoulder series. CPT code: 68082    Clinical history: Arthritis in the shoulder and pain in the back under the  shoulder blade without known injury of unspecified chronicity. Technique: Two shoulder views. Comparison: No priors. Findings: There are no fractures.  Joint relationships at glenohumeral joint are  normal.  Acromioclavicular joint is normal. There is convex appearance of the  underside of the distal acromion process.  There are no soft tissue  calcifications.              Impression IMPRESSION:    No acute abnormalities. Convex appearance of the inferior aspect of the acromion  process could increased risk of rotator cuff impingement.                Cervical spine x-rays from 12/27/2016 were personally reviewed with the Pt and demonstrated:  FINDINGS:       Vertebral body height and alignment is intact. Slight straightening of the  cervical lordosis. Moderate disc height loss endplate sclerosis and vertebral  body osteophytes from C3 through C7. Alignment is intact. No fracture. Mild  facet joint arthropathy at multiple levels. The prevertebral space appears normal.       IMPRESSION:      Moderate cervical spondylosis.       Written by Erinn Lira, as dictated by John Bailey MD.  I, Dr. John Bailey confirm that all documentation is accurate.

## 2020-03-16 NOTE — LETTER
3/19/20 Patient: Joseph Guevara YOB: 1955 Date of Visit: 3/16/2020 Stacie Morales NP 
[address] [address] [facility] [address] VIA In Basket Dear Stacie Morales NP, Thank you for referring Ms. Ada Villalobos to [facility] for evaluation. My notes for this consultation are attached. If you have questions, please do not hesitate to call me. I look forward to following your patient along with you. Sincerely, Natasha Lopez MD

## 2020-05-26 ENCOUNTER — VIRTUAL VISIT (OUTPATIENT)
Dept: ORTHOPEDIC SURGERY | Age: 65
End: 2020-05-26

## 2020-05-26 DIAGNOSIS — G89.29 CHRONIC RIGHT SHOULDER PAIN: Primary | ICD-10-CM

## 2020-05-26 DIAGNOSIS — M62.838 MUSCLE SPASM: ICD-10-CM

## 2020-05-26 DIAGNOSIS — M47.816 LUMBAR FACET ARTHROPATHY: ICD-10-CM

## 2020-05-26 DIAGNOSIS — M25.511 CHRONIC RIGHT SHOULDER PAIN: Primary | ICD-10-CM

## 2020-05-26 DIAGNOSIS — M47.812 CERVICAL SPONDYLOSIS: ICD-10-CM

## 2020-05-26 DIAGNOSIS — M79.2 NEURITIS: ICD-10-CM

## 2020-05-26 RX ORDER — GABAPENTIN 300 MG/1
300 CAPSULE ORAL 3 TIMES DAILY
Qty: 90 CAP | Refills: 2 | Status: SHIPPED | OUTPATIENT
Start: 2020-05-26 | End: 2020-09-15 | Stop reason: SDUPTHER

## 2020-05-26 NOTE — PATIENT INSTRUCTIONS
Low Back Arthritis: Exercises Introduction Here are some examples of typical rehabilitation exercises for your condition. Start each exercise slowly. Ease off the exercise if you start to have pain. Your doctor or physical therapist will tell you when you can start these exercises and which ones will work best for you. When you are not being active, find a comfortable position for rest. Some people are comfortable on the floor or a medium-firm bed with a small pillow under their head and another under their knees. Some people prefer to lie on their side with a pillow between their knees. Don't stay in one position for too long. Take short walks (10 to 20 minutes) every 2 to 3 hours. Avoid slopes, hills, and stairs until you feel better. Walk only distances you can manage without pain, especially leg pain. How to do the exercises Pelvic tilt 1. Lie on your back with your knees bent. 2. \"Brace\" your stomachtighten your muscles by pulling in and imagining your belly button moving toward your spine. 3. Press your lower back into the floor. You should feel your hips and pelvis rock back. 4. Hold for 6 seconds while breathing smoothly. 5. Relax and allow your pelvis and hips to rock forward. 6. Repeat 8 to 12 times. Back stretches 1. Get down on your hands and knees on the floor. 2. Relax your head and allow it to droop. Round your back up toward the ceiling until you feel a nice stretch in your upper, middle, and lower back. Hold this stretch for as long as it feels comfortable, or about 15 to 30 seconds. 3. Return to the starting position with a flat back while you are on your hands and knees. 4. Let your back sway by pressing your stomach toward the floor. Lift your buttocks toward the ceiling. 5. Hold this position for 15 to 30 seconds. 6. Repeat 2 to 4 times. Follow-up care is a key part of your treatment and safety.  Be sure to make and go to all appointments, and call your doctor if you are having problems. It's also a good idea to know your test results and keep a list of the medicines you take. Where can you learn more? Go to http://bernardino-prateek.info/ Enter R893 in the search box to learn more about \"Low Back Arthritis: Exercises. \" Current as of: June 26, 2019Content Version: 12.4 © 9588-2864 [employer]. Care instructions adapted under license by [employer] (which disclaims liability or warranty for this information). If you have questions about a medical condition or this instruction, always ask your healthcare professional. [address] any warranty or liability for your use of this information.

## 2020-05-26 NOTE — PROGRESS NOTES
[facility]  Sean Douglas., [address] [place] [address], [facility] [address]  Phone: [phone]  Fax: [phone]    Pt's YOB: 1955    ASSESSMENT   Diagnoses and all orders for this visit:    1. Chronic right shoulder pain    2. Neuritis  -     gabapentin (NEURONTIN) 300 mg capsule; Take 1 Cap by mouth three (3) times daily. Max Daily Amount: 900 mg. Indications: neuropathic pain    3. Lumbar facet arthropathy    4. Muscle spasm    5. Cervical spondylosis         IMPRESSION AND PLAN:  Miriam Ochoa is a 60 y.o. female with history of lumbar pain. Pt admits to increased lower back pain, frequent cramping, and sharp pain in the middle back, particularly when she is more active. Pt takes Neurontin 300 mg 1 cap TID, Voltaren 75 mg 1 tab BID, and Robaxin 500 mg as needed up to 4 x daily. 1) Pt was given information on lumbar arthritis exercises. 2) She received a refill of Neurontin 300 mg 1 cap TID. 3) Pt will continue taking Robaxin 500 mg as prescribed and does not need a refill at this time. 4) She was offered a referral to shoulder physical therapy but deferred. 5) Pt plans to obtain her previously ordered right shoulder x-rays this week. 6) Ms. Valarie Zaldivar has a reminder for a \"due or due soon\" health maintenance. I have asked that she contact her primary care provider, Jaden Lang NP, for follow-up on this health maintenance. 7)  demonstrated consistency with prescribing. Follow-up and Dispositions    · Return in about 2 months (around 7/26/2020) for Medication follow up. CPT Codes 48127-41207 for Established Patients may apply to this TeleHealth Visit  Time-based coding, delete if not needed: I spent at 13 minutes and 10 seconds from 8:45 AM to 8:59 AM with this established patient, and >50% of the time was spent counseling and/or coordinating care regarding her symptoms and medications.     Due to this being a TeleHealth evaluation, many elements of the physical examination are unable to be assessed. Pursuant to the emergency declaration under the [facility] waiver authority and the [employer] Act, this Virtual Visit was conducted, with patient's consent, to reduce the patient's risk of exposure to COVID-19 and provide continuity of care for an established patient. Services were provided through a telephone discussion to substitute for in-person clinic visit. HISTORY OF PRESENT ILLNESS:  Stepahn Tanner is a 60 y.o. female with history of lumbar pain and is evaluated from her home by telephone call at the [facility] location on 5/26/2020 with her verbal consent for follow up. She admits to increased lower back pain and frequent cramping. Pt complains of sharp pain in the middle back, particularly when she is more active. She reports right sided numbness in her neck and swelling in her hands during the day. Pt is concerned she has arthritis in her hands. She did not obtain the right shoulder x-rays ordered at her last office visit and admits that she has been hesitant about going to the hospital for the x-rays due to COVID-19. Pt takes Neurontin 300 mg 1 cap TID, Voltaren 75 mg 1 tab BID, and Robaxin 500 mg as needed up to 4 x daily. The Robaxin has been especially helpful and she has been using this more regularly. She also has pain at night in her shoulder and does report difficulty with sleep. She reports relief in her right shoulder pain with moist heat and home exercises. Pt at this time desires to continue with current care.     Pain Scale: 6/10    PCP: Con Rodriguez NP     Past Medical History:   Diagnosis Date    CAD (coronary artery disease)     Hypertension     Uterine fibroid         Social History     Socioeconomic History    Marital status: SINGLE     Spouse name: Not on file    Number of children: Not on file    Years of education: Not on file    Highest education level: Not on file   Occupational History    Not on file   Social Needs    Financial resource strain: Not on file    Food insecurity     Worry: Not on file     Inability: Not on file    Transportation needs     Medical: Not on file     Non-medical: Not on file   Tobacco Use    Smoking status: Current Every Day Smoker     Packs/day: 0.25     Years: 2.00     Pack years: 0.50    Smokeless tobacco: Never Used   Substance and Sexual Activity    Alcohol use: No     Alcohol/week: 0.8 standard drinks     Types: 1 Cans of beer per week    Drug use: Yes     Types: Marijuana     Comment: rarely     Sexual activity: Not on file   Lifestyle    Physical activity     Days per week: Not on file     Minutes per session: Not on file    Stress: Not on file   Relationships    Social connections     Talks on phone: Not on file     Gets together: Not on file     Attends [employer] service: Not on file     Active member of club or organization: Not on file     Attends meetings of clubs or organizations: Not on file     Relationship status: Not on file    Intimate partner violence     Fear of current or ex partner: Not on file     Emotionally abused: Not on file     Physically abused: Not on file     Forced sexual activity: Not on file   Other Topics Concern    Not on file   Social History Narrative    Not on file       Current Outpatient Medications   Medication Sig Dispense Refill    omeprazole (PRILOSEC) 20 mg capsule TAKE 1 CAPSULE BY MOUTH ONCE DAILY BEFORE BREAKFAST 30 Cap 2    diclofenac EC (VOLTAREN) 75 mg EC tablet Take 1 Tab by mouth two (2) times a day. Take with food as needed for pain 60 Tab 4    lidocaine (LIDODERM) 5 % Apply patch to the affected area for 12 hours a day and remove for 12 hours a day. 15 Each 0    pravastatin (PRAVACHOL) 40 mg tablet Take 1 Tab by mouth nightly.  90 Tab 1    metFORMIN ER (GLUCOPHAGE XR) 500 mg tablet Take 2 Tabs by mouth two (2) times daily (with meals). 360 Tab 1    fluticasone propionate (FLONASE) 50 mcg/actuation nasal spray USE 2 SPRAYS IN EACH NOSTRIL ONCE DAILY AS NEEDED FOR  RHINITIS 16 g 5    gabapentin (NEURONTIN) 300 mg capsule 1 po tid  Indications: Neuropathic Pain 90 Cap 4    methocarbamol (ROBAXIN) 500 mg tablet Take 1 Tab by mouth four (4) times daily. 120 Tab 4    Blood-Glucose Meter (TRUE METRIX GLUCOSE METER) misc Use to check blood sugar 2 times per day. 1 Each 0    glucose blood VI test strips (TRUE METRIX GLUCOSE TEST STRIP) strip Use to check blood sugar 2 times per day. 100 Strip 11    lancets misc Use to check blood sugar 2 times daily 1 Each 11    Insulin Needles, Disposable, 31 gauge x 5/16\" ndle Use to inject Lantus daily. 1 Package 11    sertraline (ZOLOFT) 50 mg tablet Take 1 Tab by mouth daily. 2    Lancets misc Check glucose ACHS 200 Each 11    alcohol swabs padm 1 Each by Apply Externally route Before breakfast, lunch, dinner and at bedtime. accucheck ACHS and record in log. Take log to every doctor visit. **MEDICALLY NECESSARY** 100 Pad 2    traZODone (DESYREL) 150 mg tablet Take 150-300 mg by mouth nightly.  sertraline (ZOLOFT) 100 mg tablet Take 100 mg by mouth daily.  perphenazine (TRILAFON) 4 mg tablet Take 4 mg by mouth three (3) times daily.  benztropine (COGENTIN) 1 mg tablet Take 1 mg by mouth daily. Allergies   Allergen Reactions    Latex Rash    Penicillins Nausea and Vomiting         REVIEW OF SYSTEMS    Constitutional: Negative for fever, chills, or weight change. Gastrointestinal: Negative for acid reflux, change in bowel habits, or constipation. Musculoskeletal: Positive for lumbar and right shoulder pain  Neurological: Negative for headaches, dizziness, or numbness. Psychiatric/Behavioral: Positive for difficulty with sleep.     As per HPI    IMAGING:    Thoracic spine MRI from 04/04/2018 was personally reviewed with the patient and demonstrated:  Results from Hospital Encounter on 04/04/18    [facility]    Narrative MRI THORACIC WITHOUT CONTRAST    CPT CODE: 46535    HISTORY: Mid back pain, neck pain, right leg weakness since 2001. COMPARISON: None. TECHNIQUE: Sagittal T1, T2 and stir and axial T2 weighted sequences were  acquired through the thoracic spine without contrast.        FINDINGS:     There is normal anatomic alignment of the thoracic spine. Vertebral body heights  are maintained. No infiltrative marrow replacement process or marrow edema. Thoracic spinal cord maintains normal caliber, signal intensity and morphology  throughout. Small bilateral pleural effusions. There are multilevel tiny disc protrusions with contact of the ventral cord at  T6/T7 and T7/T8, but no impingement. No critical foraminal compromise. Incidentally imaged surrounding soft tissues demonstrate no acute abnormalities  otherwise.           Impression IMPRESSION:    Small disc protrusions are most pronounced at T6/T7 and T7/T8 where there is  mild flattening of the ventral cord, but no cord impingement. Small bilateral pleural effusions.                         Lumbar spine x-rays from 11/18/2019 were personally reviewed and demonstrated:   Multilevel degenerative facet, some straightening of lumbar spine, degenerative disc at L5-S1, no instability     Left hip AP x-rays from 11/18/2019 were personally reviewed with the patient and demonstrated:   Degenerative joint findings bilaterally.       Right shoulder x-rays from 03/18/2017 were personally reviewed and demonstrated:  Results from Hospital Encounter on 03/18/17   XR SHOULDER RT AP/LAT MIN 2 V     Narrative Shoulder series. CPT code: 59950    Clinical history: Arthritis in the shoulder and pain in the back under the  shoulder blade without known injury of unspecified chronicity. Technique: Two shoulder views. Comparison: No priors. Findings:  There are no fractures.  Joint relationships at glenohumeral joint are  normal.  Acromioclavicular joint is normal. There is convex appearance of the  underside of the distal acromion process.  There are no soft tissue  calcifications.              Impression IMPRESSION:    No acute abnormalities. Convex appearance of the inferior aspect of the acromion  process could increased risk of rotator cuff impingement.                Cervical spine x-rays from 12/27/2016 were personally reviewed with the Pt and demonstrated:  FINDINGS:       Vertebral body height and alignment is intact. Slight straightening of the  cervical lordosis. Moderate disc height loss endplate sclerosis and vertebral  body osteophytes from C3 through C7. Alignment is intact. No fracture. Mild  facet joint arthropathy at multiple levels. The prevertebral space appears normal.       IMPRESSION:      Moderate cervical spondylosis.     Written by Heidi Snyder, as dictated by Montez Bojorquez MD.  I, Dr. Montez Bojorquez confirm that all documentation is accurate.

## 2020-06-05 ENCOUNTER — HOSPITAL ENCOUNTER (OUTPATIENT)
Dept: GENERAL RADIOLOGY | Age: 65
Discharge: HOME | End: 2020-06-05
Payer: MEDICAID

## 2020-06-05 DIAGNOSIS — G89.29 CHRONIC RIGHT SHOULDER PAIN: ICD-10-CM

## 2020-06-05 DIAGNOSIS — E78.2 MIXED HYPERLIPIDEMIA: ICD-10-CM

## 2020-06-05 DIAGNOSIS — E11.65 CONTROLLED TYPE 2 DIABETES MELLITUS WITH HYPERGLYCEMIA, UNSPECIFIED WHETHER LONG TERM INSULIN USE (HCC): ICD-10-CM

## 2020-06-05 DIAGNOSIS — I10 ESSENTIAL HYPERTENSION: ICD-10-CM

## 2020-06-05 DIAGNOSIS — M25.511 CHRONIC RIGHT SHOULDER PAIN: ICD-10-CM

## 2020-06-05 PROCEDURE — 73030 X-RAY EXAM OF SHOULDER: CPT

## 2020-07-01 RX ORDER — OMEPRAZOLE 20 MG/1
CAPSULE, DELAYED RELEASE ORAL
Qty: 30 CAP | Refills: 0 | Status: SHIPPED | OUTPATIENT
Start: 2020-07-01 | End: 2020-08-04 | Stop reason: SDUPTHER

## 2020-07-13 DIAGNOSIS — M62.838 MUSCLE SPASM: ICD-10-CM

## 2020-07-13 RX ORDER — METHOCARBAMOL 500 MG/1
500 TABLET, FILM COATED ORAL 4 TIMES DAILY
Qty: 120 TAB | Refills: 0 | Status: SHIPPED | OUTPATIENT
Start: 2020-07-13 | End: 2020-08-20

## 2020-07-21 ENCOUNTER — OFFICE VISIT (OUTPATIENT)
Dept: FAMILY MEDICINE CLINIC | Age: 65
End: 2020-07-21

## 2020-07-21 VITALS
HEART RATE: 67 BPM | BODY MASS INDEX: 27.16 KG/M2 | HEIGHT: 65 IN | DIASTOLIC BLOOD PRESSURE: 76 MMHG | OXYGEN SATURATION: 98 % | RESPIRATION RATE: 20 BRPM | WEIGHT: 163 LBS | SYSTOLIC BLOOD PRESSURE: 154 MMHG | TEMPERATURE: 99.1 F

## 2020-07-21 DIAGNOSIS — Z12.11 SCREENING FOR COLORECTAL CANCER: ICD-10-CM

## 2020-07-21 DIAGNOSIS — E78.2 MIXED HYPERLIPIDEMIA: Chronic | ICD-10-CM

## 2020-07-21 DIAGNOSIS — R22.32 NODULE OF SKIN OF LEFT HAND: ICD-10-CM

## 2020-07-21 DIAGNOSIS — B37.2 CANDIDIASIS, INTERTRIGINOUS: ICD-10-CM

## 2020-07-21 DIAGNOSIS — E11.65 CONTROLLED TYPE 2 DIABETES MELLITUS WITH HYPERGLYCEMIA, UNSPECIFIED WHETHER LONG TERM INSULIN USE (HCC): Primary | ICD-10-CM

## 2020-07-21 DIAGNOSIS — Z13.5 SCREENING FOR DIABETIC RETINOPATHY: ICD-10-CM

## 2020-07-21 DIAGNOSIS — I10 ESSENTIAL HYPERTENSION: ICD-10-CM

## 2020-07-21 DIAGNOSIS — Z12.12 SCREENING FOR COLORECTAL CANCER: ICD-10-CM

## 2020-07-21 RX ORDER — METFORMIN HYDROCHLORIDE 500 MG/1
1000 TABLET ORAL 2 TIMES DAILY WITH MEALS
Qty: 120 TAB | Refills: 2 | Status: SHIPPED | OUTPATIENT
Start: 2020-07-21 | End: 2021-05-11 | Stop reason: SDUPTHER

## 2020-07-21 RX ORDER — NYSTATIN 100000 [USP'U]/G
POWDER TOPICAL 4 TIMES DAILY
Qty: 30 G | Refills: 1 | Status: SHIPPED | OUTPATIENT
Start: 2020-07-21 | End: 2022-01-19 | Stop reason: SDUPTHER

## 2020-07-21 RX ORDER — PRAVASTATIN SODIUM 40 MG/1
40 TABLET ORAL
Qty: 90 TAB | Refills: 1 | Status: SHIPPED | OUTPATIENT
Start: 2020-07-21 | End: 2021-02-17

## 2020-07-21 NOTE — PROGRESS NOTES
Subjective:    Heaven Vigil is a 60y.o. year old female seen for follow up of diabetes. She also has hyperlipidemia. Diabetic Review of Systems - medication compliance: compliant all of the time received letter and call from pharmacy to discontinue metformin due to recall 3 weeks ago, diabetic diet compliance: compliant most of the time, home glucose monitoring: is performed regularly, fasting values range 90s, non fasting 150s, further diabetic ROS: no polyuria or polydipsia, no chest pain, dyspnea or TIA's, no numbness, tingling or pain in extremities. States fasting glucose was in the 80s-90s consistently with metformin    Other symptoms and concerns: states epigastric pain has improved, denies epigastric burning. States stopped drinking apple cidger vinegar. Reports 2 days ago she has itching and odor underneath right breast.  Comments area was itching and put perfume to area which caused burning and increased irritation. State she has been sweating more. Reports she noticed painful nodule to left anterior hand that has been present for the last week. Denies injury. Reports nodule has increased. Patient Active Problem List   Diagnosis Code    Type II diabetes mellitus, uncontrolled ([facility] [address] E11.65    Mixed hyperlipidemia E78.2    Myofascial pain M79.18    DDD (degenerative disc disease), cervical M50.30    Osteoarthritis of spine with radiculopathy, cervical region M47.22    Right shoulder pain M25.511    Tobacco use disorder F17.200    Muscle spasm M62.838     Current Outpatient Medications   Medication Sig Dispense Refill    methocarbamoL (ROBAXIN) 500 mg tablet Take 1 Tab by mouth four (4) times daily. 120 Tab 0    omeprazole (PRILOSEC) 20 mg capsule TAKE 1 CAPSULE BY MOUTH ONCE DAILY BEFORE BREAKFAST 30 Cap 0    gabapentin (NEURONTIN) 300 mg capsule Take 1 Cap by mouth three (3) times daily. Max Daily Amount: 900 mg.  Indications: neuropathic pain 90 Cap 2    diclofenac EC (VOLTAREN) 75 mg EC tablet Take 1 Tab by mouth two (2) times a day. Take with food as needed for pain 60 Tab 4    lidocaine (LIDODERM) 5 % Apply patch to the affected area for 12 hours a day and remove for 12 hours a day. 15 Each 0    pravastatin (PRAVACHOL) 40 mg tablet Take 1 Tab by mouth nightly. 90 Tab 1    fluticasone propionate (FLONASE) 50 mcg/actuation nasal spray USE 2 SPRAYS IN EACH NOSTRIL ONCE DAILY AS NEEDED FOR  RHINITIS 16 g 5    Blood-Glucose Meter (TRUE METRIX GLUCOSE METER) misc Use to check blood sugar 2 times per day. 1 Each 0    glucose blood VI test strips (TRUE METRIX GLUCOSE TEST STRIP) strip Use to check blood sugar 2 times per day. 100 Strip 11    lancets misc Use to check blood sugar 2 times daily 1 Each 11    Insulin Needles, Disposable, 31 gauge x 5/16\" ndle Use to inject Lantus daily. 1 Package 11    sertraline (ZOLOFT) 50 mg tablet Take 1 Tab by mouth daily. 2    Lancets misc Check glucose ACHS 200 Each 11    alcohol swabs padm 1 Each by Apply Externally route Before breakfast, lunch, dinner and at bedtime. accucheck ACHS and record in log. Take log to every doctor visit. **MEDICALLY NECESSARY** 100 Pad 2    traZODone (DESYREL) 150 mg tablet Take 150-300 mg by mouth nightly.  sertraline (ZOLOFT) 100 mg tablet Take 100 mg by mouth daily.  perphenazine (TRILAFON) 4 mg tablet Take 4 mg by mouth three (3) times daily.  benztropine (COGENTIN) 1 mg tablet Take 1 mg by mouth daily.  metFORMIN ER (GLUCOPHAGE XR) 500 mg tablet Take 2 Tabs by mouth two (2) times daily (with meals). 360 Tab 1      Allergies   Allergen Reactions    Latex Rash    Penicillins Nausea and Vomiting     No past surgical history on file.   Family History   Problem Relation Age of Onset    Diabetes Mother     Stroke Mother     Diabetes Father     Cancer Paternal Grandfather     Breast Cancer Cousin       Lab Results   Component Value Date/Time    Cholesterol, total 208 (H) 02/18/2020 08:10 AM HDL Cholesterol 58 02/18/2020 08:10 AM    LDL, calculated 116 (H) 02/18/2020 08:10 AM    VLDL, calculated 34 02/18/2020 08:10 AM    Triglyceride 168 (H) 02/18/2020 08:10 AM    CHOL/HDL Ratio 2.9 10/20/2018 08:33 AM     Lab Results   Component Value Date/Time    Sodium 142 02/18/2020 08:10 AM    Potassium 4.4 02/18/2020 08:10 AM    Chloride 103 02/18/2020 08:10 AM    CO2 26 02/18/2020 08:10 AM    Anion gap 2 (L) 10/20/2018 08:33 AM    Glucose 115 (H) 02/18/2020 08:10 AM    BUN 16 02/18/2020 08:10 AM    Creatinine 0.71 02/18/2020 08:10 AM    BUN/Creatinine ratio 23 02/18/2020 08:10 AM    GFR est  02/18/2020 08:10 AM    GFR est non-AA 92 02/18/2020 08:10 AM    Calcium 9.3 02/18/2020 08:10 AM    Bilirubin, total <0.2 02/18/2020 08:10 AM    Alk. phosphatase 69 02/18/2020 08:10 AM    Protein, total 7.5 02/18/2020 08:10 AM    Albumin 4.5 02/18/2020 08:10 AM    Globulin 3.8 10/20/2018 08:33 AM    A-G Ratio 1.5 02/18/2020 08:10 AM    ALT (SGPT) 36 (H) 02/18/2020 08:10 AM    AST (SGOT) 21 02/18/2020 08:10 AM     Lab Results   Component Value Date/Time    WBC 7.0 07/25/2016 12:47 AM    HGB 11.3 (L) 07/25/2016 12:47 AM    HCT 32.6 (L) 07/25/2016 12:47 AM    PLATELET 301 55/74/8780 12:47 AM    MCV 87.6 07/25/2016 12:47 AM     Lab Results   Component Value Date/Time    Hemoglobin A1c 6.0 (H) 09/18/2019 10:27 AM    Hemoglobin A1c (POC) 5.8 02/25/2020 09:52 AM     Lab Results   Component Value Date/Time    Microalbumin/Creat ratio (mg/g creat)  12/01/2018 09:43 AM     Cannot calculate ratio due to microalbumin result outside reportable range.     Microalb/Creat ratio (ug/mg creat.) 4 02/25/2020 10:06 AM    Microalbumin,urine random <0.50 12/01/2018 09:43 AM    Microalbumin urine (POC) 30 04/11/2017 04:00 PM     Wt Readings from Last 3 Encounters:   07/21/20 163 lb (73.9 kg)   03/16/20 164 lb (74.4 kg)   02/25/20 164 lb (74.4 kg)     Last Point of Care HGB A1C  Hemoglobin A1c (POC)   Date Value Ref Range Status   02/25/2020 5.8 % Final      BP Readings from Last 3 Encounters:   07/21/20 165/74   03/16/20 161/83   02/25/20 123/80       Results for orders placed or performed in visit on 02/25/20   MICROALBUMIN, UR, RAND W/ MICROALB/CREAT RATIO   Result Value Ref Range    Creatinine, urine 97.8 Not Estab. mg/dL    Microalbumin, urine 3.7 Not Estab. ug/mL    Microalb/Creat ratio (ug/mg creat.) 4 0 - 29 mg/g creat   AMB POC HEMOGLOBIN A1C   Result Value Ref Range    Hemoglobin A1c (POC) 5.8 %     Last Diabetic Foot Exam on: 9/18/2019    Objective:  Visit Vitals  /74 (BP 1 Location: Left arm, BP Patient Position: Sitting)   Pulse 67   Temp 99.1 °F (37.3 °C) (Temporal)   Resp 20   Ht 5' 5\" (1.651 m)   Wt 163 lb (73.9 kg)   SpO2 98%   BMI 27.12 kg/m²     Awake and alert in no acute distress   Neck supple without lymphadenopathy, no carotid artery bruits auscultated bilaterally. No thyromegaly   Lungs clear throughout   S1 S2 RRR without ectopy or murmur auscultated. Extremities: no clubbing, cyanosis, peripheral edema   Abdomen - soft, nontender, nondistended, no masses or organomegaly  Integumentary: erythematous plaque to right mammary fold  Musculoskeletal: left anterior hand nodule, tender to palpation  Reviewed vital signs           Assessment/Plan:    ICD-10-CM ICD-9-CM    1. Controlled type 2 diabetes mellitus with hyperglycemia, unspecified whether long term insulin use (HCC)  E11.65 250.80      790.29    2. Mixed hyperlipidemia  E78.2 272.2 pravastatin (PRAVACHOL) 40 mg tablet   3. Screening for colorectal cancer  Z12.11 V76.51 REFERRAL TO GASTROENTEROLOGY    Z12.12 V76.41    4. Essential hypertension  I10 401.9    5. Screening for diabetic retinopathy  Z13.5 V80.2 REFERRAL TO OPHTHALMOLOGY   6. Nodule of skin of left hand  R22.30 782. 2 XR HAND LT MIN 3 V   7.  Candidiasis, intertriginous  B37.2 112.3      Orders Placed This Encounter    XR HAND LT MIN 3 V    REFERRAL TO OPHTHALMOLOGY    REFERRAL TO GASTROENTEROLOGY    metFORMIN (GLUCOPHAGE) 500 mg tablet    pravastatin (PRAVACHOL) 40 mg tablet    nystatin (MYCOSTATIN) powder         Issues reviewed with her: low cholesterol diet, weight control and daily exercise discussed. I have discussed the diagnosis with the patient and the intended plan as seen in the above orders. The patient has received an after-visit summary and questions were answered concerning future plans. I have discussed medication side effects and warnings with the patient as well. Patient agreeable with above plan and verbalizes understanding. Follow-up and Dispositions    · Return in about 4 months (around 11/21/2020).

## 2020-07-21 NOTE — PATIENT INSTRUCTIONS
Candidiasis: Care Instructions  Your Care Instructions  Candidiasis (say \"qmg-thv-UQ-uh-alberto\") is a yeast infection. Yeast normally lives in your body. But it can cause problems if your body's defenses don't work as they should. Some medicines can increase your chance of getting a yeast infection. These include antibiotics, steroids, and cancer drugs. And some diseases like AIDS and diabetes can make you more likely to get yeast infections. There are different types of yeast infections. Nonnie Baldy is a yeast infection in the mouth. It usually occurs in people with weak immune systems. It causes white patches inside the mouth and throat. Yeast infections of the skin usually occur in skin folds where the skin stays moist. They cause red, oozing patches on your skin. Babies can get these infections under the diaper. People who often wear gloves can get them on their hands. Many women get vaginal yeast infections. They are most common when women take antibiotics. These infections can cause the vagina to itch and burn. They also cause white discharge that looks like cottage cheese. In rare cases, yeast infects the blood. This can cause serious disease. This kind of infection is treated with medicine given through a needle into a vein (IV). After you start treatment, a yeast infection usually goes away quickly. But if your immune system is weak, the infection may come back. Tell your doctor if you get yeast infections often. Follow-up care is a key part of your treatment and safety. Be sure to make and go to all appointments, and call your doctor if you are having problems. It's also a good idea to know your test results and keep a list of the medicines you take. How can you care for yourself at home? · Take your medicines exactly as prescribed. Call your doctor if you think you are having a problem with your medicine. · Use antibiotics only as directed by your doctor. · Eat yogurt with live cultures.  It has bacteria called lactobacillus. It may help prevent some types of yeast infections. · Keep your skin clean and dry. Put powder on moist places. · If you are using a cream or suppository to treat a vaginal yeast infection, don't use condoms or a diaphragm. Use a different type of birth control. · Eat a healthy diet and get regular exercise. This will help keep your immune system strong. When should you call for help? Watch closely for changes in your health, and be sure to contact your doctor if:  · You do not get better as expected. Where can you learn more? Go to http://[employer].info/  Enter K950 in the search box to learn more about \"Candidiasis: Care Instructions. \"  Current as of: November 8, 2019               Content Version: 12.5  © 1098-5325 [employer]. Care instructions adapted under license by [employer] (which disclaims liability or warranty for this information). If you have questions about a medical condition or this instruction, always ask your healthcare professional. [address] any warranty or liability for your use of this information.

## 2020-07-24 ENCOUNTER — HOSPITAL ENCOUNTER (OUTPATIENT)
Dept: GENERAL RADIOLOGY | Age: 65
Discharge: HOME | End: 2020-07-24
Payer: MEDICAID

## 2020-07-24 DIAGNOSIS — R22.32 NODULE OF SKIN OF LEFT HAND: ICD-10-CM

## 2020-07-24 PROCEDURE — 73130 X-RAY EXAM OF HAND: CPT

## 2020-08-03 ENCOUNTER — TELEPHONE (OUTPATIENT)
Dept: FAMILY MEDICINE CLINIC | Age: 65
End: 2020-08-03

## 2020-08-03 NOTE — TELEPHONE ENCOUNTER
----- Message from Ebenezer Syed NP sent at 7/28/2020 11:23 AM EDT -----  Please advise patient her xray reveals arthritis. Thanks!

## 2020-08-04 RX ORDER — OMEPRAZOLE 20 MG/1
CAPSULE, DELAYED RELEASE ORAL
Qty: 30 CAP | Refills: 0 | Status: SHIPPED | OUTPATIENT
Start: 2020-08-04 | End: 2020-09-04

## 2020-08-19 DIAGNOSIS — M62.838 MUSCLE SPASM: ICD-10-CM

## 2020-08-20 RX ORDER — METHOCARBAMOL 500 MG/1
TABLET, FILM COATED ORAL
Qty: 120 TAB | Refills: 0 | Status: SHIPPED | OUTPATIENT
Start: 2020-08-20 | End: 2021-03-23

## 2020-09-04 RX ORDER — OMEPRAZOLE 20 MG/1
CAPSULE, DELAYED RELEASE ORAL
Qty: 30 CAP | Refills: 0 | Status: SHIPPED | OUTPATIENT
Start: 2020-09-04 | End: 2020-10-07

## 2020-09-08 DIAGNOSIS — M79.2 NEURITIS: ICD-10-CM

## 2020-09-09 DIAGNOSIS — M79.2 NEURITIS: ICD-10-CM

## 2020-09-09 RX ORDER — GABAPENTIN 300 MG/1
CAPSULE ORAL
Qty: 90 CAP | Refills: 0 | OUTPATIENT
Start: 2020-09-09

## 2020-09-09 NOTE — TELEPHONE ENCOUNTER
Please verify how pt is taking this medication -- has appt next month -- we can try to move it up if needed -- last seen in May

## 2020-09-10 NOTE — TELEPHONE ENCOUNTER
Returned call to patient, verified Name/, per patient, she is taking Gabapentin 300 mg 1 cap PO TID. Patient is not sure how her medication has lasted so long. Patient scheduled sooner appointment with Dr. Christine Salcido on Tuesday 09/15/2020 at our [facility] location. Patient aware to arrive 15 mins early to check in. No further action required at this time.

## 2020-09-11 RX ORDER — GABAPENTIN 300 MG/1
CAPSULE ORAL
Qty: 90 CAP | Refills: 0 | OUTPATIENT
Start: 2020-09-11

## 2020-09-15 ENCOUNTER — OFFICE VISIT (OUTPATIENT)
Dept: ORTHOPEDIC SURGERY | Age: 65
End: 2020-09-15

## 2020-09-15 VITALS
RESPIRATION RATE: 14 BRPM | HEART RATE: 70 BPM | DIASTOLIC BLOOD PRESSURE: 84 MMHG | OXYGEN SATURATION: 97 % | SYSTOLIC BLOOD PRESSURE: 157 MMHG | HEIGHT: 65 IN | BODY MASS INDEX: 26.92 KG/M2 | WEIGHT: 161.6 LBS | TEMPERATURE: 97.8 F

## 2020-09-15 DIAGNOSIS — Z82.61 FAMILY HISTORY OF RHEUMATOID ARTHRITIS: ICD-10-CM

## 2020-09-15 DIAGNOSIS — M47.812 CERVICAL SPONDYLOSIS: ICD-10-CM

## 2020-09-15 DIAGNOSIS — M19.011 PRIMARY OSTEOARTHRITIS OF RIGHT SHOULDER: ICD-10-CM

## 2020-09-15 DIAGNOSIS — M25.50 ARTHRALGIA OF MULTIPLE JOINTS: ICD-10-CM

## 2020-09-15 DIAGNOSIS — M62.838 MUSCLE SPASM: ICD-10-CM

## 2020-09-15 DIAGNOSIS — M47.816 LUMBAR FACET ARTHROPATHY: Primary | ICD-10-CM

## 2020-09-15 DIAGNOSIS — M79.2 NEURITIS: ICD-10-CM

## 2020-09-15 RX ORDER — GABAPENTIN 300 MG/1
300 CAPSULE ORAL 3 TIMES DAILY
Qty: 90 CAP | Refills: 5 | Status: SHIPPED | OUTPATIENT
Start: 2020-09-15 | End: 2020-12-15 | Stop reason: SDUPTHER

## 2020-09-15 RX ORDER — KETOROLAC TROMETHAMINE 15 MG/ML
60 INJECTION, SOLUTION INTRAMUSCULAR; INTRAVENOUS ONCE
Qty: 1 VIAL | Refills: 0
Start: 2020-09-15 | End: 2020-09-15

## 2020-09-15 RX ORDER — DICLOFENAC SODIUM 10 MG/G
GEL TOPICAL
Qty: 500 G | Refills: 1 | Status: SHIPPED | OUTPATIENT
Start: 2020-09-15

## 2020-09-15 RX ORDER — METHOCARBAMOL 750 MG/1
750 TABLET, FILM COATED ORAL 4 TIMES DAILY
Qty: 120 TAB | Refills: 2 | Status: SHIPPED | OUTPATIENT
Start: 2020-09-15 | End: 2020-12-31

## 2020-09-15 NOTE — PATIENT INSTRUCTIONS
\     Low Back Arthritis: Exercises  Introduction  Here are some examples of typical rehabilitation exercises for your condition. Start each exercise slowly. Ease off the exercise if you start to have pain. Your doctor or physical therapist will tell you when you can start these exercises and which ones will work best for you. When you are not being active, find a comfortable position for rest. Some people are comfortable on the floor or a medium-firm bed with a small pillow under their head and another under their knees. Some people prefer to lie on their side with a pillow between their knees. Don't stay in one position for too long. Take short walks (10 to 20 minutes) every 2 to 3 hours. Avoid slopes, hills, and stairs until you feel better. Walk only distances you can manage without pain, especially leg pain. How to do the exercises  Pelvic tilt   1. Lie on your back with your knees bent. 2. \"Brace\" your stomach--tighten your muscles by pulling in and imagining your belly button moving toward your spine. 3. Press your lower back into the floor. You should feel your hips and pelvis rock back. 4. Hold for 6 seconds while breathing smoothly. 5. Relax and allow your pelvis and hips to rock forward. 6. Repeat 8 to 12 times. Back stretches   1. Get down on your hands and knees on the floor. 2. Relax your head and allow it to droop. Round your back up toward the ceiling until you feel a nice stretch in your upper, middle, and lower back. Hold this stretch for as long as it feels comfortable, or about 15 to 30 seconds. 3. Return to the starting position with a flat back while you are on your hands and knees. 4. Let your back sway by pressing your stomach toward the floor. Lift your buttocks toward the ceiling. 5. Hold this position for 15 to 30 seconds. 6. Repeat 2 to 4 times. Follow-up care is a key part of your treatment and safety.  Be sure to make and go to all appointments, and call your doctor if you are having problems. It's also a good idea to know your test results and keep a list of the medicines you take. Where can you learn more? Go to http://bernardino-prateek.info/  Enter T094 in the search box to learn more about \"Low Back Arthritis: Exercises. \"  Current as of: March 2, 2020               Content Version: 12.6  © 2006-2020 [employer]. Care instructions adapted under license by [employer] (which disclaims liability or warranty for this information). If you have questions about a medical condition or this instruction, always ask your healthcare professional. [place] [address] any warranty or liability for your use of this information. Shoulder Arthritis: Exercises  Introduction  Here are some examples of exercises for you to try. The exercises may be suggested for a condition or for rehabilitation. Start each exercise slowly. Ease off the exercises if you start to have pain. You will be told when to start these exercises and which ones will work best for you. How to do the exercises  Shoulder flexion (lying down)   To make a wand for this exercise, use a piece of PVC pipe or a broom handle with the broom removed. Make the wand about a foot wider than your shoulders. 7. Lie on your back, holding a wand with both hands. Your palms should face down as you hold the wand. 8. Keeping your elbows straight, slowly raise your arms over your head. Raise them until you feel a stretch in your shoulders, upper back, and chest.  9. Hold for 15 to 30 seconds. 10. Repeat 2 to 4 times. Shoulder rotation (lying down)   To make a wand for this exercise, use a piece of PVC pipe or a broom handle with the broom removed. Make the wand about a foot wider than your shoulders. 7. Lie on your back. Hold a wand with both hands with your elbows bent and palms up.   8. Keep your elbows close to your body, and move the wand across your body toward the sore arm. 9. Hold for 8 to 12 seconds. 10. Repeat 2 to 4 times. Shoulder internal rotation with towel   1. Hold a towel above and behind your head with the arm that is not sore. 2. With your sore arm, reach behind your back and grasp the towel. 3. With the arm above your head, pull the towel upward. Do this until you feel a stretch on the front and outside of your sore shoulder. 4. Hold 15 to 30 seconds. 5. Repeat 2 to 4 times. Shoulder blade squeeze   1. Stand with your arms at your sides, and squeeze your shoulder blades together. Do not raise your shoulders up as you squeeze. 2. Hold 6 seconds. 3. Repeat 8 to 12 times. Resisted rows   For this exercise, you will need elastic exercise material, such as surgical tubing or Thera-Band. 1. Put the band around a solid object at about waist level. (A bedpost will work well.) Each hand should hold an end of the band. 2. With your elbows at your sides and bent to 90 degrees, pull the band back. Your shoulder blades should move toward each other. Return to the starting position. 3. Repeat 8 to 12 times. External rotator strengthening exercise   1. Start by tying a piece of elastic exercise material to a doorknob. You can use surgical tubing or Thera-Band. (You may also hold one end of the band in each hand.)  2. Stand or sit with your shoulder relaxed and your elbow bent 90 degrees. Your upper arm should rest comfortably against your side. Squeeze a rolled towel between your elbow and your body for comfort. This will help keep your arm at your side. 3. Hold one end of the elastic band with the hand of the painful arm. 4. Start with your forearm across your belly. Slowly rotate the forearm out away from your body. Keep your elbow and upper arm tucked against the towel roll or the side of your body until you begin to feel tightness in your shoulder. Slowly move your arm back to where you started. 5. Repeat 8 to 12 times.     Internal rotator strengthening exercise   1. Start by tying a piece of elastic exercise material to a doorknob. You can use surgical tubing or Thera-Band. 2. Stand or sit with your shoulder relaxed and your elbow bent 90 degrees. Your upper arm should rest comfortably against your side. Squeeze a rolled towel between your elbow and your body for comfort. This will help keep your arm at your side. 3. Hold one end of the elastic band in the hand of the painful arm. 4. Slowly rotate your forearm toward your body until it touches your belly. Slowly move it back to where you started. 5. Keep your elbow and upper arm firmly tucked against the towel roll or at your side. 6. Repeat 8 to 12 times. Pendulum swing   If you have pain in your back, do not do this exercise. 1. Hold on to a table or the back of a chair with your good arm. Then bend forward a little and let your sore arm hang straight down. This exercise does not use the arm muscles. Rather, use your legs and your hips to create movement that makes your arm swing freely. 2. Use the movement from your hips and legs to guide the slightly swinging arm back and forth like a pendulum (or elephant trunk). Then guide it in circles that start small (about the size of a dinner plate). Make the circles a bit larger each day, as your pain allows. 3. Do this exercise for 5 minutes, 5 to 7 times each day. 4. As you have less pain, try bending over a little farther to do this exercise. This will increase the amount of movement at your shoulder. Follow-up care is a key part of your treatment and safety. Be sure to make and go to all appointments, and call your doctor if you are having problems. It's also a good idea to know your test results and keep a list of the medicines you take. Where can you learn more? Go to http://www.gray.com/  Enter H562 in the search box to learn more about \"Shoulder Arthritis: Exercises. \"  Current as of: March 2, 2020               Content Version: 12.6  © 0763-8099 [employer]. Care instructions adapted under license by [employer] (which disclaims liability or warranty for this information). If you have questions about a medical condition or this instruction, always ask your healthcare professional. [address] any warranty or liability for your use of this information.

## 2020-09-15 NOTE — LETTER
9/17/20 Patient: Avani Yin YOB: 1955 Date of Visit: 9/15/2020 Jessee Ferguson NP 
[address] [facility] [address] VIA In Basket Dear Jessee Ferguson NP, Thank you for referring Ms. Karma Rae to [facility] for evaluation. My notes for this consultation are attached. If you have questions, please do not hesitate to call me. I look forward to following your patient along with you. Sincerely, Tony Garcia MD

## 2020-09-15 NOTE — PROGRESS NOTES
[facility]  [address] [place] [address], [facility] [address]  Phone: [phone]  Fax: [phone]    Pt's YOB: 1955    ASSESSMENT   Diagnoses and all orders for this visit:    1. Lumbar facet arthropathy  -     KETOROLAC TROMETHAMINE INJ  -     ketorolac (TORADOL) 15 mg/mL soln injection; 4 mL by IntraMUSCular route once for 1 dose. -     [place] THER/PROPH/DIAG INJECTION, SUBCUT/IM    2. Neuritis  -     gabapentin (NEURONTIN) 300 mg capsule; Take 1 Cap by mouth three (3) times daily. Max Daily Amount: 900 mg. Indications: neuropathic pain    3. Muscle spasm  -     methocarbamoL (ROBAXIN) 750 mg tablet; Take 1 Tab by mouth four (4) times daily. 4. Cervical spondylosis  -     methocarbamoL (ROBAXIN) 750 mg tablet; Take 1 Tab by mouth four (4) times daily. 5. Primary osteoarthritis of right shoulder  -     diclofenac (Voltaren) 1 % gel; Apply 4 grams to right shoulder up to 4 times per day, maximum 16 grams per joint per day. Dispense 5 100 gram tubes    6. Arthralgia of multiple joints  -     RHEUMATOID FACTOR, QT; Future  -     RHEUMASSURE; Future  -     diclofenac (Voltaren) 1 % gel; Apply 4 grams to right shoulder up to 4 times per day, maximum 16 grams per joint per day. Dispense 5 100 gram tubes    7. Family history of rheumatoid arthritis  -     RHEUMATOID FACTOR, QT; Future  -     RHEUMASSURE; Future         IMPRESSION AND PLAN:  Lupe Hewitt is a 63 y.o. female with history of lumbar pain. She reports continued pain in the right shoulder and lower back. Pt takes Voltaren 75 mg BID as needed and Neurontin 300 mg 1 cap TID without sedation. 1) Pt was given information on lumbar and shoulder arthritis exercises. 2) She received a refill of Neurontin 300 mg 1 cap TID. 3) Pt will increase her Robaxin 500 mg to 750 mg 1 tab 4 x daily to better manage her symptoms. 4) Rheumatoid factor and CCP labs were ordered.    5) She was prescribed Voltaren 1% gel. 6) Ms. Kelly Penn has a reminder for a \"due or due soon\" health maintenance. I have asked that she contact her primary care provider, Ismael Alberto NP, for follow-up on this health maintenance. 7)  demonstrated consistency with prescribing. Follow-up and Dispositions    · Return in about 3 months (around 12/15/2020) for Medication follow up. HISTORY OF PRESENT ILLNESS:  Jose Benites is a 63 y.o. female with history of lumbar pain and presents to the office today for follow up. She reports continued pain in the right shoulder and lower back. Pt notes benefit with moist heat and epsom salt soaks. She reports that she was previously told she has arthritis in her TMJ on the right and admits to pain when opening her mouth. Pt complains of pain in the left wrist and notes that x-rays of her hand were recently obtained. She takes Voltaren 75 mg BID as needed and Neurontin 300 mg 1 cap TID without sedation. She also takes Robaxin 500 mg 1 tab 4 x daily without relief. Pt denies every being diagnosed with rheumatoid arthritis but notes a family history of rheumatoid arthritis. She admits to stiffness when getting out of bed in the morning and notes that it takes at least 30 minutes for her to start comfortably moving around. Pt at this time desires to proceed with medication evaluation. Of note, she smokes one black and mild a day.     Pain Scale: /10    PCP: Ismael Alberto NP     Past Medical History:   Diagnosis Date    CAD (coronary artery disease)     Hypertension     Uterine fibroid         Social History     Socioeconomic History    Marital status: SINGLE     Spouse name: Not on file    Number of children: Not on file    Years of education: Not on file    Highest education level: Not on file   Occupational History    Not on file   Social Needs    Financial resource strain: Not on file    Food insecurity     Worry: Not on file     Inability: Not on file   [employer] needs     Medical: Not on file     Non-medical: Not on file   Tobacco Use    Smoking status: Current Every Day Smoker     Packs/day: 0.25     Years: 2.00     Pack years: 0.50    Smokeless tobacco: Never Used   Substance and Sexual Activity    Alcohol use: No     Alcohol/week: 0.8 standard drinks     Types: 1 Cans of beer per week    Drug use: Yes     Types: Marijuana     Comment: rarely     Sexual activity: Not on file   Lifestyle    Physical activity     Days per week: Not on file     Minutes per session: Not on file    Stress: Not on file   Relationships    Social connections     Talks on phone: Not on file     Gets together: Not on file     Attends [employer] service: Not on file     Active member of club or organization: Not on file     Attends meetings of clubs or organizations: Not on file     Relationship status: Not on file    Intimate partner violence     Fear of current or ex partner: Not on file     Emotionally abused: Not on file     Physically abused: Not on file     Forced sexual activity: Not on file   Other Topics Concern    Not on file   Social History Narrative    Not on file       Current Outpatient Medications   Medication Sig Dispense Refill    gabapentin (NEURONTIN) 300 mg capsule Take 1 Cap by mouth three (3) times daily. Max Daily Amount: 900 mg. Indications: neuropathic pain 90 Cap 5    methocarbamoL (ROBAXIN) 750 mg tablet Take 1 Tab by mouth four (4) times daily. 120 Tab 2    diclofenac (Voltaren) 1 % gel Apply 4 grams to right shoulder up to 4 times per day, maximum 16 grams per joint per day. Dispense 5 100 gram tubes 500 g 1    omeprazole (PRILOSEC) 20 mg capsule TAKE 1 CAPSULE BY MOUTH ONCE DAILY BEFORE BREAKFAST 30 Cap 0    methocarbamoL (ROBAXIN) 500 mg tablet Take 1 tablet by mouth 4 times daily 120 Tab 0    metFORMIN (GLUCOPHAGE) 500 mg tablet Take 2 Tabs by mouth two (2) times daily (with meals).  120 Tab 2    pravastatin (PRAVACHOL) 40 mg tablet Take 1 Tab by mouth nightly. 90 Tab 1    nystatin (MYCOSTATIN) powder Apply  to affected area four (4) times daily. 30 g 1    diclofenac EC (VOLTAREN) 75 mg EC tablet Take 1 Tab by mouth two (2) times a day. Take with food as needed for pain 60 Tab 4    lidocaine (LIDODERM) 5 % Apply patch to the affected area for 12 hours a day and remove for 12 hours a day. 15 Each 0    fluticasone propionate (FLONASE) 50 mcg/actuation nasal spray USE 2 SPRAYS IN EACH NOSTRIL ONCE DAILY AS NEEDED FOR  RHINITIS 16 g 5    Blood-Glucose Meter (TRUE METRIX GLUCOSE METER) misc Use to check blood sugar 2 times per day. 1 Each 0    glucose blood VI test strips (TRUE METRIX GLUCOSE TEST STRIP) strip Use to check blood sugar 2 times per day. 100 Strip 11    lancets misc Use to check blood sugar 2 times daily 1 Each 11    Insulin Needles, Disposable, 31 gauge x 5/16\" ndle Use to inject Lantus daily. 1 Package 11    sertraline (ZOLOFT) 50 mg tablet Take 1 Tab by mouth daily. 2    Lancets misc Check glucose ACHS 200 Each 11    alcohol swabs padm 1 Each by Apply Externally route Before breakfast, lunch, dinner and at bedtime. accucheck ACHS and record in log. Take log to every doctor visit. **MEDICALLY NECESSARY** 100 Pad 2    traZODone (DESYREL) 150 mg tablet Take 150-300 mg by mouth nightly.  sertraline (ZOLOFT) 100 mg tablet Take 100 mg by mouth daily.  perphenazine (TRILAFON) 4 mg tablet Take 4 mg by mouth three (3) times daily.  benztropine (COGENTIN) 1 mg tablet Take 1 mg by mouth daily. Allergies   Allergen Reactions    Latex Rash    Penicillins Nausea and Vomiting         REVIEW OF SYSTEMS    Constitutional: Negative for fever, chills, or weight change. Respiratory: Negative for cough or shortness of breath. Cardiovascular: Negative for chest pain or palpitations. Gastrointestinal: Negative for acid reflux, change in bowel habits, or constipation. Genitourinary: Negative for dysuria and flank pain. Musculoskeletal: Positive for lumbar, right shoulder, bilateral hand and joint pain. Neurological: Negative for headaches, dizziness, or numbness  Psychiatric/Behavioral: Negative for difficulty with sleep. As per HPI    PHYSICAL EXAMINATION  Visit Vitals  BP (!) 157/84   Pulse 70   Temp 97.8 °F (36.6 °C) (Oral)   Resp 14   Ht 5' 5\" (1.651 m)   Wt 161 lb 9.6 oz (73.3 kg)   SpO2 97%   BMI 26.89 kg/m²       Constitutional: Awake, alert, and in no acute distress. Neurological: 1+ symmetrical DTRs in the upper extremities. 1+ symmetrical DTRs in the lower extremities. Sensation to light touch is intact. Negative Gaffney's sign bilaterally. Skin: warm, dry, and intact. Musculoskeletal: Good range of motion of both shoulders. Tenderness to palpation over the right AC joint. Tenderness to palpation in the lower lumbar region. Moderate pain with extension and axial loading. No pain with internal or external rotation of her hips. Negative straight leg raise bilaterally. Pt has swelling the MCP joint in the right hand at the middle and index fingers. Pt also has PIP joint swelling on the right at the index and middle fingers. Biceps  Triceps Deltoids Wrist Ext Wrist Flex Hand Intrin   Right +4/5 +4/5 +4/5 +4/5 +4/5 +4/5   Left +4/5 +4/5 +4/5 +4/5 +4/5 +4/5      Hip Flex  Quads Hamstrings Ankle DF EHL Ankle PF   Right +4/5 +4/5 +4/5 +4/5 +4/5 +4/5   Left +4/5 +4/5 +4/5 +4/5 +4/5 +4/5     IMAGING:    Right shoulder x-rays from 6/5/2020 were personally reviewed with the patient and demonstrated:  FINDINGS:  No acute displaced fracture or dislocation. There is mild to moderate  degenerative changes of the acromioclavicular joint with slight downward sloping  of the acromium. No destructive lesion.     IMPRESSION:     Mild to moderate degenerative changes.     Thoracic spine MRI from 04/04/2018 was personally reviewed with the patient and demonstrated:  Results from [facility] on 04/04/18   MRI [address] [place] [address] WO CONT    Narrative MRI THORACIC WITHOUT CONTRAST    CPT CODE: 75127    HISTORY: Mid back pain, neck pain, right leg weakness since 2001. COMPARISON: None. TECHNIQUE: Sagittal T1, T2 and stir and axial T2 weighted sequences were  acquired through the thoracic spine without contrast.        FINDINGS:     There is normal anatomic alignment of the thoracic spine. Vertebral body heights  are maintained. No infiltrative marrow replacement process or marrow edema. Thoracic spinal cord maintains normal caliber, signal intensity and morphology  throughout. Small bilateral pleural effusions. There are multilevel tiny disc protrusions with contact of the ventral cord at  T6/T7 and T7/T8, but no impingement. No critical foraminal compromise. Incidentally imaged surrounding soft tissues demonstrate no acute abnormalities  otherwise.           Impression IMPRESSION:    Small disc protrusions are most pronounced at T6/T7 and T7/T8 where there is  mild flattening of the ventral cord, but no cord impingement. Small bilateral pleural effusions.                         Lumbar spine x-rays from 11/18/2019 were personally reviewed and demonstrated:   Multilevel degenerative facet, some straightening of lumbar spine, degenerative disc at L5-S1, no instability     Left hip AP x-rays from 11/18/2019 were personally reviewed with the patient and demonstrated:   Degenerative joint findings bilaterally.       Right shoulder x-rays from 03/18/2017 were personally reviewed and demonstrated:  Results from Hospital Encounter on 03/18/17   XR SHOULDER RT AP/LAT MIN 2 V     Narrative Shoulder series. CPT code: 55784    Clinical history: Arthritis in the shoulder and pain in the back under the  shoulder blade without known injury of unspecified chronicity. Technique: Two shoulder views. Comparison: No priors. Findings:  There are no fractures.  Joint relationships at glenohumeral joint are  normal.  Acromioclavicular joint is normal. There is convex appearance of the  underside of the distal acromion process.  There are no soft tissue  calcifications.              Impression IMPRESSION:    No acute abnormalities. Convex appearance of the inferior aspect of the acromion  process could increased risk of rotator cuff impingement.                Cervical spine x-rays from 12/27/2016 were personally reviewed with the Pt and demonstrated:  FINDINGS:       Vertebral body height and alignment is intact. Slight straightening of the  cervical lordosis. Moderate disc height loss endplate sclerosis and vertebral  body osteophytes from C3 through C7. Alignment is intact. No fracture. Mild  facet joint arthropathy at multiple levels. The prevertebral space appears normal.       IMPRESSION:      Moderate cervical spondylosis.     Written by Minnie Harvey, as dictated by Henry Ceballos MD.  I, Dr. Henry Ceballos confirm that all documentation is accurate.

## 2020-10-07 RX ORDER — OMEPRAZOLE 20 MG/1
CAPSULE, DELAYED RELEASE ORAL
Qty: 30 CAP | Refills: 0 | Status: SHIPPED | OUTPATIENT
Start: 2020-10-07 | End: 2020-11-04

## 2020-10-21 ENCOUNTER — HOSPITAL ENCOUNTER (OUTPATIENT)
Dept: LAB | Age: 65
Discharge: HOME | End: 2020-10-21

## 2020-10-21 LAB — XX-LABCORP SPECIMEN COL,LCBCF: NORMAL

## 2020-10-21 PROCEDURE — 99001 SPECIMEN HANDLING PT-LAB: CPT

## 2020-10-29 LAB
14.3.3 ETA, RHEUM. ARTHRITIS: <0.2 NG/ML
CCP IGA+IGG SERPL IA-ACNC: <20 UNITS
RHEUMATOID FACT SERPL-ACNC: <10 IU/ML (ref 0–13.9)
RHEUMATOID FACT SERPL-ACNC: <14 UNITS/ML
SPECIMEN STATUS REPORT, ROLRST: NORMAL

## 2020-11-12 DIAGNOSIS — M47.816 LUMBAR FACET ARTHROPATHY: ICD-10-CM

## 2020-11-13 RX ORDER — DICLOFENAC SODIUM 75 MG/1
TABLET, DELAYED RELEASE ORAL
Qty: 60 TAB | Refills: 0 | Status: SHIPPED | OUTPATIENT
Start: 2020-11-13 | End: 2020-12-15 | Stop reason: SDUPTHER

## 2020-12-02 RX ORDER — FLUTICASONE PROPIONATE 50 MCG
SPRAY, SUSPENSION (ML) NASAL
Qty: 16 G | Refills: 0 | Status: SHIPPED | OUTPATIENT
Start: 2020-12-02 | End: 2021-04-13

## 2020-12-15 ENCOUNTER — OFFICE VISIT (OUTPATIENT)
Dept: ORTHOPEDIC SURGERY | Age: 65
End: 2020-12-15
Payer: MEDICAID

## 2020-12-15 VITALS
TEMPERATURE: 97.7 F | WEIGHT: 156 LBS | HEART RATE: 76 BPM | RESPIRATION RATE: 24 BRPM | DIASTOLIC BLOOD PRESSURE: 83 MMHG | SYSTOLIC BLOOD PRESSURE: 136 MMHG | BODY MASS INDEX: 25.99 KG/M2 | HEIGHT: 65 IN | OXYGEN SATURATION: 97 %

## 2020-12-15 DIAGNOSIS — M25.50 ARTHRALGIA OF MULTIPLE JOINTS: ICD-10-CM

## 2020-12-15 DIAGNOSIS — M47.816 LUMBAR FACET ARTHROPATHY: Primary | ICD-10-CM

## 2020-12-15 DIAGNOSIS — M47.812 CERVICAL SPONDYLOSIS: ICD-10-CM

## 2020-12-15 DIAGNOSIS — M79.2 NEURITIS: ICD-10-CM

## 2020-12-15 DIAGNOSIS — M19.011 PRIMARY OSTEOARTHRITIS OF RIGHT SHOULDER: ICD-10-CM

## 2020-12-15 DIAGNOSIS — M62.838 MUSCLE SPASM: ICD-10-CM

## 2020-12-15 PROCEDURE — 99213 OFFICE O/P EST LOW 20 MIN: CPT | Performed by: PHYSICAL MEDICINE & REHABILITATION

## 2020-12-15 RX ORDER — DICLOFENAC SODIUM 75 MG/1
TABLET, DELAYED RELEASE ORAL
Qty: 60 TAB | Refills: 3 | Status: SHIPPED | OUTPATIENT
Start: 2020-12-15 | End: 2021-10-11

## 2020-12-15 RX ORDER — GABAPENTIN 300 MG/1
300 CAPSULE ORAL 3 TIMES DAILY
Qty: 90 CAP | Refills: 3 | Status: SHIPPED | OUTPATIENT
Start: 2020-12-15 | End: 2021-03-17 | Stop reason: SDUPTHER

## 2020-12-15 NOTE — PATIENT INSTRUCTIONS
Low Back Arthritis: Exercises  Introduction  Here are some examples of typical rehabilitation exercises for your condition. Start each exercise slowly. Ease off the exercise if you start to have pain. Your doctor or physical therapist will tell you when you can start these exercises and which ones will work best for you. When you are not being active, find a comfortable position for rest. Some people are comfortable on the floor or a medium-firm bed with a small pillow under their head and another under their knees. Some people prefer to lie on their side with a pillow between their knees. Don't stay in one position for too long. Take short walks (10 to 20 minutes) every 2 to 3 hours. Avoid slopes, hills, and stairs until you feel better. Walk only distances you can manage without pain, especially leg pain. How to do the exercises  Pelvic tilt   1. Lie on your back with your knees bent. 2. \"Brace\" your stomach--tighten your muscles by pulling in and imagining your belly button moving toward your spine. 3. Press your lower back into the floor. You should feel your hips and pelvis rock back. 4. Hold for 6 seconds while breathing smoothly. 5. Relax and allow your pelvis and hips to rock forward. 6. Repeat 8 to 12 times. Back stretches   1. Get down on your hands and knees on the floor. 2. Relax your head and allow it to droop. Round your back up toward the ceiling until you feel a nice stretch in your upper, middle, and lower back. Hold this stretch for as long as it feels comfortable, or about 15 to 30 seconds. 3. Return to the starting position with a flat back while you are on your hands and knees. 4. Let your back sway by pressing your stomach toward the floor. Lift your buttocks toward the ceiling. 5. Hold this position for 15 to 30 seconds. 6. Repeat 2 to 4 times. Follow-up care is a key part of your treatment and safety.  Be sure to make and go to all appointments, and call your doctor if you are having problems. It's also a good idea to know your test results and keep a list of the medicines you take. Where can you learn more? Go to http://www.FOODit.com/  Enter T094 in the search box to learn more about \"Low Back Arthritis: Exercises. \"  Current as of: March 2, 2020               Content Version: 12.6  © 9070-1771 [employer]. Care instructions adapted under license by [employer] (which disclaims liability or warranty for this information). If you have questions about a medical condition or this instruction, always ask your healthcare professional. [place] [address] any warranty or liability for your use of this information.

## 2020-12-15 NOTE — LETTER
12/15/20 Patient: Romulo Daly YOB: 1955 Date of Visit: 12/15/2020 Elisa Meyers NP 
[address] [facility] [address] [facility] [address] VIA In Basket Dear Elisa Meyers NP, Thank you for referring Ms. Kuldip Blankenship to [facility] for evaluation. My notes for this consultation are attached. If you have questions, please do not hesitate to call me. I look forward to following your patient along with you. Sincerely, Jan Riley MD

## 2020-12-15 NOTE — PROGRESS NOTES
[facility]  [address] [place], [facility] [address]  Phone: [phone]  Fax: [phone]    Pt's YOB: 1955    ASSESSMENT   Diagnoses and all orders for this visit:    1. Lumbar facet arthropathy  -     diclofenac EC (VOLTAREN) 75 mg EC tablet; TAKE 1 TABLET BY MOUTH TWICE DAILY WITH FOOD AS NEEDED FOR PAIN    2. Neuritis  -     gabapentin (NEURONTIN) 300 mg capsule; Take 1 Cap by mouth three (3) times daily. Max Daily Amount: 900 mg. Indications: neuropathic pain    3. Muscle spasm    4. Cervical spondylosis    5. Primary osteoarthritis of right shoulder    6. Arthralgia of multiple joints         IMPRESSION AND PLAN:  Jerre Osgood is a 63 y.o. female with history of lumbar pain. Pt denies any change in symptoms since her last office visit. She takes Neurontin 300 mg 1 cap TID, Voltaren 75 mg as needed, and uses Voltaren 1% gel. She reports improvement in her sleep when she increased her Robaxin 500 mg to 750 mg.      1) Pt was given information on lumbar arthritis exercises. 2) She received a refill of Neurontin 300 mg 1 cap TID. 3) Pt also received a refill of  Voltaren 75 mg 1 tab BID prn with food. 4) She will continue using Voltaren 1% gel and Robaxin 750 mg as prescribed and does not need refills at this time. 5) Ms. Refugio Dash has a reminder for a \"due or due soon\" health maintenance. I have asked that she contact her primary care provider, Garry Burkett NP, for follow-up on this health maintenance. 6)  demonstrated consistency with prescribing. Follow-up and Dispositions    · Return in about 3 months (around 3/15/2021) for Medication follow up. HISTORY OF PRESENT ILLNESS:  Jerre Osgood is a 63 y.o. female with history of lumbar pain and presents to the office today for follow up. Pt denies any change in symptoms since her last office visit. She reports continued pain in the right shoulder, hands, and lower back. Pt notes benefit with moist heat and epsom salt soaks. Labs from 10/21/2020 were reviewed and demonstrated a negative rheumatoid factor and negative CCP. Pt has been prescribed Neurontin 300 mg and takes 1 cap TID. She increased her Robaxin 500 mg to 750 mg and notes that she now sleeps better at night. Pt also takes Voltaren 75 mg and uses Voltaren 1% gel as needed. Pt at this time desires to continue with current care. Pt admits to recent stress since her sister has moved in with her in 10/2020. She has not been paying rent. Her sister is a teacher's aid x 30+ years.     Pain Scale: 0 - No pain/10    PCP: Leslie Paniagua NP     Past Medical History:   Diagnosis Date    CAD (coronary artery disease)     Hypertension     Uterine fibroid         Social History     Socioeconomic History    Marital status: SINGLE     Spouse name: Not on file    Number of children: Not on file    Years of education: Not on file    Highest education level: Not on file   Occupational History    Not on file   Social Needs    Financial resource strain: Not on file    Food insecurity     Worry: Not on file     Inability: Not on file    Transportation needs     Medical: Not on file     Non-medical: Not on file   Tobacco Use    Smoking status: Current Every Day Smoker     Packs/day: 0.25     Years: 2.00     Pack years: 0.50    Smokeless tobacco: Never Used   Substance and Sexual Activity    Alcohol use: No     Alcohol/week: 0.8 standard drinks     Types: 1 Cans of beer per week    Drug use: Yes     Types: Marijuana     Comment: rarely     Sexual activity: Not on file   Lifestyle    Physical activity     Days per week: Not on file     Minutes per session: Not on file    Stress: Not on file   Relationships    Social connections     Talks on phone: Not on file     Gets together: Not on file     Attends Yazidi service: Not on file     Active member of club or organization: Not on file     Attends meetings of clubs or organizations: Not on file     Relationship status: Not on file    Intimate partner violence     Fear of current or ex partner: Not on file     Emotionally abused: Not on file     Physically abused: Not on file     Forced sexual activity: Not on file   Other Topics Concern    Not on file   Social History Narrative    Not on file       Current Outpatient Medications   Medication Sig Dispense Refill    gabapentin (NEURONTIN) 300 mg capsule Take 1 Cap by mouth three (3) times daily. Max Daily Amount: 900 mg. Indications: neuropathic pain 90 Cap 3    diclofenac EC (VOLTAREN) 75 mg EC tablet TAKE 1 TABLET BY MOUTH TWICE DAILY WITH FOOD AS NEEDED FOR PAIN 60 Tab 3    fluticasone propionate (FLONASE) 50 mcg/actuation nasal spray USE 2 SPRAY(S) IN EACH NOSTRIL ONCE DAILY AS NEEDED FOR  RHINITIS 16 g 0    omeprazole (PRILOSEC) 20 mg capsule TAKE 1 CAPSULE BY MOUTH ONCE DAILY BEFORE BREAKFAST 30 Cap 1    methocarbamoL (ROBAXIN) 750 mg tablet Take 1 Tab by mouth four (4) times daily. 120 Tab 2    diclofenac (Voltaren) 1 % gel Apply 4 grams to right shoulder up to 4 times per day, maximum 16 grams per joint per day. Dispense 5 100 gram tubes 500 g 1    metFORMIN (GLUCOPHAGE) 500 mg tablet Take 2 Tabs by mouth two (2) times daily (with meals). 120 Tab 2    pravastatin (PRAVACHOL) 40 mg tablet Take 1 Tab by mouth nightly. 90 Tab 1    nystatin (MYCOSTATIN) powder Apply  to affected area four (4) times daily. 30 g 1    lidocaine (LIDODERM) 5 % Apply patch to the affected area for 12 hours a day and remove for 12 hours a day. 15 Each 0    Blood-Glucose Meter (TRUE METRIX GLUCOSE METER) misc Use to check blood sugar 2 times per day. 1 Each 0    glucose blood VI test strips (TRUE METRIX GLUCOSE TEST STRIP) strip Use to check blood sugar 2 times per day. 100 Strip 11    lancets misc Use to check blood sugar 2 times daily 1 Each 11    Insulin Needles, Disposable, 31 gauge x 5/16\" ndle Use to inject Lantus daily.  1 Package 11    Lancets misc Check glucose ACHS 200 Each 11    alcohol swabs padm 1 Each by Apply Externally route Before breakfast, lunch, dinner and at bedtime. accucheck ACHS and record in log. Take log to every doctor visit. **MEDICALLY NECESSARY** 100 Pad 2    traZODone (DESYREL) 150 mg tablet Take 150-300 mg by mouth nightly.  sertraline (ZOLOFT) 100 mg tablet Take 200 mg by mouth daily.  perphenazine (TRILAFON) 4 mg tablet Take 4 mg by mouth three (3) times daily.  benztropine (COGENTIN) 1 mg tablet Take 1 mg by mouth daily.  methocarbamoL (ROBAXIN) 500 mg tablet Take 1 tablet by mouth 4 times daily 120 Tab 0    sertraline (ZOLOFT) 50 mg tablet Take 1 Tab by mouth daily. 2       Allergies   Allergen Reactions    Latex Rash    Penicillins Nausea and Vomiting         REVIEW OF SYSTEMS    Constitutional: Negative for fever, chills, or weight change. Respiratory: Negative for cough or shortness of breath. Cardiovascular: Negative for chest pain or palpitations. Gastrointestinal: Negative for acid reflux, change in bowel habits, or constipation. Genitourinary: Negative for dysuria and flank pain. Musculoskeletal: Positive for joint, lumbar, right shoulder, and bilateral hand pain. Neurological: Negative for headaches, dizziness; positive for numbness. Endo/Heme/Allergies: Negative for increased bruising. Psychiatric/Behavioral: Negative for difficulty with sleep. As per HPI    PHYSICAL EXAMINATION  Visit Vitals  /83 (BP 1 Location: Right arm, BP Patient Position: Sitting)   Pulse 76   Temp 97.7 °F (36.5 °C) (Skin)   Resp 24   Ht 5' 5\" (1.651 m)   Wt 156 lb (70.8 kg)   SpO2 97% Comment: RA   BMI 25.96 kg/m²       Constitutional: Awake, alert, and in no acute distress. Neurological: 1+ symmetrical DTRs in the upper extremities. 1+ symmetrical DTRs in the lower extremities. Sensation to light touch is intact. Negative Gaffney's sign bilaterally.   Skin: warm, dry, and intact. Musculoskeletal: Tenderness to palpation in the lower lumbar region. Moderate pain with extension and axial loading. No pain with internal or external rotation of her hips. Negative straight leg raise bilaterally. Biceps  Triceps Deltoids Wrist Ext Wrist Flex Hand Intrin   Right +4/5 +4/5 +4/5 +4/5 +4/5 +4/5   Left +4/5 +4/5 +4/5 +4/5 +4/5 +4/5      Hip Flex  Quads Hamstrings Ankle DF EHL Ankle PF   Right +4/5 +4/5 +4/5 +4/5 +4/5 +4/5   Left +4/5 +4/5 +4/5 +4/5 +4/5 +4/5     IMAGING:    Right shoulder x-rays from 6/5/2020 were personally reviewed with the patient and demonstrated:  FINDINGS:  No acute displaced fracture or dislocation. There is mild to moderate  degenerative changes of the acromioclavicular joint with slight downward sloping  of the acromium. No destructive lesion.     IMPRESSION:     Mild to moderate degenerative changes.     Thoracic spine MRI from 04/04/2018 was personally reviewed with the patient and demonstrated:  Results from [facility] on 04/04/18    [facility] CONT    Narrative MRI THORACIC WITHOUT CONTRAST    CPT CODE: 65948    HISTORY: Mid back pain, neck pain, right leg weakness since 2001. COMPARISON: None. TECHNIQUE: Sagittal T1, T2 and stir and axial T2 weighted sequences were  acquired through the thoracic spine without contrast.        FINDINGS:     There is normal anatomic alignment of the thoracic spine. Vertebral body heights  are maintained. No infiltrative marrow replacement process or marrow edema. Thoracic spinal cord maintains normal caliber, signal intensity and morphology  throughout. Small bilateral pleural effusions. There are multilevel tiny disc protrusions with contact of the ventral cord at  T6/T7 and T7/T8, but no impingement. No critical foraminal compromise.   Incidentally imaged surrounding soft tissues demonstrate no acute abnormalities  otherwise.           Impression IMPRESSION:    Small disc protrusions are most pronounced at T6/T7 and T7/T8 where there is  mild flattening of the ventral cord, but no cord impingement. Small bilateral pleural effusions.                         Lumbar spine x-rays from 11/18/2019 were personally reviewed and demonstrated:   Multilevel degenerative facet, some straightening of lumbar spine, degenerative disc at L5-S1, no instability     Left hip AP x-rays from 11/18/2019 were personally reviewed with the patient and demonstrated:   Degenerative joint findings bilaterally.       Right shoulder x-rays from 03/18/2017 were personally reviewed and demonstrated:  Results from Hospital Encounter on 03/18/17   XR SHOULDER RT AP/LAT MIN 2 V     Narrative Shoulder series. CPT code: 01536    Clinical history: Arthritis in the shoulder and pain in the back under the  shoulder blade without known injury of unspecified chronicity. Technique: Two shoulder views. Comparison: No priors. Findings: There are no fractures.  Joint relationships at glenohumeral joint are  normal.  Acromioclavicular joint is normal. There is convex appearance of the  underside of the distal acromion process.  There are no soft tissue  calcifications.              Impression IMPRESSION:    No acute abnormalities. Convex appearance of the inferior aspect of the acromion  process could increased risk of rotator cuff impingement.                Cervical spine x-rays from 12/27/2016 were personally reviewed with the Pt and demonstrated:  FINDINGS:       Vertebral body height and alignment is intact. Slight straightening of the  cervical lordosis. Moderate disc height loss endplate sclerosis and vertebral  body osteophytes from C3 through C7. Alignment is intact. No fracture. Mild  facet joint arthropathy at multiple levels.   The prevertebral space appears normal.       IMPRESSION:      Moderate cervical spondylosis.     Written by Caio Chaudhari, as dictated by Mally Alvarado MD.  I, Dr. Mally Alvarado confirm that all documentation is accurate.

## 2020-12-15 NOTE — PROGRESS NOTES
Renny Nazario presents today for   Chief Complaint   Patient presents with    Back Pain       Is someone accompanying this pt? no    Is the patient using any DME equipment during OV? no    Depression Screening:  3 most recent PHQ Screens 9/15/2020   Little interest or pleasure in doing things Not at all   Feeling down, depressed, irritable, or hopeless Not at all   Total Score PHQ 2 0   Trouble falling or staying asleep, or sleeping too much -   Feeling tired or having little energy -   Poor appetite, weight loss, or overeating -   Feeling bad about yourself - or that you are a failure or have let yourself or your family down -   Trouble concentrating on things such as school, work, reading, or watching TV -   Moving or speaking so slowly that other people could have noticed; or the opposite being so fidgety that others notice -   Thoughts of being better off dead, or hurting yourself in some way -   PHQ 9 Score -   How difficult have these problems made it for you to do your work, take care of your home and get along with others -       Learning Assessment:  Learning Assessment 1/21/2020   PRIMARY LEARNER Patient   HIGHEST LEVEL OF EDUCATION - PRIMARY LEARNER  GRADUATED HIGH SCHOOL OR GED   BARRIERS PRIMARY LEARNER NONE   CO-LEARNER CAREGIVER No   PRIMARY LANGUAGE ENGLISH   LEARNER PREFERENCE PRIMARY DEMONSTRATION   ANSWERED BY patient   RELATIONSHIP SELF       Abuse Screening:  Abuse Screening Questionnaire 12/15/2020   Do you ever feel afraid of your partner? N   Are you in a relationship with someone who physically or mentally threatens you? N   Is it safe for you to go home? Y       Fall Risk  Fall Risk Assessment, last 12 mths 12/15/2020   Able to walk? Yes   Fall in past 12 months? No         Coordination of Care:  1. Have you been to the ER, urgent care clinic since your last visit? no  Hospitalized since your last visit? no    2.  Have you seen or consulted any other health care providers outside of the [address] [place] [address] since your last visit? no Include any pap smears or colon screening.  no

## 2020-12-30 DIAGNOSIS — M62.838 MUSCLE SPASM: ICD-10-CM

## 2020-12-30 DIAGNOSIS — M47.812 CERVICAL SPONDYLOSIS: ICD-10-CM

## 2020-12-31 RX ORDER — METHOCARBAMOL 750 MG/1
TABLET, FILM COATED ORAL
Qty: 120 TAB | Refills: 0 | Status: SHIPPED | OUTPATIENT
Start: 2020-12-31 | End: 2021-02-11

## 2021-02-11 DIAGNOSIS — E78.2 MIXED HYPERLIPIDEMIA: Chronic | ICD-10-CM

## 2021-02-11 DIAGNOSIS — M47.812 CERVICAL SPONDYLOSIS: ICD-10-CM

## 2021-02-11 DIAGNOSIS — M62.838 MUSCLE SPASM: ICD-10-CM

## 2021-02-11 RX ORDER — METHOCARBAMOL 750 MG/1
TABLET, FILM COATED ORAL
Qty: 120 TAB | Refills: 0 | Status: SHIPPED | OUTPATIENT
Start: 2021-02-11 | End: 2021-03-23

## 2021-02-17 RX ORDER — PRAVASTATIN SODIUM 40 MG/1
TABLET ORAL
Qty: 90 TAB | Refills: 0 | Status: SHIPPED | OUTPATIENT
Start: 2021-02-17 | End: 2021-07-08 | Stop reason: SDUPTHER

## 2021-03-15 NOTE — PROGRESS NOTES
[facility]  [address] [place] [address], [facility] [place] [address]  Phone: [phone]  Fax: [phone]    Pt's YOB: 1955    ASSESSMENT   Diagnoses and all orders for this visit:    1. Lumbar facet arthropathy  -     diclofenac EC (VOLTAREN) 75 mg EC tablet; TAKE 1 TABLET BY MOUTH TWICE DAILY WITH FOOD AS NEEDED FOR PAIN  -     THER/PROPH/DIAG INJECTION, SUBCUT/IM  -     ketorolac tromethamine (TORADOL) 60 mg/2 mL injection 60 mg    2. Neuritis  -     gabapentin (NEURONTIN) 300 mg capsule; Take 1 Cap by mouth three (3) times daily. Max Daily Amount: 900 mg. Indications: neuropathic pain    3. Muscle spasm    4. Cervical spondylosis    5. Primary osteoarthritis of right shoulder    6. Arthralgia of multiple joints         IMPRESSION AND PLAN:  Didi Webb is a 63 y.o. female with history of chronic lumbar pain. She takes Neurontin 300 mg 1 cap TID, Voltaren 75 mg BID, and Robaxin 750 mg as needed. 1) Pt was given information on lumbar arthritis exercises. 2) She received a refill of Voltaren 75 mg 1 tab BID prn pain (with food). 3) Pt also received a refill of Neurontin 300 mg 1 cap TID. 4) She will continue taking Robaxin 500 mg as prescribed and does not need a refill at this time. 5) Pt received a 60 mg Toradol injection in the office today. 6) Ms. Larry Peralta has a reminder for a \"due or due soon\" health maintenance. I have asked that she contact her primary care provider, Phan Wakefield NP, for follow-up on this health maintenance. 7)  demonstrated consistency with prescribing. Follow-up and Dispositions    · Return in about 3 months (around 6/17/2021) for Medication follow up. HISTORY OF PRESENT ILLNESS:  Didi Webb is a 63 y.o. female with history of chronic lumbar pain and presents to the office today for follow up.  Pt notes that she slipped in her kitchen a couple weeks ago when she slipped on her niece's toy and ended up doing the splits. She has been prescribed Neurontin 300 mg and takes 1 cap TID. Pt also takes Voltaren 75 mg BID and notes that she is no longer using Voltaren gel. She also takes Robaxin 750 mg as needed. Pt notes that she is scheduled for her 1st dose of the COVID-19 vaccine next week. Pt at this time desires to continue with current care. Pt notes that her sister has moved in with her and is not paying rent. Pt states that she is paying the cable bill but does not offer to help with bills unless asked.      Pain Scale: 9/10    PCP: Telly Hernandez NP     Past Medical History:   Diagnosis Date    CAD (coronary artery disease)     Hypertension     Uterine fibroid         Social History     Socioeconomic History    Marital status: SINGLE     Spouse name: Not on file    Number of children: Not on file    Years of education: Not on file    Highest education level: Not on file   Occupational History    Not on file   Social Needs    Financial resource strain: Not on file    Food insecurity     Worry: Not on file     Inability: Not on file    Transportation needs     Medical: Not on file     Non-medical: Not on file   Tobacco Use    Smoking status: Current Every Day Smoker     Packs/day: 0.25     Years: 2.00     Pack years: 0.50    Smokeless tobacco: Never Used   Substance and Sexual Activity    Alcohol use: No     Alcohol/week: 0.8 standard drinks     Types: 1 Cans of beer per week    Drug use: Yes     Types: Marijuana     Comment: rarely     Sexual activity: Not on file   Lifestyle    Physical activity     Days per week: Not on file     Minutes per session: Not on file    Stress: Not on file   Relationships    Social connections     Talks on phone: Not on file     Gets together: Not on file     Attends Confucianist service: Not on file     Active member of club or organization: Not on file     Attends meetings of clubs or organizations: Not on file     Relationship status: Not on file    Intimate partner violence     Fear of current or ex partner: Not on file     Emotionally abused: Not on file     Physically abused: Not on file     Forced sexual activity: Not on file   Other Topics Concern    Not on file   Social History Narrative    Not on file       Current Outpatient Medications   Medication Sig Dispense Refill    diclofenac EC (VOLTAREN) 75 mg EC tablet TAKE 1 TABLET BY MOUTH TWICE DAILY WITH FOOD AS NEEDED FOR PAIN 60 Tab 3    gabapentin (NEURONTIN) 300 mg capsule Take 1 Cap by mouth three (3) times daily. Max Daily Amount: 900 mg. Indications: neuropathic pain 90 Cap 3    pravastatin (PRAVACHOL) 40 mg tablet Take 1 tablet by mouth nightly 90 Tab 0    methocarbamoL (ROBAXIN) 750 mg tablet Take 1 tablet by mouth 4 times daily 120 Tab 0    omeprazole (PRILOSEC) 20 mg capsule TAKE 1 CAPSULE BY MOUTH ONCE DAILY BEFORE BREAKFAST 30 Cap 2    diclofenac EC (VOLTAREN) 75 mg EC tablet TAKE 1 TABLET BY MOUTH TWICE DAILY WITH FOOD AS NEEDED FOR PAIN 60 Tab 3    fluticasone propionate (FLONASE) 50 mcg/actuation nasal spray USE 2 SPRAY(S) IN EACH NOSTRIL ONCE DAILY AS NEEDED FOR  RHINITIS 16 g 0    diclofenac (Voltaren) 1 % gel Apply 4 grams to right shoulder up to 4 times per day, maximum 16 grams per joint per day. Dispense 5 100 gram tubes 500 g 1    metFORMIN (GLUCOPHAGE) 500 mg tablet Take 2 Tabs by mouth two (2) times daily (with meals). 120 Tab 2    nystatin (MYCOSTATIN) powder Apply  to affected area four (4) times daily. 30 g 1    lidocaine (LIDODERM) 5 % Apply patch to the affected area for 12 hours a day and remove for 12 hours a day. 15 Each 0    Blood-Glucose Meter (TRUE METRIX GLUCOSE METER) misc Use to check blood sugar 2 times per day. 1 Each 0    glucose blood VI test strips (TRUE METRIX GLUCOSE TEST STRIP) strip Use to check blood sugar 2 times per day.  100 Strip 11    lancets misc Use to check blood sugar 2 times daily 1 Each 11    Insulin Needles, Disposable, 31 gauge x 5/16\" ndle Use to inject Lantus daily. 1 Package 11    Lancets misc Check glucose ACHS 200 Each 11    alcohol swabs padm 1 Each by Apply Externally route Before breakfast, lunch, dinner and at bedtime. accucheck ACHS and record in log. Take log to every doctor visit. **MEDICALLY NECESSARY** 100 Pad 2    traZODone (DESYREL) 150 mg tablet Take 150-300 mg by mouth nightly.  sertraline (ZOLOFT) 100 mg tablet Take 200 mg by mouth daily.  perphenazine (TRILAFON) 4 mg tablet Take 4 mg by mouth three (3) times daily.  benztropine (COGENTIN) 1 mg tablet Take 1 mg by mouth daily.  methocarbamoL (ROBAXIN) 500 mg tablet Take 1 tablet by mouth 4 times daily 120 Tab 0    sertraline (ZOLOFT) 50 mg tablet Take 1 Tab by mouth daily. 2       Allergies   Allergen Reactions    Latex Rash    Penicillins Nausea and Vomiting         REVIEW OF SYSTEMS    Constitutional: Negative for fever, chills, or weight change. Respiratory: Negative for cough or shortness of breath. Cardiovascular: Negative for chest pain or palpitations. Gastrointestinal: Negative for acid reflux, change in bowel habits, or constipation. Genitourinary: Negative for dysuria and flank pain. Musculoskeletal: Positive for lumbar, right shoulder, and bilateral hand pain. Neurological: Negative for headaches, dizziness; positive for numbness. Endo/Heme/Allergies: Negative for increased bruising. Psychiatric/Behavioral: Negative for difficulty with sleep. As per HPI    PHYSICAL EXAMINATION  Visit Vitals  /73   Pulse 67   Temp 97.1 °F (36.2 °C) (Temporal)   Resp 14   Ht 5' 5\" (1.651 m)   Wt 153 lb (69.4 kg)   SpO2 96%   BMI 25.46 kg/m²       Constitutional: Awake, alert, and in no acute distress. Neurological: 1+ symmetrical DTRs in the upper extremities. 1+ symmetrical DTRs in the lower extremities. Sensation to light touch is intact. Negative Gaffney's sign bilaterally. Skin: warm, dry, and intact.    Musculoskeletal: Tenderness to palpation in the lower lumbar region. Moderate pain with extension and axial loading. No pain with internal or external rotation of her hips. Negative straight leg raise bilaterally. Biceps  Triceps Deltoids Wrist Ext Wrist Flex Hand Intrin   Right +4/5 +4/5 +4/5 +4/5 +4/5 +4/5   Left +4/5 +4/5 +4/5 +4/5 +4/5 +4/5      Hip Flex  Quads Hamstrings Ankle DF EHL Ankle PF   Right +4/5 +4/5 +4/5 +4/5 +4/5 +4/5   Left +4/5 +4/5 +4/5 +4/5 +4/5 +4/5     TORADOL INJECTION:  Administrations This Visit     ketorolac tromethamine (TORADOL) 60 mg/2 mL injection 60 mg     Admin Date  03/17/2021  10:16 Action  Given Dose  60 mg Route  IntraMUSCular Site  Right Gluteus Andrew Administered By  Jessee Fox LPN    NDC: 40906-925-28    Patient Supplied?: No                IMAGING:    Right shoulder x-rays from 6/5/2020 were personally reviewed with the patient and demonstrated:  FINDINGS:  No acute displaced fracture or dislocation. There is mild to moderate  degenerative changes of the acromioclavicular joint with slight downward sloping  of the acromium. No destructive lesion.     IMPRESSION:     Mild to moderate degenerative changes.     Thoracic spine MRI from 04/04/2018 was personally reviewed with the patient and demonstrated:  Results from [facility] on 04/04/18    [facility] CONT    Narrative MRI THORACIC WITHOUT CONTRAST    CPT CODE: 44504    HISTORY: Mid back pain, neck pain, right leg weakness since 2001. COMPARISON: None. TECHNIQUE: Sagittal T1, T2 and stir and axial T2 weighted sequences were  acquired through the thoracic spine without contrast.        FINDINGS:     There is normal anatomic alignment of the thoracic spine. Vertebral body heights  are maintained. No infiltrative marrow replacement process or marrow edema. Thoracic spinal cord maintains normal caliber, signal intensity and morphology  throughout. Small bilateral pleural effusions.     There are multilevel tiny disc protrusions with contact of the ventral cord at  T6/T7 and T7/T8, but no impingement. No critical foraminal compromise. Incidentally imaged surrounding soft tissues demonstrate no acute abnormalities  otherwise.           Impression IMPRESSION:    Small disc protrusions are most pronounced at T6/T7 and T7/T8 where there is  mild flattening of the ventral cord, but no cord impingement. Small bilateral pleural effusions.                         Lumbar spine x-rays from 11/18/2019 were personally reviewed and demonstrated:   Multilevel degenerative facet, some straightening of lumbar spine, degenerative disc at L5-S1, no instability     Left hip AP x-rays from 11/18/2019 were personally reviewed and demonstrated:   Degenerative joint findings bilaterally.       Right shoulder x-rays from 03/18/2017 were personally reviewed and demonstrated:  Results from Hospital Encounter on 03/18/17   XR SHOULDER RT AP/LAT MIN 2 V     Narrative Shoulder series. CPT code: 82697    Clinical history: Arthritis in the shoulder and pain in the back under the  shoulder blade without known injury of unspecified chronicity. Technique: Two shoulder views. Comparison: No priors. Findings: There are no fractures.  Joint relationships at glenohumeral joint are  normal.  Acromioclavicular joint is normal. There is convex appearance of the  underside of the distal acromion process.  There are no soft tissue  calcifications.              Impression IMPRESSION:    No acute abnormalities. Convex appearance of the inferior aspect of the acromion  process could increased risk of rotator cuff impingement.                Cervical spine x-rays from 12/27/2016 were personally reviewed and demonstrated:  FINDINGS:       Vertebral body height and alignment is intact. Slight straightening of the  cervical lordosis. Moderate disc height loss endplate sclerosis and vertebral  body osteophytes from C3 through C7. Alignment is intact. No fracture. Mild  facet joint arthropathy at multiple levels. The prevertebral space appears normal.       IMPRESSION:      Moderate cervical spondylosis.       Written by Raheem Dodd, as dictated by Lisa Acuña MD.  I, Dr. Lisa Acuña confirm that all documentation is accurate.

## 2021-03-17 ENCOUNTER — OFFICE VISIT (OUTPATIENT)
Dept: ORTHOPEDIC SURGERY | Age: 66
End: 2021-03-17
Payer: MEDICAID

## 2021-03-17 VITALS
RESPIRATION RATE: 14 BRPM | OXYGEN SATURATION: 96 % | BODY MASS INDEX: 25.49 KG/M2 | SYSTOLIC BLOOD PRESSURE: 122 MMHG | WEIGHT: 153 LBS | TEMPERATURE: 97.1 F | DIASTOLIC BLOOD PRESSURE: 73 MMHG | HEART RATE: 67 BPM | HEIGHT: 65 IN

## 2021-03-17 DIAGNOSIS — M25.50 ARTHRALGIA OF MULTIPLE JOINTS: ICD-10-CM

## 2021-03-17 DIAGNOSIS — M47.812 CERVICAL SPONDYLOSIS: ICD-10-CM

## 2021-03-17 DIAGNOSIS — M47.816 LUMBAR FACET ARTHROPATHY: Primary | ICD-10-CM

## 2021-03-17 DIAGNOSIS — M62.838 MUSCLE SPASM: ICD-10-CM

## 2021-03-17 DIAGNOSIS — M19.011 PRIMARY OSTEOARTHRITIS OF RIGHT SHOULDER: ICD-10-CM

## 2021-03-17 DIAGNOSIS — M79.2 NEURITIS: ICD-10-CM

## 2021-03-17 PROCEDURE — 99213 OFFICE O/P EST LOW 20 MIN: CPT | Performed by: PHYSICAL MEDICINE & REHABILITATION

## 2021-03-17 PROCEDURE — 96372 THER/PROPH/DIAG INJ SC/IM: CPT | Performed by: PHYSICAL MEDICINE & REHABILITATION

## 2021-03-17 RX ORDER — KETOROLAC TROMETHAMINE 30 MG/ML
60 INJECTION, SOLUTION INTRAMUSCULAR; INTRAVENOUS ONCE
Status: COMPLETED | OUTPATIENT
Start: 2021-03-17 | End: 2021-03-17

## 2021-03-17 RX ORDER — GABAPENTIN 300 MG/1
300 CAPSULE ORAL 3 TIMES DAILY
Qty: 90 CAP | Refills: 3 | Status: SHIPPED | OUTPATIENT
Start: 2021-03-17 | End: 2021-09-02

## 2021-03-17 RX ORDER — DICLOFENAC SODIUM 75 MG/1
TABLET, DELAYED RELEASE ORAL
Qty: 60 TAB | Refills: 3 | Status: SHIPPED | OUTPATIENT
Start: 2021-03-17 | End: 2021-07-08 | Stop reason: SDUPTHER

## 2021-03-17 RX ADMIN — KETOROLAC TROMETHAMINE 60 MG: 30 INJECTION, SOLUTION INTRAMUSCULAR; INTRAVENOUS at 10:16

## 2021-03-17 NOTE — LETTER
3/18/2021 Patient: Miriam Ochoa YOB: 1955 Date of Visit: 3/17/2021 Raquel Williamson NP 
[address] [facility] [address] [facility] [address] [address] [employer] Dear Raquel Williamson NP, Thank you for referring Ms. Reina Rodriguez to [facility] for evaluation. My notes for this consultation are attached. If you have questions, please do not hesitate to call me. I look forward to following your patient along with you. Sincerely, Nicholas Diaz MD

## 2021-03-17 NOTE — PATIENT INSTRUCTIONS
Low Back Arthritis: Exercises  Introduction  Here are some examples of typical rehabilitation exercises for your condition. Start each exercise slowly. Ease off the exercise if you start to have pain. Your doctor or physical therapist will tell you when you can start these exercises and which ones will work best for you. When you are not being active, find a comfortable position for rest. Some people are comfortable on the floor or a medium-firm bed with a small pillow under their head and another under their knees. Some people prefer to lie on their side with a pillow between their knees. Don't stay in one position for too long. Take short walks (10 to 20 minutes) every 2 to 3 hours. Avoid slopes, hills, and stairs until you feel better. Walk only distances you can manage without pain, especially leg pain. How to do the exercises  Pelvic tilt   1. Lie on your back with your knees bent. 2. \"Brace\" your stomach--tighten your muscles by pulling in and imagining your belly button moving toward your spine. 3. Press your lower back into the floor. You should feel your hips and pelvis rock back. 4. Hold for 6 seconds while breathing smoothly. 5. Relax and allow your pelvis and hips to rock forward. 6. Repeat 8 to 12 times. Back stretches   1. Get down on your hands and knees on the floor. 2. Relax your head and allow it to droop. Round your back up toward the ceiling until you feel a nice stretch in your upper, middle, and lower back. Hold this stretch for as long as it feels comfortable, or about 15 to 30 seconds. 3. Return to the starting position with a flat back while you are on your hands and knees. 4. Let your back sway by pressing your stomach toward the floor. Lift your buttocks toward the ceiling. 5. Hold this position for 15 to 30 seconds. 6. Repeat 2 to 4 times. Follow-up care is a key part of your treatment and safety.  Be sure to make and go to all appointments, and call your doctor if you are having problems. It's also a good idea to know your test results and keep a list of the medicines you take. Where can you learn more? Go to http://www.[employer].com/  Enter T094 in the search box to learn more about \"Low Back Arthritis: Exercises. \"  Current as of: March 2, 2020               Content Version: 12.6  © 5624-4609 [employer]. Care instructions adapted under license by [employer] (which disclaims liability or warranty for this information). If you have questions about a medical condition or this instruction, always ask your healthcare professional. [place] [address] any warranty or liability for your use of this information.

## 2021-03-17 NOTE — PROGRESS NOTES
Celso Boyle presents today for   Chief Complaint   Patient presents with    Neck Pain    LOW BACK PAIN    Buttocks pain     right    Leg Pain     right    Follow-up       Is someone accompanying this pt? No    Is the patient using any DME equipment during OV? No    Depression Screening:  3 most recent PHQ Screens 3/17/2021   Little interest or pleasure in doing things Not at all   Feeling down, depressed, irritable, or hopeless Not at all   Total Score PHQ 2 0   Trouble falling or staying asleep, or sleeping too much -   Feeling tired or having little energy -   Poor appetite, weight loss, or overeating -   Feeling bad about yourself - or that you are a failure or have let yourself or your family down -   Trouble concentrating on things such as school, work, reading, or watching TV -   Moving or speaking so slowly that other people could have noticed; or the opposite being so fidgety that others notice -   Thoughts of being better off dead, or hurting yourself in some way -   PHQ 9 Score -   How difficult have these problems made it for you to do your work, take care of your home and get along with others -       Learning Assessment:  Learning Assessment 1/21/2020   PRIMARY LEARNER Patient   HIGHEST LEVEL OF EDUCATION - PRIMARY LEARNER  GRADUATED HIGH SCHOOL OR GED   BARRIERS PRIMARY LEARNER NONE   CO-LEARNER CAREGIVER No   PRIMARY LANGUAGE ENGLISH   LEARNER PREFERENCE PRIMARY DEMONSTRATION   ANSWERED BY patient   RELATIONSHIP SELF       Abuse Screening:  Abuse Screening Questionnaire 12/15/2020   Do you ever feel afraid of your partner? N   Are you in a relationship with someone who physically or mentally threatens you? N   Is it safe for you to go home? Y       OPIOID RISK TOOL  No flowsheet data found. Pt currently taking Antiplatelet therapy? No    Coordination of Care:  1. Have you been to the ER, urgent care clinic since your last visit? No  Hospitalized since your last visit? No    2.  Have you seen or consulted any other health care providers outside of the [address] [place] [address] since your last visit? No Include any pap smears or colon screening.  No

## 2021-03-23 DIAGNOSIS — M47.812 CERVICAL SPONDYLOSIS: ICD-10-CM

## 2021-03-23 DIAGNOSIS — M62.838 MUSCLE SPASM: ICD-10-CM

## 2021-03-23 RX ORDER — METHOCARBAMOL 750 MG/1
TABLET, FILM COATED ORAL
Qty: 120 TAB | Refills: 2 | Status: SHIPPED | OUTPATIENT
Start: 2021-03-23 | End: 2021-07-27

## 2021-04-13 RX ORDER — OMEPRAZOLE 20 MG/1
CAPSULE, DELAYED RELEASE ORAL
Qty: 30 CAP | Refills: 0 | Status: SHIPPED | OUTPATIENT
Start: 2021-04-13 | End: 2021-05-13

## 2021-04-13 RX ORDER — FLUTICASONE PROPIONATE 50 MCG
SPRAY, SUSPENSION (ML) NASAL
Qty: 16 G | Refills: 0 | Status: SHIPPED | OUTPATIENT
Start: 2021-04-13 | End: 2021-05-11 | Stop reason: SDUPTHER

## 2021-04-30 ENCOUNTER — VIRTUAL VISIT (OUTPATIENT)
Dept: FAMILY MEDICINE CLINIC | Age: 66
End: 2021-04-30

## 2021-04-30 DIAGNOSIS — Z91.199 NO-SHOW FOR APPOINTMENT: Primary | ICD-10-CM

## 2021-05-14 DIAGNOSIS — Z13.1 SCREENING FOR DIABETES MELLITUS (DM): Primary | ICD-10-CM

## 2021-05-17 DIAGNOSIS — Z13.1 SCREENING FOR DIABETES MELLITUS (DM): Primary | ICD-10-CM

## 2021-05-19 DIAGNOSIS — E11.65 CONTROLLED TYPE 2 DIABETES MELLITUS WITH HYPERGLYCEMIA, UNSPECIFIED WHETHER LONG TERM INSULIN USE (HCC): Primary | ICD-10-CM

## 2021-06-16 ENCOUNTER — APPOINTMENT (OUTPATIENT)
Dept: FAMILY MEDICINE CLINIC | Age: 66
End: 2021-06-16

## 2021-06-16 DIAGNOSIS — Z13.1 SCREENING FOR DIABETES MELLITUS (DM): ICD-10-CM

## 2021-06-17 LAB
EST. AVERAGE GLUCOSE BLD GHB EST-MCNC: 114 MG/DL
HBA1C MFR BLD: 5.6 % (ref 4.8–5.6)

## 2021-06-18 NOTE — TELEPHONE ENCOUNTER
Requested Prescriptions     Pending Prescriptions Disp Refills    omeprazole (PRILOSEC) 20 mg capsule 30 Capsule 1

## 2021-06-21 NOTE — TELEPHONE ENCOUNTER
Last Visit: 4/30/21 with ESTELA Martines  Next Appointment: 7/8/21 with ESTELA Martines  Previous Refill Encounter(s): 5/13/21 #30 with 1 refill    Requested Prescriptions     Pending Prescriptions Disp Refills    omeprazole (PRILOSEC) 20 mg capsule 90 Capsule 1     Sig: Take 1 Capsule by mouth Daily (before breakfast).

## 2021-06-22 RX ORDER — OMEPRAZOLE 20 MG/1
20 CAPSULE, DELAYED RELEASE ORAL
Qty: 90 CAPSULE | Refills: 1 | Status: SHIPPED | OUTPATIENT
Start: 2021-06-22 | End: 2022-01-19 | Stop reason: SDUPTHER

## 2021-06-28 ENCOUNTER — TELEPHONE (OUTPATIENT)
Dept: FAMILY MEDICINE CLINIC | Age: 66
End: 2021-06-28

## 2021-06-28 NOTE — TELEPHONE ENCOUNTER
Agnes Ngo ([place]: [place]) - [id number]  Omeprazole 20MG dr capsules       Status: [place] Response - Approved    Created: June 22nd, 2021 [id number]    Sent: June 28th, 2021

## 2021-07-06 ENCOUNTER — OFFICE VISIT (OUTPATIENT)
Dept: ORTHOPEDIC SURGERY | Age: 66
End: 2021-07-06
Payer: MEDICAID

## 2021-07-06 VITALS
DIASTOLIC BLOOD PRESSURE: 83 MMHG | RESPIRATION RATE: 18 BRPM | WEIGHT: 148 LBS | SYSTOLIC BLOOD PRESSURE: 151 MMHG | TEMPERATURE: 97.8 F | HEART RATE: 74 BPM | HEIGHT: 65 IN | OXYGEN SATURATION: 96 % | BODY MASS INDEX: 24.66 KG/M2

## 2021-07-06 DIAGNOSIS — M79.2 NEURITIS: ICD-10-CM

## 2021-07-06 DIAGNOSIS — M47.812 CERVICAL SPONDYLOSIS: ICD-10-CM

## 2021-07-06 DIAGNOSIS — M25.50 ARTHRALGIA OF MULTIPLE JOINTS: ICD-10-CM

## 2021-07-06 DIAGNOSIS — M47.816 LUMBAR FACET ARTHROPATHY: Primary | ICD-10-CM

## 2021-07-06 DIAGNOSIS — M19.011 PRIMARY OSTEOARTHRITIS OF RIGHT SHOULDER: ICD-10-CM

## 2021-07-06 DIAGNOSIS — M62.838 MUSCLE SPASM: ICD-10-CM

## 2021-07-06 PROCEDURE — 99213 OFFICE O/P EST LOW 20 MIN: CPT | Performed by: PHYSICAL MEDICINE & REHABILITATION

## 2021-07-06 PROCEDURE — 96372 THER/PROPH/DIAG INJ SC/IM: CPT | Performed by: PHYSICAL MEDICINE & REHABILITATION

## 2021-07-06 RX ORDER — METHYLPREDNISOLONE 4 MG/1
TABLET ORAL
Qty: 1 DOSE PACK | Refills: 0 | Status: SHIPPED | OUTPATIENT
Start: 2021-07-06 | End: 2022-01-19

## 2021-07-06 RX ORDER — KETOROLAC TROMETHAMINE 30 MG/ML
60 INJECTION, SOLUTION INTRAMUSCULAR; INTRAVENOUS ONCE
Status: COMPLETED | OUTPATIENT
Start: 2021-07-06 | End: 2021-07-06

## 2021-07-06 RX ADMIN — KETOROLAC TROMETHAMINE 60 MG: 30 INJECTION, SOLUTION INTRAMUSCULAR; INTRAVENOUS at 15:43

## 2021-07-06 NOTE — LETTER
7/8/2021    Patient: Bishop Dance   YOB: 1955   Date of Visit: 7/6/2021     Viki Schultz NP  [address]  [address] [place]  [address] [facility]    Dear Viki Schultz NP,      Thank you for referring Ms. Cinthya Phillip to [facility] for evaluation. My notes for this consultation are attached. If you have questions, please do not hesitate to call me. I look forward to following your patient along with you.       Sincerely,    Cristino Baker MD

## 2021-07-06 NOTE — PATIENT INSTRUCTIONS
Low Back Arthritis: Exercises  Introduction  Here are some examples of typical rehabilitation exercises for your condition. Start each exercise slowly. Ease off the exercise if you start to have pain. Your doctor or physical therapist will tell you when you can start these exercises and which ones will work best for you. When you are not being active, find a comfortable position for rest. Some people are comfortable on the floor or a medium-firm bed with a small pillow under their head and another under their knees. Some people prefer to lie on their side with a pillow between their knees. Don't stay in one position for too long. Take short walks (10 to 20 minutes) every 2 to 3 hours. Avoid slopes, hills, and stairs until you feel better. Walk only distances you can manage without pain, especially leg pain. How to do the exercises  Pelvic tilt   1. Lie on your back with your knees bent. 2. \"Brace\" your stomach--tighten your muscles by pulling in and imagining your belly button moving toward your spine. 3. Press your lower back into the floor. You should feel your hips and pelvis rock back. 4. Hold for 6 seconds while breathing smoothly. 5. Relax and allow your pelvis and hips to rock forward. 6. Repeat 8 to 12 times. Back stretches   1. Get down on your hands and knees on the floor. 2. Relax your head and allow it to droop. Round your back up toward the ceiling until you feel a nice stretch in your upper, middle, and lower back. Hold this stretch for as long as it feels comfortable, or about 15 to 30 seconds. 3. Return to the starting position with a flat back while you are on your hands and knees. 4. Let your back sway by pressing your stomach toward the floor. Lift your buttocks toward the ceiling. 5. Hold this position for 15 to 30 seconds. 6. Repeat 2 to 4 times. Follow-up care is a key part of your treatment and safety.  Be sure to make and go to all appointments, and call your doctor if you are having problems. It's also a good idea to know your test results and keep a list of the medicines you take. Where can you learn more? Go to http://www.[employer].com/  Enter T094 in the search box to learn more about \"Low Back Arthritis: Exercises. \"  Current as of: November 16, 2020               Content Version: 12.8  © 8956-1877 [employer]. Care instructions adapted under license by [employer] (which disclaims liability or warranty for this information). If you have questions about a medical condition or this instruction, always ask your healthcare professional. [place] [address] any warranty or liability for your use of this information.
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
## 2021-07-08 ENCOUNTER — OFFICE VISIT (OUTPATIENT)
Dept: FAMILY MEDICINE CLINIC | Age: 66
End: 2021-07-08
Payer: MEDICAID

## 2021-07-08 VITALS
WEIGHT: 149 LBS | HEIGHT: 65 IN | SYSTOLIC BLOOD PRESSURE: 130 MMHG | TEMPERATURE: 97 F | BODY MASS INDEX: 24.83 KG/M2 | HEART RATE: 76 BPM | DIASTOLIC BLOOD PRESSURE: 68 MMHG | RESPIRATION RATE: 18 BRPM | OXYGEN SATURATION: 98 %

## 2021-07-08 DIAGNOSIS — Z13.5 SCREENING FOR DIABETIC RETINOPATHY: ICD-10-CM

## 2021-07-08 DIAGNOSIS — E11.65 CONTROLLED TYPE 2 DIABETES MELLITUS WITH HYPERGLYCEMIA, UNSPECIFIED WHETHER LONG TERM INSULIN USE (HCC): Primary | ICD-10-CM

## 2021-07-08 DIAGNOSIS — Z12.4 SCREENING FOR CERVICAL CANCER: ICD-10-CM

## 2021-07-08 DIAGNOSIS — E78.2 MIXED HYPERLIPIDEMIA: Chronic | ICD-10-CM

## 2021-07-08 PROCEDURE — 99214 OFFICE O/P EST MOD 30 MIN: CPT | Performed by: NURSE PRACTITIONER

## 2021-07-08 RX ORDER — PRAVASTATIN SODIUM 40 MG/1
TABLET ORAL
Qty: 90 TABLET | Refills: 1 | Status: SHIPPED | OUTPATIENT
Start: 2021-07-08 | End: 2022-02-16

## 2021-07-08 NOTE — PATIENT INSTRUCTIONS
Diabetes Foot Health: Care Instructions  Your Care Instructions     When you have diabetes, your feet need extra care and attention. Diabetes can damage the nerve endings and blood vessels in your feet, making you less likely to notice when your feet are injured. Diabetes also limits your body's ability to fight infection and get blood to areas that need it. If you get a minor foot injury, it could become an ulcer or a serious infection. With good foot care, you can prevent most of these problems. Caring for your feet can be quick and easy. Most of the care can be done when you are bathing or getting ready for bed. Follow-up care is a key part of your treatment and safety. Be sure to make and go to all appointments, and call your doctor if you are having problems. It's also a good idea to know your test results and keep a list of the medicines you take. How can you care for yourself at home? · Keep your blood sugar close to normal by watching what and how much you eat, monitoring blood sugar, taking medicines if prescribed, and getting regular exercise. · Do not smoke. Smoking affects blood flow and can make foot problems worse. If you need help quitting, talk to your doctor about stop-smoking programs and medicines. These can increase your chances of quitting for good. · Eat a diet that is low in fats. High fat intake can cause fat to build up in your blood vessels and decrease blood flow. · Inspect your feet daily for blisters, cuts, cracks, or sores. If you cannot see well, use a mirror or have someone help you. · Take care of your feet:  ? Wash your feet every day. Use warm (not hot) water. Check the water temperature with your wrists or other part of your body, not your feet. ? Dry your feet well. Pat them dry. Do not rub the skin on your feet too hard. Dry well between your toes. If the skin on your feet stays moist, bacteria or a fungus can grow, which can lead to infection. ?  Keep your skin soft. Use moisturizing skin cream to keep the skin on your feet soft and prevent calluses and cracks. But do not put the cream between your toes, and stop using any cream that causes a rash. ? Clean underneath your toenails carefully. Do not use a sharp object to clean underneath your toenails. Use the blunt end of a nail file or other rounded tool. ? Trim and file your toenails straight across to prevent ingrown toenails. Use a nail clipper, not scissors. Use an emery board to smooth the edges. · Change socks daily. Socks without seams are best, because seams often rub the feet. You can find socks for people with diabetes from specialty catalogs. · Look inside your shoes every day for things like gravel or torn linings, which could cause blisters or sores. · Buy shoes that fit well:  ? Look for shoes that have plenty of space around the toes. This helps prevent bunions and blisters. ? Try on shoes while wearing the kind of socks you will usually wear with the shoes. ? Avoid plastic shoes. They may rub your feet and cause blisters. Good shoes should be made of materials that are flexible and breathable, such as leather or cloth. ? Break in new shoes slowly by wearing them for no more than an hour a day for several days. Take extra time to check your feet for red areas, blisters, or other problems after you wear new shoes. · Do not go barefoot. Do not wear sandals, and do not wear shoes with very thin soles. Thin soles are easy to puncture. They also do not protect your feet from hot pavement or cold weather. · Have your doctor check your feet during each visit. If you have a foot problem, see your doctor. Do not try to treat an early foot problem at home. Home remedies or treatments that you can buy without a prescription (such as corn removers) can be harmful. · Always get early treatment for foot problems. A minor irritation can lead to a major problem if not properly cared for early.   When should you call for help? Call your doctor now or seek immediate medical care if:    · You have a foot sore, an ulcer or break in the skin that is not healing after 4 days, bleeding corns or calluses, or an ingrown toenail.     · You have blue or black areas, which can mean bruising or blood flow problems.     · You have peeling skin or tiny blisters between your toes or cracking or oozing of the skin.     · You have a fever for more than 24 hours and a foot sore.     · You have new numbness or tingling in your feet that does not go away after you move your feet or change positions.     · You have unexplained or unusual swelling of the foot or ankle. Watch closely for changes in your health, and be sure to contact your doctor if:    · You cannot do proper foot care. Where can you learn more? Go to http://www.gray.com/  Enter A739 in the search box to learn more about \"Diabetes Foot Health: Care Instructions. \"  Current as of: August 31, 2020               Content Version: 12.8  © 2006-2021 [employer]. Care instructions adapted under license by [employer] (which disclaims liability or warranty for this information). If you have questions about a medical condition or this instruction, always ask your healthcare professional. [address] any warranty or liability for your use of this information.
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
## 2021-07-09 LAB
ALBUMIN SERPL-MCNC: 4.7 G/DL (ref 3.8–4.8)
ALBUMIN/CREAT UR: 4 MG/G CREAT (ref 0–29)
ALBUMIN/GLOB SERPL: 1.9 {RATIO} (ref 1.2–2.2)
ALP SERPL-CCNC: 67 IU/L (ref 48–121)
ALT SERPL-CCNC: 37 IU/L (ref 0–32)
AST SERPL-CCNC: 26 IU/L (ref 0–40)
BILIRUB SERPL-MCNC: <0.2 MG/DL (ref 0–1.2)
BUN SERPL-MCNC: 20 MG/DL (ref 8–27)
BUN/CREAT SERPL: 20 (ref 12–28)
CALCIUM SERPL-MCNC: 9.4 MG/DL (ref 8.7–10.3)
CHLORIDE SERPL-SCNC: 101 MMOL/L (ref 96–106)
CHOLEST SERPL-MCNC: 235 MG/DL (ref 100–199)
CO2 SERPL-SCNC: 27 MMOL/L (ref 20–29)
CREAT SERPL-MCNC: 0.99 MG/DL (ref 0.57–1)
CREAT UR-MCNC: 156.3 MG/DL
GLOBULIN SER CALC-MCNC: 2.5 G/DL (ref 1.5–4.5)
GLUCOSE SERPL-MCNC: 70 MG/DL (ref 65–99)
HDLC SERPL-MCNC: 56 MG/DL
LDLC SERPL CALC-MCNC: 135 MG/DL (ref 0–99)
MICROALBUMIN UR-MCNC: 6.1 UG/ML
POTASSIUM SERPL-SCNC: 5.2 MMOL/L (ref 3.5–5.2)
PROT SERPL-MCNC: 7.2 G/DL (ref 6–8.5)
SODIUM SERPL-SCNC: 142 MMOL/L (ref 134–144)
TRIGL SERPL-MCNC: 245 MG/DL (ref 0–149)
VLDLC SERPL CALC-MCNC: 44 MG/DL (ref 5–40)

## 2021-07-26 DIAGNOSIS — M62.838 MUSCLE SPASM: ICD-10-CM

## 2021-07-26 DIAGNOSIS — M47.812 CERVICAL SPONDYLOSIS: ICD-10-CM

## 2021-07-27 RX ORDER — METHOCARBAMOL 750 MG/1
TABLET, FILM COATED ORAL
Qty: 120 TABLET | Refills: 0 | Status: SHIPPED | OUTPATIENT
Start: 2021-07-27 | End: 2021-09-02

## 2021-08-18 DIAGNOSIS — Z12.31 ENCOUNTER FOR SCREENING MAMMOGRAM FOR MALIGNANT NEOPLASM OF BREAST: Primary | ICD-10-CM

## 2021-09-01 DIAGNOSIS — M62.838 MUSCLE SPASM: ICD-10-CM

## 2021-09-01 DIAGNOSIS — M47.812 CERVICAL SPONDYLOSIS: ICD-10-CM

## 2021-09-01 DIAGNOSIS — M79.2 NEURITIS: ICD-10-CM

## 2021-09-02 RX ORDER — GABAPENTIN 300 MG/1
CAPSULE ORAL
Qty: 90 CAPSULE | Refills: 0 | Status: SHIPPED | OUTPATIENT
Start: 2021-09-02 | End: 2021-11-03 | Stop reason: SDUPTHER

## 2021-09-02 RX ORDER — METHOCARBAMOL 750 MG/1
TABLET, FILM COATED ORAL
Qty: 120 TABLET | Refills: 0 | Status: SHIPPED | OUTPATIENT
Start: 2021-09-02 | End: 2021-10-11

## 2021-10-08 ENCOUNTER — VIRTUAL VISIT (OUTPATIENT)
Dept: FAMILY MEDICINE CLINIC | Age: 66
End: 2021-10-08

## 2021-10-08 DIAGNOSIS — Z91.199 NO-SHOW FOR APPOINTMENT: Primary | ICD-10-CM

## 2021-10-11 DIAGNOSIS — M62.838 MUSCLE SPASM: ICD-10-CM

## 2021-10-11 DIAGNOSIS — M47.812 CERVICAL SPONDYLOSIS: ICD-10-CM

## 2021-10-11 DIAGNOSIS — M47.816 LUMBAR FACET ARTHROPATHY: ICD-10-CM

## 2021-10-11 RX ORDER — DICLOFENAC SODIUM 75 MG/1
TABLET, DELAYED RELEASE ORAL
Qty: 60 TABLET | Refills: 0 | Status: SHIPPED | OUTPATIENT
Start: 2021-10-11 | End: 2021-11-23

## 2021-10-11 RX ORDER — METHOCARBAMOL 750 MG/1
TABLET, FILM COATED ORAL
Qty: 120 TABLET | Refills: 0 | Status: SHIPPED | OUTPATIENT
Start: 2021-10-11 | End: 2021-11-03 | Stop reason: SDUPTHER

## 2021-10-27 ENCOUNTER — LAB ONLY (OUTPATIENT)
Dept: FAMILY MEDICINE CLINIC | Age: 66
End: 2021-10-27

## 2021-10-27 DIAGNOSIS — E78.2 MIXED HYPERLIPIDEMIA: Primary | ICD-10-CM

## 2021-10-27 DIAGNOSIS — E11.65 CONTROLLED TYPE 2 DIABETES MELLITUS WITH HYPERGLYCEMIA, UNSPECIFIED WHETHER LONG TERM INSULIN USE (HCC): ICD-10-CM

## 2021-10-28 LAB
ALBUMIN SERPL-MCNC: 4.8 G/DL (ref 3.8–4.8)
ALBUMIN/GLOB SERPL: 1.7 {RATIO} (ref 1.2–2.2)
ALP SERPL-CCNC: 69 IU/L (ref 44–121)
ALT SERPL-CCNC: 41 IU/L (ref 0–32)
AST SERPL-CCNC: 36 IU/L (ref 0–40)
BILIRUB SERPL-MCNC: <0.2 MG/DL (ref 0–1.2)
BUN SERPL-MCNC: 14 MG/DL (ref 8–27)
BUN/CREAT SERPL: 15 (ref 12–28)
CALCIUM SERPL-MCNC: 9.7 MG/DL (ref 8.7–10.3)
CHLORIDE SERPL-SCNC: 101 MMOL/L (ref 96–106)
CHOLEST SERPL-MCNC: 226 MG/DL (ref 100–199)
CO2 SERPL-SCNC: 25 MMOL/L (ref 20–29)
CREAT SERPL-MCNC: 0.96 MG/DL (ref 0.57–1)
EST. AVERAGE GLUCOSE BLD GHB EST-MCNC: 114 MG/DL
GLOBULIN SER CALC-MCNC: 2.9 G/DL (ref 1.5–4.5)
GLUCOSE SERPL-MCNC: 81 MG/DL (ref 65–99)
HBA1C MFR BLD: 5.6 % (ref 4.8–5.6)
HDLC SERPL-MCNC: 57 MG/DL
LDLC SERPL CALC-MCNC: 135 MG/DL (ref 0–99)
POTASSIUM SERPL-SCNC: 5 MMOL/L (ref 3.5–5.2)
PROT SERPL-MCNC: 7.7 G/DL (ref 6–8.5)
SODIUM SERPL-SCNC: 140 MMOL/L (ref 134–144)
TRIGL SERPL-MCNC: 192 MG/DL (ref 0–149)
VLDLC SERPL CALC-MCNC: 34 MG/DL (ref 5–40)

## 2021-11-01 LAB — PAP SMEAR, EXTERNAL: NEGATIVE

## 2021-11-03 ENCOUNTER — OFFICE VISIT (OUTPATIENT)
Dept: FAMILY MEDICINE CLINIC | Age: 66
End: 2021-11-03
Payer: MEDICAID

## 2021-11-03 VITALS
DIASTOLIC BLOOD PRESSURE: 69 MMHG | BODY MASS INDEX: 24.32 KG/M2 | HEART RATE: 83 BPM | OXYGEN SATURATION: 98 % | RESPIRATION RATE: 20 BRPM | HEIGHT: 65 IN | TEMPERATURE: 97.4 F | WEIGHT: 146 LBS | SYSTOLIC BLOOD PRESSURE: 131 MMHG

## 2021-11-03 DIAGNOSIS — M62.838 MUSCLE SPASM: ICD-10-CM

## 2021-11-03 DIAGNOSIS — Z53.20 MAMMOGRAM DECLINED: ICD-10-CM

## 2021-11-03 DIAGNOSIS — E11.65 CONTROLLED TYPE 2 DIABETES MELLITUS WITH HYPERGLYCEMIA, UNSPECIFIED WHETHER LONG TERM INSULIN USE (HCC): Primary | ICD-10-CM

## 2021-11-03 DIAGNOSIS — M47.812 CERVICAL SPONDYLOSIS: ICD-10-CM

## 2021-11-03 DIAGNOSIS — R10.30 LOWER ABDOMINAL PAIN: ICD-10-CM

## 2021-11-03 DIAGNOSIS — M79.2 NEURITIS: ICD-10-CM

## 2021-11-03 DIAGNOSIS — E78.2 MIXED HYPERLIPIDEMIA: ICD-10-CM

## 2021-11-03 PROCEDURE — 99214 OFFICE O/P EST MOD 30 MIN: CPT | Performed by: NURSE PRACTITIONER

## 2021-11-03 RX ORDER — METHOCARBAMOL 750 MG/1
TABLET, FILM COATED ORAL
Qty: 120 TABLET | Refills: 0 | Status: SHIPPED | OUTPATIENT
Start: 2021-11-03 | End: 2022-01-10

## 2021-11-03 RX ORDER — GABAPENTIN 300 MG/1
CAPSULE ORAL
Qty: 90 CAPSULE | Refills: 0 | Status: SHIPPED | OUTPATIENT
Start: 2021-11-03 | End: 2021-12-09

## 2021-11-03 NOTE — PROGRESS NOTES
1. \"Have you been to the ER, urgent care clinic since your last visit? Hospitalized since your last visit? \" No    2. \"Have you seen or consulted any other health care providers outside of the [address] [place] [address] since your last visit? \" No     3. For patients aged 39-70: Has the patient had a colonoscopy? No     If the patient is female:    4. For patients aged 41-77: Has the patient had a mammogram within the past 2 years? No    5. For patients aged 21-65: Has the patient had a pap smear? No    Learning Assessment 1/21/2020   PRIMARY LEARNER Patient   HIGHEST LEVEL OF EDUCATION - PRIMARY LEARNER  GRADUATED HIGH SCHOOL OR GED   BARRIERS PRIMARY LEARNER NONE   CO-LEARNER CAREGIVER No   PRIMARY LANGUAGE ENGLISH   LEARNER PREFERENCE PRIMARY DEMONSTRATION   ANSWERED BY patient   RELATIONSHIP SELF     3 most recent PHQ Screens 11/3/2021   Little interest or pleasure in doing things Not at all   Feeling down, depressed, irritable, or hopeless Not at all   Total Score PHQ 2 0   Trouble falling or staying asleep, or sleeping too much -   Feeling tired or having little energy -   Poor appetite, weight loss, or overeating -   Feeling bad about yourself - or that you are a failure or have let yourself or your family down -   Trouble concentrating on things such as school, work, reading, or watching TV -   Moving or speaking so slowly that other people could have noticed; or the opposite being so fidgety that others notice -   Thoughts of being better off dead, or hurting yourself in some way -   PHQ 9 Score -   How difficult have these problems made it for you to do your work, take care of your home and get along with others -     Fall Risk Assessment, last 12 mths 3/17/2021   Able to walk? Yes   Fall in past 12 months? 0   Do you feel unsteady? 0   Are you worried about falling 0     Abuse Screening Questionnaire 12/15/2020   Do you ever feel afraid of your partner?  N   Are you in a relationship with someone who physically or mentally threatens you? N   Is it safe for you to go home?  Y         Okay to discontinue medications per verbal order Molinda Nissen NP-C

## 2021-11-03 NOTE — PATIENT INSTRUCTIONS
Abdominal Pain: Care Instructions  Your Care Instructions     Abdominal pain has many possible causes. Some aren't serious and get better on their own in a few days. Others need more testing and treatment. If your pain continues or gets worse, you need to be rechecked and may need more tests to find out what is wrong. You may need surgery to correct the problem. Don't ignore new symptoms, such as fever, nausea and vomiting, urination problems, pain that gets worse, and dizziness. These may be signs of a more serious problem. Your doctor may have recommended a follow-up visit in the next 8 to 12 hours. If you are not getting better, you may need more tests or treatment. The doctor has checked you carefully, but problems can develop later. If you notice any problems or new symptoms, get medical treatment right away. Follow-up care is a key part of your treatment and safety. Be sure to make and go to all appointments, and call your doctor if you are having problems. It's also a good idea to know your test results and keep a list of the medicines you take. How can you care for yourself at home? · Rest until you feel better. · To prevent dehydration, drink plenty of fluids. Choose water and other clear liquids until you feel better. If you have kidney, heart, or liver disease and have to limit fluids, talk with your doctor before you increase the amount of fluids you drink. · If your stomach is upset, eat mild foods, such as rice, dry toast or crackers, bananas, and applesauce. Try eating several small meals instead of two or three large ones. · Wait until 48 hours after all symptoms have gone away before you have spicy foods, alcohol, and drinks that contain caffeine. · Do not eat foods that are high in fat. · Avoid anti-inflammatory medicines such as aspirin, ibuprofen (Advil, Motrin), and naproxen (Aleve). These can cause stomach upset.  Talk to your doctor if you take daily aspirin for another health problem. When should you call for help? Call 911 anytime you think you may need emergency care. For example, call if:    · You passed out (lost consciousness).     · You pass maroon or very bloody stools.     · You vomit blood or what looks like coffee grounds.     · You have new, severe belly pain. Call your doctor now or seek immediate medical care if:    · Your pain gets worse, especially if it becomes focused in one area of your belly.     · You have a new or higher fever.     · Your stools are black and look like tar, or they have streaks of blood.     · You have unexpected vaginal bleeding.     · You have symptoms of a urinary tract infection. These may include:  ? Pain when you urinate. ? Urinating more often than usual.  ? Blood in your urine.     · You are dizzy or lightheaded, or you feel like you may faint. Watch closely for changes in your health, and be sure to contact your doctor if:    · You are not getting better after 1 day (24 hours). Where can you learn more? Go to http://www.gray.com/  Enter H746 in the search box to learn more about \"Abdominal Pain: Care Instructions. \"  Current as of: July 1, 2021               Content Version: 13.0  © 2522-9675 [employer]. Care instructions adapted under license by [employer] (which disclaims liability or warranty for this information). If you have questions about a medical condition or this instruction, always ask your healthcare professional. [place] [address] any warranty or liability for your use of this information.

## 2021-11-03 NOTE — PROGRESS NOTES
Subjective:    Parish Espinoza is a 61y.o. year old female seen for follow up of diabetes. She also has hyperlipidemia. Diabetic Review of Systems - medication compliance: diet only, diabetic diet compliance: compliant most of the time, home glucose monitoring: is not performed, further diabetic ROS: no polyuria or polydipsia, no chest pain, dyspnea or TIA's, no numbness, tingling or pain in extremities     Other symptoms and concerns: patient states she has been having lower abd pain since she had her well woman exam Monday. Denies bowel or bladder concerns. States she has increased pain with walking. Patient Active Problem List   Diagnosis Code    Type II diabetes mellitus, uncontrolled (HCC) E11.65    Mixed hyperlipidemia E78.2    Myofascial pain M79.18    DDD (degenerative disc disease), cervical M50.30    Osteoarthritis of spine with radiculopathy, cervical region M47.22    Right shoulder pain M25.511    Tobacco use disorder F17.200    Muscle spasm M62.838     Current Outpatient Medications   Medication Sig Dispense Refill    diclofenac EC (VOLTAREN) 75 mg EC tablet TAKE 1 TABLET BY MOUTH TWICE DAILY WITH FOOD AS NEEDED FOR PAIN 60 Tablet 0    methocarbamoL (ROBAXIN) 750 mg tablet Take 1 tablet by mouth 4 times daily 120 Tablet 0    pravastatin (PRAVACHOL) 40 mg tablet Take 1 tablet by mouth nightly 90 Tablet 1    omeprazole (PRILOSEC) 20 mg capsule Take 1 Capsule by mouth Daily (before breakfast). 90 Capsule 1    fluticasone propionate (FLONASE) 50 mcg/actuation nasal spray 2 Sprays by Both Nostrils route daily as needed for Rhinitis. 16 g 2    metFORMIN (GLUCOPHAGE) 500 mg tablet Take 2 Tabs by mouth two (2) times daily (with meals). 120 Tab 0    diclofenac (Voltaren) 1 % gel Apply 4 grams to right shoulder up to 4 times per day, maximum 16 grams per joint per day. Dispense 5 100 gram tubes 500 g 1    nystatin (MYCOSTATIN) powder Apply  to affected area four (4) times daily.  30 g 1    Blood-Glucose Meter (TRUE METRIX GLUCOSE METER) misc Use to check blood sugar 2 times per day. 1 Each 0    glucose blood VI test strips (TRUE METRIX GLUCOSE TEST STRIP) strip Use to check blood sugar 2 times per day. 100 Strip 11    lancets misc Use to check blood sugar 2 times daily 1 Each 11    Insulin Needles, Disposable, 31 gauge x 5/16\" ndle Use to inject Lantus daily. 1 Package 11    Lancets misc Check glucose ACHS 200 Each 11    alcohol swabs padm 1 Each by Apply Externally route Before breakfast, lunch, dinner and at bedtime. accucheck ACHS and record in log. Take log to every doctor visit. **MEDICALLY NECESSARY** 100 Pad 2    traZODone (DESYREL) 150 mg tablet Take 150-300 mg by mouth nightly.  sertraline (ZOLOFT) 100 mg tablet Take 200 mg by mouth daily.  perphenazine (TRILAFON) 4 mg tablet Take 4 mg by mouth three (3) times daily.  benztropine (COGENTIN) 1 mg tablet Take 1 mg by mouth daily.  gabapentin (NEURONTIN) 300 mg capsule TAKE 1 CAPSULE BY MOUTH THREE TIMES DAILY FOR NEUROPATHIC PAIN. MAX  DAILY  AMOUNT  900MG (Patient not taking: Reported on 11/3/2021) 90 Capsule 0    methylPREDNISolone (MEDROL DOSEPACK) 4 mg tablet Per dose pack instructions (Patient not taking: Reported on 7/8/2021) 1 Dose Pack 0    lidocaine (LIDODERM) 5 % Apply patch to the affected area for 12 hours a day and remove for 12 hours a day. (Patient not taking: Reported on 11/3/2021) 15 Each 0    sertraline (ZOLOFT) 50 mg tablet Take 1 Tablet by mouth daily. (Patient not taking: Reported on 11/3/2021)  2      Allergies   Allergen Reactions    Latex Rash    Penicillins Nausea and Vomiting     No past surgical history on file.   Family History   Problem Relation Age of Onset    Diabetes Mother     Stroke Mother     Diabetes Father     Cancer Paternal Grandfather     Breast Cancer Cousin       Lab Results   Component Value Date/Time    Cholesterol, total 226 (H) 10/27/2021 10:10 AM    HDL Cholesterol 57 10/27/2021 10:10 AM    LDL, calculated 135 (H) 10/27/2021 10:10 AM    LDL, calculated 116 (H) 02/18/2020 08:10 AM    VLDL, calculated 34 10/27/2021 10:10 AM    VLDL, calculated 34 02/18/2020 08:10 AM    Triglyceride 192 (H) 10/27/2021 10:10 AM    CHOL/HDL Ratio 2.9 10/20/2018 08:33 AM     Lab Results   Component Value Date/Time    Sodium 140 10/27/2021 10:10 AM    Potassium 5.0 10/27/2021 10:10 AM    Chloride 101 10/27/2021 10:10 AM    CO2 25 10/27/2021 10:10 AM    Anion gap 2 (L) 10/20/2018 08:33 AM    Glucose 81 10/27/2021 10:10 AM    BUN 14 10/27/2021 10:10 AM    Creatinine 0.96 10/27/2021 10:10 AM    BUN/Creatinine ratio 15 10/27/2021 10:10 AM    GFR est AA 72 10/27/2021 10:10 AM    GFR est non-AA 63 10/27/2021 10:10 AM    Calcium 9.7 10/27/2021 10:10 AM    Bilirubin, total <0.2 10/27/2021 10:10 AM    Alk. phosphatase 69 10/27/2021 10:10 AM    Protein, total 7.7 10/27/2021 10:10 AM    Albumin 4.8 10/27/2021 10:10 AM    Globulin 3.8 10/20/2018 08:33 AM    A-G Ratio 1.7 10/27/2021 10:10 AM    ALT (SGPT) 41 (H) 10/27/2021 10:10 AM    AST (SGOT) 36 10/27/2021 10:10 AM     Lab Results   Component Value Date/Time    WBC 7.0 07/25/2016 12:47 AM    HGB 11.3 (L) 07/25/2016 12:47 AM    HCT 32.6 (L) 07/25/2016 12:47 AM    PLATELET 615 27/78/6332 12:47 AM    MCV 87.6 07/25/2016 12:47 AM     Lab Results   Component Value Date/Time    Hemoglobin A1c 5.6 10/27/2021 09:39 AM    Hemoglobin A1c (POC) 5.8 02/25/2020 09:52 AM     Lab Results   Component Value Date/Time    Microalbumin/Creat ratio (mg/g creat)  12/01/2018 09:43 AM     Cannot calculate ratio due to microalbumin result outside reportable range.     Microalb/Creat ratio (ug/mg creat.) 4 07/08/2021 02:34 PM    Microalbumin,urine random <0.50 12/01/2018 09:43 AM    Microalbumin urine (POC) 30 04/11/2017 04:00 PM     Wt Readings from Last 3 Encounters:   11/03/21 146 lb (66.2 kg)   07/08/21 149 lb (67.6 kg)   07/06/21 148 lb (67.1 kg)     Last Point of Care HGB A1C  Hemoglobin A1c (POC)   Date Value Ref Range Status   02/25/2020 5.8 % Final      BP Readings from Last 3 Encounters:   11/03/21 131/69   07/08/21 130/68   07/06/21 (!) 151/83     Results for orders placed or performed in visit on 10/27/21   HEMOGLOBIN A1C WITH EAG   Result Value Ref Range    Hemoglobin A1c 5.6 4.8 - 5.6 %    Estimated average glucose 114 mg/dL   LIPID PANEL   Result Value Ref Range    Cholesterol, total 226 (H) 100 - 199 mg/dL    Triglyceride 192 (H) 0 - 149 mg/dL    HDL Cholesterol 57 >39 mg/dL    VLDL, calculated 34 5 - 40 mg/dL    LDL, calculated 135 (H) 0 - 99 mg/dL   METABOLIC PANEL, COMPREHENSIVE   Result Value Ref Range    Glucose 81 65 - 99 mg/dL    BUN 14 8 - 27 mg/dL    Creatinine 0.96 0.57 - 1.00 mg/dL    GFR est non-AA 63 >59 mL/min/1.73    GFR est AA 72 >59 mL/min/1.73    BUN/Creatinine ratio 15 12 - 28    Sodium 140 134 - 144 mmol/L    Potassium 5.0 3.5 - 5.2 mmol/L    Chloride 101 96 - 106 mmol/L    CO2 25 20 - 29 mmol/L    Calcium 9.7 8.7 - 10.3 mg/dL    Protein, total 7.7 6.0 - 8.5 g/dL    Albumin 4.8 3.8 - 4.8 g/dL    GLOBULIN, TOTAL 2.9 1.5 - 4.5 g/dL    A-G Ratio 1.7 1.2 - 2.2    Bilirubin, total <0.2 0.0 - 1.2 mg/dL    Alk. phosphatase 69 44 - 121 IU/L    AST (SGOT) 36 0 - 40 IU/L    ALT (SGPT) 41 (H) 0 - 32 IU/L     Last Diabetic Foot Exam on:   Diabetic Foot and Eye Exam HM Status   Topic Date Due    Eye Exam  05/03/2020    Diabetic Foot Care  07/08/2022       Objective:  Visit Vitals  /69 (BP 1 Location: Left arm, BP Patient Position: Sitting, BP Cuff Size: Adult)   Pulse 83   Temp 97.4 °F (36.3 °C) (Temporal)   Resp 20   Ht 5' 5\" (1.651 m)   Wt 146 lb (66.2 kg)   SpO2 98%   BMI 24.30 kg/m²     Awake and alert in no acute distress   Neck supple without lymphadenopathy, no carotid artery bruits auscultated bilaterally. No thyromegaly   Lungs clear throughout   S1 S2 RRR without ectopy or murmur auscultated.    Extremities: no clubbing, cyanosis, peripheral edema   Abdomen - soft, mild lower tender, nondistended, no masses or organomegaly  Integumentary: no rashes   Reviewed vital signs       Assessment/Plan:    ICD-10-CM ICD-9-CM    1. Controlled type 2 diabetes mellitus with hyperglycemia, unspecified whether long term insulin use (HCC)  E11.65 250.80      790.29    2. Lower abdominal pain  R10.30 789.09 XR ABD (AP AND ERECT OR DECUB)   3. Mixed hyperlipidemia  E78.2 272.2    4. Muscle spasm  M62.838 728.85 methocarbamoL (ROBAXIN) 750 mg tablet   5. Cervical spondylosis  M47.812 721.0 methocarbamoL (ROBAXIN) 750 mg tablet   6. Neuritis  M79.2 729.2 gabapentin (NEURONTIN) 300 mg capsule   7. Mammogram declined  Z53.20 V64.2      Orders Placed This Encounter    XR ABD (AP AND ERECT OR DECUB)    methocarbamoL (ROBAXIN) 750 mg tablet    gabapentin (NEURONTIN) 300 mg capsule        Issues reviewed with her: diabetic Sick Day rules reviewed, handout given, foot care discussed and Podiatry visits discussed and annual eye examinations at Ophthalmology discussed. I have discussed the diagnosis with the patient and the intended plan as seen in the above orders. The patient has received an after-visit summary and questions were answered concerning future plans. I have discussed medication side effects and warnings with the patient as well. Patient agreeable with above plan and verbalizes understanding. Follow-up and Dispositions    · Return in about 3 weeks (around 11/24/2021) for abd pain if not improved, in office f/u /5 month DM/HLD fasting labs 1 week prior to office visit.

## 2021-11-12 ENCOUNTER — HOSPITAL ENCOUNTER (OUTPATIENT)
Dept: GENERAL RADIOLOGY | Age: 66
Discharge: HOME | End: 2021-11-12
Payer: MEDICAID

## 2021-11-12 ENCOUNTER — TRANSCRIBE ORDER (OUTPATIENT)
Dept: REGISTRATION | Age: 66
End: 2021-11-12

## 2021-11-12 DIAGNOSIS — R10.30 GROIN PAIN: ICD-10-CM

## 2021-11-12 DIAGNOSIS — R10.30 GROIN PAIN: Primary | ICD-10-CM

## 2021-11-12 PROCEDURE — 74019 RADEX ABDOMEN 2 VIEWS: CPT

## 2021-11-22 DIAGNOSIS — M47.816 LUMBAR FACET ARTHROPATHY: ICD-10-CM

## 2021-11-23 RX ORDER — DICLOFENAC SODIUM 75 MG/1
TABLET, DELAYED RELEASE ORAL
Qty: 60 TABLET | Refills: 0 | Status: SHIPPED | OUTPATIENT
Start: 2021-11-23 | End: 2022-01-12 | Stop reason: SDUPTHER

## 2021-11-23 NOTE — TELEPHONE ENCOUNTER
Patient contacted and told that she must been seen prior to any further refills.  She was transferred to the  to schedule

## 2021-11-23 NOTE — TELEPHONE ENCOUNTER
I last saw the pt in July -- will send a 1 month refill -- please have pt make follow up visit for any further refills

## 2022-01-03 DIAGNOSIS — M47.812 CERVICAL SPONDYLOSIS: ICD-10-CM

## 2022-01-03 DIAGNOSIS — M62.838 MUSCLE SPASM: ICD-10-CM

## 2022-01-05 ENCOUNTER — TELEPHONE (OUTPATIENT)
Dept: FAMILY MEDICINE CLINIC | Age: 67
End: 2022-01-05

## 2022-01-05 NOTE — TELEPHONE ENCOUNTER
----- Message from Jose Gilliam sent at 1/5/2022 12:59 PM EST -----  Subject: Refill Request    QUESTIONS  Name of Medication? methocarbamoL (ROBAXIN) 750 mg tablet  Patient-reported dosage and instructions? 750MG 1 tablet 4 times per day   How many days do you have left? 0  Preferred Pharmacy? [facility]  Pharmacy phone number (if available)? [phone]  ---------------------------------------------------------------------------  --------------  CALL BACK INFO  What is the best way for the office to contact you? Do not leave any   message, patient will call back for answer  Preferred Call Back Phone Number?  [phone]

## 2022-01-05 NOTE — TELEPHONE ENCOUNTER
Last Visit: 11/3/21 with ESTELA Dawson  Next Appointment: 1/19/22 with ESTELA Dawson  Previous Refill Encounter(s): 11/3/21 #120    Requested Prescriptions     Pending Prescriptions Disp Refills    methocarbamoL (ROBAXIN) 750 mg tablet [Pharmacy Med Name: Methocarbamol 750 MG Oral Tablet] 120 Tablet 0     Sig: Take 1 tablet by mouth 4 times daily

## 2022-01-06 ENCOUNTER — TELEPHONE (OUTPATIENT)
Dept: MAMMOGRAPHY | Age: 67
End: 2022-01-06

## 2022-01-06 NOTE — TELEPHONE ENCOUNTER
I called Lizandro Lanier to assist her with scheduling a mammogram but she declines ,and desires not to have this screening done at all .

## 2022-01-06 NOTE — TELEPHONE ENCOUNTER
----- Message from Alyssa sent at 1/6/2022  3:02 PM EST -----  Subject: Refill Request    QUESTIONS  Name of Medication? methocarbamoL (ROBAXIN) 750 mg tablet  Patient-reported dosage and instructions? 1 tablet 4 times per day  How many days do you have left? 0  Preferred Pharmacy? [facility]  Pharmacy phone number (if available)? [phone]  Additional Information for Provider? Pt is completely out of medication   and states she has a lot of pain. Requesting refill of medication. If   unable, please contact pt at below number until 5 pm  ---------------------------------------------------------------------------  --------------  CALL BACK INFO  What is the best way for the office to contact you? OK to leave message on   voicemail,Do not leave any message, patient will call back for answer  Preferred Call Back Phone Number?  [phone]

## 2022-01-10 RX ORDER — METHOCARBAMOL 750 MG/1
TABLET, FILM COATED ORAL
Qty: 120 TABLET | Refills: 0 | Status: SHIPPED | OUTPATIENT
Start: 2022-01-10 | End: 2022-01-12 | Stop reason: SDUPTHER

## 2022-01-12 ENCOUNTER — OFFICE VISIT (OUTPATIENT)
Dept: ORTHOPEDIC SURGERY | Age: 67
End: 2022-01-12
Payer: MEDICAID

## 2022-01-12 VITALS
HEART RATE: 97 BPM | HEIGHT: 65 IN | OXYGEN SATURATION: 95 % | BODY MASS INDEX: 24.3 KG/M2 | TEMPERATURE: 97.2 F | RESPIRATION RATE: 18 BRPM

## 2022-01-12 DIAGNOSIS — M79.2 NEURITIS: ICD-10-CM

## 2022-01-12 DIAGNOSIS — G89.29 CHRONIC RIGHT SHOULDER PAIN: ICD-10-CM

## 2022-01-12 DIAGNOSIS — M47.812 CERVICAL SPONDYLOSIS: ICD-10-CM

## 2022-01-12 DIAGNOSIS — M25.50 ARTHRALGIA OF MULTIPLE JOINTS: ICD-10-CM

## 2022-01-12 DIAGNOSIS — M47.816 LUMBAR FACET ARTHROPATHY: Primary | ICD-10-CM

## 2022-01-12 DIAGNOSIS — M25.511 CHRONIC RIGHT SHOULDER PAIN: ICD-10-CM

## 2022-01-12 DIAGNOSIS — M62.838 MUSCLE SPASM: ICD-10-CM

## 2022-01-12 PROCEDURE — 96372 THER/PROPH/DIAG INJ SC/IM: CPT | Performed by: PHYSICAL MEDICINE & REHABILITATION

## 2022-01-12 PROCEDURE — 99214 OFFICE O/P EST MOD 30 MIN: CPT | Performed by: PHYSICAL MEDICINE & REHABILITATION

## 2022-01-12 RX ORDER — KETOROLAC TROMETHAMINE 30 MG/ML
60 INJECTION, SOLUTION INTRAMUSCULAR; INTRAVENOUS ONCE
Status: COMPLETED | OUTPATIENT
Start: 2022-01-12 | End: 2022-01-12

## 2022-01-12 RX ORDER — GABAPENTIN 300 MG/1
CAPSULE ORAL
Qty: 270 CAPSULE | Refills: 1 | Status: SHIPPED | OUTPATIENT
Start: 2022-01-12 | End: 2022-08-29 | Stop reason: SDUPTHER

## 2022-01-12 RX ORDER — DICLOFENAC SODIUM 75 MG/1
TABLET, DELAYED RELEASE ORAL
Qty: 180 TABLET | Refills: 1 | Status: SHIPPED | OUTPATIENT
Start: 2022-01-12

## 2022-01-12 RX ORDER — METHOCARBAMOL 750 MG/1
TABLET, FILM COATED ORAL
Qty: 120 TABLET | Refills: 5 | Status: SHIPPED | OUTPATIENT
Start: 2022-01-12

## 2022-01-12 RX ADMIN — KETOROLAC TROMETHAMINE 60 MG: 30 INJECTION, SOLUTION INTRAMUSCULAR; INTRAVENOUS at 12:02

## 2022-01-12 NOTE — PROGRESS NOTES
[facility]  [address] [place] [address], [facility] [address] [address]  Phone: [phone]  Fax: [phone]    Pt's YOB: 1955    ASSESSMENT   Diagnoses and all orders for this visit:    1. Lumbar facet arthropathy  -     diclofenac EC (VOLTAREN) 75 mg EC tablet; Take 1 tab by mouth twice daily as needed. -     ketorolac tromethamine (TORADOL) 60 mg/2 mL injection 60 mg  -     THER/PROPH/DIAG INJECTION, SUBCUT/IM    2. Muscle spasm  -     methocarbamoL (ROBAXIN) 750 mg tablet; Take 1 tab by mouth 4 times daily as needed. 3. Cervical spondylosis  -     methocarbamoL (ROBAXIN) 750 mg tablet; Take 1 tab by mouth 4 times daily as needed. -     ketorolac tromethamine (TORADOL) 60 mg/2 mL injection 60 mg  -     THER/PROPH/DIAG INJECTION, SUBCUT/IM    4. Neuritis  -     gabapentin (NEURONTIN) 300 mg capsule; TAKE 1 CAPSULE BY MOUTH THREE TIMES DAILY . DO NOT EXCEED 3 PER 24 HOURS FOR  NEUROPATHIC  PAIN    5. Arthralgia of multiple joints  -     diclofenac EC (VOLTAREN) 75 mg EC tablet; Take 1 tab by mouth twice daily as needed. -     ketorolac tromethamine (TORADOL) 60 mg/2 mL injection 60 mg  -     THER/PROPH/DIAG INJECTION, SUBCUT/IM    6. Chronic right shoulder pain  -     diclofenac EC (VOLTAREN) 75 mg EC tablet; Take 1 tab by mouth twice daily as needed. -     ketorolac tromethamine (TORADOL) 60 mg/2 mL injection 60 mg  -     THER/PROPH/DIAG INJECTION, SUBCUT/IM         IMPRESSION AND PLAN:  Princess Cloud is a 61 y.o. female with history of chronic lumbar pain. She complains of diaphoresis on her head x 2-3 days and continued lumbar pain. Pt is taking Neurontin 300 mg 1 cap TID, Voltaren 75 mg BID, and Robaxin 500 mg as needed. 1) Pt was given information on lumbar and shoulder arthritis exercises.    2) She received refills of Neurontin 300 mg for neuropathic symptoms, Robaxin 500 mg for muscle spasm, and Voltaren 75 mg for inflammatory pain (gi precautions given)  3) Pt received a 60 mg Toradol injection in the office today. 4) Ms. Melisa Duong has a reminder for a \"due or due soon\" health maintenance. I have asked that she contact her primary care provider, Hilda Stearns NP, for follow-up on this health maintenance. 5)  demonstrated consistency with prescribing. 6) Pt encouraged to use other methods for pain management including moist heat, consistency with HEP and topical joint medication  Follow-up and Dispositions    · Return in about 6 months (around 7/12/2022) for Medication follow up. HISTORY OF PRESENT ILLNESS:  Ilean Cabot is a 61 y.o. female with history of chronic lumbar pain and presents to the office today for follow up. Pt complains of diaphoresis on her head x 2-3 days and continued lumbar pain. She reports that her pain is exacerbated with the colder weather. Pt is taking Neurontin 300 mg 1 cap TID, Voltaren 75 mg BID, and Robaxin 500 mg as needed and requests refills at this time. She notes that she supplements with vitamin D. She denies any stomach or renal issues. Review of labs from 10/27/2021 were reviewed and demonstrated creainine of 0.96 and eGFR of 72. Pt at this time desires to continue with current care.        Pain Scale: 10 - Worst pain ever/10    PCP: Hilda Stearns NP     Past Medical History:   Diagnosis Date    CAD (coronary artery disease)     Hypertension     Uterine fibroid         Social History     Socioeconomic History    Marital status: SINGLE     Spouse name: Not on file    Number of children: Not on file    Years of education: Not on file    Highest education level: Not on file   Occupational History    Not on file   Tobacco Use    Smoking status: Current Every Day Smoker     Packs/day: 0.25     Years: 2.00     Pack years: 0.50    Smokeless tobacco: Never Used   Substance and Sexual Activity    Alcohol use: No     Alcohol/week: 0.8 standard drinks     Types: 1 Cans of beer per week    Drug use: Yes     Types: Marijuana     Comment: rarely     Sexual activity: Not on file   Other Topics Concern    Not on file   Social History Narrative    Not on file     Social Determinants of Health     Financial Resource Strain:     Difficulty of Paying Living Expenses: Not on file   Food Insecurity:     Worried About Running Out of Food in the Last Year: Not on file    Kim of Food in the Last Year: Not on file   Transportation Needs:     Lack of Transportation (Medical): Not on file    Lack of Transportation (Non-Medical): Not on file   Physical Activity:     Days of Exercise per Week: Not on file    Minutes of Exercise per Session: Not on file   Stress:     Feeling of Stress : Not on file   Social Connections:     Frequency of Communication with Friends and Family: Not on file    Frequency of Social Gatherings with Friends and Family: Not on file    Attends Mandaeism Services: Not on file    Active Member of [address] [place] [address] or Organizations: Not on file    Attends Club or Organization Meetings: Not on file    Marital Status: Not on file   Intimate Partner Violence:     Fear of Current or Ex-Partner: Not on file    Emotionally Abused: Not on file    Physically Abused: Not on file    Sexually Abused: Not on file   Housing Stability:     Unable to Pay for Housing in the Last Year: Not on file    Number of [place] in the Last Year: Not on file    Unstable Housing in the Last Year: Not on file       Current Outpatient Medications   Medication Sig Dispense Refill    diclofenac EC (VOLTAREN) 75 mg EC tablet Take 1 tab by mouth twice daily as needed. 180 Tablet 1    methocarbamoL (ROBAXIN) 750 mg tablet Take 1 tab by mouth 4 times daily as needed. 120 Tablet 5    gabapentin (NEURONTIN) 300 mg capsule TAKE 1 CAPSULE BY MOUTH THREE TIMES DAILY .  DO NOT EXCEED 3 PER 24 HOURS FOR  NEUROPATHIC  PAIN 270 Capsule 1    pravastatin (PRAVACHOL) 40 mg tablet Take 1 tablet by mouth nightly 90 Tablet 1  omeprazole (PRILOSEC) 20 mg capsule Take 1 Capsule by mouth Daily (before breakfast). 90 Capsule 1    traZODone (DESYREL) 150 mg tablet Take 150-300 mg by mouth nightly.  sertraline (ZOLOFT) 100 mg tablet Take 200 mg by mouth daily.  benztropine (COGENTIN) 1 mg tablet Take 1 mg by mouth daily.  methylPREDNISolone (MEDROL DOSEPACK) 4 mg tablet Per dose pack instructions (Patient not taking: Reported on 7/8/2021) 1 Dose Pack 0    fluticasone propionate (FLONASE) 50 mcg/actuation nasal spray 2 Sprays by Both Nostrils route daily as needed for Rhinitis. (Patient not taking: Reported on 1/12/2022) 16 g 2    metFORMIN (GLUCOPHAGE) 500 mg tablet Take 2 Tabs by mouth two (2) times daily (with meals). 120 Tab 0    diclofenac (Voltaren) 1 % gel Apply 4 grams to right shoulder up to 4 times per day, maximum 16 grams per joint per day. Dispense 5 100 gram tubes (Patient not taking: Reported on 1/12/2022) 500 g 1    nystatin (MYCOSTATIN) powder Apply  to affected area four (4) times daily. (Patient not taking: Reported on 1/12/2022) 30 g 1    lidocaine (LIDODERM) 5 % Apply patch to the affected area for 12 hours a day and remove for 12 hours a day. (Patient not taking: Reported on 11/3/2021) 15 Each 0    Blood-Glucose Meter (TRUE METRIX GLUCOSE METER) misc Use to check blood sugar 2 times per day. (Patient not taking: Reported on 1/12/2022) 1 Each 0    glucose blood VI test strips (TRUE METRIX GLUCOSE TEST STRIP) strip Use to check blood sugar 2 times per day. 100 Strip 11    lancets misc Use to check blood sugar 2 times daily 1 Each 11    Insulin Needles, Disposable, 31 gauge x 5/16\" ndle Use to inject Lantus daily. 1 Package 11    sertraline (ZOLOFT) 50 mg tablet Take 1 Tablet by mouth daily. (Patient not taking: Reported on 11/3/2021)  2    Lancets misc Check glucose ACHS 200 Each 11    alcohol swabs padm 1 Each by Apply Externally route Before breakfast, lunch, dinner and at bedtime.  Kelsey Chong ACHS and record in log. Take log to every doctor visit. **MEDICALLY NECESSARY** (Patient not taking: Reported on 1/12/2022) 100 Pad 2    perphenazine (TRILAFON) 4 mg tablet Take 4 mg by mouth three (3) times daily. Allergies   Allergen Reactions    Latex Rash    Penicillins Nausea and Vomiting    Penicillin Unknown (comments)         REVIEW OF SYSTEMS    Constitutional: Negative for fever, chills, or weight change. Positive for diaphoresis of the scalp. Respiratory: Negative for cough or shortness of breath. Cardiovascular: Negative for chest pain or palpitations. Gastrointestinal: Negative for acid reflux, change in bowel habits, or constipation. Genitourinary: Negative for dysuria and flank pain. Musculoskeletal: Positive for lumbar and right shoulder pain. Skin: Negative for rash. Neurological: Negative for headaches, dizziness, or numbness. Endo/Heme/Allergies: Negative for increased bruising. Psychiatric/Behavioral: Negative for difficulty with sleep. As per HPI    PHYSICAL EXAMINATION  Visit Vitals  Pulse 97   Temp 97.2 °F (36.2 °C) (Temporal)   Resp 18   Ht 5' 5\" (1.651 m)   SpO2 95%   BMI 24.30 kg/m²       Constitutional: Awake, alert, and in no acute distress. Neurological:  Sensation to light touch is intact. Skin: warm, dry, and intact. Musculoskeletal: Mild pain with extension, axial loading, and less with forward flexion. No pain with internal or external rotation of her hips. Negative straight leg raise bilaterally.  Mild pain with ROM both shoulders and mild limitation with abduction right shoulder      Biceps  Triceps Deltoids Wrist Ext Wrist Flex Hand Intrin   Right +4/5 +4/5 +4/5 +4/5 +4/5 +4/5   Left +4/5 +4/5 +4/5 +4/5 +4/5 +4/5          Hip Flex  Quads Hamstrings Ankle DF EHL Ankle PF   Right +4/5 +4/5 +4/5 +4/5 +4/5 +4/5   Left +4/5 +4/5 +4/5 +4/5 +4/5 +4/5     TORADOL INJECTION:    Administrations This Visit     ketorolac tromethamine (TORADOL) 60 mg/2 mL injection 60 mg     Admin Date  01/12/2022  12:02 Action  Given Dose  60 mg Route  IntraMUSCular Site  Right Gluteus Andrew Administered By  Lizzette Aj LPN    NDC: 47064-548-48    Patient Supplied?: No              IMAGING:    Right shoulder x-rays from 6/5/2020 were personally reviewed with the patient and demonstrated:    FINDINGS:  No acute displaced fracture or dislocation. There is mild to moderate  degenerative changes of the acromioclavicular joint with slight downward sloping  of the acromium. No destructive lesion.     IMPRESSION:     Mild to moderate degenerative changes.     Thoracic spine MRI from 04/04/2018 was personally reviewed with the patient and demonstrated:  Results from [facility] on 04/04/18    [facility] CONT    Narrative MRI THORACIC WITHOUT CONTRAST    CPT CODE: 01236    HISTORY: Mid back pain, neck pain, right leg weakness since 2001. COMPARISON: None. TECHNIQUE: Sagittal T1, T2 and stir and axial T2 weighted sequences were  acquired through the thoracic spine without contrast.        FINDINGS:     There is normal anatomic alignment of the thoracic spine. Vertebral body heights  are maintained. No infiltrative marrow replacement process or marrow edema. Thoracic spinal cord maintains normal caliber, signal intensity and morphology  throughout. Small bilateral pleural effusions. There are multilevel tiny disc protrusions with contact of the ventral cord at  T6/T7 and T7/T8, but no impingement. No critical foraminal compromise. Incidentally imaged surrounding soft tissues demonstrate no acute abnormalities  otherwise.           Impression IMPRESSION:    Small disc protrusions are most pronounced at T6/T7 and T7/T8 where there is  mild flattening of the ventral cord, but no cord impingement.     Small bilateral pleural effusions.                         Lumbar spine x-rays from 11/18/2019 were personally reviewed and demonstrated: Multilevel degenerative facet, some straightening of lumbar spine, degenerative disc at L5-S1, no instability     Left hip AP x-rays from 11/18/2019 were personally reviewed and demonstrated:   Degenerative joint findings bilaterally. Right shoulder x-rays from 03/18/2017 were personally reviewed and demonstrated:  Results from Hospital Encounter on 03/18/17   XR SHOULDER RT AP/LAT MIN 2 V     Narrative Shoulder series. CPT code: 07308    Clinical history: Arthritis in the shoulder and pain in the back under the  shoulder blade without known injury of unspecified chronicity. Technique: Two shoulder views. Comparison: No priors. Findings: There are no fractures.  Joint relationships at glenohumeral joint are  normal.  Acromioclavicular joint is normal. There is convex appearance of the  underside of the distal acromion process.  There are no soft tissue  calcifications.              Impression IMPRESSION:    No acute abnormalities. Convex appearance of the inferior aspect of the acromion  process could increased risk of rotator cuff impingement.                Cervical spine x-rays from 12/27/2016 were personally reviewed and demonstrated:    FINDINGS:       Vertebral body height and alignment is intact. Slight straightening of the  cervical lordosis. Moderate disc height loss endplate sclerosis and vertebral  body osteophytes from C3 through C7. Alignment is intact. No fracture. Mild  facet joint arthropathy at multiple levels. The prevertebral space appears normal.       IMPRESSION:      Moderate cervical spondylosis.           Written by Magdalena Barrientos, as dictated by Lenora Mckeon MD.  I, Dr. Lenora Mckeon confirm that all documentation is accurate.

## 2022-01-19 ENCOUNTER — OFFICE VISIT (OUTPATIENT)
Dept: FAMILY MEDICINE CLINIC | Age: 67
End: 2022-01-19
Payer: MEDICAID

## 2022-01-19 VITALS
HEART RATE: 77 BPM | DIASTOLIC BLOOD PRESSURE: 76 MMHG | BODY MASS INDEX: 24.83 KG/M2 | WEIGHT: 149 LBS | RESPIRATION RATE: 20 BRPM | SYSTOLIC BLOOD PRESSURE: 138 MMHG | TEMPERATURE: 97.4 F | OXYGEN SATURATION: 98 % | HEIGHT: 65 IN

## 2022-01-19 DIAGNOSIS — M47.812 CERVICAL SPONDYLOSIS: ICD-10-CM

## 2022-01-19 DIAGNOSIS — R10.13 EPIGASTRIC PAIN: Primary | ICD-10-CM

## 2022-01-19 DIAGNOSIS — M62.838 MUSCLE SPASM: ICD-10-CM

## 2022-01-19 PROCEDURE — 99214 OFFICE O/P EST MOD 30 MIN: CPT | Performed by: NURSE PRACTITIONER

## 2022-01-19 RX ORDER — LIDOCAINE 50 MG/G
PATCH TOPICAL
Qty: 15 EACH | Refills: 0 | Status: SHIPPED | OUTPATIENT
Start: 2022-01-19 | End: 2022-03-17

## 2022-01-19 RX ORDER — METFORMIN HYDROCHLORIDE 500 MG/1
500 TABLET, EXTENDED RELEASE ORAL
Qty: 90 TABLET | Refills: 1 | Status: SHIPPED | OUTPATIENT
Start: 2022-01-19

## 2022-01-19 RX ORDER — OMEPRAZOLE 20 MG/1
20 CAPSULE, DELAYED RELEASE ORAL
Qty: 90 CAPSULE | Refills: 1 | Status: SHIPPED | OUTPATIENT
Start: 2022-01-19

## 2022-01-19 RX ORDER — METFORMIN HYDROCHLORIDE 500 MG/1
1000 TABLET ORAL 2 TIMES DAILY WITH MEALS
Qty: 120 TABLET | Refills: 0 | Status: CANCELLED | OUTPATIENT
Start: 2022-01-19

## 2022-01-19 RX ORDER — NYSTATIN 100000 [USP'U]/G
POWDER TOPICAL 4 TIMES DAILY
Qty: 30 G | Refills: 1 | Status: SHIPPED | OUTPATIENT
Start: 2022-01-19

## 2022-01-19 RX ORDER — METHOCARBAMOL 750 MG/1
TABLET, FILM COATED ORAL
Qty: 120 TABLET | Refills: 5 | Status: CANCELLED | OUTPATIENT
Start: 2022-01-19

## 2022-01-19 NOTE — PROGRESS NOTES
Nu Vincent is a 61 y.o. female who was seen in clinic today (1/19/2022) for Abdominal Pain (follow up)    Assessment & Plan:   Diagnoses and all orders for this visit:    1. Epigastric pain  -     REFERRAL TO GASTROENTEROLOGY    2. Muscle spasm    3. Cervical spondylosis    Other orders  -     lidocaine (LIDODERM) 5 %; Apply patch to the affected area for 12 hours a day and remove for 12 hours a day. -     nystatin (MYCOSTATIN) powder; Apply  to affected area four (4) times daily. -     omeprazole (PRILOSEC) 20 mg capsule; Take 1 Capsule by mouth Daily (before breakfast). -     metFORMIN ER (GLUCOPHAGE XR) 500 mg tablet; Take 1 Tablet by mouth daily (with dinner). resume metformin as above, reports she had been taking medication once daily. I have discussed the diagnosis with the patient and the intended plan as seen in the above orders. The patient has received an after-visit summary and questions were answered concerning future plans. I have discussed medication side effects and warnings with the patient as well. Patient agreeable with above plan and verbalizes understanding. Follow-up and Dispositions    · Return in about 6 months (around 7/19/2022) for DM/HTN/HLD, in office follow up, fasting labs 1 wk prior. Subjective:   Patient states she does continue to have abd pain which is intermittent. Denies worsening or improvement of symptoms. States abd pain is located in the center of her abd. Reviewed xray results in detail. All questions answered.     Lab Results   Component Value Date/Time    Sodium 140 10/27/2021 10:10 AM    Potassium 5.0 10/27/2021 10:10 AM    Chloride 101 10/27/2021 10:10 AM    CO2 25 10/27/2021 10:10 AM    Anion gap 2 (L) 10/20/2018 08:33 AM    Glucose 81 10/27/2021 10:10 AM    BUN 14 10/27/2021 10:10 AM    Creatinine 0.96 10/27/2021 10:10 AM    BUN/Creatinine ratio 15 10/27/2021 10:10 AM    GFR est AA 72 10/27/2021 10:10 AM    GFR est non-AA 63 10/27/2021 10:10 AM Calcium 9.7 10/27/2021 10:10 AM    Bilirubin, total <0.2 10/27/2021 10:10 AM    Alk. phosphatase 69 10/27/2021 10:10 AM    Protein, total 7.7 10/27/2021 10:10 AM    Albumin 4.8 10/27/2021 10:10 AM    Globulin 3.8 10/20/2018 08:33 AM    A-G Ratio 1.7 10/27/2021 10:10 AM    ALT (SGPT) 41 (H) 10/27/2021 10:10 AM    AST (SGOT) 36 10/27/2021 10:10 AM     Lab Results   Component Value Date/Time    Cholesterol, total 226 (H) 10/27/2021 10:10 AM    HDL Cholesterol 57 10/27/2021 10:10 AM    LDL, calculated 135 (H) 10/27/2021 10:10 AM    LDL, calculated 116 (H) 02/18/2020 08:10 AM    VLDL, calculated 34 10/27/2021 10:10 AM    VLDL, calculated 34 02/18/2020 08:10 AM    Triglyceride 192 (H) 10/27/2021 10:10 AM    CHOL/HDL Ratio 2.9 10/20/2018 08:33 AM     Lab Results   Component Value Date/Time    Hemoglobin A1c 5.6 10/27/2021 09:39 AM    Hemoglobin A1c (POC) 5.8 02/25/2020 09:52 AM     No results found for: Tammi Mchugh, VD3RIA, [id number]    Lab Results   Component Value Date/Time    WBC 7.0 07/25/2016 12:47 AM    HGB 11.3 (L) 07/25/2016 12:47 AM    HCT 32.6 (L) 07/25/2016 12:47 AM    PLATELET 776 52/31/0864 12:47 AM    MCV 87.6 07/25/2016 12:47 AM       Wt Readings from Last 3 Encounters:   01/19/22 149 lb (67.6 kg)   11/03/21 146 lb (66.2 kg)   07/08/21 149 lb (67.6 kg)     Temp Readings from Last 3 Encounters:   01/19/22 97.4 °F (36.3 °C) (Temporal)   01/12/22 97.2 °F (36.2 °C) (Temporal)   11/03/21 97.4 °F (36.3 °C) (Temporal)     BP Readings from Last 3 Encounters:   01/19/22 (!) 149/80   11/03/21 131/69   07/08/21 130/68     Pulse Readings from Last 3 Encounters:   01/19/22 77   01/12/22 97   11/03/21 83       Prior to Admission medications    Medication Sig Start Date End Date Taking? Authorizing Provider   diclofenac EC (VOLTAREN) 75 mg EC tablet Take 1 tab by mouth twice daily as needed.  1/12/22  Yes Aleisha Reddy MD   methocarbamoL (ROBAXIN) 750 mg tablet Take 1 tab by mouth 4 times daily as needed. 1/12/22  Yes Ok Church MD   gabapentin (NEURONTIN) 300 mg capsule TAKE 1 CAPSULE BY MOUTH THREE TIMES DAILY . DO NOT EXCEED 3 PER 24 HOURS FOR  NEUROPATHIC  PAIN 1/12/22  Yes Ok Church MD   pravastatin (PRAVACHOL) 40 mg tablet Take 1 tablet by mouth nightly 7/8/21  Yes Gabrielle Schmidt NP   omeprazole (PRILOSEC) 20 mg capsule Take 1 Capsule by mouth Daily (before breakfast). 6/22/21  Yes Gabrielle Schmidt NP   fluticasone propionate (FLONASE) 50 mcg/actuation nasal spray 2 Sprays by Both Nostrils route daily as needed for Rhinitis. 5/13/21  Yes Gabrielle Schmidt NP   metFORMIN (GLUCOPHAGE) 500 mg tablet Take 2 Tabs by mouth two (2) times daily (with meals). 5/13/21  Yes Gabrielle Schmidt NP   diclofenac (Voltaren) 1 % gel Apply 4 grams to right shoulder up to 4 times per day, maximum 16 grams per joint per day. Dispense 5 100 gram tubes 9/15/20  Yes Ok Church MD   nystatin (MYCOSTATIN) powder Apply  to affected area four (4) times daily. 7/21/20  Yes Gabrielle Schmidt NP   lidocaine (LIDODERM) 5 % Apply patch to the affected area for 12 hours a day and remove for 12 hours a day. 1/21/20  Yes Gabrielle Schmidt NP   Blood-Glucose Meter (TRUE METRIX GLUCOSE METER) misc Use to check blood sugar 2 times per day. 9/18/19  Yes Gabrielle Schmidt NP   glucose blood VI test strips (TRUE METRIX GLUCOSE TEST STRIP) strip Use to check blood sugar 2 times per day. 9/18/19  Yes Gabrielle Schmidt NP   lancets misc Use to check blood sugar 2 times daily 9/18/19  Yes Gabrielle Schmidt NP   Lancets misc Check glucose ACHS 12/23/16  Yes Stephanie Marcano NP   alcohol swabs padm 1 Each by Apply Externally route Before breakfast, lunch, dinner and at bedtime. accucheck ACHS and record in log. Take log to every doctor visit. **MEDICALLY NECESSARY** 7/25/16  Yes Chasity Roca MD   traZODone (DESYREL) 150 mg tablet Take 150-300 mg by mouth nightly.    Yes Provider, Historical   sertraline (ZOLOFT) 100 mg tablet Take 200 mg by mouth daily. Yes Provider, Historical   perphenazine (TRILAFON) 4 mg tablet Take 4 mg by mouth three (3) times daily. Yes Provider, Historical   benztropine (COGENTIN) 1 mg tablet Take 1 mg by mouth daily. Yes Provider, Historical   methylPREDNISolone (MEDROL DOSEPACK) 4 mg tablet Per dose pack instructions  Patient not taking: Reported on 7/8/2021 7/6/21 1/19/22  Sigrid Matos MD   Insulin Needles, Disposable, 31 gauge x 5/16\" ndle Use to inject Lantus daily. 9/18/19 1/19/22  Ruthy MENEZES NP   sertraline (ZOLOFT) 50 mg tablet Take 1 Tablet by mouth daily. 7/1/19 1/19/22  Provider, Historical         The following sections were reviewed & updated as appropriate: PMH, PSH, FH, and SH. Review of Systems   Constitutional: Negative for activity change, appetite change, chills, fatigue and fever. Respiratory: Negative for chest tightness and shortness of breath. Cardiovascular: Negative for chest pain. Gastrointestinal: Positive for abdominal pain (intermittent). Neurological: Negative for dizziness and headaches. Objective:     Visit Vitals  /76 (BP 1 Location: Right upper arm, BP Patient Position: Sitting, BP Cuff Size: Adult)   Pulse 77   Temp 97.4 °F (36.3 °C) (Temporal)   Resp 20   Ht 5' 5\" (1.651 m)   Wt 149 lb (67.6 kg)   SpO2 98%   BMI 24.79 kg/m²      Physical Exam  Constitutional:       General: She is not in acute distress. Appearance: She is well-developed. HENT:      Head: Normocephalic and atraumatic. Neck:      Vascular: No carotid bruit. Cardiovascular:      Rate and Rhythm: Normal rate and regular rhythm. Heart sounds: Normal heart sounds. No murmur heard. No friction rub. No gallop. Pulmonary:      Effort: Pulmonary effort is normal.      Breath sounds: Normal breath sounds. No decreased breath sounds, wheezing, rhonchi or rales. Abdominal:      General: Bowel sounds are normal.      Palpations: Abdomen is soft.       Tenderness: There is abdominal tenderness (mild) in the epigastric area. Musculoskeletal:      Cervical back: Normal range of motion and neck supple. Lymphadenopathy:      Cervical: No cervical adenopathy. Skin:     General: Skin is warm and dry. Neurological:      Mental Status: She is alert and oriented to person, place, and time. Disclaimer: The patient understands our medical plan. Alternatives have been explained and offered. The risks, benefits and significant side effects of all medications have been reviewed. Anticipated time course and progression of condition reviewed. All questions have been addressed. She is encouraged to employ the information provided in the after visit summary, which was reviewed. Where applicable, she is instructed to call the clinic if she has not been notified either by phone or through [address] with the results of her tests or with an appointment plan for any referrals within 1 week(s). No news is not good news; it's no news. The patient  is to call if her condition worsens or fails to improve or if significant side effects are experienced. Aspects of this note may have been generated using voice recognition software. Despite editing, there may be unrecognized errors.        Inna Wolf, ESTELA

## 2022-01-19 NOTE — PATIENT INSTRUCTIONS
Abdominal Pain: Care Instructions  Your Care Instructions     Abdominal pain has many possible causes. Some aren't serious and get better on their own in a few days. Others need more testing and treatment. If your pain continues or gets worse, you need to be rechecked and may need more tests to find out what is wrong. You may need surgery to correct the problem. Don't ignore new symptoms, such as fever, nausea and vomiting, urination problems, pain that gets worse, and dizziness. These may be signs of a more serious problem. Your doctor may have recommended a follow-up visit in the next 8 to 12 hours. If you are not getting better, you may need more tests or treatment. The doctor has checked you carefully, but problems can develop later. If you notice any problems or new symptoms, get medical treatment right away. Follow-up care is a key part of your treatment and safety. Be sure to make and go to all appointments, and call your doctor if you are having problems. It's also a good idea to know your test results and keep a list of the medicines you take. How can you care for yourself at home? · Rest until you feel better. · To prevent dehydration, drink plenty of fluids. Choose water and other clear liquids until you feel better. If you have kidney, heart, or liver disease and have to limit fluids, talk with your doctor before you increase the amount of fluids you drink. · If your stomach is upset, eat mild foods, such as rice, dry toast or crackers, bananas, and applesauce. Try eating several small meals instead of two or three large ones. · Wait until 48 hours after all symptoms have gone away before you have spicy foods, alcohol, and drinks that contain caffeine. · Do not eat foods that are high in fat. · Avoid anti-inflammatory medicines such as aspirin, ibuprofen (Advil, Motrin), and naproxen (Aleve). These can cause stomach upset.  Talk to your doctor if you take daily aspirin for another health problem. When should you call for help? Call 911 anytime you think you may need emergency care. For example, call if:    · You passed out (lost consciousness).     · You pass maroon or very bloody stools.     · You vomit blood or what looks like coffee grounds.     · You have new, severe belly pain. Call your doctor now or seek immediate medical care if:    · Your pain gets worse, especially if it becomes focused in one area of your belly.     · You have a new or higher fever.     · Your stools are black and look like tar, or they have streaks of blood.     · You have unexpected vaginal bleeding.     · You have symptoms of a urinary tract infection. These may include:  ? Pain when you urinate. ? Urinating more often than usual.  ? Blood in your urine.     · You are dizzy or lightheaded, or you feel like you may faint. Watch closely for changes in your health, and be sure to contact your doctor if:    · You are not getting better after 1 day (24 hours). Where can you learn more? Go to http://www.gray.com/  Enter F218 in the search box to learn more about \"Abdominal Pain: Care Instructions. \"  Current as of: July 1, 2021               Content Version: 13.0  © 4828-0701 [employer]. Care instructions adapted under license by [employer] (which disclaims liability or warranty for this information). If you have questions about a medical condition or this instruction, always ask your healthcare professional. [place] [address] any warranty or liability for your use of this information.

## 2022-03-17 RX ORDER — LIDOCAINE 50 MG/G
PATCH TOPICAL
Qty: 15 PATCH | Refills: 0 | Status: SHIPPED | OUTPATIENT
Start: 2022-03-17

## 2022-03-19 PROBLEM — F17.200 TOBACCO USE DISORDER: Status: ACTIVE | Noted: 2017-02-21

## 2022-03-19 PROBLEM — M25.511 RIGHT SHOULDER PAIN: Status: ACTIVE | Noted: 2017-02-21

## 2022-03-19 PROBLEM — M79.18 MYOFASCIAL PAIN: Status: ACTIVE | Noted: 2017-02-21

## 2022-03-19 PROBLEM — M47.22 OSTEOARTHRITIS OF SPINE WITH RADICULOPATHY, CERVICAL REGION: Status: ACTIVE | Noted: 2017-02-21

## 2022-03-19 PROBLEM — M62.838 MUSCLE SPASM: Status: ACTIVE | Noted: 2017-04-04

## 2022-03-19 PROBLEM — M50.30 DDD (DEGENERATIVE DISC DISEASE), CERVICAL: Status: ACTIVE | Noted: 2017-02-21

## 2022-04-21 ENCOUNTER — TELEPHONE (OUTPATIENT)
Dept: MAMMOGRAPHY | Age: 67
End: 2022-04-21

## 2022-05-03 ENCOUNTER — HOSPITAL ENCOUNTER (EMERGENCY)
Age: 67
Discharge: HOME | End: 2022-05-03
Attending: STUDENT IN AN ORGANIZED HEALTH CARE EDUCATION/TRAINING PROGRAM
Payer: MEDICAID

## 2022-05-03 VITALS
RESPIRATION RATE: 18 BRPM | HEIGHT: 65 IN | OXYGEN SATURATION: 99 % | WEIGHT: 145 LBS | TEMPERATURE: 98.6 F | HEART RATE: 78 BPM | DIASTOLIC BLOOD PRESSURE: 100 MMHG | BODY MASS INDEX: 24.16 KG/M2 | SYSTOLIC BLOOD PRESSURE: 169 MMHG

## 2022-05-03 DIAGNOSIS — L30.9 DERMATITIS: Primary | ICD-10-CM

## 2022-05-03 PROCEDURE — 99283 EMERGENCY DEPT VISIT LOW MDM: CPT

## 2022-05-03 RX ORDER — MUPIROCIN 20 MG/G
OINTMENT TOPICAL DAILY
Qty: 22 G | Refills: 0 | Status: SHIPPED | OUTPATIENT
Start: 2022-05-03

## 2022-05-03 RX ORDER — TRIAMCINOLONE ACETONIDE 1 MG/G
OINTMENT TOPICAL 2 TIMES DAILY
Qty: 60 G | Refills: 0 | Status: SHIPPED | OUTPATIENT
Start: 2022-05-03

## 2022-05-03 NOTE — ED TRIAGE NOTES
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 Pt now has skin irritation to both hands. Pt has been putting Vaseline, neosporin and a burn cream she got from the pharmacy but it hasn't helped.

## 2022-05-03 NOTE — ED PROVIDER NOTES
[facility] EMERGENCY DEPT    Date: 5/3/2022  Patient Name: Anusha Hinojosa    History of Presenting Illness     Chief Complaint   Patient presents with    Skin Problem     61 y.o. female presents to the ED complaining of a rash to her bilateral hands for the past week. Patient states she wore latex gloves, which she is allergic to, has since broke out on her hands. She notes having persistent itching, does have 2 small open sites which she has been applying antibiotic ointment. She denies any throat swelling, shortness of breath, redness, warmth, swelling, or other symptoms. Patient denies any other associated signs or symptoms. Patient denies any other complaints. Nursing notes regarding the HPI and triage nursing notes were reviewed. Prior medical records were reviewed. Current Outpatient Medications   Medication Sig Dispense Refill    triamcinolone acetonide (KENALOG) 0.1 % ointment Apply  to affected area two (2) times a day. use thin layer 60 g 0    mupirocin (BACTROBAN) 2 % ointment Apply  to affected area daily. Apply to any open areas. 22 g 0    lidocaine (LIDODERM) 5 % USE 1  PATCH TOPICALLY TO AFFECTED AREA EVERY 12 HOURS AND  REMOVE  FOR  12  HOURS  A  DAY 15 Patch 0    pravastatin (PRAVACHOL) 40 mg tablet Take 1 tablet by mouth nightly 90 Tablet 2    nystatin (MYCOSTATIN) powder Apply  to affected area four (4) times daily. 30 g 1    omeprazole (PRILOSEC) 20 mg capsule Take 1 Capsule by mouth Daily (before breakfast). 90 Capsule 1    metFORMIN ER (GLUCOPHAGE XR) 500 mg tablet Take 1 Tablet by mouth daily (with dinner). 90 Tablet 1    diclofenac EC (VOLTAREN) 75 mg EC tablet Take 1 tab by mouth twice daily as needed. 180 Tablet 1    methocarbamoL (ROBAXIN) 750 mg tablet Take 1 tab by mouth 4 times daily as needed. 120 Tablet 5    gabapentin (NEURONTIN) 300 mg capsule TAKE 1 CAPSULE BY MOUTH THREE TIMES DAILY .  DO NOT EXCEED 3 PER 24 HOURS FOR  NEUROPATHIC  PAIN 270 Capsule 1    fluticasone propionate (FLONASE) 50 mcg/actuation nasal spray 2 Sprays by Both Nostrils route daily as needed for Rhinitis. 16 g 2    diclofenac (Voltaren) 1 % gel Apply 4 grams to right shoulder up to 4 times per day, maximum 16 grams per joint per day. Dispense 5 100 gram tubes 500 g 1    Blood-Glucose Meter (TRUE METRIX GLUCOSE METER) misc Use to check blood sugar 2 times per day. 1 Each 0    glucose blood VI test strips (TRUE METRIX GLUCOSE TEST STRIP) strip Use to check blood sugar 2 times per day. 100 Strip 11    lancets misc Use to check blood sugar 2 times daily 1 Each 11    Lancets misc Check glucose ACHS 200 Each 11    alcohol swabs padm 1 Each by Apply Externally route Before breakfast, lunch, dinner and at bedtime. accucheck ACHS and record in log. Take log to every doctor visit. **MEDICALLY NECESSARY** 100 Pad 2    traZODone (DESYREL) 150 mg tablet Take 150-300 mg by mouth nightly.  sertraline (ZOLOFT) 100 mg tablet Take 200 mg by mouth daily.  perphenazine (TRILAFON) 4 mg tablet Take 4 mg by mouth three (3) times daily.  benztropine (COGENTIN) 1 mg tablet Take 1 mg by mouth daily. Past History     Past Medical History:  Past Medical History:   Diagnosis Date    CAD (coronary artery disease)     Hypertension     Uterine fibroid        Past Surgical History:  No past surgical history on file.     Family History:  Family History   Problem Relation Age of Onset    Diabetes Mother     Stroke Mother     Diabetes Father     Cancer Paternal Grandfather     Breast Cancer Cousin        Social History:  Social History     Tobacco Use    Smoking status: Current Every Day Smoker     Packs/day: 0.25     Years: 2.00     Pack years: 0.50    Smokeless tobacco: Never Used   Substance Use Topics    Alcohol use: No     Alcohol/week: 0.8 standard drinks     Types: 1 Cans of beer per week    Drug use: Yes     Types: Marijuana     Comment: rarely Allergies: Allergies   Allergen Reactions    Latex Rash    Penicillins Nausea and Vomiting    Penicillin Unknown (comments)       Patient's primary care provider (as noted in EPIC):  Celine Bland NP    Review of Systems   Constitutional:  Denies malaise, fever, chills. Extremity/MS:  Denies injury or pain. Neuro:  Denies headache, LOC, dizziness, neurologic symptoms/deficits/paresthesias. Skin: + rash to bilateral hands. All other systems negative as reviewed. Visit Vitals  BP (!) 169/100 (BP 1 Location: Left upper arm, BP Patient Position: At rest)   Pulse 78   Temp 98.6 °F (37 °C)   Resp 18   Ht 5' 5\" (1.651 m)   Wt 65.8 kg (145 lb)   SpO2 99%   BMI 24.13 kg/m²       PHYSICAL EXAM:    CONSTITUTIONAL:  Alert, in no apparent distress;  well developed;  well nourished. HEAD:  Normocephalic, atraumatic. EYES:  EOMI. Non-icteric sclera. Normal conjunctiva. ENTM:  Nose:  no rhinorrhea. Throat:  no erythema or exudate, mucous membranes moist.  NECK: Supple  RESPIRATORY:  Chest clear, equal breath sounds, good air movement. Without wheezes, rhonchi or rales. CARDIOVASCULAR:  Regular rate and rhythm. No murmurs, rubs, or gallops. GI:  Normal bowel sounds, abdomen soft and non-tender. No rebound or guarding. BACK:  Non-tender. UPPER EXT:  Normal inspection. LOWER EXT:  No edema, no calf tenderness. Distal pulses intact. NEURO:  Moves all four extremities, and grossly normal motor exam.  SKIN: Bilateral hands with mild hyperpigmentation and scaling/plaques noted, 2 pinpoint regions of clear fluid drainage. Without surrounding erythema, edema, warmth. PSYCH:  Alert and normal affect. IMPRESSION AND MEDICAL DECISION MAKING:  Patient has a rash to her bilateral hands, she has hyperpigmented plaques and scaling noted mainly to the dorsum of her hands. Patient was prescribed triamcinolone and mupirocin to apply to any open area.   She will follow-up with her primary care doctor, use CeraVe, return for any acute worsening. Diagnosis:   1. Dermatitis      Disposition: Discharge    Follow-up Information     Follow up With Specialties Details Why Contact Info    Chris Santillan NP Nurse Practitioner In 3 days  [address]  [address]  [address]  [phone]      [facility] EMERGENCY DEPT Emergency Medicine  If symptoms worsen [address] [facility] [address]  [phone]          Discharge Medication List as of 5/3/2022  8:42 AM      START taking these medications    Details   triamcinolone acetonide (KENALOG) 0.1 % ointment Apply  to affected area two (2) times a day. use thin layer, Normal, Disp-60 g, R-0      mupirocin (BACTROBAN) 2 % ointment Apply  to affected area daily. Apply to any open areas., Normal, Disp-22 g, R-0         CONTINUE these medications which have NOT CHANGED    Details   lidocaine (LIDODERM) 5 % USE 1  PATCH TOPICALLY TO AFFECTED AREA EVERY 12 HOURS AND  REMOVE  FOR  12  HOURS  A  DAY, Normal, Disp-15 Patch, R-0      pravastatin (PRAVACHOL) 40 mg tablet Take 1 tablet by mouth nightly, Normal, Disp-90 Tablet, R-2      nystatin (MYCOSTATIN) powder Apply  to affected area four (4) times daily. , Normal, Disp-30 g, R-1      omeprazole (PRILOSEC) 20 mg capsule Take 1 Capsule by mouth Daily (before breakfast). , Normal, Disp-90 Capsule, R-1      metFORMIN ER (GLUCOPHAGE XR) 500 mg tablet Take 1 Tablet by mouth daily (with dinner). , Normal, Disp-90 Tablet, R-1      diclofenac EC (VOLTAREN) 75 mg EC tablet Take 1 tab by mouth twice daily as needed., Normal, Disp-180 Tablet, R-1      methocarbamoL (ROBAXIN) 750 mg tablet Take 1 tab by mouth 4 times daily as needed., Normal, Disp-120 Tablet, R-5      gabapentin (NEURONTIN) 300 mg capsule TAKE 1 CAPSULE BY MOUTH THREE TIMES DAILY .  DO NOT EXCEED 3 PER 24 HOURS FOR  NEUROPATHIC  PAIN, Normal, Disp-270 Capsule, R-1      fluticasone propionate (FLONASE) 50 mcg/actuation nasal spray 2 Sprays by Both Nostrils route daily as needed for Rhinitis., Normal, Disp-16 g, R-2      diclofenac (Voltaren) 1 % gel Apply 4 grams to right shoulder up to 4 times per day, maximum 16 grams per joint per day. Dispense 5 100 gram tubes, Normal, Disp-500 g,R-1      Blood-Glucose Meter (TRUE METRIX GLUCOSE METER) misc Use to check blood sugar 2 times per day., Normal, Disp-1 Each, R-0      glucose blood VI test strips (TRUE METRIX GLUCOSE TEST STRIP) strip Use to check blood sugar 2 times per day., Normal, Disp-100 Strip, R-11      !! lancets misc Use to check blood sugar 2 times daily, Normal, Disp-1 Each, R-11      !! Lancets misc Check glucose ACHS, Normal, Disp-200 Each, R-11      alcohol swabs padm 1 Each by Apply Externally route Before breakfast, lunch, dinner and at bedtime. accucheck ACHS and record in log. Take log to every doctor visit. **MEDICALLY NECESSARY**, Print, Disp-100 Pad, R-2      traZODone (DESYREL) 150 mg tablet Take 150-300 mg by mouth nightly., Historical Med      sertraline (ZOLOFT) 100 mg tablet Take 200 mg by mouth daily. , Historical Med      perphenazine (TRILAFON) 4 mg tablet Take 4 mg by mouth three (3) times daily. , Historical Med      benztropine (COGENTIN) 1 mg tablet Take 1 mg by mouth daily. , Historical Med       !! - Potential duplicate medications found. Please discuss with provider.         LEIDA Draper Cera

## 2022-05-26 ENCOUNTER — HOSPITAL ENCOUNTER (EMERGENCY)
Age: 67
Discharge: HOME | End: 2022-05-26
Attending: STUDENT IN AN ORGANIZED HEALTH CARE EDUCATION/TRAINING PROGRAM
Payer: MEDICAID

## 2022-05-26 VITALS
BODY MASS INDEX: 22.47 KG/M2 | RESPIRATION RATE: 19 BRPM | TEMPERATURE: 99 F | WEIGHT: 135 LBS | DIASTOLIC BLOOD PRESSURE: 75 MMHG | HEART RATE: 80 BPM | OXYGEN SATURATION: 98 % | SYSTOLIC BLOOD PRESSURE: 131 MMHG

## 2022-05-26 DIAGNOSIS — R11.2 NAUSEA AND VOMITING, UNSPECIFIED VOMITING TYPE: Primary | ICD-10-CM

## 2022-05-26 LAB
ALBUMIN SERPL-MCNC: 4 G/DL (ref 3.4–5)
ALBUMIN/GLOB SERPL: 0.9 {RATIO} (ref 0.8–1.7)
ALP SERPL-CCNC: 66 U/L (ref 45–117)
ALT SERPL-CCNC: 97 U/L (ref 13–56)
ANION GAP SERPL CALC-SCNC: 6 MMOL/L (ref 3–18)
APPEARANCE UR: CLEAR
AST SERPL-CCNC: 65 U/L (ref 10–38)
ATRIAL RATE: 82 BPM
BACTERIA URNS QL MICRO: ABNORMAL /HPF
BASOPHILS # BLD: 0.1 K/UL (ref 0–0.1)
BASOPHILS NFR BLD: 0 % (ref 0–2)
BILIRUB SERPL-MCNC: 0.4 MG/DL (ref 0.2–1)
BILIRUB UR QL: ABNORMAL
BUN SERPL-MCNC: 15 MG/DL (ref 7–18)
BUN/CREAT SERPL: 16 (ref 12–20)
CALCIUM SERPL-MCNC: 10 MG/DL (ref 8.5–10.1)
CALCULATED P AXIS, ECG09: 55 DEGREES
CALCULATED R AXIS, ECG10: 71 DEGREES
CALCULATED T AXIS, ECG11: 38 DEGREES
CHLORIDE SERPL-SCNC: 105 MMOL/L (ref 100–111)
CO2 SERPL-SCNC: 29 MMOL/L (ref 21–32)
COLOR UR: ABNORMAL
CREAT SERPL-MCNC: 0.92 MG/DL (ref 0.6–1.3)
DIAGNOSIS, 93000: NORMAL
DIFFERENTIAL METHOD BLD: ABNORMAL
EOSINOPHIL # BLD: 1 K/UL (ref 0–0.4)
EOSINOPHIL NFR BLD: 6 % (ref 0–5)
EPITH CASTS URNS QL MICRO: ABNORMAL /LPF (ref 0–5)
ERYTHROCYTE [DISTWIDTH] IN BLOOD BY AUTOMATED COUNT: 14.8 % (ref 11.6–14.5)
GLOBULIN SER CALC-MCNC: 4.3 G/DL (ref 2–4)
GLUCOSE SERPL-MCNC: 102 MG/DL (ref 74–99)
GLUCOSE UR STRIP.AUTO-MCNC: NEGATIVE MG/DL
HCT VFR BLD AUTO: 37.6 % (ref 35–45)
HGB BLD-MCNC: 12.1 G/DL (ref 12–16)
HGB UR QL STRIP: NEGATIVE
IMM GRANULOCYTES # BLD AUTO: 0.1 K/UL (ref 0–0.04)
IMM GRANULOCYTES NFR BLD AUTO: 0 % (ref 0–0.5)
KETONES UR QL STRIP.AUTO: 15 MG/DL
LEUKOCYTE ESTERASE UR QL STRIP.AUTO: ABNORMAL
LYMPHOCYTES # BLD: 1.6 K/UL (ref 0.9–3.6)
LYMPHOCYTES NFR BLD: 10 % (ref 21–52)
MCH RBC QN AUTO: 29.7 PG (ref 24–34)
MCHC RBC AUTO-ENTMCNC: 32.2 G/DL (ref 31–37)
MCV RBC AUTO: 92.2 FL (ref 78–100)
MONOCYTES # BLD: 0.8 K/UL (ref 0.05–1.2)
MONOCYTES NFR BLD: 5 % (ref 3–10)
MUCOUS THREADS URNS QL MICRO: ABNORMAL /LPF
NEUTS SEG # BLD: 12.1 K/UL (ref 1.8–8)
NEUTS SEG NFR BLD: 78 % (ref 40–73)
NITRITE UR QL STRIP.AUTO: NEGATIVE
NRBC # BLD: 0 K/UL (ref 0–0.01)
NRBC BLD-RTO: 0 PER 100 WBC
P-R INTERVAL, ECG05: 136 MS
PH UR STRIP: 5 [PH] (ref 5–8)
PLATELET # BLD AUTO: 391 K/UL (ref 135–420)
PMV BLD AUTO: 10.8 FL (ref 9.2–11.8)
POTASSIUM SERPL-SCNC: 3.4 MMOL/L (ref 3.5–5.5)
PROT SERPL-MCNC: 8.3 G/DL (ref 6.4–8.2)
PROT UR STRIP-MCNC: 30 MG/DL
Q-T INTERVAL, ECG07: 386 MS
QRS DURATION, ECG06: 90 MS
QTC CALCULATION (BEZET), ECG08: 450 MS
RBC # BLD AUTO: 4.08 M/UL (ref 4.2–5.3)
RBC #/AREA URNS HPF: ABNORMAL /HPF (ref 0–5)
SODIUM SERPL-SCNC: 140 MMOL/L (ref 136–145)
SP GR UR REFRACTOMETRY: >1.03 (ref 1–1.03)
UROBILINOGEN UR QL STRIP.AUTO: 1 EU/DL (ref 0.2–1)
VENTRICULAR RATE, ECG03: 82 BPM
WBC # BLD AUTO: 15.6 K/UL (ref 4.6–13.2)
WBC URNS QL MICRO: ABNORMAL /HPF (ref 0–4)

## 2022-05-26 PROCEDURE — 93005 ELECTROCARDIOGRAM TRACING: CPT

## 2022-05-26 PROCEDURE — 81001 URINALYSIS AUTO W/SCOPE: CPT

## 2022-05-26 PROCEDURE — 80053 COMPREHEN METABOLIC PANEL: CPT

## 2022-05-26 PROCEDURE — 99284 EMERGENCY DEPT VISIT MOD MDM: CPT

## 2022-05-26 PROCEDURE — 85025 COMPLETE CBC W/AUTO DIFF WBC: CPT

## 2022-05-26 RX ORDER — ONDANSETRON 4 MG/1
4 TABLET, ORALLY DISINTEGRATING ORAL
Qty: 12 TABLET | Refills: 0 | Status: SHIPPED | OUTPATIENT
Start: 2022-05-26

## 2022-05-26 NOTE — ED NOTES
Patient laying on stretcher alert and oriented x3, no signs of distress noted. Will continue to monitor.

## 2022-05-26 NOTE — ED PROVIDER NOTES
Patient is a 79-year-old female with history of coronary artery disease, diet-controlled diabetes, and hypertension. Patient presents today with primary complaint of 2 weeks of intermittent nonbilious/nonbloody emesis that is associated with her taking a new formulation of her methocarbamol. Patient states 2 weeks ago she refilled her prescription for methocarbamol and states it was missing the enteric coating and ever since then whenever she takes that medication she becomes nauseous shortly thereafter. Patient denies any associated diarrhea, fever/chills, cough, nasal congestion, and states she has been in her normal state of health and eating/drinking well. Patient denies any active complaints, denies any current nausea or pain. Past Medical History:   Diagnosis Date    CAD (coronary artery disease)     Hypertension     Uterine fibroid        History reviewed. No pertinent surgical history.       Family History:   Problem Relation Age of Onset    Diabetes Mother     Stroke Mother     Diabetes Father     Cancer Paternal Grandfather     Breast Cancer Cousin        Social History     Socioeconomic History    Marital status: SINGLE     Spouse name: Not on file    Number of children: Not on file    Years of education: Not on file    Highest education level: Not on file   Occupational History    Not on file   Tobacco Use    Smoking status: Current Every Day Smoker     Packs/day: 0.25     Years: 2.00     Pack years: 0.50    Smokeless tobacco: Never Used   Substance and Sexual Activity    Alcohol use: No     Alcohol/week: 0.8 standard drinks     Types: 1 Cans of beer per week    Drug use: Yes     Types: Marijuana     Comment: rarely     Sexual activity: Not on file   Other Topics Concern    Not on file   Social History Narrative    Not on file     Social Determinants of Health     Financial Resource Strain:     Difficulty of Paying Living Expenses: Not on file   Food Insecurity:     Worried About [facility] in the Last Year: Not on file    Kim of Food in the Last Year: Not on file   Transportation Needs:     Lack of Transportation (Medical): Not on file    Lack of Transportation (Non-Medical): Not on file   Physical Activity:     Days of Exercise per Week: Not on file    Minutes of Exercise per Session: Not on file   Stress:     Feeling of Stress : Not on file   Social Connections:     Frequency of Communication with Friends and Family: Not on file    Frequency of Social Gatherings with Friends and Family: Not on file    Attends [employer] Services: Not on file    Active Member of [address] [place] [address] or Organizations: Not on file    Attends Club or Organization Meetings: Not on file    Marital Status: Not on file   Intimate Partner Violence:     Fear of Current or Ex-Partner: Not on file    Emotionally Abused: Not on file    Physically Abused: Not on file    Sexually Abused: Not on file   Housing Stability:     Unable to Pay for Housing in the Last Year: Not on file    Number of [place] in the Last Year: Not on file    Unstable Housing in the Last Year: Not on file         ALLERGIES: Latex, Penicillins, and Penicillin    Review of Systems   Constitutional: Negative for chills and fever. HENT: Negative for rhinorrhea and sore throat. Eyes: Negative for discharge and redness. Respiratory: Negative for cough and shortness of breath. Cardiovascular: Negative for chest pain and leg swelling. Gastrointestinal: Positive for nausea and vomiting. Negative for abdominal pain and diarrhea. Genitourinary: Negative for difficulty urinating and dysuria. Musculoskeletal: Negative for back pain and neck pain. Skin: Negative for rash and wound. Neurological: Negative for syncope, light-headedness and headaches.        Vitals:    05/26/22 0704   BP: (!) 146/85   Pulse: 94   Resp: 17   Temp: 99 °F (37.2 °C)   SpO2: 96%   Weight: 61.2 kg (135 lb)            Physical Exam  Constitutional:       General: She is not in acute distress. Appearance: She is not ill-appearing, toxic-appearing or diaphoretic. HENT:      Head: Normocephalic and atraumatic. Right Ear: External ear normal.      Left Ear: External ear normal.      Nose: No congestion or rhinorrhea. Mouth/Throat:      Mouth: Mucous membranes are moist.      Pharynx: No oropharyngeal exudate or posterior oropharyngeal erythema. Eyes:      General:         Right eye: No discharge. Left eye: No discharge. Pupils: Pupils are equal, round, and reactive to light. Cardiovascular:      Rate and Rhythm: Normal rate and regular rhythm. Heart sounds: No murmur heard. No friction rub. No gallop. Pulmonary:      Effort: Pulmonary effort is normal. No respiratory distress. Breath sounds: No stridor. No wheezing, rhonchi or rales. Chest:      Chest wall: No tenderness. Abdominal:      General: Abdomen is flat. There is no distension. Tenderness: There is no abdominal tenderness. There is no right CVA tenderness, left CVA tenderness, guarding or rebound. Musculoskeletal:         General: No swelling, tenderness, deformity or signs of injury. Cervical back: No rigidity or tenderness. Right lower leg: No edema. Left lower leg: No edema. Lymphadenopathy:      Cervical: No cervical adenopathy. Skin:     General: Skin is warm. Capillary Refill: Capillary refill takes less than 2 seconds. Coloration: Skin is not jaundiced or pale. Findings: No bruising, erythema, lesion or rash. Neurological:      General: No focal deficit present. Mental Status: She is alert and oriented to person, place, and time. Sensory: No sensory deficit. Motor: No weakness.    Psychiatric:         Mood and Affect: Mood normal.          MDM  Number of Diagnoses or Management Options  Diagnosis management comments: Patient presents with a primary complaint of nausea/vomiting that is associated with taking a new formulation of her methocarbamol, I suspect that the lack of enteric coating is resulting in increased gastric irritation resulting in patient's nausea but will also evaluate for other potential causes including significant electrolyte abnormality, metabolic derangement, biliary pathology, and ACS.        Amount and/or Complexity of Data Reviewed  Clinical lab tests: reviewed           Procedures

## 2022-05-26 NOTE — DISCHARGE INSTRUCTIONS
Please contact your primary care doctor soon as possible arrange follow-up with x-ray to 7 days. At this appointment please discuss receiving a new prescription for your muscle relaxer 1 that has an enteric coating to help you be able to tolerate it better. If you develop any sudden change in your symptoms including inability to tolerate any food/drink, sudden/severe pain, fever/chills, or any other sudden/severe change in your condition please return immediately to the emerged part further evaluation and treatment.

## 2022-05-27 ENCOUNTER — TELEPHONE (OUTPATIENT)
Dept: FAMILY MEDICINE CLINIC | Age: 67
End: 2022-05-27

## 2022-05-27 NOTE — TELEPHONE ENCOUNTER
Patient called stating she went to the emergency room for nausea and vomiting and the medication that was prescribe is making her sicker and requesting if provider can prescribe something else advised pt she needs to schedule hosp f/u.  Thank you

## 2022-05-31 ENCOUNTER — TELEPHONE (OUTPATIENT)
Dept: ORTHOPEDIC SURGERY | Age: 67
End: 2022-05-31

## 2022-05-31 NOTE — TELEPHONE ENCOUNTER
Jay Christy MD  o Nurses 1 hour ago (10:15 AM)     TJ    Has this happened previously?  I do not know which pills are coated -- Do the pills look different?  thanks    Message text

## 2022-05-31 NOTE — TELEPHONE ENCOUNTER
Patient called and stated she recently went to the ER due to her vomiting and not feeling good from the medication (methacrbamol/ ROBAXIN). She stated she cannot take uncoated pills and needs them to be coated. She is requesting Dr. Dennise Cabrera prescribe her something different asap. Please advise.     Patient's contact# [phone]

## 2022-05-31 NOTE — TELEPHONE ENCOUNTER
Attempted to contact the pt about her message. She was not able to be reached. A message was left for the pt asking her to contact the office about her message. The pt will need to call her pharmacy and see if they can tell her any muscle relaxers that may be \"coated\" or easier on the stomach. The provider is not able to determine this information at this time.

## 2022-05-31 NOTE — TELEPHONE ENCOUNTER
Called patient in regards to her message on 5/27/22 and to see if she currently still has symptoms. Left patient a voice message to contact the office at her earliest convenience.

## 2022-06-01 ENCOUNTER — HOSPITAL ENCOUNTER (EMERGENCY)
Age: 67
Discharge: HOME | End: 2022-06-01
Attending: STUDENT IN AN ORGANIZED HEALTH CARE EDUCATION/TRAINING PROGRAM
Payer: MEDICAID

## 2022-06-01 ENCOUNTER — APPOINTMENT (OUTPATIENT)
Dept: CT IMAGING | Age: 67
End: 2022-06-01
Attending: PHYSICIAN ASSISTANT
Payer: MEDICAID

## 2022-06-01 VITALS
RESPIRATION RATE: 18 BRPM | HEIGHT: 64 IN | BODY MASS INDEX: 23.05 KG/M2 | HEART RATE: 89 BPM | WEIGHT: 135 LBS | OXYGEN SATURATION: 98 % | TEMPERATURE: 98.3 F | DIASTOLIC BLOOD PRESSURE: 75 MMHG | SYSTOLIC BLOOD PRESSURE: 125 MMHG

## 2022-06-01 DIAGNOSIS — K31.84 GASTROPARESIS: Primary | ICD-10-CM

## 2022-06-01 DIAGNOSIS — R11.2 NAUSEA AND VOMITING, UNSPECIFIED VOMITING TYPE: ICD-10-CM

## 2022-06-01 LAB
ALBUMIN SERPL-MCNC: 3.8 G/DL (ref 3.4–5)
ALBUMIN/GLOB SERPL: 0.9 {RATIO} (ref 0.8–1.7)
ALP SERPL-CCNC: 57 U/L (ref 45–117)
ALT SERPL-CCNC: 84 U/L (ref 13–56)
ANION GAP SERPL CALC-SCNC: 2 MMOL/L (ref 3–18)
AST SERPL-CCNC: 37 U/L (ref 10–38)
BASOPHILS # BLD: 0.1 K/UL (ref 0–0.1)
BASOPHILS NFR BLD: 1 % (ref 0–2)
BILIRUB SERPL-MCNC: 0.2 MG/DL (ref 0.2–1)
BUN SERPL-MCNC: 18 MG/DL (ref 7–18)
BUN/CREAT SERPL: 16 (ref 12–20)
CALCIUM SERPL-MCNC: 9.7 MG/DL (ref 8.5–10.1)
CHLORIDE SERPL-SCNC: 105 MMOL/L (ref 100–111)
CO2 SERPL-SCNC: 33 MMOL/L (ref 21–32)
CREAT SERPL-MCNC: 1.1 MG/DL (ref 0.6–1.3)
DIFFERENTIAL METHOD BLD: ABNORMAL
EOSINOPHIL # BLD: 0.6 K/UL (ref 0–0.4)
EOSINOPHIL NFR BLD: 6 % (ref 0–5)
ERYTHROCYTE [DISTWIDTH] IN BLOOD BY AUTOMATED COUNT: 14.5 % (ref 11.6–14.5)
GLOBULIN SER CALC-MCNC: 4.4 G/DL (ref 2–4)
GLUCOSE SERPL-MCNC: 102 MG/DL (ref 74–99)
HCT VFR BLD AUTO: 37.9 % (ref 35–45)
HGB BLD-MCNC: 12.1 G/DL (ref 12–16)
IMM GRANULOCYTES # BLD AUTO: 0 K/UL (ref 0–0.04)
IMM GRANULOCYTES NFR BLD AUTO: 0 % (ref 0–0.5)
LIPASE SERPL-CCNC: 370 U/L (ref 73–393)
LYMPHOCYTES # BLD: 3 K/UL (ref 0.9–3.6)
LYMPHOCYTES NFR BLD: 28 % (ref 21–52)
MCH RBC QN AUTO: 29.7 PG (ref 24–34)
MCHC RBC AUTO-ENTMCNC: 31.9 G/DL (ref 31–37)
MCV RBC AUTO: 92.9 FL (ref 78–100)
MONOCYTES # BLD: 0.5 K/UL (ref 0.05–1.2)
MONOCYTES NFR BLD: 5 % (ref 3–10)
NEUTS SEG # BLD: 6.5 K/UL (ref 1.8–8)
NEUTS SEG NFR BLD: 60 % (ref 40–73)
NRBC # BLD: 0 K/UL (ref 0–0.01)
NRBC BLD-RTO: 0 PER 100 WBC
PLATELET # BLD AUTO: 397 K/UL (ref 135–420)
PMV BLD AUTO: 10.6 FL (ref 9.2–11.8)
POTASSIUM SERPL-SCNC: 4 MMOL/L (ref 3.5–5.5)
PROT SERPL-MCNC: 8.2 G/DL (ref 6.4–8.2)
RBC # BLD AUTO: 4.08 M/UL (ref 4.2–5.3)
SODIUM SERPL-SCNC: 140 MMOL/L (ref 136–145)
WBC # BLD AUTO: 10.8 K/UL (ref 4.6–13.2)

## 2022-06-01 PROCEDURE — 74011250636 HC RX REV CODE- 250/636: Performed by: PHYSICIAN ASSISTANT

## 2022-06-01 PROCEDURE — 96375 TX/PRO/DX INJ NEW DRUG ADDON: CPT

## 2022-06-01 PROCEDURE — 85025 COMPLETE CBC W/AUTO DIFF WBC: CPT

## 2022-06-01 PROCEDURE — 83690 ASSAY OF LIPASE: CPT

## 2022-06-01 PROCEDURE — 80053 COMPREHEN METABOLIC PANEL: CPT

## 2022-06-01 PROCEDURE — 74011000636 HC RX REV CODE- 636: Performed by: STUDENT IN AN ORGANIZED HEALTH CARE EDUCATION/TRAINING PROGRAM

## 2022-06-01 PROCEDURE — 96374 THER/PROPH/DIAG INJ IV PUSH: CPT

## 2022-06-01 PROCEDURE — 99285 EMERGENCY DEPT VISIT HI MDM: CPT

## 2022-06-01 PROCEDURE — 93005 ELECTROCARDIOGRAM TRACING: CPT

## 2022-06-01 PROCEDURE — 74177 CT ABD & PELVIS W/CONTRAST: CPT

## 2022-06-01 RX ORDER — METOCLOPRAMIDE HYDROCHLORIDE 5 MG/ML
10 INJECTION INTRAMUSCULAR; INTRAVENOUS
Status: COMPLETED | OUTPATIENT
Start: 2022-06-01 | End: 2022-06-01

## 2022-06-01 RX ORDER — KETOROLAC TROMETHAMINE 15 MG/ML
15 INJECTION, SOLUTION INTRAMUSCULAR; INTRAVENOUS ONCE
Status: COMPLETED | OUTPATIENT
Start: 2022-06-01 | End: 2022-06-01

## 2022-06-01 RX ORDER — METOCLOPRAMIDE 10 MG/1
10 TABLET ORAL
Qty: 10 TABLET | Refills: 0 | Status: SHIPPED | OUTPATIENT
Start: 2022-06-01 | End: 2022-06-11

## 2022-06-01 RX ORDER — ONDANSETRON 2 MG/ML
4 INJECTION INTRAMUSCULAR; INTRAVENOUS
Status: COMPLETED | OUTPATIENT
Start: 2022-06-01 | End: 2022-06-01

## 2022-06-01 RX ADMIN — KETOROLAC TROMETHAMINE 15 MG: 15 INJECTION, SOLUTION INTRAMUSCULAR; INTRAVENOUS at 11:56

## 2022-06-01 RX ADMIN — METOCLOPRAMIDE 10 MG: 5 INJECTION, SOLUTION INTRAMUSCULAR; INTRAVENOUS at 15:02

## 2022-06-01 RX ADMIN — ONDANSETRON 4 MG: 2 INJECTION INTRAMUSCULAR; INTRAVENOUS at 11:56

## 2022-06-01 RX ADMIN — IOPAMIDOL 100 ML: 612 INJECTION, SOLUTION INTRAVENOUS at 13:34

## 2022-06-01 NOTE — ED TRIAGE NOTES
Pt reports upper abdominal pain since 5/26 along with vomiting every time she eats that seemed to started after she started taking a generic form of robaxin.

## 2022-06-01 NOTE — ED PROVIDER NOTES
[address] [facility],[address]  [facility] EMERGENCY DEPT    Date: 6/1/2022  Patient Name: Ming Loja    History of Presenting Illness     Chief Complaint   Patient presents with    Abdominal Pain     61 y.o. female with a past medical history of CAD, hypertension presents the ED complaining of mid abdominal pain onset 5/26. Patient describes having a constant aching to this region. She is also having intermittent nausea and vomiting, did not eat anything yesterday and has not yet vomited today. She states that her symptoms originally began after taking a generic for Robaxin. She denies any fever, chills, diarrhea or constipation, hematemesis, blood per rectum, other symptoms. Patient denies any other associated signs or symptoms. Patient denies any other complaints. Nursing notes regarding the HPI and triage nursing notes were reviewed. Prior medical records were reviewed. Current Outpatient Medications   Medication Sig Dispense Refill    metoclopramide HCl (Reglan) 10 mg tablet Take 1 Tablet by mouth every six (6) hours as needed for Nausea or Vomiting for up to 10 days. 10 Tablet 0    ondansetron (ZOFRAN ODT) 4 mg disintegrating tablet Take 1 Tablet by mouth every eight (8) hours as needed for Nausea or Vomiting for up to 12 doses. 12 Tablet 0    triamcinolone acetonide (KENALOG) 0.1 % ointment Apply  to affected area two (2) times a day. use thin layer 60 g 0    mupirocin (BACTROBAN) 2 % ointment Apply  to affected area daily. Apply to any open areas. 22 g 0    lidocaine (LIDODERM) 5 % USE 1  PATCH TOPICALLY TO AFFECTED AREA EVERY 12 HOURS AND  REMOVE  FOR  12  HOURS  A  DAY 15 Patch 0    pravastatin (PRAVACHOL) 40 mg tablet Take 1 tablet by mouth nightly 90 Tablet 2    nystatin (MYCOSTATIN) powder Apply  to affected area four (4) times daily. 30 g 1    omeprazole (PRILOSEC) 20 mg capsule Take 1 Capsule by mouth Daily (before breakfast).  90 Capsule 1    metFORMIN ER (GLUCOPHAGE XR) 500 mg tablet Take 1 Tablet by mouth daily (with dinner). 90 Tablet 1    diclofenac EC (VOLTAREN) 75 mg EC tablet Take 1 tab by mouth twice daily as needed. 180 Tablet 1    methocarbamoL (ROBAXIN) 750 mg tablet Take 1 tab by mouth 4 times daily as needed. 120 Tablet 5    gabapentin (NEURONTIN) 300 mg capsule TAKE 1 CAPSULE BY MOUTH THREE TIMES DAILY . DO NOT EXCEED 3 PER 24 HOURS FOR  NEUROPATHIC  PAIN 270 Capsule 1    fluticasone propionate (FLONASE) 50 mcg/actuation nasal spray 2 Sprays by Both Nostrils route daily as needed for Rhinitis. 16 g 2    diclofenac (Voltaren) 1 % gel Apply 4 grams to right shoulder up to 4 times per day, maximum 16 grams per joint per day. Dispense 5 100 gram tubes 500 g 1    Blood-Glucose Meter (TRUE METRIX GLUCOSE METER) misc Use to check blood sugar 2 times per day. 1 Each 0    glucose blood VI test strips (TRUE METRIX GLUCOSE TEST STRIP) strip Use to check blood sugar 2 times per day. 100 Strip 11    lancets misc Use to check blood sugar 2 times daily 1 Each 11    Lancets misc Check glucose ACHS 200 Each 11    alcohol swabs padm 1 Each by Apply Externally route Before breakfast, lunch, dinner and at bedtime. accucheck ACHS and record in log. Take log to every doctor visit. **MEDICALLY NECESSARY** 100 Pad 2    traZODone (DESYREL) 150 mg tablet Take 150-300 mg by mouth nightly.  sertraline (ZOLOFT) 100 mg tablet Take 200 mg by mouth daily.  perphenazine (TRILAFON) 4 mg tablet Take 4 mg by mouth three (3) times daily.  benztropine (COGENTIN) 1 mg tablet Take 1 mg by mouth daily. Past History     Past Medical History:  Past Medical History:   Diagnosis Date    CAD (coronary artery disease)     Hypertension     Uterine fibroid        Past Surgical History:  No past surgical history on file.     Family History:  Family History   Problem Relation Age of Onset    Diabetes Mother     Stroke Mother     Diabetes Father     Cancer Paternal Reanna Pain Breast Cancer Cousin        Social History:  Social History     Tobacco Use    Smoking status: Current Every Day Smoker     Packs/day: 0.25     Years: 2.00     Pack years: 0.50    Smokeless tobacco: Never Used   Substance Use Topics    Alcohol use: No     Alcohol/week: 0.8 standard drinks     Types: 1 Cans of beer per week    Drug use: Yes     Types: Marijuana     Comment: rarely        Allergies: Allergies   Allergen Reactions    Latex Rash    Penicillins Nausea and Vomiting    Penicillin Unknown (comments)       Patient's primary care provider (as noted in [facility]):  Wolfgang Gonzalez NP    Review of Systems   Constitutional:  Denies malaise, fever, chills. Cardiac:  Denies chest pain or palpitations. Respiratory:  Denies cough, wheezing, difficulty breathing, shortness of breath. GI/ABD:  + nausea, vomiting, abd pain. :  Denies injury, pain, dysuria or urgency. Back:  Denies injury or pain. Pelvis:  Denies injury or pain. Skin: Denies injury, rash, itching or skin changes. All other systems negative as reviewed. Visit Vitals  /75   Pulse 89   Temp 98.3 °F (36.8 °C)   Resp 18   Ht 5' 4\" (1.626 m)   Wt 61.2 kg (135 lb)   SpO2 98%   BMI 23.17 kg/m²       Patient Vitals for the past 12 hrs:   Temp Pulse Resp BP SpO2   06/01/22 1151 98.3 °F (36.8 °C) -- -- -- --   06/01/22 1149 -- 89 18 125/75 98 %       PHYSICAL EXAM:    CONSTITUTIONAL:  Alert, in no apparent distress;  well developed;  well nourished. HEAD:  Normocephalic, atraumatic. EYES:  EOMI. Non-icteric sclera. Normal conjunctiva. ENTM:  Nose:  no rhinorrhea. Throat:  no erythema or exudate, mucous membranes moist.  NECK:  Supple  RESPIRATORY:  Chest clear, equal breath sounds, good air movement. Without wheezes, rhonchi or rales. CARDIOVASCULAR:  Regular rate and rhythm. No murmurs, rubs, or gallops. GI:  Normal bowel sounds, abdomen soft and mild TTP throughout. No rebound or guarding. BACK:  Non-tender.   NEURO: Moves all four extremities, and grossly normal motor exam.  SKIN:  No rashes;  Normal for age. PSYCH:  Alert and normal affect. ED COURSE AND MEDICAL DECISION MAKING:      Recent Results (from the past 12 hour(s))   CBC WITH AUTOMATED DIFF    Collection Time: 06/01/22 12:20 PM   Result Value Ref Range    WBC 10.8 4.6 - 13.2 K/uL    RBC 4.08 (L) 4.20 - 5.30 M/uL    HGB 12.1 12.0 - 16.0 g/dL    HCT 37.9 35.0 - 45.0 %    MCV 92.9 78.0 - 100.0 FL    MCH 29.7 24.0 - 34.0 PG    MCHC 31.9 31.0 - 37.0 g/dL    RDW 14.5 11.6 - 14.5 %    PLATELET 484 124 - 567 K/uL    MPV 10.6 9.2 - 11.8 FL    NRBC 0.0 0  WBC    ABSOLUTE NRBC 0.00 0.00 - 0.01 K/uL    NEUTROPHILS 60 40 - 73 %    LYMPHOCYTES 28 21 - 52 %    MONOCYTES 5 3 - 10 %    EOSINOPHILS 6 (H) 0 - 5 %    BASOPHILS 1 0 - 2 %    IMMATURE GRANULOCYTES 0 0.0 - 0.5 %    ABS. NEUTROPHILS 6.5 1.8 - 8.0 K/UL    ABS. LYMPHOCYTES 3.0 0.9 - 3.6 K/UL    ABS. MONOCYTES 0.5 0.05 - 1.2 K/UL    ABS. EOSINOPHILS 0.6 (H) 0.0 - 0.4 K/UL    ABS. BASOPHILS 0.1 0.0 - 0.1 K/UL    ABS. IMM. GRANS. 0.0 0.00 - 0.04 K/UL    DF AUTOMATED     METABOLIC PANEL, COMPREHENSIVE    Collection Time: 06/01/22 12:20 PM   Result Value Ref Range    Sodium 140 136 - 145 mmol/L    Potassium 4.0 3.5 - 5.5 mmol/L    Chloride 105 100 - 111 mmol/L    CO2 33 (H) 21 - 32 mmol/L    Anion gap 2 (L) 3.0 - 18 mmol/L    Glucose 102 (H) 74 - 99 mg/dL    BUN 18 7.0 - 18 MG/DL    Creatinine 1.10 0.6 - 1.3 MG/DL    BUN/Creatinine ratio 16 12 - 20      GFR est AA >60 >60 ml/min/1.73m2    GFR est non-AA 50 (L) >60 ml/min/1.73m2    Calcium 9.7 8.5 - 10.1 MG/DL    Bilirubin, total 0.2 0.2 - 1.0 MG/DL    ALT (SGPT) 84 (H) 13 - 56 U/L    AST (SGOT) 37 10 - 38 U/L    Alk.  phosphatase 57 45 - 117 U/L    Protein, total 8.2 6.4 - 8.2 g/dL    Albumin 3.8 3.4 - 5.0 g/dL    Globulin 4.4 (H) 2.0 - 4.0 g/dL    A-G Ratio 0.9 0.8 - 1.7     LIPASE    Collection Time: 06/01/22 12:20 PM   Result Value Ref Range    Lipase 370 73 - 393 U/L CT ABD PELV W CONT    Result Date: 6/1/2022  CT ABDOMEN AND PELVIS ENHANCED CPT CODE: 92346 and 43555 INDICATION: Upper abdominal pain. Vomiting. . TECHNIQUE: 5 mm collimation axial images obtained from the diaphragm to the level of the pubic symphysis following the uneventful administration of 100 cc's nonionic intravenous contrast. All CT scans at this facility are performed using dose optimization technique as appropriate to this specific exam, to include automated exposure control, adjustment of the mA and/or KP according to patient size or use of iterative reconstruction techniques. COMPARISON: None. ABDOMEN FINDINGS: No consolidation or effusion at the included lung bases. No significant hernia. Liver not enlarged. No discrete mass. Small amount of presumed fatty infiltration along the hepatic fissure. Gallbladder not distended. No calcified stones. Pancreas unremarkable. Spleen unremarkable. Adrenal glands unremarkable. Kidneys demonstrate symmetric enhancement. No hydronephrosis. No calcified stones. 6 mm hypodensity lateral cortex image 34 and 6 mm subcortical hypodensity posterior mid left cortex image 33 are too small to characterize but could reflect cysts. Stomach is moderately distended, largely fluid-filled with some layering debris. The first and second segments of descending duodenum is dilated up to 4.3 cm diameter. Transition from dilated fluid-filled descending duodenum to transverse duodenum just distal to the level of the ampulla, sagittal image 29, axial 44 coronal 27. No regional inflammatory change or mesenteric fluid. -third through fourth segments of the duodenum are decompressed, with suggestion of a congenital malrotation. The duodenum appears to extend ventral along the right lateral margin of the aorta image 41, where it is adjacent to other jejunal loops and cannot be followed due to the collapsed nature.  Reformatted images suggest this extends ventral before descending and turning left lateral ventricle to the aorta. No comparison to document stability. -No reported surgical history. -Small bowel subsequent is nondilated and decompressed to the level of the cecum. -Doubt barium study would be efficacious for delineating anatomy and bowel course at this time due to the volume of fluid and debris in the stomach. Consider GI consult PELVIS FINDINGS: No colon distention. Appendix unremarkable. Uterus remains, not enlarged. Bladder empty and not well assessed. No free fluid in the pelvis. No ventral hernia. Abdominal aorta nonaneurysmal. No evidence of dissection. Degenerative change commensurate with age. 1. Moderately distended predominately fluid filled stomach and first and second segments of the duodenum with rapid transition to decompressed third segment duodenum as above. -Unclear etiology as there is no regional inflammatory change or discernible mass given lack of distention. -Questionable possible congenital malrotation as third and fourth segment duodenum difficult to track as above. -Effective partial outlet obstruction at the third segment of the duodenum of unclear etiology. 2. No other bowel distention to suggest obstruction. Nonenlarged appendix. No free fluid. 3. Probable mild hepatic steatosis. IMPRESSION AND MEDICAL DECISION MAKING:    Consult with my attending, Dr. Ck Hoff. He states that this is gastroparesis and directing for Reglan. Patient's EKG shows QTc of 450. Patient was given Reglan, about 30-45 minutes later she noted resolution of her symptoms. She was up walking about, looks great. States that she feels back to her baseline and is ready to go home. Patient states she would not like to be admitted, but would like to be discharged. I have discussed with her the risks of Reglan given that she is currently taking sertraline and trilafon. Patient is aware of the risks, states she will only take the Reglan if she is feeling badly.   She agrees to follow-up with GI and her primary care doctor, return immediately for any acute worsening. Dr. Rubi Taylor agrees with the assessment and plan. Diagnosis:   1. Gastroparesis    2. Nausea and vomiting, unspecified vomiting type      Disposition: Discharge    Follow-up Information     Follow up With Specialties Details Why Contact Info    [facility]  Schedule an appointment as soon as possible for a visit   [address]    Celine Bland NP Nurse Practitioner Schedule an appointment as soon as possible for a visit   [address]  [address]      [facility] EMERGENCY DEPT Emergency Medicine  If symptoms worsen [address] [address]  [phone]          Discharge Medication List as of 6/1/2022  4:16 PM      CONTINUE these medications which have NOT CHANGED    Details   ondansetron (ZOFRAN ODT) 4 mg disintegrating tablet Take 1 Tablet by mouth every eight (8) hours as needed for Nausea or Vomiting for up to 12 doses. , Normal, Disp-12 Tablet, R-0      triamcinolone acetonide (KENALOG) 0.1 % ointment Apply  to affected area two (2) times a day. use thin layer, Normal, Disp-60 g, R-0      mupirocin (BACTROBAN) 2 % ointment Apply  to affected area daily. Apply to any open areas., Normal, Disp-22 g, R-0      lidocaine (LIDODERM) 5 % USE 1  PATCH TOPICALLY TO AFFECTED AREA EVERY 12 HOURS AND  REMOVE  FOR  12  HOURS  A  DAY, Normal, Disp-15 Patch, R-0      pravastatin (PRAVACHOL) 40 mg tablet Take 1 tablet by mouth nightly, Normal, Disp-90 Tablet, R-2      nystatin (MYCOSTATIN) powder Apply  to affected area four (4) times daily. , Normal, Disp-30 g, R-1      omeprazole (PRILOSEC) 20 mg capsule Take 1 Capsule by mouth Daily (before breakfast). , Normal, Disp-90 Capsule, R-1      metFORMIN ER (GLUCOPHAGE XR) 500 mg tablet Take 1 Tablet by mouth daily (with dinner). , Normal, Disp-90 Tablet, R-1      diclofenac EC (VOLTAREN) 75 mg EC tablet Take 1 tab by mouth twice daily as needed., Normal, Disp-180 Tablet, R-1      methocarbamoL (ROBAXIN) 750 mg tablet Take 1 tab by mouth 4 times daily as needed., Normal, Disp-120 Tablet, R-5      gabapentin (NEURONTIN) 300 mg capsule TAKE 1 CAPSULE BY MOUTH THREE TIMES DAILY . DO NOT EXCEED 3 PER 24 HOURS FOR  NEUROPATHIC  PAIN, Normal, Disp-270 Capsule, R-1      fluticasone propionate (FLONASE) 50 mcg/actuation nasal spray 2 Sprays by Both Nostrils route daily as needed for Rhinitis., Normal, Disp-16 g, R-2      diclofenac (Voltaren) 1 % gel Apply 4 grams to right shoulder up to 4 times per day, maximum 16 grams per joint per day. Dispense 5 100 gram tubes, Normal, Disp-500 g,R-1      Blood-Glucose Meter (TRUE METRIX GLUCOSE METER) misc Use to check blood sugar 2 times per day., Normal, Disp-1 Each, R-0      glucose blood VI test strips (TRUE METRIX GLUCOSE TEST STRIP) strip Use to check blood sugar 2 times per day., Normal, Disp-100 Strip, R-11      !! lancets misc Use to check blood sugar 2 times daily, Normal, Disp-1 Each, R-11      !! Lancets misc Check glucose ACHS, Normal, Disp-200 Each, R-11      alcohol swabs padm 1 Each by Apply Externally route Before breakfast, lunch, dinner and at bedtime. accucheck ACHS and record in log. Take log to every doctor visit. **MEDICALLY NECESSARY**, Print, Disp-100 Pad, R-2      traZODone (DESYREL) 150 mg tablet Take 150-300 mg by mouth nightly., Historical Med      sertraline (ZOLOFT) 100 mg tablet Take 200 mg by mouth daily. , Historical Med      perphenazine (TRILAFON) 4 mg tablet Take 4 mg by mouth three (3) times daily. , Historical Med      benztropine (COGENTIN) 1 mg tablet Take 1 mg by mouth daily. , Historical Med       !! - Potential duplicate medications found. Please discuss with provider.         LEIDA Su

## 2022-06-01 NOTE — ED NOTES
I have reviewed discharge instructions with the patient. The patient verbalized understanding. Pt ambulated to lobby with steady gait after IV removed.

## 2022-06-02 ENCOUNTER — TELEPHONE (OUTPATIENT)
Dept: ORTHOPEDIC SURGERY | Age: 67
End: 2022-06-02

## 2022-06-02 LAB
ATRIAL RATE: 66 BPM
CALCULATED P AXIS, ECG09: 60 DEGREES
CALCULATED R AXIS, ECG10: 64 DEGREES
CALCULATED T AXIS, ECG11: 47 DEGREES
DIAGNOSIS, 93000: NORMAL
P-R INTERVAL, ECG05: 138 MS
Q-T INTERVAL, ECG07: 426 MS
QRS DURATION, ECG06: 98 MS
QTC CALCULATION (BEZET), ECG08: 446 MS
VENTRICULAR RATE, ECG03: 66 BPM

## 2022-06-02 NOTE — TELEPHONE ENCOUNTER
Patient [phone] (patient's phone broken)    Patient states the last fill she picked up for the Methocarbamol made her go to the ED because she thinks it was not \"coated\" and really upset her stomach. She believes it was the generic version and she is asking for a new prescription for the coated version. Please call to discuss her options.

## 2022-06-02 NOTE — TELEPHONE ENCOUNTER
Left another message for patient to call back. If she does call back, please refer to karen's comment and relay this information to her.

## 2022-06-02 NOTE — TELEPHONE ENCOUNTER
Patient contacted and told that we have given her the same RX. Nothing was written differently and to contact her pharmacy to see if they can give her the coated form of methocarbamol.

## 2022-06-13 ENCOUNTER — ANESTHESIA EVENT (OUTPATIENT)
Dept: ENDOSCOPY | Age: 67
End: 2022-06-13
Payer: MEDICAID

## 2022-06-14 ENCOUNTER — HOSPITAL ENCOUNTER (OUTPATIENT)
Age: 67
Setting detail: OUTPATIENT SURGERY
Discharge: HOME | End: 2022-06-14
Attending: INTERNAL MEDICINE | Admitting: INTERNAL MEDICINE
Payer: MEDICAID

## 2022-06-14 ENCOUNTER — ANESTHESIA (OUTPATIENT)
Dept: ENDOSCOPY | Age: 67
End: 2022-06-14
Payer: MEDICAID

## 2022-06-14 VITALS
HEIGHT: 64 IN | BODY MASS INDEX: 22.53 KG/M2 | WEIGHT: 132 LBS | RESPIRATION RATE: 20 BRPM | HEART RATE: 75 BPM | OXYGEN SATURATION: 97 % | TEMPERATURE: 98 F | DIASTOLIC BLOOD PRESSURE: 90 MMHG | SYSTOLIC BLOOD PRESSURE: 169 MMHG

## 2022-06-14 LAB — GLUCOSE BLD STRIP.AUTO-MCNC: 83 MG/DL (ref 70–110)

## 2022-06-14 PROCEDURE — 77030003657 HC NDL SCLER BSC -B: Performed by: INTERNAL MEDICINE

## 2022-06-14 PROCEDURE — 76060000031 HC ANESTHESIA FIRST 0.5 HR: Performed by: INTERNAL MEDICINE

## 2022-06-14 PROCEDURE — 74011250636 HC RX REV CODE- 250/636: Performed by: NURSE ANESTHETIST, CERTIFIED REGISTERED

## 2022-06-14 PROCEDURE — 77030019988 HC FCPS ENDOSC DISP BSC -B: Performed by: INTERNAL MEDICINE

## 2022-06-14 PROCEDURE — 77030008565 HC TBNG SUC IRR ERBE -B: Performed by: INTERNAL MEDICINE

## 2022-06-14 PROCEDURE — 00731 ANES UPR GI NDSC PX NOS: CPT | Performed by: NURSE ANESTHETIST, CERTIFIED REGISTERED

## 2022-06-14 PROCEDURE — 76040000019: Performed by: INTERNAL MEDICINE

## 2022-06-14 PROCEDURE — 74011000250 HC RX REV CODE- 250: Performed by: NURSE ANESTHETIST, CERTIFIED REGISTERED

## 2022-06-14 PROCEDURE — 77030020018 HC MRKR ENDOSC SPOT 5ML SYR GISP -B: Performed by: INTERNAL MEDICINE

## 2022-06-14 PROCEDURE — 82962 GLUCOSE BLOOD TEST: CPT

## 2022-06-14 PROCEDURE — 74011250637 HC RX REV CODE- 250/637: Performed by: INTERNAL MEDICINE

## 2022-06-14 PROCEDURE — 2709999900 HC NON-CHARGEABLE SUPPLY: Performed by: INTERNAL MEDICINE

## 2022-06-14 PROCEDURE — 00731 ANES UPR GI NDSC PX NOS: CPT | Performed by: STUDENT IN AN ORGANIZED HEALTH CARE EDUCATION/TRAINING PROGRAM

## 2022-06-14 RX ORDER — MAGNESIUM SULFATE 100 %
4 CRYSTALS MISCELLANEOUS AS NEEDED
Status: DISCONTINUED | OUTPATIENT
Start: 2022-06-14 | End: 2022-06-14 | Stop reason: HOSPADM

## 2022-06-14 RX ORDER — INSULIN LISPRO 100 [IU]/ML
INJECTION, SOLUTION INTRAVENOUS; SUBCUTANEOUS ONCE
Status: DISCONTINUED | OUTPATIENT
Start: 2022-06-14 | End: 2022-06-14 | Stop reason: HOSPADM

## 2022-06-14 RX ORDER — PROPOFOL 10 MG/ML
INJECTION, EMULSION INTRAVENOUS AS NEEDED
Status: DISCONTINUED | OUTPATIENT
Start: 2022-06-14 | End: 2022-06-14 | Stop reason: HOSPADM

## 2022-06-14 RX ORDER — DEXTROMETHORPHAN/PSEUDOEPHED 2.5-7.5/.8
DROPS ORAL AS NEEDED
Status: DISCONTINUED | OUTPATIENT
Start: 2022-06-14 | End: 2022-06-14 | Stop reason: HOSPADM

## 2022-06-14 RX ORDER — LIDOCAINE HYDROCHLORIDE 10 MG/ML
0.1 INJECTION, SOLUTION EPIDURAL; INFILTRATION; INTRACAUDAL; PERINEURAL AS NEEDED
Status: DISCONTINUED | OUTPATIENT
Start: 2022-06-14 | End: 2022-06-14 | Stop reason: HOSPADM

## 2022-06-14 RX ORDER — SODIUM CHLORIDE, SODIUM LACTATE, POTASSIUM CHLORIDE, CALCIUM CHLORIDE 600; 310; 30; 20 MG/100ML; MG/100ML; MG/100ML; MG/100ML
75 INJECTION, SOLUTION INTRAVENOUS CONTINUOUS
Status: DISCONTINUED | OUTPATIENT
Start: 2022-06-14 | End: 2022-06-14 | Stop reason: HOSPADM

## 2022-06-14 RX ADMIN — SODIUM CHLORIDE, SODIUM LACTATE, POTASSIUM CHLORIDE, AND CALCIUM CHLORIDE: 600; 310; 30; 20 INJECTION, SOLUTION INTRAVENOUS at 13:38

## 2022-06-14 RX ADMIN — PROPOFOL 10 MG: 10 INJECTION, EMULSION INTRAVENOUS at 13:56

## 2022-06-14 RX ADMIN — PROPOFOL 20 MG: 10 INJECTION, EMULSION INTRAVENOUS at 13:54

## 2022-06-14 RX ADMIN — PROPOFOL 50 MG: 10 INJECTION, EMULSION INTRAVENOUS at 13:41

## 2022-06-14 RX ADMIN — PROPOFOL 20 MG: 10 INJECTION, EMULSION INTRAVENOUS at 13:50

## 2022-06-14 RX ADMIN — PROPOFOL 20 MG: 10 INJECTION, EMULSION INTRAVENOUS at 13:52

## 2022-06-14 RX ADMIN — PROPOFOL 20 MG: 10 INJECTION, EMULSION INTRAVENOUS at 13:49

## 2022-06-14 RX ADMIN — PROPOFOL 20 MG: 10 INJECTION, EMULSION INTRAVENOUS at 13:47

## 2022-06-14 RX ADMIN — PROPOFOL 20 MG: 10 INJECTION, EMULSION INTRAVENOUS at 13:42

## 2022-06-14 RX ADMIN — PROPOFOL 20 MG: 10 INJECTION, EMULSION INTRAVENOUS at 13:44

## 2022-06-14 RX ADMIN — SODIUM CHLORIDE, PRESERVATIVE FREE 20 MG: 5 INJECTION INTRAVENOUS at 11:25

## 2022-06-14 RX ADMIN — PROPOFOL 20 MG: 10 INJECTION, EMULSION INTRAVENOUS at 13:45

## 2022-06-14 RX ADMIN — PROPOFOL 20 MG: 10 INJECTION, EMULSION INTRAVENOUS at 13:43

## 2022-06-14 RX ADMIN — PROPOFOL 10 MG: 10 INJECTION, EMULSION INTRAVENOUS at 13:46

## 2022-06-14 NOTE — ANESTHESIA PREPROCEDURE EVALUATION
Relevant Problems   No relevant active problems       Anesthetic History   No history of anesthetic complications            Review of Systems / Medical History  Patient summary reviewed, nursing notes reviewed and pertinent labs reviewed    Pulmonary  Within defined limits                 Neuro/Psych   Within defined limits           Cardiovascular    Hypertension: well controlled          CAD         GI/Hepatic/Renal  Within defined limits              Endo/Other    Diabetes: well controlled, type 2    Arthritis     Other Findings              Physical Exam    Airway  Mallampati: III  TM Distance: 4 - 6 cm  Neck ROM: normal range of motion   Mouth opening: Normal     Cardiovascular    Rhythm: regular  Rate: normal         Dental  No notable dental hx       Pulmonary  Breath sounds clear to auscultation               Abdominal  GI exam deferred       Other Findings            Anesthetic Plan    ASA: 3  Anesthesia type: MAC          Induction: Intravenous  Anesthetic plan and risks discussed with: Patient

## 2022-06-14 NOTE — DISCHARGE INSTRUCTIONS
Upper GI Endoscopy: What to Expect at [address] had an upper GI endoscopy. Your doctor used a thin, lighted tube that bends to look at the inside of your esophagus, your stomach, and the first part of the small intestine, called the duodenum. After you have an endoscopy, you will stay at the hospital or clinic for 1 to 2 hours. This will allow the medicine to wear off. You will be able to go home after your doctor or nurse checks to make sure that you're not having any problems. You may have to stay overnight if you had treatment during the test. You may have a sore throat for a day or two after the test.  This care sheet gives you a general idea about what to expect after the test.  How can you care for yourself at home? Activity   · Rest as much as you need to after you go home. · You should be able to go back to your usual activities the day after the test.  Diet   · Follow your doctor's directions for eating after the test.  · Drink plenty of fluids (unless your doctor has told you not to). Medications   · If you have a sore throat the day after the test, use an over-the-counter spray to numb your throat. Follow-up care is a key part of your treatment and safety. Be sure to make and go to all appointments, and call your doctor if you are having problems. It's also a good idea to know your test results and keep a list of the medicines you take. When should you call for help? Call 911 anytime you think you may need emergency care. For example, call if:    · You passed out (lost consciousness). · You have trouble breathing. · You pass maroon or bloody stools. Call your doctor now or seek immediate medical care if:    · You have pain that does not get better after your take pain medicine. · You have new or worse belly pain. · You have blood in your stools. · You are sick to your stomach and cannot keep fluids down. · You have a fever.      · You cannot pass stools or gas.   Watch closely for changes in your health, and be sure to contact your doctor if:    · Your throat still hurts after a day or two. · You do not get better as expected. Where can you learn more? Go to http://www.bennett.com/  Enter J454 in the search box to learn more about \"Upper GI Endoscopy: What to Expect at Home. \"  Current as of: September 8, 2021               Content Version: 13.2  © 2006-2022 [id number]. Care instructions adapted under license by [employer] (which disclaims liability or warranty for this information). If you have questions about a medical condition or this instruction, always ask your healthcare professional. [place] [address] any warranty or liability for your use of this information. DISCHARGE SUMMARY from Nurse     POST-PROCEDURE INSTRUCTIONS:    Call your Physician if you:  ? Observe any excess bleeding. ? Develop a temperature over 100.5o F.  ? Experience abdominal, shoulder or chest pain. ? Notice any signs of decreased circulation or nerve impairment to an extremity such as a change in color, persistent numbness, tingling, coldness or increase in pain. ? Vomit blood or you have nausea and vomiting lasting longer than 4 hours. ? Are unable to take medications. ? Are unable to urinate within 8 hours after discharge following general anesthesia or intravenous sedation. For the next 24 hours after receiving general anesthesia or intravenous sedation, or while taking prescription Narcotics, limit your activities:  ? Do NOT drive a motor vehicle, operate hazard machinery or power tools, or perform tasks that require coordination. The medication you received during your procedure may have some effect on your mental awareness. ? Do NOT make important personal or business decisions. The medication you received during your procedure may have some effect on your mental awareness.   ? Do NOT drink alcoholic beverages. These drinks do not mix well with the medications that have been given to you. ? Upon discharge from the hospital, you must be accompanied by a responsible adult. ? Resume your diet as directed by your physician. ? Resume medications as your physician has prescribed. ? Please give a list of your current medications to your Primary Care Provider. ? Please update this list whenever your medications are discontinued, doses are changed, or new medications (including over-the-counter products) are added. ? Please carry medication information at all times in case of emergency situations. These are general instructions for a healthy lifestyle:    No smoking/ No tobacco products/ Avoid exposure to second hand smoke.  Surgeon General's Warning:  Quitting smoking now greatly reduces serious risk to your health. Obesity, smoking, and a sedentary lifestyle greatly increase your risk for illness.  A healthy diet, regular physical exercise & weight monitoring are important for maintaining a healthy lifestyle   You may be retaining fluid if you have a history of heart failure or if you experience any of the following symptoms:  Weight gain of 3 pounds or more overnight or 5 pounds in a week, increased swelling in our hands or feet or shortness of breath while lying flat in bed. Please call your doctor as soon as you notice any of these symptoms; do not wait until your next office visit. Recognize signs and symptoms of STROKE:  F  -  Face looks uneven  A  -  Arms unable to move or move unevenly  S  -  Speech slurred or non-existent  T  -  Time to call 911 - as soon as signs and symptoms begin - DO NOT go back to bed or wait to see If you get better - TIME IS BRAIN. Colorectal Screening   Colorectal cancer almost always develops from precancerous polyps (abnormal growths) in the colon or rectum.   Screening tests can find precancerous polyps, so that they can be removed before they turn into cancer. Screening tests can also find colorectal cancer early, when treatment works best.  [facility] Speak with your physician about when you should begin screening and how often you should be tested. [employer] Activation    Thank you for requesting access to [employer] Please follow the instructions below to securely access and download your online medical record. [employer] allows you to send messages to your doctor, view your test results, renew your prescriptions, schedule appointments, and more. How Do I Sign Up? 1. In your internet browser, go to [email or web address]. 2. Click on the First Time User? Click Here link in the Sign In box. You will see the New Member Sign Up page. 3. Enter your [employer] Access Code exactly as it appears below. You will not need to use this code after youve completed the sign-up process. If you do not sign up before the expiration date, you must request a new code. [employer] Access Code: Activation code not generated  Current [employer] Status: Active (This is the date your [employer] access code will )    4. Enter the last four digits of your Social Security Number (xxxx) and Date of Birth (mm/dd/yyyy) as indicated and click Submit. You will be taken to the next sign-up page. 5. Create a [employer] ID. This will be your [employer] login ID and cannot be changed, so think of one that is secure and easy to remember. 6. Create a [employer] password. You can change your password at any time. 7. Enter your Password Reset Question and Answer. This can be used at a later time if you forget your password. 8. Enter your e-mail address. You will receive e-mail notification when new information is available in [address] 9. Click Sign Up. You can now view and download portions of your medical record. 10. Click the Download Summary menu link to download a portable copy of your medical information.     Additional Information    If you have questions, please call [phone]. Remember, MyChart is NOT to be used for urgent needs. For medical emergencies, dial 911. Educational references and/or instructions provided during this visit included:    See Attached      APPOINTMENTS:    Per MD Instruction    Discharge information has been reviewed with the patient. The patient verbalized understanding.

## 2022-06-14 NOTE — ANESTHESIA POSTPROCEDURE EVALUATION
Procedure(s):  UPPER ENDOSCOPY/tattoo. MAC    Anesthesia Post Evaluation      Multimodal analgesia: multimodal analgesia used between 6 hours prior to anesthesia start to PACU discharge  Patient location during evaluation: PACU  Patient participation: complete - patient participated  Level of consciousness: sleepy but conscious  Pain management: adequate  Airway patency: patent  Anesthetic complications: no  Cardiovascular status: acceptable  Respiratory status: acceptable  Hydration status: acceptable  Post anesthesia nausea and vomiting:  controlled  Final Post Anesthesia Temperature Assessment:  Normothermia (36.0-37.5 degrees C)      INITIAL Post-op Vital signs: No vitals data found for the desired time range.

## 2022-06-14 NOTE — PROGRESS NOTES
[email or web address] [address]  [place], [address]      Brief Procedure Note    Yoni Chou  1955  [id number]    Date of Procedure: 6/14/2022    Preoperative diagnosis: Gastric food:  935.2 - T18.2xxA  Mass in the third part of the duodenum (biopsy):  235.2 - D37.2    Postoperative diagnosis: duodenal mass  hiatal hernia    Type of Anesthesia: MAC (Monitored anesthesia care)    Description of findings: same as post op dx    Procedure: Procedure(s):  UPPER ENDOSCOPY/tattoo    :  Dr. Claudeen Funk, MD    Assistant(s): Endoscopy Technician-1: Sebas Rader  Endoscopy RN-1: Ny Paredes RN  Float Staff: Cary Suazo RN    Devices/implants/grafts/tissues/prosthesis: None    EBL:None    Specimens: * No specimens in log *    Findings: See printed and scanned procedure note    Complications: None    Dr. Claudeen Funk, MD  6/14/2022  2:09 PM

## 2022-06-14 NOTE — H&P
[email or web address]  [phone]      GASTROENTEROLOGY Pre-Procedure H and P      Impression/Plan:   1. 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 HPI:  Yoni Chou is a 61 y.o. 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 PMH:   Past Medical History:   Diagnosis Date    CAD (coronary artery disease)     Hypertension     Uterine fibroid        PSH:   History reviewed. No pertinent surgical history. Social HX:   Social History     Socioeconomic History    Marital status: SINGLE     Spouse name: Not on file    Number of children: Not on file    Years of education: Not on file    Highest education level: Not on file   Occupational History    Not on file   Tobacco Use    Smoking status: Current Every Day Smoker     Packs/day: 0.25     Years: 2.00     Pack years: 0.50    Smokeless tobacco: Never Used   Substance and Sexual Activity    Alcohol use: No     Alcohol/week: 0.8 standard drinks     Types: 1 Cans of beer per week    Drug use: Yes     Types: Marijuana     Comment: rarely     Sexual activity: Not on file   Other Topics Concern    Not on file   Social History Narrative    Not on file     Social Determinants of Health     Financial Resource Strain:     Difficulty of Paying Living Expenses: Not on file   Food Insecurity:     Worried About Running Out of Food in the Last Year: Not on file    Kim of Food in the Last Year: Not on file   Transportation Needs:     Lack of Transportation (Medical): Not on file    Lack of Transportation (Non-Medical):  Not on file   Physical Activity:     Days of Exercise per Week: Not on file    Minutes of Exercise per Session: Not on file   Stress:     Feeling of Stress : Not on file   Social Connections:     Frequency of Communication with Friends and Family: Not on file    Frequency of Social Gatherings with Friends and Family: Not on file    Attends Mu-ism Services: Not on file    Active Member of [address] [place] [address] or Organizations: Not on file    Attends Club or Organization Meetings: Not on file    Marital Status: Not on file   Intimate Partner Violence:     Fear of Current or Ex-Partner: Not on file    Emotionally Abused: Not on file    Physically Abused: Not on file    Sexually Abused: Not on file   Housing Stability:     Unable to Pay for Housing in the Last Year: Not on file    Number of [place] in the Last Year: Not on file    Unstable Housing in the Last Year: Not on file       FHX:   Family History   Problem Relation Age of Onset    Diabetes Mother     Stroke Mother     Diabetes Father     Cancer Paternal Grandfather     Breast Cancer Cousin        Allergy:   Allergies   Allergen Reactions    Latex Rash    Penicillins Nausea and Vomiting    Penicillin Unknown (comments)       Home Medications:     Medications Prior to Admission   Medication Sig    ondansetron (ZOFRAN ODT) 4 mg disintegrating tablet Take 1 Tablet by mouth every eight (8) hours as needed for Nausea or Vomiting for up to 12 doses.  triamcinolone acetonide (KENALOG) 0.1 % ointment Apply  to affected area two (2) times a day. use thin layer    pravastatin (PRAVACHOL) 40 mg tablet Take 1 tablet by mouth nightly    diclofenac EC (VOLTAREN) 75 mg EC tablet Take 1 tab by mouth twice daily as needed.  methocarbamoL (ROBAXIN) 750 mg tablet Take 1 tab by mouth 4 times daily as needed.  gabapentin (NEURONTIN) 300 mg capsule TAKE 1 CAPSULE BY MOUTH THREE TIMES DAILY . DO NOT EXCEED 3 PER 24 HOURS FOR  NEUROPATHIC  PAIN    traZODone (DESYREL) 150 mg tablet Take 150-300 mg by mouth nightly.     sertraline (ZOLOFT) 100 mg tablet Take 200 mg by mouth daily.  perphenazine (TRILAFON) 4 mg tablet Take 4 mg by mouth three (3) times daily.  benztropine (COGENTIN) 1 mg tablet Take 1 mg by mouth daily.  mupirocin (BACTROBAN) 2 % ointment Apply  to affected area daily. Apply to any open areas.  lidocaine (LIDODERM) 5 % USE 1  PATCH TOPICALLY TO AFFECTED AREA EVERY 12 HOURS AND  REMOVE  FOR  12  HOURS  A  DAY    nystatin (MYCOSTATIN) powder Apply  to affected area four (4) times daily.  omeprazole (PRILOSEC) 20 mg capsule Take 1 Capsule by mouth Daily (before breakfast). (Patient not taking: Reported on 6/14/2022)    metFORMIN ER (GLUCOPHAGE XR) 500 mg tablet Take 1 Tablet by mouth daily (with dinner). (Patient not taking: Reported on 6/14/2022)    fluticasone propionate (FLONASE) 50 mcg/actuation nasal spray 2 Sprays by Both Nostrils route daily as needed for Rhinitis.  diclofenac (Voltaren) 1 % gel Apply 4 grams to right shoulder up to 4 times per day, maximum 16 grams per joint per day. Dispense 5 100 gram tubes    Blood-Glucose Meter (TRUE METRIX GLUCOSE METER) misc Use to check blood sugar 2 times per day.  glucose blood VI test strips (TRUE METRIX GLUCOSE TEST STRIP) strip Use to check blood sugar 2 times per day.  lancets misc Use to check blood sugar 2 times daily    Lancets misc Check glucose ACHS    alcohol swabs padm 1 Each by Apply Externally route Before breakfast, lunch, dinner and at bedtime. accucheck ACHS and record in log. Take log to every doctor visit. **MEDICALLY NECESSARY**       Review of Systems:     Constitutional: No fevers, chills, weight loss, fatigue. Skin: No rashes, pruritis, jaundice, ulcerations, erythema. HENT: No headaches, nosebleeds, sinus pressure, rhinorrhea, sore throat. Eyes: No visual changes, blurred vision, eye pain, photophobia, jaundice. Cardiovascular: No chest pain, heart palpitations. Respiratory: No cough, SOB, wheezing, chest discomfort, orthopnea. Gastrointestinal: Neg unless noted otherwise in H&P   Genitourinary: No dysuria, bleeding, discharge, pyuria. Musculoskeletal: No weakness, arthralgias, wasting. Endo: No sweats. Heme: No bruising, easy bleeding. Allergies: As noted. Neurological: Cranial nerves intact. Alert and oriented. Gait not assessed. Psychiatric:  No anxiety, depression, hallucinations. Visit Vitals  BP (!) 161/93 (BP 1 Location: Left arm, BP Patient Position: At rest)   Pulse 69   Temp 98.7 °F (37.1 °C)   Resp 20   Ht 5' 4\" (1.626 m)   Wt 59.9 kg (132 lb)   SpO2 98%   BMI 22.66 kg/m²       Physical Assessment:     constitutional: appearance: well developed, well nourished, normal habitus, no deformities, in no acute distress. skin: inspection: no rashes, ulcers, icterus or other lesions; no clubbing or telangiectasias. palpation: no induration or subcutaneos nodules. eyes: inspection: normal conjunctivae and lids; no jaundice pupils: normal  ENMT: mouth: normal oral mucosa,lips and gums; good dentition. oropharynx: normal tongue, hard and soft palate; posterior pharynx without erithema, exudate or lesions. neck: thyroid: normal size, consistency and position; no masses or tenderness. respiratory: effort: normal chest excursion; no intercostal retraction or accessory muscle use. cardiovascular: abdominal aorta: normal size and position; no bruits. palpation: PMI of normal size and position; normal rhythm; no thrill or murmurs. abdominal: abdomen: normal consistency; no tenderness or masses. hernias: no hernias appreciated. liver: normal size and consistency. spleen: not palpable. rectal: hemoccult/guaiac: not performed. musculoskeletal: digits and nails: no clubbing, cyanosis, petechiae or other inflammatory conditions. gait: normal gait and station head and neck: normal range of motion; no pain, crepitation or contracture. spine/ribs/pelvis: normal range of motion; no pain, deformity or contracture. neurologic: cranial nerves: II-XII normal.   psychiatric: judgement/insight: within normal limits. memory: within normal limits for recent and remote events. mood and affect: no evidence of depression, anxiety or agitation. orientation: oriented to time, space and person. Basic Metabolic Profile   No results for input(s): NA, K, CL, CO2, BUN, GLU, CA, MG, PHOS in the last 72 hours. No lab exists for component: CREAT      CBC w/Diff    No results for input(s): WBC, RBC, HGB, HCT, MCV, MCH, MCHC, RDW, PLT, HGBEXT, HCTEXT, PLTEXT in the last 72 hours. No lab exists for component: MPV No results for input(s): GRANS, LYMPH, EOS, PRO, MYELO, METAS, BLAST in the last 72 hours. No lab exists for component: MONO, BASO     Hepatic Function   No results for input(s): ALB, TP, TBILI, AP, AML, LPSE in the last 72 hours. No lab exists for component: DBILI, GPT, SGOT     Coags   No results for input(s): PTP, INR, APTT, INREXT in the last 72 hours. Melvin Cardona MD  [facility] [address], [facility]  Cell: [phone]  Direct pager: [phone]  [email or web address]  [email or web address]

## 2022-08-10 ENCOUNTER — VIRTUAL VISIT (OUTPATIENT)
Dept: FAMILY MEDICINE CLINIC | Age: 67
End: 2022-08-10

## 2022-08-10 DIAGNOSIS — Z91.199 NO-SHOW FOR APPOINTMENT: Primary | ICD-10-CM

## 2022-08-10 NOTE — PROGRESS NOTES
Called patient to begin virtual appointment with ESTELA Chaudhari but patient did not answer. Unable to leave a message due to patient's mailbox being full.

## 2022-08-29 DIAGNOSIS — M79.2 NEURITIS: ICD-10-CM

## 2022-08-29 RX ORDER — GABAPENTIN 300 MG/1
CAPSULE ORAL
Qty: 90 CAPSULE | Refills: 1 | Status: SHIPPED | OUTPATIENT
Start: 2022-08-29 | End: 2022-09-12 | Stop reason: SDUPTHER

## 2022-08-29 NOTE — PROGRESS NOTES
Pt last seen in January -- will reorder 1 month and refill -- she has been diagnosed and treated for gi cancer -- will try to get virtual visit scheduled for further refills.

## 2022-08-31 NOTE — TELEPHONE ENCOUNTER
For [facility] in place:   Recommendation Provided To:    Intervention Detail: New Rx: 1, reason: Patient Preference and Scheduled Appointment  Gap Closed?:   Intervention Accepted By:   Time Spent (min): 5

## 2022-09-06 RX ORDER — GABAPENTIN 300 MG/1
CAPSULE ORAL
Qty: 90 CAPSULE | Refills: 0 | OUTPATIENT
Start: 2022-09-06

## 2022-09-08 NOTE — TELEPHONE ENCOUNTER
Patient called to request an alternate medication for Methacarbonal - she says she already picked up the Gabapentin but was expecting to also get a new medication to replace the Methacarbonal that gave her cancer. Are we able to prescribe a different medication for her in addition to the Gabapentin?

## 2022-09-12 ENCOUNTER — HOSPITAL ENCOUNTER (EMERGENCY)
Age: 67
Discharge: HOME | End: 2022-09-12
Attending: STUDENT IN AN ORGANIZED HEALTH CARE EDUCATION/TRAINING PROGRAM
Payer: MEDICARE

## 2022-09-12 ENCOUNTER — APPOINTMENT (OUTPATIENT)
Dept: GENERAL RADIOLOGY | Age: 67
End: 2022-09-12
Attending: PHYSICIAN ASSISTANT
Payer: MEDICARE

## 2022-09-12 VITALS
TEMPERATURE: 98.3 F | RESPIRATION RATE: 18 BRPM | OXYGEN SATURATION: 97 % | HEIGHT: 65 IN | SYSTOLIC BLOOD PRESSURE: 128 MMHG | WEIGHT: 132 LBS | HEART RATE: 126 BPM | DIASTOLIC BLOOD PRESSURE: 90 MMHG | BODY MASS INDEX: 21.99 KG/M2

## 2022-09-12 DIAGNOSIS — M79.2 NEURITIS: ICD-10-CM

## 2022-09-12 DIAGNOSIS — R11.2 NAUSEA AND VOMITING, UNSPECIFIED VOMITING TYPE: Primary | ICD-10-CM

## 2022-09-12 LAB
ALBUMIN SERPL-MCNC: 4.6 G/DL (ref 3.4–5)
ALBUMIN/GLOB SERPL: 0.9 {RATIO} (ref 0.8–1.7)
ALP SERPL-CCNC: 94 U/L (ref 45–117)
ALT SERPL-CCNC: 58 U/L (ref 13–56)
ANION GAP SERPL CALC-SCNC: 5 MMOL/L (ref 3–18)
ANION GAP SERPL CALC-SCNC: 9 MMOL/L (ref 3–18)
APPEARANCE UR: ABNORMAL
AST SERPL-CCNC: 33 U/L (ref 10–38)
ATRIAL RATE: 125 BPM
BACTERIA URNS QL MICRO: ABNORMAL /HPF
BASOPHILS # BLD: 0 K/UL (ref 0–0.1)
BASOPHILS NFR BLD: 1 % (ref 0–2)
BILIRUB SERPL-MCNC: 0.3 MG/DL (ref 0.2–1)
BILIRUB UR QL: NEGATIVE
BUN SERPL-MCNC: 21 MG/DL (ref 7–18)
BUN SERPL-MCNC: 25 MG/DL (ref 7–18)
BUN/CREAT SERPL: 21 (ref 12–20)
BUN/CREAT SERPL: 22 (ref 12–20)
CA-I SERPL-SCNC: 0.96 MMOL/L (ref 1.15–1.33)
CALCIUM SERPL-MCNC: 11.2 MG/DL (ref 8.5–10.1)
CALCIUM SERPL-MCNC: 9.1 MG/DL (ref 8.5–10.1)
CALCULATED P AXIS, ECG09: 55 DEGREES
CALCULATED R AXIS, ECG10: 48 DEGREES
CALCULATED T AXIS, ECG11: 47 DEGREES
CHLORIDE SERPL-SCNC: 103 MMOL/L (ref 100–111)
CHLORIDE SERPL-SCNC: 94 MMOL/L (ref 100–111)
CO2 SERPL-SCNC: 29 MMOL/L (ref 21–32)
CO2 SERPL-SCNC: 33 MMOL/L (ref 21–32)
COLOR UR: ABNORMAL
CREAT SERPL-MCNC: 0.96 MG/DL (ref 0.6–1.3)
CREAT SERPL-MCNC: 1.19 MG/DL (ref 0.6–1.3)
DIAGNOSIS, 93000: NORMAL
DIFFERENTIAL METHOD BLD: ABNORMAL
EOSINOPHIL # BLD: 0 K/UL (ref 0–0.4)
EOSINOPHIL NFR BLD: 0 % (ref 0–5)
EPITH CASTS URNS QL MICRO: ABNORMAL /LPF (ref 0–5)
ERYTHROCYTE [DISTWIDTH] IN BLOOD BY AUTOMATED COUNT: 15.9 % (ref 11.6–14.5)
GLOBULIN SER CALC-MCNC: 5 G/DL (ref 2–4)
GLUCOSE SERPL-MCNC: 132 MG/DL (ref 74–99)
GLUCOSE SERPL-MCNC: 157 MG/DL (ref 74–99)
GLUCOSE UR STRIP.AUTO-MCNC: NEGATIVE MG/DL
HCT VFR BLD AUTO: 42.2 % (ref 35–45)
HGB BLD-MCNC: 13.7 G/DL (ref 12–16)
HGB UR QL STRIP: NEGATIVE
HYALINE CASTS URNS QL MICRO: ABNORMAL /LPF (ref 0–2)
IMM GRANULOCYTES # BLD AUTO: 0 K/UL (ref 0–0.04)
IMM GRANULOCYTES NFR BLD AUTO: 0 % (ref 0–0.5)
KETONES UR QL STRIP.AUTO: ABNORMAL MG/DL
LEUKOCYTE ESTERASE UR QL STRIP.AUTO: ABNORMAL
LIPASE SERPL-CCNC: 220 U/L (ref 73–393)
LYMPHOCYTES # BLD: 1.8 K/UL (ref 0.9–3.6)
LYMPHOCYTES NFR BLD: 24 % (ref 21–52)
MAGNESIUM SERPL-MCNC: 2.5 MG/DL (ref 1.6–2.6)
MCH RBC QN AUTO: 28 PG (ref 24–34)
MCHC RBC AUTO-ENTMCNC: 32.5 G/DL (ref 31–37)
MCV RBC AUTO: 86.3 FL (ref 78–100)
MONOCYTES # BLD: 1.3 K/UL (ref 0.05–1.2)
MONOCYTES NFR BLD: 17 % (ref 3–10)
NEUTS SEG # BLD: 4.4 K/UL (ref 1.8–8)
NEUTS SEG NFR BLD: 57 % (ref 40–73)
NITRITE UR QL STRIP.AUTO: NEGATIVE
NRBC # BLD: 0 K/UL (ref 0–0.01)
NRBC BLD-RTO: 0 PER 100 WBC
P-R INTERVAL, ECG05: 132 MS
PH UR STRIP: 7 [PH] (ref 5–8)
PLATELET # BLD AUTO: 430 K/UL (ref 135–420)
PMV BLD AUTO: 10.9 FL (ref 9.2–11.8)
POTASSIUM SERPL-SCNC: 3.8 MMOL/L (ref 3.5–5.5)
POTASSIUM SERPL-SCNC: 3.9 MMOL/L (ref 3.5–5.5)
PROT SERPL-MCNC: 9.6 G/DL (ref 6.4–8.2)
PROT UR STRIP-MCNC: 30 MG/DL
Q-T INTERVAL, ECG07: 340 MS
QRS DURATION, ECG06: 86 MS
QTC CALCULATION (BEZET), ECG08: 490 MS
RBC # BLD AUTO: 4.89 M/UL (ref 4.2–5.3)
RBC #/AREA URNS HPF: NEGATIVE /HPF (ref 0–5)
SODIUM SERPL-SCNC: 136 MMOL/L (ref 136–145)
SODIUM SERPL-SCNC: 137 MMOL/L (ref 136–145)
SP GR UR REFRACTOMETRY: 1.02 (ref 1–1.03)
TROPONIN-HIGH SENSITIVITY: 9 NG/L (ref 0–54)
UROBILINOGEN UR QL STRIP.AUTO: 1 EU/DL (ref 0.2–1)
VENTRICULAR RATE, ECG03: 125 BPM
WBC # BLD AUTO: 7.6 K/UL (ref 4.6–13.2)
WBC URNS QL MICRO: ABNORMAL /HPF (ref 0–4)

## 2022-09-12 PROCEDURE — 96374 THER/PROPH/DIAG INJ IV PUSH: CPT

## 2022-09-12 PROCEDURE — 83690 ASSAY OF LIPASE: CPT

## 2022-09-12 PROCEDURE — 93005 ELECTROCARDIOGRAM TRACING: CPT

## 2022-09-12 PROCEDURE — 84484 ASSAY OF TROPONIN QUANT: CPT

## 2022-09-12 PROCEDURE — 83735 ASSAY OF MAGNESIUM: CPT

## 2022-09-12 PROCEDURE — 96375 TX/PRO/DX INJ NEW DRUG ADDON: CPT

## 2022-09-12 PROCEDURE — 80053 COMPREHEN METABOLIC PANEL: CPT

## 2022-09-12 PROCEDURE — 81001 URINALYSIS AUTO W/SCOPE: CPT

## 2022-09-12 PROCEDURE — 71045 X-RAY EXAM CHEST 1 VIEW: CPT

## 2022-09-12 PROCEDURE — 82330 ASSAY OF CALCIUM: CPT

## 2022-09-12 PROCEDURE — 99285 EMERGENCY DEPT VISIT HI MDM: CPT

## 2022-09-12 PROCEDURE — 74011250636 HC RX REV CODE- 250/636: Performed by: STUDENT IN AN ORGANIZED HEALTH CARE EDUCATION/TRAINING PROGRAM

## 2022-09-12 PROCEDURE — 85025 COMPLETE CBC W/AUTO DIFF WBC: CPT

## 2022-09-12 PROCEDURE — 96361 HYDRATE IV INFUSION ADD-ON: CPT

## 2022-09-12 RX ORDER — ONDANSETRON 2 MG/ML
4 INJECTION INTRAMUSCULAR; INTRAVENOUS ONCE
Status: COMPLETED | OUTPATIENT
Start: 2022-09-12 | End: 2022-09-12

## 2022-09-12 RX ORDER — ONDANSETRON 4 MG/1
4 TABLET, FILM COATED ORAL
Qty: 14 TABLET | Refills: 0 | Status: SHIPPED | OUTPATIENT
Start: 2022-09-12

## 2022-09-12 RX ORDER — GABAPENTIN 300 MG/1
CAPSULE ORAL
Qty: 90 CAPSULE | Refills: 1 | Status: SHIPPED | OUTPATIENT
Start: 2022-09-12 | End: 2022-10-31 | Stop reason: SDUPTHER

## 2022-09-12 RX ORDER — METOCLOPRAMIDE HYDROCHLORIDE 5 MG/ML
10 INJECTION INTRAMUSCULAR; INTRAVENOUS
Status: COMPLETED | OUTPATIENT
Start: 2022-09-12 | End: 2022-09-12

## 2022-09-12 RX ADMIN — METOCLOPRAMIDE HYDROCHLORIDE 10 MG: 5 INJECTION INTRAMUSCULAR; INTRAVENOUS at 10:24

## 2022-09-12 RX ADMIN — SODIUM CHLORIDE 1000 ML: 9 INJECTION, SOLUTION INTRAVENOUS at 09:12

## 2022-09-12 RX ADMIN — ONDANSETRON 4 MG: 2 INJECTION INTRAMUSCULAR; INTRAVENOUS at 09:11

## 2022-09-12 RX ADMIN — SODIUM CHLORIDE 1000 ML: 9 INJECTION, SOLUTION INTRAVENOUS at 10:24

## 2022-09-12 NOTE — ED PROVIDER NOTES
EMERGENCY DEPARTMENT HISTORY AND PHYSICAL EXAM      Date: 9/12/2022  Patient Name: Ángel Zaldivar    History of Presenting Illness     Chief Complaint   Patient presents with    Nausea    Vomiting     Pt arrived via EMS for Nausea, vomiting that started yesterday. Hx of abdominal tumor, on chemo. + covid on 9/12       History (Context): Ángel Zaldivar is a 74 y.o. female with a past medical history significant for hypertension, CAD, previous adenocarcinoma status post Whipple's procedure comes into the ED today due to nausea vomiting. Patient states symptoms have been ongoing over the past 48 hours however have worsened since onset. States that she has had decreased p.o. intake and vomits \"every time after I eat. \"  Patient admits to taking antinausea medication prior to arrival without improvement. Denies any fever, chills, chest pain, dyspnea, or abdominal pain. Patient states symptoms are severe in quality. Denies any relieving factors however states exacerbation with p.o. intake. PCP: Jimbo Ayala NP    Current Outpatient Medications   Medication Sig Dispense Refill    ondansetron hcl (Zofran) 4 mg tablet Take 1 Tablet by mouth every eight (8) hours as needed for Nausea. 14 Tablet 0    gabapentin (NEURONTIN) 300 mg capsule TAKE 1 CAPSULE BY MOUTH THREE TIMES DAILY . DO NOT EXCEED 3 PER 24 HOURS FOR  NEUROPATHIC  PAIN  Indications: neuropathic pain 90 Capsule 1    ondansetron (ZOFRAN ODT) 4 mg disintegrating tablet Take 1 Tablet by mouth every eight (8) hours as needed for Nausea or Vomiting for up to 12 doses. 12 Tablet 0    triamcinolone acetonide (KENALOG) 0.1 % ointment Apply  to affected area two (2) times a day. use thin layer 60 g 0    mupirocin (BACTROBAN) 2 % ointment Apply  to affected area daily. Apply to any open areas.  22 g 0    lidocaine (LIDODERM) 5 % USE 1  PATCH TOPICALLY TO AFFECTED AREA EVERY 12 HOURS AND  REMOVE  FOR  12  HOURS  A  DAY 15 Patch 0    pravastatin (PRAVACHOL) 40 mg tablet Take 1 tablet by mouth nightly 90 Tablet 2    nystatin (MYCOSTATIN) powder Apply  to affected area four (4) times daily. 30 g 1    omeprazole (PRILOSEC) 20 mg capsule Take 1 Capsule by mouth Daily (before breakfast). (Patient not taking: Reported on 6/14/2022) 90 Capsule 1    metFORMIN ER (GLUCOPHAGE XR) 500 mg tablet Take 1 Tablet by mouth daily (with dinner). (Patient not taking: Reported on 6/14/2022) 90 Tablet 1    diclofenac EC (VOLTAREN) 75 mg EC tablet Take 1 tab by mouth twice daily as needed. 180 Tablet 1    methocarbamoL (ROBAXIN) 750 mg tablet Take 1 tab by mouth 4 times daily as needed. 120 Tablet 5    fluticasone propionate (FLONASE) 50 mcg/actuation nasal spray 2 Sprays by Both Nostrils route daily as needed for Rhinitis. 16 g 2    diclofenac (Voltaren) 1 % gel Apply 4 grams to right shoulder up to 4 times per day, maximum 16 grams per joint per day. Dispense 5 100 gram tubes 500 g 1    Blood-Glucose Meter (TRUE METRIX GLUCOSE METER) misc Use to check blood sugar 2 times per day. 1 Each 0    glucose blood VI test strips (TRUE METRIX GLUCOSE TEST STRIP) strip Use to check blood sugar 2 times per day. 100 Strip 11    lancets misc Use to check blood sugar 2 times daily 1 Each 11    Lancets misc Check glucose ACHS 200 Each 11    alcohol swabs padm 1 Each by Apply Externally route Before breakfast, lunch, dinner and at bedtime. accucheck ACHS and record in log. Take log to every doctor visit. **MEDICALLY NECESSARY** 100 Pad 2    traZODone (DESYREL) 150 mg tablet Take 150-300 mg by mouth nightly. sertraline (ZOLOFT) 100 mg tablet Take 200 mg by mouth daily. perphenazine (TRILAFON) 4 mg tablet Take 4 mg by mouth three (3) times daily. benztropine (COGENTIN) 1 mg tablet Take 1 mg by mouth daily.          Past History     Past Medical History:   Past Medical History:   Diagnosis Date    CAD (coronary artery disease)     Hypertension     Uterine fibroid        Past Surgical History:  No past surgical history on file. Family History:  Family History   Problem Relation Age of Onset    Diabetes Mother     Stroke Mother     Diabetes Father     Cancer Paternal Grandfather     Breast Cancer Cousin        Social History:   Social History     Tobacco Use    Smoking status: Every Day     Packs/day: 0.25     Years: 2.00     Pack years: 0.50     Types: Cigarettes    Smokeless tobacco: Never   Substance Use Topics    Alcohol use: No     Alcohol/week: 0.8 standard drinks     Types: 1 Cans of beer per week    Drug use: Yes     Types: Marijuana     Comment: rarely        Allergies: Allergies   Allergen Reactions    Latex Rash    Penicillins Nausea and Vomiting    Penicillin Unknown (comments)       PMH, PSH, family history, social history, allergies reviewed with the patient with significant items noted above. Review of Systems   Review of Systems   Constitutional:  Negative for chills and fever. HENT:  Negative for sore throat. Eyes:  Negative for visual disturbance. Respiratory:  Negative for shortness of breath. Cardiovascular:  Negative for chest pain. Gastrointestinal:  Positive for nausea and vomiting. Negative for abdominal pain, constipation and diarrhea. Genitourinary:  Negative for difficulty urinating. Musculoskeletal:  Negative for myalgias. Skin:  Negative for rash. Neurological:  Negative for headaches. Physical Exam     Vitals:    09/12/22 0856   BP: (!) 128/90   Pulse: (!) 126   Resp: 18   Temp: 98.3 °F (36.8 °C)   SpO2: 97%   Weight: 59.9 kg (132 lb)   Height: 5' 5\" (1.651 m)       Physical Exam  Vitals and nursing note reviewed. Constitutional:       General: She is not in acute distress. Appearance: Normal appearance. She is not ill-appearing or toxic-appearing. HENT:      Head: Normocephalic and atraumatic. Mouth/Throat:      Mouth: Mucous membranes are moist.   Eyes:      General: No scleral icterus.      Conjunctiva/sclera: Conjunctivae normal. Cardiovascular:      Rate and Rhythm: Regular rhythm. Tachycardia present. Comments: Normal peripheral perfusion  Pulmonary:      Effort: Pulmonary effort is normal. No respiratory distress. Abdominal:      General: There is no distension. Palpations: Abdomen is soft. Tenderness: There is no abdominal tenderness. There is no guarding or rebound. Musculoskeletal:         General: No deformity. Normal range of motion. Cervical back: Normal range of motion and neck supple. Skin:     General: Skin is warm and dry. Findings: No rash. Neurological:      General: No focal deficit present. Mental Status: She is alert and oriented to person, place, and time. Mental status is at baseline. Psychiatric:         Mood and Affect: Mood normal.         Thought Content:  Thought content normal.       Diagnostic Study Results     Labs -     Recent Results (from the past 12 hour(s))   EKG, 12 LEAD, INITIAL    Collection Time: 09/12/22  8:50 AM   Result Value Ref Range    Ventricular Rate 125 BPM    Atrial Rate 125 BPM    P-R Interval 132 ms    QRS Duration 86 ms    Q-T Interval 340 ms    QTC Calculation (Bezet) 490 ms    Calculated P Axis 55 degrees    Calculated R Axis 48 degrees    Calculated T Axis 47 degrees    Diagnosis       Sinus tachycardia  Possible Left atrial enlargement  Nonspecific ST abnormality  Abnormal ECG    Confirmed by Nancy Dickson MD, Job Cords ([id number]) on 9/12/2022 9:37:51 AM     CBC WITH AUTOMATED DIFF    Collection Time: 09/12/22  9:00 AM   Result Value Ref Range    WBC 7.6 4.6 - 13.2 K/uL    RBC 4.89 4.20 - 5.30 M/uL    HGB 13.7 12.0 - 16.0 g/dL    HCT 42.2 35.0 - 45.0 %    MCV 86.3 78.0 - 100.0 FL    MCH 28.0 24.0 - 34.0 PG    MCHC 32.5 31.0 - 37.0 g/dL    RDW 15.9 (H) 11.6 - 14.5 %    PLATELET 132 (H) 286 - 420 K/uL    MPV 10.9 9.2 - 11.8 FL    NRBC 0.0 0  WBC    ABSOLUTE NRBC 0.00 0.00 - 0.01 K/uL    NEUTROPHILS 57 40 - 73 %    LYMPHOCYTES 24 21 - 52 %    MONOCYTES 17 (H) 3 - 10 %    EOSINOPHILS 0 0 - 5 %    BASOPHILS 1 0 - 2 %    IMMATURE GRANULOCYTES 0 0.0 - 0.5 %    ABS. NEUTROPHILS 4.4 1.8 - 8.0 K/UL    ABS. LYMPHOCYTES 1.8 0.9 - 3.6 K/UL    ABS. MONOCYTES 1.3 (H) 0.05 - 1.2 K/UL    ABS. EOSINOPHILS 0.0 0.0 - 0.4 K/UL    ABS. BASOPHILS 0.0 0.0 - 0.1 K/UL    ABS. IMM. GRANS. 0.0 0.00 - 0.04 K/UL    DF AUTOMATED     METABOLIC PANEL, COMPREHENSIVE    Collection Time: 09/12/22  9:00 AM   Result Value Ref Range    Sodium 136 136 - 145 mmol/L    Potassium 3.9 3.5 - 5.5 mmol/L    Chloride 94 (L) 100 - 111 mmol/L    CO2 33 (H) 21 - 32 mmol/L    Anion gap 9 3.0 - 18 mmol/L    Glucose 157 (H) 74 - 99 mg/dL    BUN 25 (H) 7.0 - 18 MG/DL    Creatinine 1.19 0.6 - 1.3 MG/DL    BUN/Creatinine ratio 21 (H) 12 - 20      GFR est AA 55 (L) >60 ml/min/1.73m2    GFR est non-AA 46 (L) >60 ml/min/1.73m2    Calcium 11.2 (H) 8.5 - 10.1 MG/DL    Bilirubin, total 0.3 0.2 - 1.0 MG/DL    ALT (SGPT) 58 (H) 13 - 56 U/L    AST (SGOT) 33 10 - 38 U/L    Alk.  phosphatase 94 45 - 117 U/L    Protein, total 9.6 (H) 6.4 - 8.2 g/dL    Albumin 4.6 3.4 - 5.0 g/dL    Globulin 5.0 (H) 2.0 - 4.0 g/dL    A-G Ratio 0.9 0.8 - 1.7     LIPASE    Collection Time: 09/12/22  9:00 AM   Result Value Ref Range    Lipase 220 73 - 393 U/L   TROPONIN-HIGH SENSITIVITY    Collection Time: 09/12/22  9:00 AM   Result Value Ref Range    Troponin-High Sensitivity 9 0 - 54 ng/L   MAGNESIUM    Collection Time: 09/12/22  9:00 AM   Result Value Ref Range    Magnesium 2.5 1.6 - 2.6 mg/dL   URINALYSIS W/ RFLX MICROSCOPIC    Collection Time: 09/12/22 10:20 AM   Result Value Ref Range    Color DARK YELLOW      Appearance CLOUDY      Specific gravity 1.024 1.005 - 1.030      pH (UA) 7.0 5.0 - 8.0      Protein 30 (A) NEG mg/dL    Glucose Negative NEG mg/dL    Ketone TRACE (A) NEG mg/dL    Bilirubin Negative NEG      Blood Negative NEG      Urobilinogen 1.0 0.2 - 1.0 EU/dL    Nitrites Negative NEG      Leukocyte Esterase TRACE (A) NEG     URINE MICROSCOPIC ONLY    Collection Time: 09/12/22 10:20 AM   Result Value Ref Range    WBC 4 to 10 0 - 4 /hpf    RBC Negative 0 - 5 /hpf    Epithelial cells 1+ 0 - 5 /lpf    Bacteria 2+ (A) NEG /hpf    Hyaline cast TOO NUMEROUS TO COUNT 0 - 2 /lpf   CALCIUM, IONIZED    Collection Time: 09/12/22 11:23 AM   Result Value Ref Range    Ionized Calcium 0.96 (L) 1.15 - 2.38 MMOL/L   METABOLIC PANEL, BASIC    Collection Time: 09/12/22  1:06 PM   Result Value Ref Range    Sodium 137 136 - 145 mmol/L    Potassium 3.8 3.5 - 5.5 mmol/L    Chloride 103 100 - 111 mmol/L    CO2 29 21 - 32 mmol/L    Anion gap 5 3.0 - 18 mmol/L    Glucose 132 (H) 74 - 99 mg/dL    BUN 21 (H) 7.0 - 18 MG/DL    Creatinine 0.96 0.6 - 1.3 MG/DL    BUN/Creatinine ratio 22 (H) 12 - 20      GFR est AA >60 >60 ml/min/1.73m2    GFR est non-AA 58 (L) >60 ml/min/1.73m2    Calcium 9.1 8.5 - 10.1 MG/DL      Labs Reviewed   CBC WITH AUTOMATED DIFF - Abnormal; Notable for the following components:       Result Value    RDW 15.9 (*)     PLATELET 824 (*)     MONOCYTES 17 (*)     ABS.  MONOCYTES 1.3 (*)     All other components within normal limits   METABOLIC PANEL, COMPREHENSIVE - Abnormal; Notable for the following components:    Chloride 94 (*)     CO2 33 (*)     Glucose 157 (*)     BUN 25 (*)     BUN/Creatinine ratio 21 (*)     GFR est AA 55 (*)     GFR est non-AA 46 (*)     Calcium 11.2 (*)     ALT (SGPT) 58 (*)     Protein, total 9.6 (*)     Globulin 5.0 (*)     All other components within normal limits   URINALYSIS W/ RFLX MICROSCOPIC - Abnormal; Notable for the following components:    Protein 30 (*)     Ketone TRACE (*)     Leukocyte Esterase TRACE (*)     All other components within normal limits   URINE MICROSCOPIC ONLY - Abnormal; Notable for the following components:    Bacteria 2+ (*)     All other components within normal limits   CALCIUM, IONIZED - Abnormal; Notable for the following components:    Ionized Calcium 0.96 (*)     All other components within normal limits   METABOLIC PANEL, BASIC - Abnormal; Notable for the following components:    Glucose 132 (*)     BUN 21 (*)     BUN/Creatinine ratio 22 (*)     GFR est non-AA 58 (*)     All other components within normal limits   LIPASE   TROPONIN-HIGH SENSITIVITY   MAGNESIUM       Radiologic Studies -   XR CHEST PORT   Final Result   No radiographic evidence of acute cardiopulmonary process. CT Results  (Last 48 hours)      None          CXR Results  (Last 48 hours)                 09/12/22 0927  XR CHEST PORT Final result    Impression:  No radiographic evidence of acute cardiopulmonary process. Narrative:  EXAM: XR CHEST PORT       CLINICAL INDICATION/HISTORY: 74 years Female. chest pain. Additional History: Nausea and vomiting. TECHNIQUE: Frontal view of the chest       COMPARISON: Chest radiograph 1/5/2020. FINDINGS:       Right sided MediPort with tip at the level of the inferior SVC. Small linear   density at the lateral left midlung zone, unchanged, likely scar. The cardiac   silhouette appears within normal limits. Pulmonary vasculature appears within   normal limits. No confluent airspace opacity is appreciated. No definite evidence   of pleural effusion or pneumothorax. No acute osseous abnormality appreciated. The laboratory results, imaging results, and other diagnostic exams were reviewed in the EMR. Medical Decision Making   I am the first provider for this patient. I reviewed the vital signs, available nursing notes, past medical history, past surgical history, family history and social history. Vital Signs-Reviewed the patient's vital signs.        Records Reviewed: Personally, on initial evaluation    MDM:   Job Davidson presents with complaint of nausea and vomiting  DDX includes but is not limited to: Dehydration, DAWSON, COVID, chemotherapy side effect    Patient over well-appearing, no acute distress, vital signs gross within normal limits exception to tachycardia. Will obtain lab work for further evaluation of patients complaint. Will continue to monitor and evaluate patient while in the ED. Orders as below:  Orders Placed This Encounter    XR CHEST PORT    CBC WITH AUTOMATED DIFF    METABOLIC PANEL, COMPREHENSIVE    LIPASE    URINALYSIS W/ RFLX MICROSCOPIC    TROPONIN-HIGH SENSITIVITY    MAGNESIUM    URINE MICROSCOPIC ONLY    CALCIUM, IONIZED    BASIC METABOLIC PANEL    EKG, 12 LEAD, INITIAL    ondansetron (ZOFRAN) injection 4 mg    sodium chloride 0.9 % bolus infusion 1,000 mL    metoclopramide HCl (REGLAN) injection 10 mg    sodium chloride 0.9 % bolus infusion 1,000 mL    ondansetron hcl (Zofran) 4 mg tablet        ED Course:   ED Course as of 09/12/22 1814   Mon Sep 12, 2022   1105 Patient's chest x-ray does not show any acute cardiopulmonary abnormalities. Lab work significant for calcium 11.2 and therefore will obtain ionized calcium, chloride 94, bicarb 33, otherwise labs grossly within normal limits. We will continue to monitor patient. [DV]   8092 Patient's ionized calcium 0.96. Will consult hematology/oncology for further recommendation. [DV]   26 Spoke with oncology who recommended obtaining repeat BMP as IV fluids may be etiology for her ionized calcium being low when compared to the BMP that was obtained prior to IV fluids initiation. We will continue to monitor patient. [DV]   6033 Patient's calcium now 9.1 on repeat BMP. Patient's heart rate normalized now in the 80s. Will discharge patient home with strict return precautions and follow-up recommendations. Patient was able to  tolerate p.o. intake. Patient verbalized understanding is without any further questions.  [DV]      ED Course User Index  [DV] Manolo Case DO           Procedures:  Procedures        Diagnosis and Disposition     CLINICAL IMPRESSION:  1. Nausea and vomiting, unspecified vomiting type      Discharge Medication List as of 9/12/2022  1:55 PM START taking these medications    Details   ondansetron hcl (Zofran) 4 mg tablet Take 1 Tablet by mouth every eight (8) hours as needed for Nausea., Normal, Disp-14 Tablet, R-0           CONTINUE these medications which have NOT CHANGED    Details   gabapentin (NEURONTIN) 300 mg capsule TAKE 1 CAPSULE BY MOUTH THREE TIMES DAILY . DO NOT EXCEED 3 PER 24 HOURS FOR  NEUROPATHIC  PAIN  Indications: neuropathic pain, Normal, Disp-90 Capsule, R-1      ondansetron (ZOFRAN ODT) 4 mg disintegrating tablet Take 1 Tablet by mouth every eight (8) hours as needed for Nausea or Vomiting for up to 12 doses. , Normal, Disp-12 Tablet, R-0      triamcinolone acetonide (KENALOG) 0.1 % ointment Apply  to affected area two (2) times a day. use thin layer, Normal, Disp-60 g, R-0      mupirocin (BACTROBAN) 2 % ointment Apply  to affected area daily. Apply to any open areas., Normal, Disp-22 g, R-0      lidocaine (LIDODERM) 5 % USE 1  PATCH TOPICALLY TO AFFECTED AREA EVERY 12 HOURS AND  REMOVE  FOR  12  HOURS  A  DAY, Normal, Disp-15 Patch, R-0      pravastatin (PRAVACHOL) 40 mg tablet Take 1 tablet by mouth nightly, Normal, Disp-90 Tablet, R-2      nystatin (MYCOSTATIN) powder Apply  to affected area four (4) times daily. , Normal, Disp-30 g, R-1      omeprazole (PRILOSEC) 20 mg capsule Take 1 Capsule by mouth Daily (before breakfast). , Normal, Disp-90 Capsule, R-1      metFORMIN ER (GLUCOPHAGE XR) 500 mg tablet Take 1 Tablet by mouth daily (with dinner). , Normal, Disp-90 Tablet, R-1      diclofenac EC (VOLTAREN) 75 mg EC tablet Take 1 tab by mouth twice daily as needed., Normal, Disp-180 Tablet, R-1      methocarbamoL (ROBAXIN) 750 mg tablet Take 1 tab by mouth 4 times daily as needed., Normal, Disp-120 Tablet, R-5      fluticasone propionate (FLONASE) 50 mcg/actuation nasal spray 2 Sprays by Both Nostrils route daily as needed for Rhinitis., Normal, Disp-16 g, R-2      diclofenac (Voltaren) 1 % gel Apply 4 grams to right shoulder up to 4 times per day, maximum 16 grams per joint per day. Dispense 5 100 gram tubes, Normal, Disp-500 g,R-1      Blood-Glucose Meter (TRUE METRIX GLUCOSE METER) misc Use to check blood sugar 2 times per day., Normal, Disp-1 Each, R-0      glucose blood VI test strips (TRUE METRIX GLUCOSE TEST STRIP) strip Use to check blood sugar 2 times per day., Normal, Disp-100 Strip, R-11      !! lancets misc Use to check blood sugar 2 times daily, Normal, Disp-1 Each, R-11      !! Lancets misc Check glucose ACHS, Normal, Disp-200 Each, R-11      alcohol swabs padm 1 Each by Apply Externally route Before breakfast, lunch, dinner and at bedtime. accucheck ACHS and record in log. Take log to every doctor visit. **MEDICALLY NECESSARY**, Print, Disp-100 Pad, R-2      traZODone (DESYREL) 150 mg tablet Take 150-300 mg by mouth nightly., Historical Med      sertraline (ZOLOFT) 100 mg tablet Take 200 mg by mouth daily. , Historical Med      perphenazine (TRILAFON) 4 mg tablet Take 4 mg by mouth three (3) times daily. , Historical Med      benztropine (COGENTIN) 1 mg tablet Take 1 mg by mouth daily. , Historical Med       !! - Potential duplicate medications found. Please discuss with provider. Disposition: Home    Patient condition at time of disposition: Stable    DISCHARGE NOTE:   Pt has been reexamined. Patient has no new complaints, changes, or physical findings. Care plan outlined and precautions discussed. Results were reviewed with the patient. All medications were reviewed with the patient. All of pt's questions and concerns were addressed. Alarm symptoms and return precautions associated with chief complaint and evaluation were reviewed with the patient in detail. The patient demonstrated adequate understanding. Patient was instructed to follow up with Oncology, as well as strict return precautions to the ED upon further deterioration. Patient is ready to go home.   The patient is happy with this plan        Dragon Disclaimer     Please note that this dictation was completed with Clar[employer], the computer voice recognition software. Quite often unanticipated grammatical, syntax, homophones, and other interpretive errors are inadvertently transcribed by the computer software. Please disregard these errors. Please excuse any errors that have escaped final proofreading. Brody ROSARIO.

## 2022-09-15 NOTE — TELEPHONE ENCOUNTER
I called and spoke to MsNeil Jose. The pt was identified using 2 pt identifiers. She was made aware that she will have to wait until her appt with Dr. Tacho Dasilva to discuss the new muscle relaxer. I encouraged to the pt to contact the office periodically to see if there have been any cancellations with appts. We may be able to get her in earlier than 10/31. She verbalized understanding and has no questions or concerns at this time.

## 2022-10-17 ENCOUNTER — APPOINTMENT (OUTPATIENT)
Dept: GENERAL RADIOLOGY | Age: 67
DRG: 375 | End: 2022-10-17
Attending: PHYSICIAN ASSISTANT
Payer: MEDICARE

## 2022-10-17 ENCOUNTER — HOSPITAL ENCOUNTER (INPATIENT)
Age: 67
LOS: 2 days | Discharge: ANOTHER HOSPITAL | DRG: 375 | End: 2022-10-19
Attending: EMERGENCY MEDICINE | Admitting: HOSPITALIST
Payer: MEDICARE

## 2022-10-17 ENCOUNTER — APPOINTMENT (OUTPATIENT)
Dept: CT IMAGING | Age: 67
DRG: 375 | End: 2022-10-17
Attending: PHYSICIAN ASSISTANT
Payer: MEDICARE

## 2022-10-17 DIAGNOSIS — E87.1 HYPONATREMIA: ICD-10-CM

## 2022-10-17 DIAGNOSIS — E86.0 DEHYDRATION: ICD-10-CM

## 2022-10-17 DIAGNOSIS — C17.0 ADENOCARCINOMA OF DUODENUM (HCC): ICD-10-CM

## 2022-10-17 DIAGNOSIS — R79.89 ELEVATED LFTS: ICD-10-CM

## 2022-10-17 DIAGNOSIS — N17.9 AKI (ACUTE KIDNEY INJURY) (HCC): Primary | ICD-10-CM

## 2022-10-17 LAB
ALBUMIN SERPL-MCNC: 3.9 G/DL (ref 3.4–5)
ALBUMIN/GLOB SERPL: 0.9 {RATIO} (ref 0.8–1.7)
ALP SERPL-CCNC: 102 U/L (ref 45–117)
ALT SERPL-CCNC: 402 U/L (ref 13–56)
ANION GAP SERPL CALC-SCNC: 11 MMOL/L (ref 3–18)
AST SERPL-CCNC: 182 U/L (ref 10–38)
ATRIAL RATE: 115 BPM
BASOPHILS # BLD: 0 K/UL (ref 0–0.1)
BASOPHILS NFR BLD: 0 % (ref 0–2)
BILIRUB SERPL-MCNC: 0.7 MG/DL (ref 0.2–1)
BUN SERPL-MCNC: 70 MG/DL (ref 7–18)
BUN/CREAT SERPL: 33 (ref 12–20)
CALCIUM SERPL-MCNC: 9 MG/DL (ref 8.5–10.1)
CALCULATED P AXIS, ECG09: 64 DEGREES
CALCULATED R AXIS, ECG10: 80 DEGREES
CALCULATED T AXIS, ECG11: 55 DEGREES
CHLORIDE SERPL-SCNC: 86 MMOL/L (ref 100–111)
CO2 SERPL-SCNC: 28 MMOL/L (ref 21–32)
CREAT SERPL-MCNC: 2.14 MG/DL (ref 0.6–1.3)
DIAGNOSIS, 93000: NORMAL
DIFFERENTIAL METHOD BLD: ABNORMAL
EOSINOPHIL # BLD: 0 K/UL (ref 0–0.4)
EOSINOPHIL NFR BLD: 0 % (ref 0–5)
ERYTHROCYTE [DISTWIDTH] IN BLOOD BY AUTOMATED COUNT: 16.4 % (ref 11.6–14.5)
FLUAV RNA SPEC QL NAA+PROBE: NOT DETECTED
FLUBV RNA SPEC QL NAA+PROBE: NOT DETECTED
GLOBULIN SER CALC-MCNC: 4.2 G/DL (ref 2–4)
GLUCOSE SERPL-MCNC: 145 MG/DL (ref 74–99)
HCT VFR BLD AUTO: 35.9 % (ref 35–45)
HGB BLD-MCNC: 12.5 G/DL (ref 12–16)
IMM GRANULOCYTES # BLD AUTO: 0 K/UL (ref 0–0.04)
IMM GRANULOCYTES NFR BLD AUTO: 0 % (ref 0–0.5)
LIPASE SERPL-CCNC: 131 U/L (ref 73–393)
LYMPHOCYTES # BLD: 2.7 K/UL (ref 0.9–3.6)
LYMPHOCYTES NFR BLD: 37 % (ref 21–52)
MAGNESIUM SERPL-MCNC: 2.2 MG/DL (ref 1.6–2.6)
MCH RBC QN AUTO: 28.3 PG (ref 24–34)
MCHC RBC AUTO-ENTMCNC: 34.8 G/DL (ref 31–37)
MCV RBC AUTO: 81.4 FL (ref 78–100)
MONOCYTES # BLD: 1.5 K/UL (ref 0.05–1.2)
MONOCYTES NFR BLD: 20 % (ref 3–10)
NEUTS SEG # BLD: 3.2 K/UL (ref 1.8–8)
NEUTS SEG NFR BLD: 43 % (ref 40–73)
NRBC # BLD: 0.02 K/UL (ref 0–0.01)
NRBC BLD-RTO: 0.3 PER 100 WBC
P-R INTERVAL, ECG05: 126 MS
PLATELET # BLD AUTO: 306 K/UL (ref 135–420)
PLATELET COMMENTS,PCOM: ABNORMAL
PMV BLD AUTO: 10.3 FL (ref 9.2–11.8)
POTASSIUM SERPL-SCNC: 3.6 MMOL/L (ref 3.5–5.5)
PROT SERPL-MCNC: 8.1 G/DL (ref 6.4–8.2)
Q-T INTERVAL, ECG07: 382 MS
QRS DURATION, ECG06: 90 MS
QTC CALCULATION (BEZET), ECG08: 528 MS
RBC # BLD AUTO: 4.41 M/UL (ref 4.2–5.3)
RBC MORPH BLD: ABNORMAL
SARS-COV-2, COV2: NOT DETECTED
SODIUM SERPL-SCNC: 125 MMOL/L (ref 136–145)
TROPONIN-HIGH SENSITIVITY: 47 NG/L (ref 0–54)
VENTRICULAR RATE, ECG03: 115 BPM
WBC # BLD AUTO: 7.4 K/UL (ref 4.6–13.2)

## 2022-10-17 PROCEDURE — 83690 ASSAY OF LIPASE: CPT

## 2022-10-17 PROCEDURE — 74011250636 HC RX REV CODE- 250/636: Performed by: PHYSICIAN ASSISTANT

## 2022-10-17 PROCEDURE — 80053 COMPREHEN METABOLIC PANEL: CPT

## 2022-10-17 PROCEDURE — 65270000046 HC RM TELEMETRY

## 2022-10-17 PROCEDURE — 85025 COMPLETE CBC W/AUTO DIFF WBC: CPT

## 2022-10-17 PROCEDURE — 74011250636 HC RX REV CODE- 250/636: Performed by: EMERGENCY MEDICINE

## 2022-10-17 PROCEDURE — G0378 HOSPITAL OBSERVATION PER HR: HCPCS

## 2022-10-17 PROCEDURE — 84484 ASSAY OF TROPONIN QUANT: CPT

## 2022-10-17 PROCEDURE — 96374 THER/PROPH/DIAG INJ IV PUSH: CPT

## 2022-10-17 PROCEDURE — 93005 ELECTROCARDIOGRAM TRACING: CPT

## 2022-10-17 PROCEDURE — 71045 X-RAY EXAM CHEST 1 VIEW: CPT

## 2022-10-17 PROCEDURE — 99285 EMERGENCY DEPT VISIT HI MDM: CPT

## 2022-10-17 PROCEDURE — 83735 ASSAY OF MAGNESIUM: CPT

## 2022-10-17 PROCEDURE — 96375 TX/PRO/DX INJ NEW DRUG ADDON: CPT

## 2022-10-17 PROCEDURE — 87636 SARSCOV2 & INF A&B AMP PRB: CPT

## 2022-10-17 PROCEDURE — 96360 HYDRATION IV INFUSION INIT: CPT

## 2022-10-17 PROCEDURE — 74176 CT ABD & PELVIS W/O CONTRAST: CPT

## 2022-10-17 RX ORDER — ONDANSETRON 2 MG/ML
4 INJECTION INTRAMUSCULAR; INTRAVENOUS
Status: COMPLETED | OUTPATIENT
Start: 2022-10-17 | End: 2022-10-17

## 2022-10-17 RX ORDER — MORPHINE SULFATE 4 MG/ML
4 INJECTION INTRAVENOUS
Status: COMPLETED | OUTPATIENT
Start: 2022-10-17 | End: 2022-10-17

## 2022-10-17 RX ORDER — SODIUM CHLORIDE, SODIUM LACTATE, POTASSIUM CHLORIDE, CALCIUM CHLORIDE 600; 310; 30; 20 MG/100ML; MG/100ML; MG/100ML; MG/100ML
75 INJECTION, SOLUTION INTRAVENOUS CONTINUOUS
Status: DISCONTINUED | OUTPATIENT
Start: 2022-10-17 | End: 2022-10-18

## 2022-10-17 RX ADMIN — SODIUM CHLORIDE 1000 ML: 9 INJECTION, SOLUTION INTRAVENOUS at 15:05

## 2022-10-17 RX ADMIN — SODIUM CHLORIDE, POTASSIUM CHLORIDE, SODIUM LACTATE AND CALCIUM CHLORIDE 75 ML/HR: 600; 310; 30; 20 INJECTION, SOLUTION INTRAVENOUS at 23:05

## 2022-10-17 RX ADMIN — MORPHINE SULFATE 4 MG: 4 INJECTION INTRAVENOUS at 17:44

## 2022-10-17 RX ADMIN — ONDANSETRON 4 MG: 2 INJECTION INTRAMUSCULAR; INTRAVENOUS at 17:44

## 2022-10-17 NOTE — ED PROVIDER NOTES
EMERGENCY DEPARTMENT HISTORY AND PHYSICAL EXAM    Date: 10/17/2022  Patient Name: Princess Cloud    History of Presenting Illness         History Provided By: Patient     Chief Complaint: Nausea, vomiting, diarrhea, dehydration   Duration: Couple days   Timing:  gradual   Location: N/A   Quality: dehydrated   Severity: moderate   Modifying Factors: worse after multiple days of diarrhea    Associated Symptoms: none       Additional History (Context): Princess Cloud is a 74 y.o. female with a history of CAD and hypertension who presents today for issues as above. Patient reports she has currently undergoing chemo for an abdominal mass. Reports she has a chemo treatment every other week. Reports concerns for nausea, vomiting and diarrhea. Reports she has not been able to keep her pain medication down. Reports she has not been able to control her pain while at home. Denies any foul odor or recent antibiotic use. Denies any known sick contacts or recent travel. Has not tried thing for the symptoms at home. PCP: Tamara Arambula NP    Current Facility-Administered Medications   Medication Dose Route Frequency Provider Last Rate Last Admin    morphine injection 4 mg  4 mg IntraVENous NOW Pat Arreola PA        ondansetron (ZOFRAN) injection 4 mg  4 mg IntraVENous NOW Pat Arreola PA         Current Outpatient Medications   Medication Sig Dispense Refill    ondansetron hcl (Zofran) 4 mg tablet Take 1 Tablet by mouth every eight (8) hours as needed for Nausea. 14 Tablet 0    gabapentin (NEURONTIN) 300 mg capsule TAKE 1 CAPSULE BY MOUTH THREE TIMES DAILY . DO NOT EXCEED 3 PER 24 HOURS FOR  NEUROPATHIC  PAIN  Indications: neuropathic pain 90 Capsule 1    ondansetron (ZOFRAN ODT) 4 mg disintegrating tablet Take 1 Tablet by mouth every eight (8) hours as needed for Nausea or Vomiting for up to 12 doses.  12 Tablet 0    triamcinolone acetonide (KENALOG) 0.1 % ointment Apply  to affected area two (2) times a day. use thin layer 60 g 0    mupirocin (BACTROBAN) 2 % ointment Apply  to affected area daily. Apply to any open areas. 22 g 0    lidocaine (LIDODERM) 5 % USE 1  PATCH TOPICALLY TO AFFECTED AREA EVERY 12 HOURS AND  REMOVE  FOR  12  HOURS  A  DAY 15 Patch 0    pravastatin (PRAVACHOL) 40 mg tablet Take 1 tablet by mouth nightly 90 Tablet 2    nystatin (MYCOSTATIN) powder Apply  to affected area four (4) times daily. 30 g 1    omeprazole (PRILOSEC) 20 mg capsule Take 1 Capsule by mouth Daily (before breakfast). (Patient not taking: Reported on 6/14/2022) 90 Capsule 1    metFORMIN ER (GLUCOPHAGE XR) 500 mg tablet Take 1 Tablet by mouth daily (with dinner). (Patient not taking: Reported on 6/14/2022) 90 Tablet 1    diclofenac EC (VOLTAREN) 75 mg EC tablet Take 1 tab by mouth twice daily as needed. 180 Tablet 1    methocarbamoL (ROBAXIN) 750 mg tablet Take 1 tab by mouth 4 times daily as needed. 120 Tablet 5    fluticasone propionate (FLONASE) 50 mcg/actuation nasal spray 2 Sprays by Both Nostrils route daily as needed for Rhinitis. 16 g 2    diclofenac (Voltaren) 1 % gel Apply 4 grams to right shoulder up to 4 times per day, maximum 16 grams per joint per day. Dispense 5 100 gram tubes 500 g 1    Blood-Glucose Meter (TRUE METRIX GLUCOSE METER) misc Use to check blood sugar 2 times per day. 1 Each 0    glucose blood VI test strips (TRUE METRIX GLUCOSE TEST STRIP) strip Use to check blood sugar 2 times per day. 100 Strip 11    lancets misc Use to check blood sugar 2 times daily 1 Each 11    Lancets misc Check glucose ACHS 200 Each 11    alcohol swabs padm 1 Each by Apply Externally route Before breakfast, lunch, dinner and at bedtime. accucheck ACHS and record in log. Take log to every doctor visit. **MEDICALLY NECESSARY** 100 Pad 2    traZODone (DESYREL) 150 mg tablet Take 150-300 mg by mouth nightly. sertraline (ZOLOFT) 100 mg tablet Take 200 mg by mouth daily.       perphenazine (TRILAFON) 4 mg tablet Take 4 mg by mouth three (3) times daily. benztropine (COGENTIN) 1 mg tablet Take 1 mg by mouth daily. Past History     Past Medical History:  Past Medical History:   Diagnosis Date    CAD (coronary artery disease)     Hypertension     Uterine fibroid        Past Surgical History:  No past surgical history on file. Family History:  Family History   Problem Relation Age of Onset    Diabetes Mother     Stroke Mother     Diabetes Father     Cancer Paternal Grandfather     Breast Cancer Cousin        Social History:  Social History     Tobacco Use    Smoking status: Every Day     Packs/day: 0.25     Years: 2.00     Pack years: 0.50     Types: Cigarettes    Smokeless tobacco: Never   Substance Use Topics    Alcohol use: No     Alcohol/week: 0.8 standard drinks     Types: 1 Cans of beer per week    Drug use: Yes     Types: Marijuana     Comment: rarely        Allergies: Allergies   Allergen Reactions    Latex Rash    Penicillins Nausea and Vomiting    Penicillin Unknown (comments)         Review of Systems   Review of Systems   Constitutional:  Negative for chills and fever. HENT:  Negative for congestion, rhinorrhea and sore throat. Respiratory:  Negative for cough and shortness of breath. Cardiovascular:  Negative for chest pain. Gastrointestinal:  Positive for diarrhea, nausea and vomiting. Negative for abdominal pain, blood in stool and constipation. Genitourinary:  Negative for dysuria, frequency and hematuria. Musculoskeletal:  Negative for back pain and myalgias. Skin:  Negative for rash and wound. Neurological:  Negative for dizziness and headaches. All other systems reviewed and are negative. All Other Systems Negative  Physical Exam     Vitals:    10/17/22 1446 10/17/22 1729   BP: (!) 142/88    Pulse: (!) 116 (!) 111   Resp: 18 19   Temp: 98.3 °F (36.8 °C)    SpO2: 98% 99%   Weight: 60.8 kg (134 lb)    Height: 5' 5\" (1.651 m)      Physical Exam  Vitals and nursing note reviewed. Constitutional:       General: She is not in acute distress. Appearance: She is well-developed. She is not diaphoretic. HENT:      Head: Normocephalic and atraumatic. Mouth/Throat:      Mouth: Mucous membranes are moist.   Eyes:      Conjunctiva/sclera: Conjunctivae normal.   Cardiovascular:      Rate and Rhythm: Regular rhythm. Tachycardia present. Pulses: No decreased pulses. Heart sounds: Normal heart sounds. Pulmonary:      Effort: Pulmonary effort is normal. No respiratory distress. Breath sounds: Normal breath sounds. Chest:      Chest wall: No tenderness. Abdominal:      General: Bowel sounds are normal. There is no distension. Palpations: Abdomen is soft. Tenderness: There is no abdominal tenderness. There is no guarding or rebound. Musculoskeletal:         General: No deformity. Cervical back: Normal range of motion and neck supple. Skin:     General: Skin is warm and dry. Neurological:      Mental Status: She is alert and oriented to person, place, and time. Deep Tendon Reflexes: Reflexes are normal and symmetric. Diagnostic Study Results     Labs -     Recent Results (from the past 12 hour(s))   CBC WITH AUTOMATED DIFF    Collection Time: 10/17/22  2:35 PM   Result Value Ref Range    WBC 7.4 4.6 - 13.2 K/uL    RBC 4.41 4.20 - 5.30 M/uL    HGB 12.5 12.0 - 16.0 g/dL    HCT 35.9 35.0 - 45.0 %    MCV 81.4 78.0 - 100.0 FL    MCH 28.3 24.0 - 34.0 PG    MCHC 34.8 31.0 - 37.0 g/dL    RDW 16.4 (H) 11.6 - 14.5 %    PLATELET 141 643 - 738 K/uL    MPV 10.3 9.2 - 11.8 FL    NRBC 0.3 (H) 0  WBC    ABSOLUTE NRBC 0.02 (H) 0.00 - 0.01 K/uL    NEUTROPHILS 43 40 - 73 %    LYMPHOCYTES 37 21 - 52 %    MONOCYTES 20 (H) 3 - 10 %    EOSINOPHILS 0 0 - 5 %    BASOPHILS 0 0 - 2 %    IMMATURE GRANULOCYTES 0 0.0 - 0.5 %    ABS. NEUTROPHILS 3.2 1.8 - 8.0 K/UL    ABS. LYMPHOCYTES 2.7 0.9 - 3.6 K/UL    ABS. MONOCYTES 1.5 (H) 0.05 - 1.2 K/UL    ABS.  EOSINOPHILS 0.0 0.0 - 0.4 K/UL    ABS. BASOPHILS 0.0 0.0 - 0.1 K/UL    ABS. IMM. GRANS. 0.0 0.00 - 0.04 K/UL    DF MANUAL      PLATELET COMMENTS ADEQUATE PLATELETS      RBC COMMENTS NORMOCYTIC, NORMOCHROMIC     METABOLIC PANEL, COMPREHENSIVE    Collection Time: 10/17/22  2:35 PM   Result Value Ref Range    Sodium 125 (L) 136 - 145 mmol/L    Potassium 3.6 3.5 - 5.5 mmol/L    Chloride 86 (L) 100 - 111 mmol/L    CO2 28 21 - 32 mmol/L    Anion gap 11 3.0 - 18 mmol/L    Glucose 145 (H) 74 - 99 mg/dL    BUN 70 (H) 7.0 - 18 MG/DL    Creatinine 2.14 (H) 0.6 - 1.3 MG/DL    BUN/Creatinine ratio 33 (H) 12 - 20      eGFR 25 (L) >60 ml/min/1.73m2    Calcium 9.0 8.5 - 10.1 MG/DL    Bilirubin, total 0.7 0.2 - 1.0 MG/DL    ALT (SGPT) 402 (H) 13 - 56 U/L    AST (SGOT) 182 (H) 10 - 38 U/L    Alk.  phosphatase 102 45 - 117 U/L    Protein, total 8.1 6.4 - 8.2 g/dL    Albumin 3.9 3.4 - 5.0 g/dL    Globulin 4.2 (H) 2.0 - 4.0 g/dL    A-G Ratio 0.9 0.8 - 1.7     LIPASE    Collection Time: 10/17/22  2:35 PM   Result Value Ref Range    Lipase 131 73 - 393 U/L   MAGNESIUM    Collection Time: 10/17/22  2:35 PM   Result Value Ref Range    Magnesium 2.2 1.6 - 2.6 mg/dL   TROPONIN-HIGH SENSITIVITY    Collection Time: 10/17/22  3:00 PM   Result Value Ref Range    Troponin-High Sensitivity 47 0 - 54 ng/L   EKG, 12 LEAD, INITIAL    Collection Time: 10/17/22  3:11 PM   Result Value Ref Range    Ventricular Rate 115 BPM    Atrial Rate 115 BPM    P-R Interval 126 ms    QRS Duration 90 ms    Q-T Interval 382 ms    QTC Calculation (Bezet) 528 ms    Calculated P Axis 64 degrees    Calculated R Axis 80 degrees    Calculated T Axis 55 degrees    Diagnosis       Sinus tachycardia  Prolonged QT  Abnormal ECG  When compared with ECG of 12-SEP-2022 08:50,  No significant change was found  Confirmed by Pearl Keen MD, [facility] ([id number]) on 10/17/2022 4:54:18 PM     COVID-19 WITH INFLUENZA A/B    Collection Time: 10/17/22  3:15 PM   Result Value Ref Range    SARS-CoV-2 by PCR Not detected NOTD      Influenza A by PCR Not detected NOTD      Influenza B by PCR Not detected NOTD         Radiologic Studies -   XR CHEST PORT   Final Result   No acute pulmonary disease. CT ABD PELV WO CONT    (Results Pending)     CT Results  (Last 48 hours)      None          CXR Results  (Last 48 hours)                 10/17/22 1514  XR CHEST PORT Final result    Impression:  No acute pulmonary disease. Narrative:  Portable CXR:       HISTORY: Nausea and vomiting. Comparison September 12, 2022       Cardiac silhouette is top normal in size. No vascular congestion. Central   catheter tip at distal SVC. Lungs and costophrenic angles are clear. Medical Decision Making   I am the first provider for this patient. I reviewed the vital signs, available nursing notes, past medical history, past surgical history, family history and social history. Vital Signs-Reviewed the patient's vital signs. Records Reviewed: Nursing Notes and Old Medical Records     Procedures: None   Procedures    Provider Notes (Medical Decision Making):     differential diagnosis: Chemo side effect, dehydration, electrolyte abnormality, gastroenteritis, enteritis, orthostatic hypotension    Plan: We will order labs and fluids. 5:30 PM  Discussed case with Dr. Sisi Key who agrees with need for admission for DAWSON and dehydration. Patient's LFTs were noted to be elevated which is new for her. Will order CT of abdomen and pelvis. Will place admission orders. Patient is also agreeable to admission. Will order stool studies. MED RECONCILIATION:  Current Facility-Administered Medications   Medication Dose Route Frequency    morphine injection 4 mg  4 mg IntraVENous NOW    ondansetron (ZOFRAN) injection 4 mg  4 mg IntraVENous NOW     Current Outpatient Medications   Medication Sig    ondansetron hcl (Zofran) 4 mg tablet Take 1 Tablet by mouth every eight (8) hours as needed for Nausea. gabapentin (NEURONTIN) 300 mg capsule TAKE 1 CAPSULE BY MOUTH THREE TIMES DAILY . DO NOT EXCEED 3 PER 24 HOURS FOR  NEUROPATHIC  PAIN  Indications: neuropathic pain    ondansetron (ZOFRAN ODT) 4 mg disintegrating tablet Take 1 Tablet by mouth every eight (8) hours as needed for Nausea or Vomiting for up to 12 doses. triamcinolone acetonide (KENALOG) 0.1 % ointment Apply  to affected area two (2) times a day. use thin layer    mupirocin (BACTROBAN) 2 % ointment Apply  to affected area daily. Apply to any open areas. lidocaine (LIDODERM) 5 % USE 1  PATCH TOPICALLY TO AFFECTED AREA EVERY 12 HOURS AND  REMOVE  FOR  12  HOURS  A  DAY    pravastatin (PRAVACHOL) 40 mg tablet Take 1 tablet by mouth nightly    nystatin (MYCOSTATIN) powder Apply  to affected area four (4) times daily. omeprazole (PRILOSEC) 20 mg capsule Take 1 Capsule by mouth Daily (before breakfast). (Patient not taking: Reported on 6/14/2022)    metFORMIN ER (GLUCOPHAGE XR) 500 mg tablet Take 1 Tablet by mouth daily (with dinner). (Patient not taking: Reported on 6/14/2022)    diclofenac EC (VOLTAREN) 75 mg EC tablet Take 1 tab by mouth twice daily as needed. methocarbamoL (ROBAXIN) 750 mg tablet Take 1 tab by mouth 4 times daily as needed. fluticasone propionate (FLONASE) 50 mcg/actuation nasal spray 2 Sprays by Both Nostrils route daily as needed for Rhinitis. diclofenac (Voltaren) 1 % gel Apply 4 grams to right shoulder up to 4 times per day, maximum 16 grams per joint per day. Dispense 5 100 gram tubes    Blood-Glucose Meter (TRUE METRIX GLUCOSE METER) misc Use to check blood sugar 2 times per day. glucose blood VI test strips (TRUE METRIX GLUCOSE TEST STRIP) strip Use to check blood sugar 2 times per day. lancets misc Use to check blood sugar 2 times daily    Lancets misc Check glucose ACHS    alcohol swabs padm 1 Each by Apply Externally route Before breakfast, lunch, dinner and at bedtime. accucheck ACHS and record in log. Take log to every doctor visit. **MEDICALLY NECESSARY**    traZODone (DESYREL) 150 mg tablet Take 150-300 mg by mouth nightly. sertraline (ZOLOFT) 100 mg tablet Take 200 mg by mouth daily. perphenazine (TRILAFON) 4 mg tablet Take 4 mg by mouth three (3) times daily. benztropine (COGENTIN) 1 mg tablet Take 1 mg by mouth daily. Disposition:  Admit     Diagnosis     Clinical Impression:   1. DAWSON (acute kidney injury) ([facility] [address]    2. Dehydration    3. Hyponatremia    4. Elevated LFTs    5. Adenocarcinoma of duodenum ([facility]          \"Please note that this dictation was completed with [employer], the computer voice recognition software. Quite often unanticipated grammatical, syntax, homophones, and other interpretive errors are inadvertently transcribed by the computer software. Please disregard these errors. Please excuse any errors that have escaped final proofreading. \"

## 2022-10-17 NOTE — ED TRIAGE NOTES
From home, c/o not feeling herself over past few days. N/V/D. Client has hx of CA, presently being treated. NAD.

## 2022-10-18 VITALS
TEMPERATURE: 98.3 F | SYSTOLIC BLOOD PRESSURE: 133 MMHG | WEIGHT: 134 LBS | HEART RATE: 97 BPM | DIASTOLIC BLOOD PRESSURE: 81 MMHG | BODY MASS INDEX: 22.33 KG/M2 | OXYGEN SATURATION: 97 % | HEIGHT: 65 IN | RESPIRATION RATE: 40 BRPM

## 2022-10-18 LAB
ALBUMIN SERPL-MCNC: 3.5 G/DL (ref 3.4–5)
ALBUMIN/GLOB SERPL: 0.9 {RATIO} (ref 0.8–1.7)
ALP SERPL-CCNC: 91 U/L (ref 45–117)
ALT SERPL-CCNC: 312 U/L (ref 13–56)
ANION GAP SERPL CALC-SCNC: 5 MMOL/L (ref 3–18)
AST SERPL-CCNC: 99 U/L (ref 10–38)
BASOPHILS # BLD: 0 K/UL (ref 0–0.1)
BASOPHILS NFR BLD: 0 % (ref 0–2)
BILIRUB SERPL-MCNC: 0.7 MG/DL (ref 0.2–1)
BUN SERPL-MCNC: 40 MG/DL (ref 7–18)
BUN/CREAT SERPL: 34 (ref 12–20)
CALCIUM SERPL-MCNC: 9.1 MG/DL (ref 8.5–10.1)
CHLORIDE SERPL-SCNC: 95 MMOL/L (ref 100–111)
CO2 SERPL-SCNC: 33 MMOL/L (ref 21–32)
CREAT SERPL-MCNC: 1.19 MG/DL (ref 0.6–1.3)
DIFFERENTIAL METHOD BLD: ABNORMAL
EOSINOPHIL # BLD: 0.1 K/UL (ref 0–0.4)
EOSINOPHIL NFR BLD: 1 % (ref 0–5)
ERYTHROCYTE [DISTWIDTH] IN BLOOD BY AUTOMATED COUNT: 16.8 % (ref 11.6–14.5)
GLOBULIN SER CALC-MCNC: 4.1 G/DL (ref 2–4)
GLUCOSE SERPL-MCNC: 116 MG/DL (ref 74–99)
HCT VFR BLD AUTO: 36.1 % (ref 35–45)
HGB BLD-MCNC: 12.1 G/DL (ref 12–16)
IMM GRANULOCYTES # BLD AUTO: 0 K/UL (ref 0–0.04)
IMM GRANULOCYTES NFR BLD AUTO: 0 % (ref 0–0.5)
LACTATE BLD-SCNC: 1.39 MMOL/L (ref 0.4–2)
LYMPHOCYTES # BLD: 2.5 K/UL (ref 0.9–3.6)
LYMPHOCYTES NFR BLD: 42 % (ref 21–52)
MCH RBC QN AUTO: 28.7 PG (ref 24–34)
MCHC RBC AUTO-ENTMCNC: 33.5 G/DL (ref 31–37)
MCV RBC AUTO: 85.7 FL (ref 78–100)
MONOCYTES # BLD: 1.3 K/UL (ref 0.05–1.2)
MONOCYTES NFR BLD: 22 % (ref 3–10)
NEUTS SEG # BLD: 2.1 K/UL (ref 1.8–8)
NEUTS SEG NFR BLD: 35 % (ref 40–73)
NRBC # BLD: 0 K/UL (ref 0–0.01)
NRBC BLD-RTO: 0 PER 100 WBC
PLATELET # BLD AUTO: 254 K/UL (ref 135–420)
PMV BLD AUTO: 10.2 FL (ref 9.2–11.8)
POTASSIUM SERPL-SCNC: 3.4 MMOL/L (ref 3.5–5.5)
PROT SERPL-MCNC: 7.6 G/DL (ref 6.4–8.2)
RBC # BLD AUTO: 4.21 M/UL (ref 4.2–5.3)
SODIUM SERPL-SCNC: 133 MMOL/L (ref 136–145)
WBC # BLD AUTO: 6 K/UL (ref 4.6–13.2)

## 2022-10-18 PROCEDURE — 83605 ASSAY OF LACTIC ACID: CPT

## 2022-10-18 PROCEDURE — G0378 HOSPITAL OBSERVATION PER HR: HCPCS

## 2022-10-18 PROCEDURE — 85025 COMPLETE CBC W/AUTO DIFF WBC: CPT

## 2022-10-18 PROCEDURE — 80053 COMPREHEN METABOLIC PANEL: CPT

## 2022-10-18 PROCEDURE — 65270000046 HC RM TELEMETRY

## 2022-10-18 RX ORDER — SODIUM CHLORIDE 9 MG/ML
125 INJECTION, SOLUTION INTRAVENOUS CONTINUOUS
Status: DISCONTINUED | OUTPATIENT
Start: 2022-10-18 | End: 2022-10-19 | Stop reason: HOSPADM

## 2022-10-18 NOTE — PROGRESS NOTES
INTERIM UPDATE - 2138 EST on 10/17/2022    \"The Patient may need Surgery at any time and is also at risk for perforation. \"  General Surgical services advise that if Patient develops a perforation that they will be unable to operate on this Patient to correct it given her surgical history. In General Surgical service's opinion after having the Abdominal CT read to him over the phone, \"100%, they should accept this Patient,\" because they state that they will take then Patient if the Patient needs surgery and the Patient may need Surgery. Those who perform Whipple Procedures are the ones who will know when surgery is indicated and can perform the surgery, so they should be monitoring for that need at this facility. Plan:  Given that General Surgical services reports that this Patient cannot safely be monitored here and that no surgical intervention could be performed (and, indeed, may not be able to be safely assessed here), Patient should be Transferred. [facility] Phys[facility] that Patient cannot be safely managed at this facility and will need to be Transferred to a Facility that performs Whipple Procedures, ideally the facility that performed her surgery, if possible.

## 2022-10-18 NOTE — ED NOTES
10:34 PM  Pt had whipple procedure performed at [facility] this early summer. Spoke with Dr. Trudi Lanes, on-call general surgeon, and relayed to him CT abdominal report findings. Concern was that patient had Whipple procedure and that since we do not perform this procedure at this hospital would be uncomfortable providing services for this patient as patient is at risk of serious deterioration and may require surgery which is a procedure not performed at this facility. This was initially attempted with [facility] access center discussion at [facility] on-call general surgeon Dr. Claudeen Sites who said the patient could be transferred later once patient's DAWSON hyponatremia and electrolyte abnormalities were addressed as an inpatient. I spoke with our hospitalist for the evening Dr. Wing Ramirez said that he is not comfortable having surgical backup here. Dr. Wing Ramirez spoke with Dr. Cristela Cohen again directly and general surgical recommendation is that since the indications for the any complications from the procedure would be best monitored by the physician team that did the surgery, patient would be not a good candidate and at risk for deterioration and bowel perforation and emergency surgery here at our facility. Access center was called back again reading notes entered by hospitalist team and patient has been accepted at [facility] to medical surgical bed. However, there are no beds available and patient will remain here in the emergency department as an admitted patient waiting for bed over at [facility].  We will continue to give LR 75 cc an hour. 10:37 PM : Pt care transferred to Dr. Yuan Ervin  ,ED provider. History of patient complaint(s), available diagnostic reports and current treatment plan has been discussed thoroughly. Bedside rounding on patient occured : yes .   Intended disposition of patient : transfer  Pending diagnostics reports and/or labs (please list): bed availability at [facility]. Giving IVF.      Meenakshi, pa-c

## 2022-10-18 NOTE — PROGRESS NOTES
Phone: [phone]    Hematology / Oncology Progress Note            Patient: Rosa Villatoro   MRN: [id number]         [facility]: [id number]    YOB: 1955      Admit Date: 10/17/2022    Assessment:     Active Problems:    Dehydration (10/17/2022)      Hyponatremia (10/17/2022)      [facility] (10/17/2022)      Adenocarcinoma of duodenum ([facility] [address] (10/17/2022)    Ca duodenum, diagnosed 2022, unresectable lesion  Aborted whipple's procedure at [facility] Dr Kwok Sera gastrojejunostomy  On palliative chemo FOLFOX  Nausea, vomiting, abd pain x 3-4 days  diarrhea  Hyponatremia  Ac renal failure  Elevated transaminases  Dehydration  Debilitation    Plan:     IVF, supplement electrolytes  Antiemetics  Review CT films, symptoms possibly sec to chemo.  At risk of obstruction of GJ stomy  Rpt labs this am    David Lucia MD  [facility] [phone]      Subjective:     Nausea, vomiting    Objective:     Visit Vitals  /87   Pulse 90   Temp 98.3 °F (36.8 °C)   Resp 26   Ht 5' 5\" (1.651 m)   Wt 60.8 kg (134 lb)   SpO2 97%   BMI 22.30 kg/m²             Temp (24hrs), Av.3 °F (36.8 °C), Min:98.3 °F (36.8 °C), Max:98.3 °F (36.8 °C)        Intake/Output Summary (Last 24 hours) at 10/18/2022 0810  Last data filed at 10/17/2022 1605  Gross per 24 hour   Intake 1000 ml   Output --   Net 1000 ml     Review of Systems:   Constitutional:  fatigue  Eyes: negative for visual disturbance,  icterus  Ears, Nose, Mouth, Throat, and Face: negative for tinnitus, epistaxis, sore mouth and hoarseness  Respiratory: negative for cough, sputum, hemoptysis, pleurisy/chest pain or wheezing  Cardiovascular: negative for chest pain, , palpitations,  syncope, paroxysmal nocturnal dyspnea  Gastrointestinal:  nausea, vomiting,  diarrhea,  and abdominal pain x 3 days  Genitourinary:negative for dysuria, nocturia, urinary incontinence, hesitancy and hematuria  Endocrine: no polydipsia, no goitre  Integument: negative for rash, skin lesion(s) and pruritus  Hematologic/Lymphatic: negative for easy bruising, bleeding and lymphadenopathy  Musculoskeletal:negative for myalgias, arthralgias and bone pain  Neurological: negative for headaches, dizziness, seizures, paresthesia and weakness    Physical Exam: ECOG : 2  Constitutional: Alert, oriented  Eyes: PERRLA, anicteric,  pallor  ENT: no palpable Lymph nodes, no mucositis, no thrush. Respiratory: bilateral air entry, no rales,  no wheezing. Cardiovascular: S1S2 regular , no  murmur, no pedal edema  Gastrointestinal: soft,  non tender, no HSM,  Integument: no rash, no petechiae or ecchymosis. Neurological: inon focal      Labs:  Recent Results (from the past 24 hour(s))   CBC WITH AUTOMATED DIFF    Collection Time: 10/17/22  2:35 PM   Result Value Ref Range    WBC 7.4 4.6 - 13.2 K/uL    RBC 4.41 4.20 - 5.30 M/uL    HGB 12.5 12.0 - 16.0 g/dL    HCT 35.9 35.0 - 45.0 %    MCV 81.4 78.0 - 100.0 FL    MCH 28.3 24.0 - 34.0 PG    MCHC 34.8 31.0 - 37.0 g/dL    RDW 16.4 (H) 11.6 - 14.5 %    PLATELET 806 753 - 636 K/uL    MPV 10.3 9.2 - 11.8 FL    NRBC 0.3 (H) 0  WBC    ABSOLUTE NRBC 0.02 (H) 0.00 - 0.01 K/uL    NEUTROPHILS 43 40 - 73 %    LYMPHOCYTES 37 21 - 52 %    MONOCYTES 20 (H) 3 - 10 %    EOSINOPHILS 0 0 - 5 %    BASOPHILS 0 0 - 2 %    IMMATURE GRANULOCYTES 0 0.0 - 0.5 %    ABS. NEUTROPHILS 3.2 1.8 - 8.0 K/UL    ABS. LYMPHOCYTES 2.7 0.9 - 3.6 K/UL    ABS. MONOCYTES 1.5 (H) 0.05 - 1.2 K/UL    ABS. EOSINOPHILS 0.0 0.0 - 0.4 K/UL    ABS. BASOPHILS 0.0 0.0 - 0.1 K/UL    ABS. IMM.  GRANS. 0.0 0.00 - 0.04 K/UL    DF MANUAL      PLATELET COMMENTS ADEQUATE PLATELETS      RBC COMMENTS NORMOCYTIC, NORMOCHROMIC     METABOLIC PANEL, COMPREHENSIVE    Collection Time: 10/17/22  2:35 PM   Result Value Ref Range    Sodium 125 (L) 136 - 145 mmol/L    Potassium 3.6 3.5 - 5.5 mmol/L    Chloride 86 (L) 100 - 111 mmol/L    CO2 28 21 - 32 mmol/L    Anion gap 11 3.0 - 18 mmol/L    Glucose 145 (H) 74 - 99 mg/dL    BUN 70 (H) 7.0 - 18 MG/DL    Creatinine 2.14 (H) 0.6 - 1.3 MG/DL    BUN/Creatinine ratio 33 (H) 12 - 20      eGFR 25 (L) >60 ml/min/1.73m2    Calcium 9.0 8.5 - 10.1 MG/DL    Bilirubin, total 0.7 0.2 - 1.0 MG/DL    ALT (SGPT) 402 (H) 13 - 56 U/L    AST (SGOT) 182 (H) 10 - 38 U/L    Alk.  phosphatase 102 45 - 117 U/L    Protein, total 8.1 6.4 - 8.2 g/dL    Albumin 3.9 3.4 - 5.0 g/dL    Globulin 4.2 (H) 2.0 - 4.0 g/dL    A-G Ratio 0.9 0.8 - 1.7     LIPASE    Collection Time: 10/17/22  2:35 PM   Result Value Ref Range    Lipase 131 73 - 393 U/L   MAGNESIUM    Collection Time: 10/17/22  2:35 PM   Result Value Ref Range    Magnesium 2.2 1.6 - 2.6 mg/dL   TROPONIN-HIGH SENSITIVITY    Collection Time: 10/17/22  3:00 PM   Result Value Ref Range    Troponin-High Sensitivity 47 0 - 54 ng/L   EKG, 12 LEAD, INITIAL    Collection Time: 10/17/22  3:11 PM   Result Value Ref Range    Ventricular Rate 115 BPM    Atrial Rate 115 BPM    P-R Interval 126 ms    QRS Duration 90 ms    Q-T Interval 382 ms    QTC Calculation (Bezet) 528 ms    Calculated P Axis 64 degrees    Calculated R Axis 80 degrees    Calculated T Axis 55 degrees    Diagnosis       Sinus tachycardia  Prolonged QT  Abnormal ECG  When compared with ECG of 12-SEP-2022 08:50,  No significant change was found  Confirmed by Fredi Arias MD, Lidia Kilgore ([id number]) on 10/17/2022 4:54:18 PM     COVID-19 WITH INFLUENZA A/B    Collection Time: 10/17/22  3:15 PM   Result Value Ref Range    SARS-CoV-2 by PCR Not detected NOTD      Influenza A by PCR Not detected NOTD      Influenza B by PCR Not detected NOTD

## 2022-10-18 NOTE — ED NOTES
Patient resting with eyes closed throughout the night. Patient states that she is a little hungry now. Patient provided with cup of jello and spoon. Patient tolerated jello well. Will continue to monitor patient.

## 2022-10-19 PROCEDURE — G0378 HOSPITAL OBSERVATION PER HR: HCPCS

## 2022-10-27 NOTE — PROGRESS NOTES
[facility]  [address] [place] [address], [facility] [address]  Phone: [phone]  Fax: [phone]    Pt's YOB: 1955    ASSESSMENT   Diagnoses and all orders for this visit:    1. Neuritis  -     gabapentin (NEURONTIN) 300 mg capsule; TAKE 1 CAPSULE BY MOUTH THREE TIMES DAILY . DO NOT EXCEED 3 PER 24 HOURS FOR  NEUROPATHIC  PAIN  Indications: neuropathic pain    2. Muscle spasm  -     tiZANidine (ZANAFLEX) 4 mg tablet; Take 1 tab by mouth BID-TID as needed for muscle spasm. 3. Cervical spondylosis  -     ketorolac tromethamine (TORADOL) 60 mg/2 mL injection 60 mg    4. Adenocarcinoma of duodenum ([address]    5. Lumbar facet arthropathy  -     ketorolac tromethamine (TORADOL) 60 mg/2 mL injection 60 mg    6. Arthralgia of multiple joints    7. Chronic right shoulder pain       IMPRESSION AND PLAN:  Tressa Prater is a 74 y.o. female with history of chronic lumbar pain and presents to the office today for follow up. Pt complains of continued progressive pain in the lumbar region exacerbated by the colder weather. She is taking Neurontin 300 mg 1 cap TID, Voltaren 75 mg BID, and was instructed to discontinue taking Robaxin 500 mg as needed due to adverse side effects. 1) Pt was given information on lumbar arthritis exercises. 2) She received refills of Neurontin 300 mg for neuropathic symptoms at this time. 3) Pt received a 30 mg Toradol injection in the office today. 4) She was prescribed Zanaflex 4 mg 1 tab BID-TID as needed for muscle spasms. 5) Ms. Mandy Berger has a reminder for a \"due or due soon\" health maintenance. I have asked that she contact her primary care provider, Graciela Swain NP, for follow-up on this health maintenance. 6)  demonstrated consistency with prescribing. Follow-up and Dispositions    Return in about 3 months (around 1/31/2023) for Medication follow up.              HISTORY OF PRESENT ILLNESS:  Tressa Prater is a 74 y.o. female with history of chronic lumbar pain and presents to the office today for follow up. Pt complains of continued progressive pain in the lumbar region exacerbated by the colder weather. She reports she was recently diagnosed with gastric cancer and has undergone an attempted surgery to remove the mass but has since began chemotherapy to shrink the mass before continuing with completion of the surgery. Pt is taking Neurontin 300 mg 1 cap TID, Voltaren 75 mg BID, and was instructed to discontinue taking Robaxin 500 mg as needed due to adverse side effects. She notes that she supplements with vitamin D. She has been instructed to discontinue taking NSAID's at this time and has since discontinued smoking. Pt at this time desires to continue with current care and prescription of Zanaflex 4 mg.      Pain Scale: 8/10    PCP: Petra Blount NP     Past Medical History:   Diagnosis Date    CAD (coronary artery disease)     Hypertension     Uterine fibroid         Social History     Socioeconomic History    Marital status: SINGLE     Spouse name: Not on file    Number of children: Not on file    Years of education: Not on file    Highest education level: Not on file   Occupational History    Not on file   Tobacco Use    Smoking status: Every Day     Packs/day: 0.25     Years: 2.00     Pack years: 0.50     Types: Cigarettes    Smokeless tobacco: Never   Substance and Sexual Activity    Alcohol use: No     Alcohol/week: 0.8 standard drinks     Types: 1 Cans of beer per week    Drug use: Yes     Types: Marijuana     Comment: rarely     Sexual activity: Not on file   Other Topics Concern    Not on file   Social History Narrative    Not on file     Social Determinants of Health     Financial Resource Strain: Not on file   Food Insecurity: Not on file   Transportation Needs: Not on file   Physical Activity: Not on file   Stress: Not on file   Social Connections: Not on file   Intimate Partner Violence: Not on file Housing Stability: Not on file       Current Outpatient Medications   Medication Sig Dispense Refill    gabapentin (NEURONTIN) 300 mg capsule TAKE 1 CAPSULE BY MOUTH THREE TIMES DAILY . DO NOT EXCEED 3 PER 24 HOURS FOR  NEUROPATHIC  PAIN  Indications: neuropathic pain 270 Capsule 1    tiZANidine (ZANAFLEX) 4 mg tablet Take 1 tab by mouth BID-TID as needed for muscle spasm. 60 Tablet 1    alcohol swabs padm 1 Each by Apply Externally route Before breakfast, lunch, dinner and at bedtime. accucheck ACHS and record in log. Take log to every doctor visit. **MEDICALLY NECESSARY** 100 Pad 2    HYDROcodone-acetaminophen (NORCO)  mg tablet       iron sucrose (VENOFER) 20 MG/ML injection 200 mg by IntraVENous route. metoclopramide HCl (REGLAN) 10 mg tablet Metoclopramide Oral     one tab tid before meals    active      naloxone (NARCAN) 4 mg/actuation nasal spray Give 1 spray into 1 nostril. Give additional doses using a new nasal spray with each dose, if patient does not respond or relapses into respiratory depression. Call 911. Additional doses may be given every 2 to 3 minutes until medical assistance arrives. oxyCODONE IR (ROXICODONE) 5 mg immediate release tablet Take 1 Tablet by mouth. oxyCODONE IR (ROXICODONE) 5 mg immediate release tablet TAKE 1 TABLET BY MOUTH EVERY 4 TO 6 HOURS AS NEEDED FOR PAIN      oxyCODONE IR (ROXICODONE) 10 mg tab immediate release tablet Take 1 Tablet by mouth. oxyCODONE IR (ROXICODONE) 10 mg tab immediate release tablet TAKE 1 TABLET BY MOUTH EVERY 4 TO 6 HOURS AS NEEDED FOR PAIN      polyethylene glycol (MIRALAX) 17 gram/dose powder Mix 1 packet (17g) into 4-8 ounces of beverage and drink once daily. Results in 2-4 days, Max treatment length of 2 weeks. polyethylene glycol (MIRALAX) 17 gram/dose powder       perphenazine (TRILAFON) 8 mg tablet Take 8 mg by mouth two (2) times a day.       ondansetron hcl (Zofran) 4 mg tablet Take 1 Tablet by mouth every eight (8) hours as needed for Nausea. 14 Tablet 0    ondansetron (ZOFRAN ODT) 4 mg disintegrating tablet Take 1 Tablet by mouth every eight (8) hours as needed for Nausea or Vomiting for up to 12 doses. 12 Tablet 0    triamcinolone acetonide (KENALOG) 0.1 % ointment Apply  to affected area two (2) times a day. use thin layer 60 g 0    mupirocin (BACTROBAN) 2 % ointment Apply  to affected area daily. Apply to any open areas. 22 g 0    lidocaine (LIDODERM) 5 % USE 1  PATCH TOPICALLY TO AFFECTED AREA EVERY 12 HOURS AND  REMOVE  FOR  12  HOURS  A  DAY 15 Patch 0    pravastatin (PRAVACHOL) 40 mg tablet Take 1 tablet by mouth nightly 90 Tablet 2    nystatin (MYCOSTATIN) powder Apply  to affected area four (4) times daily. 30 g 1    omeprazole (PRILOSEC) 20 mg capsule Take 1 Capsule by mouth Daily (before breakfast). (Patient not taking: No sig reported) 90 Capsule 1    metFORMIN ER (GLUCOPHAGE XR) 500 mg tablet Take 1 Tablet by mouth daily (with dinner). (Patient not taking: No sig reported) 90 Tablet 1    diclofenac EC (VOLTAREN) 75 mg EC tablet Take 1 tab by mouth twice daily as needed. 180 Tablet 1    methocarbamoL (ROBAXIN) 750 mg tablet Take 1 tab by mouth 4 times daily as needed. 120 Tablet 5    fluticasone propionate (FLONASE) 50 mcg/actuation nasal spray 2 Sprays by Both Nostrils route daily as needed for Rhinitis. 16 g 2    diclofenac (Voltaren) 1 % gel Apply 4 grams to right shoulder up to 4 times per day, maximum 16 grams per joint per day. Dispense 5 100 gram tubes 500 g 1    Blood-Glucose Meter (TRUE METRIX GLUCOSE METER) misc Use to check blood sugar 2 times per day. 1 Each 0    glucose blood VI test strips (TRUE METRIX GLUCOSE TEST STRIP) strip Use to check blood sugar 2 times per day. 100 Strip 11    lancets misc Use to check blood sugar 2 times daily 1 Each 11    Lancets misc Check glucose ACHS 200 Each 11    traZODone (DESYREL) 150 mg tablet Take 150-300 mg by mouth nightly.       sertraline (ZOLOFT) 100 mg tablet Take 200 mg by mouth daily. perphenazine (TRILAFON) 4 mg tablet Take 4 mg by mouth three (3) times daily. benztropine (COGENTIN) 1 mg tablet Take 1 mg by mouth daily. Allergies   Allergen Reactions    Latex Rash    Penicillins Nausea and Vomiting    Penicillin Unknown (comments)         REVIEW OF SYSTEMS    Constitutional: Negative for fever, chills, or weight change. Respiratory: Negative for cough or shortness of breath. Cardiovascular: Negative for chest pain or palpitations. Gastrointestinal: Negative for acid reflux, change in bowel habits, or constipation. Genitourinary: Negative for dysuria and flank pain. Musculoskeletal: Positive for lumbar and right shoulder pain. Skin: Negative for rash. Neurological: Negative for headaches, dizziness, or numbness. Endo/Heme/Allergies: Negative for increased bruising. Psychiatric/Behavioral: Positive for difficulty with sleep. As per HPI    PHYSICAL EXAMINATION  Visit Vitals  Pulse 86   Temp 97.4 °F (36.3 °C) (Temporal)   Ht 5' 5\" (1.651 m)   Wt 134 lb (60.8 kg)   LMP  (LMP Unknown)   SpO2 100%   BMI 22.30 kg/m²       Constitutional: Awake, alert, and in no acute distress. Neurological: 1+ symmetrical DTRs in the upper extremities. 1+ symmetrical DTRs in the lower extremities. Sensation to light touch is intact. Negative Gaffney's sign bilaterally. Skin: warm, dry, and intact. Musculoskeletal: No pain with extension, axial loading, or forward flexion. No pain with internal or external rotation of her hips. Negative straight leg raise bilaterally.       Biceps  Triceps Deltoids Wrist Ext Wrist Flex Hand Intrin   Right +4/5 +4/5 +4/5 +4/5 +4/5 +4/5   Left +4/5 +4/5 +4/5 +4/5 +4/5 +4/5      Hip Flex  Quads Hamstrings Ankle DF EHL Ankle PF   Right +4/5 +4/5 +4/5 +4/5 +4/5 +4/5   Left +4/5 +4/5 +4/5 +4/5 +4/5 +4/5     TORADOL INJECTION:  Administrations This Visit       ketorolac tromethamine (TORADOL) 60 mg/2 mL injection 60 mg Admin Date  10/31/2022  11:28 Action  Given Dose  60 mg Route  IntraMUSCular Site  Right Gluteus Andrew Administered By  Bettye Mccann    NDC: 20335-990-89    Patient Supplied?: No                      IMAGING:    Right shoulder x-rays from 6/5/2020 were personally reviewed with the patient and demonstrated:     FINDINGS:  No acute displaced fracture or dislocation. There is mild to moderate  degenerative changes of the acromioclavicular joint with slight downward sloping  of the acromium. No destructive lesion. IMPRESSION:     Mild to moderate degenerative changes. Thoracic spine MRI from 04/04/2018 was personally reviewed with the patient and demonstrated:    Narrative MRI THORACIC WITHOUT CONTRAST    CPT CODE: 67321    HISTORY: Mid back pain, neck pain, right leg weakness since 2001. COMPARISON: None. TECHNIQUE: Sagittal T1, T2 and stir and axial T2 weighted sequences were  acquired through the thoracic spine without contrast.        FINDINGS:     There is normal anatomic alignment of the thoracic spine. Vertebral body heights  are maintained. No infiltrative marrow replacement process or marrow edema. Thoracic spinal cord maintains normal caliber, signal intensity and morphology  throughout. Small bilateral pleural effusions. There are multilevel tiny disc protrusions with contact of the ventral cord at  T6/T7 and T7/T8, but no impingement. No critical foraminal compromise. Incidentally imaged surrounding soft tissues demonstrate no acute abnormalities  otherwise. Impression IMPRESSION:    Small disc protrusions are most pronounced at T6/T7 and T7/T8 where there is  mild flattening of the ventral cord, but no cord impingement. Small bilateral pleural effusions.                          Lumbar spine x-rays from 11/18/2019 were personally reviewed and demonstrated:   Multilevel degenerative facet, some straightening of lumbar spine, degenerative disc at L5-S1, no instability     Left hip AP x-rays from 11/18/2019 were personally reviewed and demonstrated:   Degenerative joint findings bilaterally. Right shoulder x-rays from 03/18/2017 were personally reviewed and demonstrated:     Narrative Shoulder series. CPT code: 55389    Clinical history: Arthritis in the shoulder and pain in the back under the  shoulder blade without known injury of unspecified chronicity. Technique: Two shoulder views. Comparison: No priors. Findings: There are no fractures. Joint relationships at glenohumeral joint are  normal.  Acromioclavicular joint is normal. There is convex appearance of the  underside of the distal acromion process. There are no soft tissue  calcifications. Impression IMPRESSION:    No acute abnormalities. Convex appearance of the inferior aspect of the acromion  process could increased risk of rotator cuff impingement. Cervical spine x-rays from 12/27/2016 were personally reviewed and demonstrated:     FINDINGS:       Vertebral body height and alignment is intact. Slight straightening of the  cervical lordosis. Moderate disc height loss endplate sclerosis and vertebral  body osteophytes from C3 through C7. Alignment is intact. No fracture. Mild  facet joint arthropathy at multiple levels. The prevertebral space appears normal.       IMPRESSION:      Moderate cervical spondylosis. Written by Kenny Romero, as dictated by Marissa Alvarez MD.  I, Dr. Marissa Alvarez confirm that all documentation is accurate.

## 2022-10-31 ENCOUNTER — OFFICE VISIT (OUTPATIENT)
Dept: ORTHOPEDIC SURGERY | Age: 67
End: 2022-10-31
Payer: MEDICARE

## 2022-10-31 VITALS
BODY MASS INDEX: 22.33 KG/M2 | TEMPERATURE: 97.4 F | OXYGEN SATURATION: 100 % | HEART RATE: 86 BPM | WEIGHT: 134 LBS | HEIGHT: 65 IN

## 2022-10-31 DIAGNOSIS — G89.29 CHRONIC RIGHT SHOULDER PAIN: ICD-10-CM

## 2022-10-31 DIAGNOSIS — M47.816 LUMBAR FACET ARTHROPATHY: ICD-10-CM

## 2022-10-31 DIAGNOSIS — M62.838 MUSCLE SPASM: ICD-10-CM

## 2022-10-31 DIAGNOSIS — M79.2 NEURITIS: Primary | ICD-10-CM

## 2022-10-31 DIAGNOSIS — M25.511 CHRONIC RIGHT SHOULDER PAIN: ICD-10-CM

## 2022-10-31 DIAGNOSIS — M25.50 ARTHRALGIA OF MULTIPLE JOINTS: ICD-10-CM

## 2022-10-31 DIAGNOSIS — M47.812 CERVICAL SPONDYLOSIS: ICD-10-CM

## 2022-10-31 DIAGNOSIS — C17.0 ADENOCARCINOMA OF DUODENUM (HCC): ICD-10-CM

## 2022-10-31 PROBLEM — E11.9 TYPE 2 DIABETES MELLITUS (HCC): Status: ACTIVE | Noted: 2022-10-31

## 2022-10-31 PROCEDURE — G9899 SCRN MAM PERF RSLTS DOC: HCPCS | Performed by: PHYSICAL MEDICINE & REHABILITATION

## 2022-10-31 PROCEDURE — 1123F ACP DISCUSS/DSCN MKR DOCD: CPT | Performed by: PHYSICAL MEDICINE & REHABILITATION

## 2022-10-31 PROCEDURE — 99214 OFFICE O/P EST MOD 30 MIN: CPT | Performed by: PHYSICAL MEDICINE & REHABILITATION

## 2022-10-31 PROCEDURE — G8420 CALC BMI NORM PARAMETERS: HCPCS | Performed by: PHYSICAL MEDICINE & REHABILITATION

## 2022-10-31 PROCEDURE — 1090F PRES/ABSN URINE INCON ASSESS: CPT | Performed by: PHYSICAL MEDICINE & REHABILITATION

## 2022-10-31 PROCEDURE — 3017F COLORECTAL CA SCREEN DOC REV: CPT | Performed by: PHYSICAL MEDICINE & REHABILITATION

## 2022-10-31 PROCEDURE — G8400 PT W/DXA NO RESULTS DOC: HCPCS | Performed by: PHYSICAL MEDICINE & REHABILITATION

## 2022-10-31 PROCEDURE — G8432 DEP SCR NOT DOC, RNG: HCPCS | Performed by: PHYSICAL MEDICINE & REHABILITATION

## 2022-10-31 PROCEDURE — G8427 DOCREV CUR MEDS BY ELIG CLIN: HCPCS | Performed by: PHYSICAL MEDICINE & REHABILITATION

## 2022-10-31 PROCEDURE — G8536 NO DOC ELDER MAL SCRN: HCPCS | Performed by: PHYSICAL MEDICINE & REHABILITATION

## 2022-10-31 PROCEDURE — 1101F PT FALLS ASSESS-DOCD LE1/YR: CPT | Performed by: PHYSICAL MEDICINE & REHABILITATION

## 2022-10-31 PROCEDURE — 96372 THER/PROPH/DIAG INJ SC/IM: CPT | Performed by: PHYSICAL MEDICINE & REHABILITATION

## 2022-10-31 RX ORDER — KETOROLAC TROMETHAMINE 30 MG/ML
30 INJECTION, SOLUTION INTRAMUSCULAR; INTRAVENOUS
Status: DISCONTINUED | OUTPATIENT
Start: 2022-10-31 | End: 2022-10-31

## 2022-10-31 RX ORDER — METHOCARBAMOL 750 MG/1
TABLET, FILM COATED ORAL
Qty: 120 TABLET | Refills: 5 | Status: CANCELLED | OUTPATIENT
Start: 2022-10-31

## 2022-10-31 RX ORDER — HYDROCODONE BITARTRATE AND ACETAMINOPHEN 10; 325 MG/1; MG/1
TABLET ORAL
COMMUNITY
Start: 2022-10-20

## 2022-10-31 RX ORDER — DICLOFENAC SODIUM 75 MG/1
TABLET, DELAYED RELEASE ORAL
Qty: 180 TABLET | Refills: 1 | Status: CANCELLED | OUTPATIENT
Start: 2022-10-31

## 2022-10-31 RX ORDER — OXYCODONE HYDROCHLORIDE 10 MG/1
1 TABLET ORAL
COMMUNITY
Start: 2022-09-16

## 2022-10-31 RX ORDER — NALOXONE HYDROCHLORIDE 4 MG/.1ML
SPRAY NASAL
COMMUNITY
Start: 2022-08-29

## 2022-10-31 RX ORDER — GABAPENTIN 300 MG/1
CAPSULE ORAL
Qty: 270 CAPSULE | Refills: 1 | Status: SHIPPED | OUTPATIENT
Start: 2022-10-31

## 2022-10-31 RX ORDER — OXYCODONE HYDROCHLORIDE 5 MG/1
1 TABLET ORAL
COMMUNITY
Start: 2022-07-18

## 2022-10-31 RX ORDER — KETOROLAC TROMETHAMINE 30 MG/ML
60 INJECTION, SOLUTION INTRAMUSCULAR; INTRAVENOUS ONCE
Status: COMPLETED | OUTPATIENT
Start: 2022-10-31 | End: 2022-10-31

## 2022-10-31 RX ORDER — OXYCODONE HYDROCHLORIDE 10 MG/1
TABLET ORAL
COMMUNITY
Start: 2022-09-22

## 2022-10-31 RX ORDER — TIZANIDINE 4 MG/1
TABLET ORAL
Qty: 60 TABLET | Refills: 1 | Status: SHIPPED | OUTPATIENT
Start: 2022-10-31

## 2022-10-31 RX ORDER — OXYCODONE HYDROCHLORIDE 5 MG/1
TABLET ORAL
COMMUNITY
Start: 2022-08-23

## 2022-10-31 RX ORDER — POLYETHYLENE GLYCOL 3350 17 G/17G
POWDER, FOR SOLUTION ORAL
COMMUNITY
Start: 2022-10-20

## 2022-10-31 RX ORDER — PERPHENAZINE 8 MG/1
8 TABLET, FILM COATED ORAL 2 TIMES DAILY
COMMUNITY
Start: 2022-10-24

## 2022-10-31 RX ORDER — METOCLOPRAMIDE 10 MG/1
TABLET ORAL
COMMUNITY

## 2022-10-31 RX ADMIN — KETOROLAC TROMETHAMINE 60 MG: 30 INJECTION, SOLUTION INTRAMUSCULAR; INTRAVENOUS at 11:28

## 2022-10-31 NOTE — PATIENT INSTRUCTIONS
Low Back Arthritis: Exercises  Introduction  Here are some examples of typical rehabilitation exercises for your condition. Start each exercise slowly. Ease off the exercise if you start to have pain. Your doctor or physical therapist will tell you when you can start these exercises and which ones will work best for you. When you are not being active, find a comfortable position for rest. Some people are comfortable on the floor or a medium-firm bed with a small pillow under their head and another under their knees. Some people prefer to lie on their side with a pillow between their knees. Don't stay in one position for too long. Take short walks (10 to 20 minutes) every 2 to 3 hours. Avoid slopes, hills, and stairs until you feel better. Walk only distances you can manage without pain, especially leg pain. How to do the exercises  Pelvic tilt  Lie on your back with your knees bent. \"Brace\" your stomach--tighten your muscles by pulling in and imagining your belly button moving toward your spine. Press your lower back into the floor. You should feel your hips and pelvis rock back. Hold for 6 seconds while breathing smoothly. Relax and allow your pelvis and hips to rock forward. Repeat 8 to 12 times. Back stretches  Get down on your hands and knees on the floor. Relax your head and allow it to droop. Round your back up toward the ceiling until you feel a nice stretch in your upper, middle, and lower back. Hold this stretch for as long as it feels comfortable, or about 15 to 30 seconds. Return to the starting position with a flat back while you are on your hands and knees. Let your back sway by pressing your stomach toward the floor. Lift your buttocks toward the ceiling. Hold this position for 15 to 30 seconds. Repeat 2 to 4 times. Follow-up care is a key part of your treatment and safety. Be sure to make and go to all appointments, and call your doctor if you are having problems.  It's also a good idea to know your test results and keep a list of the medicines you take. Current as of: March 9, 2022               Content Version: 13.4  © 2006-2022 [employer]. Care instructions adapted under license by [employer] (which disclaims liability or warranty for this information). If you have questions about a medical condition or this instruction, always ask your healthcare professional. [place] [address] any warranty or liability for your use of this information.

## 2022-10-31 NOTE — PROGRESS NOTES
Barbara Gomez presents today for   Chief Complaint   Patient presents with    Back Pain       Is someone accompanying this pt? no    Is the patient using any DME equipment during OV? no    Depression Screening:  3 most recent PHQ Screens 11/3/2021   Little interest or pleasure in doing things Not at all   Feeling down, depressed, irritable, or hopeless Not at all   Total Score PHQ 2 0   Trouble falling or staying asleep, or sleeping too much -   Feeling tired or having little energy -   Poor appetite, weight loss, or overeating -   Feeling bad about yourself - or that you are a failure or have let yourself or your family down -   Trouble concentrating on things such as school, work, reading, or watching TV -   Moving or speaking so slowly that other people could have noticed; or the opposite being so fidgety that others notice -   Thoughts of being better off dead, or hurting yourself in some way -   PHQ 9 Score -   How difficult have these problems made it for you to do your work, take care of your home and get along with others -       Learning Assessment:  Learning Assessment 1/21/2020   PRIMARY LEARNER Patient   HIGHEST LEVEL OF EDUCATION - PRIMARY LEARNER  GRADUATED HIGH SCHOOL OR GED   BARRIERS PRIMARY LEARNER NONE   CO-LEARNER CAREGIVER No   PRIMARY LANGUAGE ENGLISH   LEARNER PREFERENCE PRIMARY DEMONSTRATION   ANSWERED BY patient   RELATIONSHIP SELF       Abuse Screening:  Abuse Screening Questionnaire 12/15/2020   Do you ever feel afraid of your partner? N   Are you in a relationship with someone who physically or mentally threatens you? N   Is it safe for you to go home? Y       Fall Risk  Fall Risk Assessment, last 12 mths 1/12/2022   Able to walk? Yes   Fall in past 12 months? 1   Do you feel unsteady? -   Are you worried about falling -   Number of falls in past 12 months 2       OPIOID RISK TOOL  No flowsheet data found. Coordination of Care:  1.  Have you been to the ER, urgent care clinic since your last visit? Yes 2 weeks throwing up from chemo and meds  Hospitalized since your last visit? yes    2. Have you seen or consulted any other health care providers outside of the [address] [place] [address] since your last visit? no Include any pap smears or colon screening.  no

## 2022-10-31 NOTE — PROGRESS NOTES
Consent was explained to the pt and signed. No questions or concerns voiced at this time. Pt given 30 mg/1 ml of toradol IM in right gluteus. No sign or symptoms of infection noted at injection site. There was no bleeding, swelling or leaking noted after injection. Pt handed injection information sheet to take home. Patient walked to the  without any issues.

## 2022-11-09 NOTE — TELEPHONE ENCOUNTER
This patient contacted office for the following prescriptions to be filled:    Last office visit: 1/19/22  Follow up appointment: 11/17/223  Medication requested :   Requested Prescriptions     Pending Prescriptions Disp Refills    lidocaine (LIDODERM) 5 % 15 Patch 0     Sig: USE 1  PATCH TOPICALLY TO AFFECTED AREA EVERY 12 HOURS AND  REMOVE  FOR  12  HOURS  A  DAY       PCP: ESTELA Ross   Mail order or Local pharmacy name  [address]

## 2022-11-10 RX ORDER — LIDOCAINE 50 MG/G
PATCH TOPICAL
Qty: 15 PATCH | Refills: 0 | Status: SHIPPED | OUTPATIENT
Start: 2022-11-10

## 2022-11-12 ENCOUNTER — DOCUMENTATION ONLY (OUTPATIENT)
Dept: FAMILY MEDICINE CLINIC | Age: 67
End: 2022-11-12

## 2022-11-12 NOTE — PROGRESS NOTES
[facility] (Key: [id number]) - [id number]  Lidocaine 5% patches       View in Med Check  Status: PA Request    Created: November 10th, 2022 [id number]    Sent: November 12th, 2022    Open

## 2022-11-16 PROBLEM — E86.0 DEHYDRATION: Status: RESOLVED | Noted: 2022-10-17 | Resolved: 2022-11-16

## 2022-12-09 ENCOUNTER — OFFICE VISIT (OUTPATIENT)
Dept: FAMILY MEDICINE CLINIC | Age: 67
End: 2022-12-09
Payer: MEDICARE

## 2022-12-09 VITALS
HEART RATE: 80 BPM | TEMPERATURE: 98.1 F | DIASTOLIC BLOOD PRESSURE: 87 MMHG | RESPIRATION RATE: 18 BRPM | HEIGHT: 65 IN | OXYGEN SATURATION: 99 % | BODY MASS INDEX: 23.29 KG/M2 | WEIGHT: 139.8 LBS | SYSTOLIC BLOOD PRESSURE: 152 MMHG

## 2022-12-09 DIAGNOSIS — E78.2 MIXED HYPERLIPIDEMIA: ICD-10-CM

## 2022-12-09 DIAGNOSIS — C17.0 ADENOCARCINOMA OF DUODENUM (HCC): ICD-10-CM

## 2022-12-09 DIAGNOSIS — E11.65 CONTROLLED TYPE 2 DIABETES MELLITUS WITH HYPERGLYCEMIA, UNSPECIFIED WHETHER LONG TERM INSULIN USE (HCC): Primary | ICD-10-CM

## 2022-12-09 PROCEDURE — 99213 OFFICE O/P EST LOW 20 MIN: CPT | Performed by: NURSE PRACTITIONER

## 2022-12-09 PROCEDURE — 2022F DILAT RTA XM EVC RTNOPTHY: CPT | Performed by: NURSE PRACTITIONER

## 2022-12-09 PROCEDURE — G8400 PT W/DXA NO RESULTS DOC: HCPCS | Performed by: NURSE PRACTITIONER

## 2022-12-09 PROCEDURE — G8536 NO DOC ELDER MAL SCRN: HCPCS | Performed by: NURSE PRACTITIONER

## 2022-12-09 PROCEDURE — G8432 DEP SCR NOT DOC, RNG: HCPCS | Performed by: NURSE PRACTITIONER

## 2022-12-09 PROCEDURE — G9899 SCRN MAM PERF RSLTS DOC: HCPCS | Performed by: NURSE PRACTITIONER

## 2022-12-09 PROCEDURE — 3017F COLORECTAL CA SCREEN DOC REV: CPT | Performed by: NURSE PRACTITIONER

## 2022-12-09 PROCEDURE — G8420 CALC BMI NORM PARAMETERS: HCPCS | Performed by: NURSE PRACTITIONER

## 2022-12-09 PROCEDURE — G8427 DOCREV CUR MEDS BY ELIG CLIN: HCPCS | Performed by: NURSE PRACTITIONER

## 2022-12-09 PROCEDURE — 3044F HG A1C LEVEL LT 7.0%: CPT | Performed by: NURSE PRACTITIONER

## 2022-12-09 PROCEDURE — 1123F ACP DISCUSS/DSCN MKR DOCD: CPT | Performed by: NURSE PRACTITIONER

## 2022-12-09 PROCEDURE — 1101F PT FALLS ASSESS-DOCD LE1/YR: CPT | Performed by: NURSE PRACTITIONER

## 2022-12-09 PROCEDURE — 1090F PRES/ABSN URINE INCON ASSESS: CPT | Performed by: NURSE PRACTITIONER

## 2022-12-09 RX ORDER — AMLODIPINE BESYLATE 5 MG/1
5 TABLET ORAL DAILY
Qty: 30 TABLET | Refills: 2 | Status: SHIPPED | OUTPATIENT
Start: 2022-12-09

## 2022-12-09 NOTE — PROGRESS NOTES
1. \"Have you been to the ER, urgent care clinic since your last visit? Hospitalized since your last visit? \"  [facility] ER and [facility] ER     2. \"Have you seen or consulted any other health care providers outside of the [address] [place] [address] since your last visit? \"  No      3. For patients aged 39-70: Has the patient had a colonoscopy / FIT/ Cologuard? No      If the patient is female:    4. For patients aged 41-77: Has the patient had a mammogram within the past 2 years? No      5. For patients aged 21-65: Has the patient had a pap smear? Yes - Care Gap present.  Most recent result on file

## 2022-12-09 NOTE — PROGRESS NOTES
Subjective:    Mirela Parker is a 74y.o. year old female seen for follow up of diabetes. She also has hyperlipidemia. Diabetic Review of Systems - medication compliance: none, diabetic diet compliance: compliant most of the time, home glucose monitoring: is not performed, further diabetic ROS: no polyuria or polydipsia, no chest pain, dyspnea or TIA's, no numbness, tingling or pain in extremities. Other symptoms and concerns: patient states she continues to have muscle spasms with coughing/sneezing. Reports the tizanidine she has been prescribed has not been helpful. States she was previously taking robaxin however, the last refill she received with not coated and this caused abd pain and vomiting. Patient was reently dx with gastric cancer. States she hasn't been handling this too well. She is under the care of psychiatry for medication management but has not started with a therapist.  She is currently receiving chemotherapy. States she was informed her tumor is inoperable.     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 Results in 2-4 days, Max treatment length of 2 weeks. gabapentin (NEURONTIN) 300 mg capsule TAKE 1 CAPSULE BY MOUTH THREE TIMES DAILY . DO NOT EXCEED 3 PER 24 HOURS FOR  NEUROPATHIC  PAIN  Indications: neuropathic pain 270 Capsule 1    tiZANidine (ZANAFLEX) 4 mg tablet Take 1 tab by mouth BID-TID as needed for muscle spasm. 60 Tablet 1    ondansetron hcl (Zofran) 4 mg tablet Take 1 Tablet by mouth every eight (8) hours as needed for Nausea. 14 Tablet 0    ondansetron (ZOFRAN ODT) 4 mg disintegrating tablet Take 1 Tablet by mouth every eight (8) hours as needed for Nausea or Vomiting for up to 12 doses. 12 Tablet 0    pravastatin (PRAVACHOL) 40 mg tablet Take 1 tablet by mouth nightly 90 Tablet 2    traZODone (DESYREL) 150 mg tablet Take 150-300 mg by mouth nightly. sertraline (ZOLOFT) 100 mg tablet Take 200 mg by mouth daily. perphenazine (TRILAFON) 4 mg tablet Take 4 mg by mouth three (3) times daily. benztropine (COGENTIN) 1 mg tablet Take 1 mg by mouth daily. iron sucrose (VENOFER) 20 MG/ML injection 200 mg by IntraVENous route. (Patient not taking: Reported on 12/9/2022)      naloxone [facility]) 4 mg/actuation nasal spray Give 1 spray into 1 nostril. Give additional doses using a new nasal spray with each dose, if patient does not respond or relapses into respiratory depression. Call 911. Additional doses may be given every 2 to 3 minutes until medical assistance arrives. (Patient not taking: Reported on 12/9/2022)      oxyCODONE IR (ROXICODONE) 5 mg immediate release tablet Take 1 Tablet by mouth. (Patient not taking: Reported on 12/9/2022)      oxyCODONE IR (ROXICODONE) 5 mg immediate release tablet TAKE 1 TABLET BY MOUTH EVERY 4 TO 6 HOURS AS NEEDED FOR PAIN (Patient not taking: Reported on 12/9/2022)      oxyCODONE IR (ROXICODONE) 10 mg tab immediate release tablet Take 1 Tablet by mouth.  (Patient not taking: Reported on 12/9/2022)      oxyCODONE IR (ROXICODONE) 10 mg tab immediate release tablet TAKE 1 TABLET BY MOUTH EVERY 4 TO 6 HOURS AS NEEDED FOR PAIN (Patient not taking: Reported on 12/9/2022)      polyethylene glycol (MIRALAX) 17 gram/dose powder  (Patient not taking: Reported on 12/9/2022)      perphenazine (TRILAFON) 8 mg tablet Take 8 mg by mouth two (2) times a day. (Patient not taking: Reported on 12/9/2022)      triamcinolone acetonide (KENALOG) 0.1 % ointment Apply  to affected area two (2) times a day. use thin layer (Patient not taking: Reported on 12/9/2022) 60 g 0    mupirocin (BACTROBAN) 2 % ointment Apply  to affected area daily. Apply to any open areas. (Patient not taking: Reported on 12/9/2022) 22 g 0    nystatin (MYCOSTATIN) powder Apply  to affected area four (4) times daily. (Patient not taking: Reported on 12/9/2022) 30 g 1    omeprazole (PRILOSEC) 20 mg capsule Take 1 Capsule by mouth Daily (before breakfast). (Patient not taking: No sig reported) 90 Capsule 1    metFORMIN ER (GLUCOPHAGE XR) 500 mg tablet Take 1 Tablet by mouth daily (with dinner). (Patient not taking: No sig reported) 90 Tablet 1    diclofenac EC (VOLTAREN) 75 mg EC tablet Take 1 tab by mouth twice daily as needed. (Patient not taking: Reported on 12/9/2022) 180 Tablet 1    methocarbamoL (ROBAXIN) 750 mg tablet Take 1 tab by mouth 4 times daily as needed. (Patient not taking: Reported on 12/9/2022) 120 Tablet 5    fluticasone propionate (FLONASE) 50 mcg/actuation nasal spray 2 Sprays by Both Nostrils route daily as needed for Rhinitis. (Patient not taking: Reported on 12/9/2022) 16 g 2    diclofenac (Voltaren) 1 % gel Apply 4 grams to right shoulder up to 4 times per day, maximum 16 grams per joint per day. Dispense 5 100 gram tubes (Patient not taking: Reported on 12/9/2022) 500 g 1    Blood-Glucose Meter (TRUE METRIX GLUCOSE METER) misc Use to check blood sugar 2 times per day.  (Patient not taking: Reported on 12/9/2022) 1 Each 0 glucose blood VI test strips (TRUE METRIX GLUCOSE TEST STRIP) strip Use to check blood sugar 2 times per day. (Patient not taking: Reported on 12/9/2022) 100 Strip 11    lancets misc Use to check blood sugar 2 times daily (Patient not taking: Reported on 12/9/2022) 1 Each 11    Lancets misc Check glucose ACHS (Patient not taking: Reported on 12/9/2022) 200 Each 11    alcohol swabs padm 1 Each by Apply Externally route Before breakfast, lunch, dinner and at bedtime. accucheck ACHS and record in log. Take log to every doctor visit. **MEDICALLY NECESSARY** (Patient not taking: Reported on 12/9/2022) 100 Pad 2      Allergies   Allergen Reactions    Latex Rash    Penicillins Nausea and Vomiting    Penicillin Unknown (comments)     History reviewed. No pertinent surgical history. Family History   Problem Relation Age of Onset    Diabetes Mother     Stroke Mother     Diabetes Father     Cancer Paternal Grandfather     Breast Cancer Cousin       Lab Results   Component Value Date/Time    Cholesterol, total 226 (H) 10/27/2021 10:10 AM    HDL Cholesterol 57 10/27/2021 10:10 AM    LDL, calculated 135 (H) 10/27/2021 10:10 AM    LDL, calculated 116 (H) 02/18/2020 08:10 AM    VLDL, calculated 34 10/27/2021 10:10 AM    VLDL, calculated 34 02/18/2020 08:10 AM    Triglyceride 192 (H) 10/27/2021 10:10 AM    CHOL/HDL Ratio 2.9 10/20/2018 08:33 AM     Lab Results   Component Value Date/Time    Sodium 133 (L) 10/18/2022 12:30 PM    Potassium 3.4 (L) 10/18/2022 12:30 PM    Chloride 95 (L) 10/18/2022 12:30 PM    CO2 33 (H) 10/18/2022 12:30 PM    Anion gap 5 10/18/2022 12:30 PM    Glucose 116 (H) 10/18/2022 12:30 PM    BUN 40 (H) 10/18/2022 12:30 PM    Creatinine 1.19 10/18/2022 12:30 PM    BUN/Creatinine ratio 34 (H) 10/18/2022 12:30 PM    GFR est AA >60 09/12/2022 01:06 PM    GFR est non-AA 58 (L) 09/12/2022 01:06 PM    Calcium 9.1 10/18/2022 12:30 PM    Bilirubin, total 0.7 10/18/2022 12:30 PM    Alk.  phosphatase 91 10/18/2022 12:30 PM Protein, total 7.6 10/18/2022 12:30 PM    Albumin 3.5 10/18/2022 12:30 PM    Globulin 4.1 (H) 10/18/2022 12:30 PM    A-G Ratio 0.9 10/18/2022 12:30 PM    ALT (SGPT) 312 (H) 10/18/2022 12:30 PM    AST (SGOT) 99 (H) 10/18/2022 12:30 PM     Lab Results   Component Value Date/Time    WBC 6.0 10/18/2022 12:30 PM    HGB 12.1 10/18/2022 12:30 PM    HCT 36.1 10/18/2022 12:30 PM    PLATELET 052 41/25/0095 12:30 PM    MCV 85.7 10/18/2022 12:30 PM     Lab Results   Component Value Date/Time    Hemoglobin A1c 5.6 10/27/2021 09:39 AM    Hemoglobin A1c (POC) 5.8 02/25/2020 09:52 AM     Lab Results   Component Value Date/Time    Microalbumin/Creat ratio (mg/g creat)  12/01/2018 09:43 AM     Cannot calculate ratio due to microalbumin result outside reportable range.     Microalb/Creat ratio (ug/mg creat.) 4 07/08/2021 02:34 PM    Microalbumin,urine random <0.50 12/01/2018 09:43 AM    Microalbumin urine (POC) 30 04/11/2017 04:00 PM     Wt Readings from Last 3 Encounters:   12/09/22 139 lb 12.8 oz (63.4 kg)   10/31/22 134 lb (60.8 kg)   10/17/22 134 lb (60.8 kg)     Last Point of Care HGB A1C  Hemoglobin A1c (POC)   Date Value Ref Range Status   02/25/2020 5.8 % Final      BP Readings from Last 3 Encounters:   12/09/22 (!) 152/87   10/18/22 133/81   09/12/22 (!) 128/90       Results for orders placed or performed during the hospital encounter of 10/17/22   COVID-19 WITH INFLUENZA A/B   Result Value Ref Range    SARS-CoV-2 by PCR Not detected NOTD      Influenza A by PCR Not detected NOTD      Influenza B by PCR Not detected NOTD     CBC WITH AUTOMATED DIFF   Result Value Ref Range    WBC 7.4 4.6 - 13.2 K/uL    RBC 4.41 4.20 - 5.30 M/uL    HGB 12.5 12.0 - 16.0 g/dL    HCT 35.9 35.0 - 45.0 %    MCV 81.4 78.0 - 100.0 FL    MCH 28.3 24.0 - 34.0 PG    MCHC 34.8 31.0 - 37.0 g/dL    RDW 16.4 (H) 11.6 - 14.5 %    PLATELET 827 330 - 506 K/uL    MPV 10.3 9.2 - 11.8 FL    NRBC 0.3 (H) 0  WBC    ABSOLUTE NRBC 0.02 (H) 0.00 - 0.01 K/uL NEUTROPHILS 43 40 - 73 %    LYMPHOCYTES 37 21 - 52 %    MONOCYTES 20 (H) 3 - 10 %    EOSINOPHILS 0 0 - 5 %    BASOPHILS 0 0 - 2 %    IMMATURE GRANULOCYTES 0 0.0 - 0.5 %    ABS. NEUTROPHILS 3.2 1.8 - 8.0 K/UL    ABS. LYMPHOCYTES 2.7 0.9 - 3.6 K/UL    ABS. MONOCYTES 1.5 (H) 0.05 - 1.2 K/UL    ABS. EOSINOPHILS 0.0 0.0 - 0.4 K/UL    ABS. BASOPHILS 0.0 0.0 - 0.1 K/UL    ABS. IMM. GRANS. 0.0 0.00 - 0.04 K/UL    DF MANUAL      PLATELET COMMENTS ADEQUATE PLATELETS      RBC COMMENTS NORMOCYTIC, NORMOCHROMIC     METABOLIC PANEL, COMPREHENSIVE   Result Value Ref Range    Sodium 125 (L) 136 - 145 mmol/L    Potassium 3.6 3.5 - 5.5 mmol/L    Chloride 86 (L) 100 - 111 mmol/L    CO2 28 21 - 32 mmol/L    Anion gap 11 3.0 - 18 mmol/L    Glucose 145 (H) 74 - 99 mg/dL    BUN 70 (H) 7.0 - 18 MG/DL    Creatinine 2.14 (H) 0.6 - 1.3 MG/DL    BUN/Creatinine ratio 33 (H) 12 - 20      eGFR 25 (L) >60 ml/min/1.73m2    Calcium 9.0 8.5 - 10.1 MG/DL    Bilirubin, total 0.7 0.2 - 1.0 MG/DL    ALT (SGPT) 402 (H) 13 - 56 U/L    AST (SGOT) 182 (H) 10 - 38 U/L    Alk.  phosphatase 102 45 - 117 U/L    Protein, total 8.1 6.4 - 8.2 g/dL    Albumin 3.9 3.4 - 5.0 g/dL    Globulin 4.2 (H) 2.0 - 4.0 g/dL    A-G Ratio 0.9 0.8 - 1.7     LIPASE   Result Value Ref Range    Lipase 131 73 - 393 U/L   MAGNESIUM   Result Value Ref Range    Magnesium 2.2 1.6 - 2.6 mg/dL   TROPONIN-HIGH SENSITIVITY   Result Value Ref Range    Troponin-High Sensitivity 47 0 - 54 ng/L   CBC WITH AUTOMATED DIFF   Result Value Ref Range    WBC 6.0 4.6 - 13.2 K/uL    RBC 4.21 4.20 - 5.30 M/uL    HGB 12.1 12.0 - 16.0 g/dL    HCT 36.1 35.0 - 45.0 %    MCV 85.7 78.0 - 100.0 FL    MCH 28.7 24.0 - 34.0 PG    MCHC 33.5 31.0 - 37.0 g/dL    RDW 16.8 (H) 11.6 - 14.5 %    PLATELET 475 413 - 209 K/uL    MPV 10.2 9.2 - 11.8 FL    NRBC 0.0 0  WBC    ABSOLUTE NRBC 0.00 0.00 - 0.01 K/uL    NEUTROPHILS 35 (L) 40 - 73 %    LYMPHOCYTES 42 21 - 52 %    MONOCYTES 22 (H) 3 - 10 %    EOSINOPHILS 1 0 - 5 %    BASOPHILS 0 0 - 2 %    IMMATURE GRANULOCYTES 0 0.0 - 0.5 %    ABS. NEUTROPHILS 2.1 1.8 - 8.0 K/UL    ABS. LYMPHOCYTES 2.5 0.9 - 3.6 K/UL    ABS. MONOCYTES 1.3 (H) 0.05 - 1.2 K/UL    ABS. EOSINOPHILS 0.1 0.0 - 0.4 K/UL    ABS. BASOPHILS 0.0 0.0 - 0.1 K/UL    ABS. IMM. GRANS. 0.0 0.00 - 0.04 K/UL    DF AUTOMATED     METABOLIC PANEL, COMPREHENSIVE   Result Value Ref Range    Sodium 133 (L) 136 - 145 mmol/L    Potassium 3.4 (L) 3.5 - 5.5 mmol/L    Chloride 95 (L) 100 - 111 mmol/L    CO2 33 (H) 21 - 32 mmol/L    Anion gap 5 3.0 - 18 mmol/L    Glucose 116 (H) 74 - 99 mg/dL    BUN 40 (H) 7.0 - 18 MG/DL    Creatinine 1.19 0.6 - 1.3 MG/DL    BUN/Creatinine ratio 34 (H) 12 - 20      eGFR 51 (L) >60 ml/min/1.73m2    Calcium 9.1 8.5 - 10.1 MG/DL    Bilirubin, total 0.7 0.2 - 1.0 MG/DL    ALT (SGPT) 312 (H) 13 - 56 U/L    AST (SGOT) 99 (H) 10 - 38 U/L    Alk.  phosphatase 91 45 - 117 U/L    Protein, total 7.6 6.4 - 8.2 g/dL    Albumin 3.5 3.4 - 5.0 g/dL    Globulin 4.1 (H) 2.0 - 4.0 g/dL    A-G Ratio 0.9 0.8 - 1.7     POC LACTIC ACID   Result Value Ref Range    Lactic Acid (POC) 1.39 0.40 - 2.00 mmol/L   EKG, 12 LEAD, INITIAL   Result Value Ref Range    Ventricular Rate 115 BPM    Atrial Rate 115 BPM    P-R Interval 126 ms    QRS Duration 90 ms    Q-T Interval 382 ms    QTC Calculation (Bezet) 528 ms    Calculated P Axis 64 degrees    Calculated R Axis 80 degrees    Calculated T Axis 55 degrees    Diagnosis       Sinus tachycardia  Prolonged QT  Abnormal ECG  When compared with ECG of 12-SEP-2022 08:50,  No significant change was found  Confirmed by Bernarda Chester MD, Shekhar Cervantes ([id number]) on 10/17/2022 4:54:18 PM         Last Diabetic Foot Exam on:   Diabetic Foot and Eye Exam HM Status   Topic Date Due    Eye Exam  05/03/2020    Diabetic Foot Care  07/08/2022       Objective:  Visit Vitals  BP (!) 152/87 (BP 1 Location: Left upper arm, BP Patient Position: Sitting, BP Cuff Size: Adult)   Pulse 80   Temp 98.1 °F (36.7 °C) (Temporal) Resp 18   Ht 5' 5\" (1.651 m)   Wt 139 lb 12.8 oz (63.4 kg)   SpO2 99%   BMI 23.26 kg/m²     Awake and alert in no acute distress, frail appearing. Neck supple without lymphadenopathy, no carotid artery bruits auscultated bilaterally. No thyromegaly   Lungs clear throughout   S1 S2 RRR without ectopy or murmur auscultated. Extremities: no clubbing, cyanosis, peripheral edema   Abdomen - soft, nontender, nondistended, no masses or organomegaly  Integumentary: no rashes   Reviewed vital signs       Diabetic foot exam:     Left Foot:   Visual Exam: normal    Pulse DP: 2+ (normal)   Filament test: normal sensation    Vibratory sensation: normal      Right Foot:   Visual Exam: normal    Pulse DP: 2+ (normal)   Filament test: normal sensation    Vibratory sensation: normal      Assessment/Plan:    ICD-10-CM ICD-9-CM    1. Controlled type 2 diabetes mellitus with hyperglycemia, unspecified whether long term insulin use (HCC)  E11.65 250.80 HEMOGLOBIN A1C WITH EAG     790.29 LIPID PANEL      METABOLIC PANEL, COMPREHENSIVE      METABOLIC PANEL, COMPREHENSIVE      LIPID PANEL      HEMOGLOBIN A1C WITH EAG      2. Mixed hyperlipidemia  E78.2 272.2 LIPID PANEL      METABOLIC PANEL, COMPREHENSIVE      METABOLIC PANEL, COMPREHENSIVE      LIPID PANEL      3. Adenocarcinoma of duodenum (HCC)  C17.0 152.0         Orders Placed This Encounter    HEMOGLOBIN A1C WITH EAG    LIPID PANEL    METABOLIC PANEL, COMPREHENSIVE    amLODIPine (NORVASC) 5 mg tablet     Begin amlodipine as above for elevated blood pressure  Patient provided manufacture information to provide to pharmacy for coated robaxin  I have discussed the diagnosis with the patient and the intended plan as seen in the above orders. The patient has received an after-visit summary and questions were answered concerning future plans. I have discussed medication side effects and warnings with the patient as well.   Patient agreeable with above plan and verbalizes understanding. Follow-up and Dispositions    Return in about 3 months (around 3/9/2023) for DM/HTN/HLD, in office follow up.

## 2022-12-10 LAB
ALBUMIN SERPL-MCNC: 4.4 G/DL (ref 3.8–4.8)
ALBUMIN/GLOB SERPL: 1.7 {RATIO} (ref 1.2–2.2)
ALP SERPL-CCNC: 76 IU/L (ref 44–121)
ALT SERPL-CCNC: 22 IU/L (ref 0–32)
AST SERPL-CCNC: 24 IU/L (ref 0–40)
BILIRUB SERPL-MCNC: <0.2 MG/DL (ref 0–1.2)
BUN SERPL-MCNC: 9 MG/DL (ref 8–27)
BUN/CREAT SERPL: 15 (ref 12–28)
CALCIUM SERPL-MCNC: 9.5 MG/DL (ref 8.7–10.3)
CHLORIDE SERPL-SCNC: 103 MMOL/L (ref 96–106)
CHOLEST SERPL-MCNC: 264 MG/DL (ref 100–199)
CO2 SERPL-SCNC: 23 MMOL/L (ref 20–29)
CREAT SERPL-MCNC: 0.59 MG/DL (ref 0.57–1)
EGFR: 100 ML/MIN/1.73
EST. AVERAGE GLUCOSE BLD GHB EST-MCNC: 131 MG/DL
GLOBULIN SER CALC-MCNC: 2.6 G/DL (ref 1.5–4.5)
GLUCOSE SERPL-MCNC: 93 MG/DL (ref 70–99)
HBA1C MFR BLD: 6.2 % (ref 4.8–5.6)
HDLC SERPL-MCNC: 59 MG/DL
LDLC SERPL CALC-MCNC: 141 MG/DL (ref 0–99)
POTASSIUM SERPL-SCNC: 4.5 MMOL/L (ref 3.5–5.2)
PROT SERPL-MCNC: 7 G/DL (ref 6–8.5)
SODIUM SERPL-SCNC: 143 MMOL/L (ref 134–144)
TRIGL SERPL-MCNC: 350 MG/DL (ref 0–149)
VLDLC SERPL CALC-MCNC: 64 MG/DL (ref 5–40)

## 2022-12-10 NOTE — LETTER
[id number] [id number]    Date: 8/18/2021            Dear Ms. Garcia,    Your good health is important to us; that is why we are sending you this friendly reminder. As always, our goal is to be your partner in life-long wellness. Our records indicate that you need to make an appointment for your yearly mammogram. Please call [phone]   to schedule this examination at your earliest convenience. If you have had this study done elsewhere, please contact us so we may update our records. If you have recently scheduled your appointment, kindly disregard this reminder.           Sincerely,    [address]
no

## 2022-12-22 RX ORDER — LANCETS
EACH MISCELLANEOUS
Qty: 200 EACH | Refills: 11 | Status: SHIPPED | OUTPATIENT
Start: 2022-12-22

## 2022-12-22 RX ORDER — PRAVASTATIN SODIUM 40 MG/1
40 TABLET ORAL
Qty: 90 TABLET | Refills: 2 | Status: SHIPPED | OUTPATIENT
Start: 2022-12-22

## 2022-12-22 RX ORDER — PEN NEEDLE, DIABETIC 31 GX3/16"
NEEDLE, DISPOSABLE MISCELLANEOUS
Qty: 100 PEN NEEDLE | Refills: 5 | Status: SHIPPED | OUTPATIENT
Start: 2022-12-22

## 2022-12-22 RX ORDER — ISOPROPYL ALCOHOL 70 ML/100ML
1 SWAB TOPICAL
Qty: 100 PAD | Refills: 2 | Status: SHIPPED | OUTPATIENT
Start: 2022-12-22

## 2022-12-22 RX ORDER — INSULIN LISPRO 100 [IU]/ML
10 INJECTION, SOLUTION INTRAVENOUS; SUBCUTANEOUS
Qty: 1 ADJUSTABLE DOSE PRE-FILLED PEN SYRINGE | Refills: 2 | Status: SHIPPED | OUTPATIENT
Start: 2022-12-22

## 2022-12-22 RX ORDER — BLOOD-GLUCOSE METER
EACH MISCELLANEOUS
Qty: 1 EACH | Refills: 0 | Status: SHIPPED | OUTPATIENT
Start: 2022-12-22

## 2022-12-22 RX ORDER — CALCIUM CITRATE/VITAMIN D3 200MG-6.25
TABLET ORAL
Qty: 100 STRIP | Refills: 11 | Status: SHIPPED | OUTPATIENT
Start: 2022-12-22

## 2023-01-01 DIAGNOSIS — M62.838 MUSCLE SPASM: ICD-10-CM

## 2023-01-03 RX ORDER — TIZANIDINE 4 MG/1
TABLET ORAL
Qty: 60 TABLET | Refills: 0 | Status: SHIPPED | OUTPATIENT
Start: 2023-01-03

## 2023-01-09 ENCOUNTER — TRANSCRIBE ORDER (OUTPATIENT)
Dept: SCHEDULING | Age: 68
End: 2023-01-09

## 2023-01-09 DIAGNOSIS — C17.0 MALIGNANT NEOPLASM OF DUODENUM (HCC): Primary | ICD-10-CM

## 2023-02-01 ENCOUNTER — HOSPITAL ENCOUNTER (OUTPATIENT)
Dept: CT IMAGING | Age: 68
Discharge: HOME | End: 2023-02-01
Attending: PHYSICIAN ASSISTANT
Payer: MEDICARE

## 2023-02-01 DIAGNOSIS — C17.0 MALIGNANT NEOPLASM OF DUODENUM (HCC): ICD-10-CM

## 2023-02-01 LAB — CREAT UR-MCNC: 0.7 MG/DL (ref 0.6–1.3)

## 2023-02-01 PROCEDURE — 74011000636 HC RX REV CODE- 636: Performed by: RADIOLOGY

## 2023-02-01 PROCEDURE — 74177 CT ABD & PELVIS W/CONTRAST: CPT

## 2023-02-01 PROCEDURE — 82565 ASSAY OF CREATININE: CPT

## 2023-02-01 RX ADMIN — IOPAMIDOL 70 ML: 612 INJECTION, SOLUTION INTRAVENOUS at 11:29

## 2023-02-01 RX ADMIN — DIATRIZOATE MEGLUMINE AND DIATRIZOATE SODIUM 30 ML: 660; 100 LIQUID ORAL; RECTAL at 11:29

## 2023-02-04 ENCOUNTER — HOSPITAL ENCOUNTER (OUTPATIENT)
Age: 68
Setting detail: OBSERVATION
Discharge: HOME | End: 2023-02-05
Attending: EMERGENCY MEDICINE | Admitting: INTERNAL MEDICINE
Payer: MEDICARE

## 2023-02-04 DIAGNOSIS — N17.9 AKI (ACUTE KIDNEY INJURY) (HCC): Primary | ICD-10-CM

## 2023-02-04 DIAGNOSIS — R11.10 VOMITING, UNSPECIFIED VOMITING TYPE, UNSPECIFIED WHETHER NAUSEA PRESENT: ICD-10-CM

## 2023-02-04 LAB
ALBUMIN SERPL-MCNC: 3.9 G/DL (ref 3.4–5)
ALBUMIN/GLOB SERPL: 0.7 (ref 0.8–1.7)
ALP SERPL-CCNC: 90 U/L (ref 45–117)
ALT SERPL-CCNC: 42 U/L (ref 13–56)
ANION GAP SERPL CALC-SCNC: 13 MMOL/L (ref 3–18)
APPEARANCE UR: CLEAR
AST SERPL-CCNC: 50 U/L (ref 10–38)
ATRIAL RATE: 114 BPM
BASOPHILS # BLD: 0 K/UL (ref 0–0.1)
BASOPHILS NFR BLD: 0 % (ref 0–2)
BILIRUB SERPL-MCNC: 0.4 MG/DL (ref 0.2–1)
BILIRUB UR QL: ABNORMAL
BNP SERPL-MCNC: 93 PG/ML (ref 0–900)
BUN SERPL-MCNC: 22 MG/DL (ref 7–18)
BUN/CREAT SERPL: 12 (ref 12–20)
CALCIUM SERPL-MCNC: 10.3 MG/DL (ref 8.5–10.1)
CALCULATED P AXIS, ECG09: 56 DEGREES
CALCULATED R AXIS, ECG10: 95 DEGREES
CALCULATED T AXIS, ECG11: 35 DEGREES
CHLORIDE SERPL-SCNC: 88 MMOL/L (ref 100–111)
CO2 SERPL-SCNC: 30 MMOL/L (ref 21–32)
COLOR UR: YELLOW
CREAT SERPL-MCNC: 1.83 MG/DL (ref 0.6–1.3)
DIAGNOSIS, 93000: NORMAL
DIFFERENTIAL METHOD BLD: ABNORMAL
EOSINOPHIL # BLD: 0.1 K/UL (ref 0–0.4)
EOSINOPHIL NFR BLD: 2 % (ref 0–5)
EPITH CASTS URNS QL MICRO: NORMAL /LPF (ref 0–5)
ERYTHROCYTE [DISTWIDTH] IN BLOOD BY AUTOMATED COUNT: 15.6 % (ref 11.6–14.5)
FLUAV AG NPH QL IA: NEGATIVE
FLUBV AG NOSE QL IA: NEGATIVE
GLOBULIN SER CALC-MCNC: 5.4 G/DL (ref 2–4)
GLUCOSE SERPL-MCNC: 199 MG/DL (ref 74–99)
GLUCOSE UR STRIP.AUTO-MCNC: NEGATIVE MG/DL
HCT VFR BLD AUTO: 41.9 % (ref 35–45)
HGB BLD-MCNC: 13.8 G/DL (ref 12–16)
HGB UR QL STRIP: NEGATIVE
HYALINE CASTS URNS QL MICRO: NORMAL /LPF (ref 0–2)
IMM GRANULOCYTES # BLD AUTO: 0 K/UL
IMM GRANULOCYTES NFR BLD AUTO: 0 %
KETONES UR QL STRIP.AUTO: NEGATIVE MG/DL
LACTATE BLD-SCNC: 1.44 MMOL/L (ref 0.4–2)
LEUKOCYTE ESTERASE UR QL STRIP.AUTO: NEGATIVE
LIPASE SERPL-CCNC: 99 U/L (ref 73–393)
LYMPHOCYTES # BLD: 3.2 K/UL (ref 0.9–3.6)
LYMPHOCYTES NFR BLD: 44 % (ref 21–52)
MCH RBC QN AUTO: 28.8 PG (ref 24–34)
MCHC RBC AUTO-ENTMCNC: 32.9 G/DL (ref 31–37)
MCV RBC AUTO: 87.5 FL (ref 78–100)
METAMYELOCYTES NFR BLD MANUAL: 1 %
MONOCYTES # BLD: 1 K/UL (ref 0.05–1.2)
MONOCYTES NFR BLD: 14 % (ref 3–10)
NEUTS BAND NFR BLD MANUAL: 2 % (ref 0–5)
NEUTS SEG # BLD: 2.8 K/UL (ref 1.8–8)
NEUTS SEG NFR BLD: 37 % (ref 40–73)
NITRITE UR QL STRIP.AUTO: NEGATIVE
NRBC # BLD: 0.02 K/UL (ref 0–0.01)
NRBC BLD-RTO: 0.3 PER 100 WBC
P-R INTERVAL, ECG05: 124 MS
PH UR STRIP: 5 (ref 5–8)
PLATELET # BLD AUTO: 490 K/UL (ref 135–420)
PLATELET COMMENTS,PCOM: ABNORMAL
PMV BLD AUTO: 10.1 FL (ref 9.2–11.8)
POTASSIUM SERPL-SCNC: 5.2 MMOL/L (ref 3.5–5.5)
PROT SERPL-MCNC: 9.3 G/DL (ref 6.4–8.2)
PROT UR STRIP-MCNC: 100 MG/DL
Q-T INTERVAL, ECG07: 366 MS
QRS DURATION, ECG06: 90 MS
QTC CALCULATION (BEZET), ECG08: 504 MS
RBC # BLD AUTO: 4.79 M/UL (ref 4.2–5.3)
RBC #/AREA URNS HPF: NORMAL /HPF (ref 0–5)
RBC MORPH BLD: ABNORMAL
SARS-COV-2 RDRP RESP QL NAA+PROBE: NOT DETECTED
SODIUM SERPL-SCNC: 131 MMOL/L (ref 136–145)
SOURCE, COVRS: NORMAL
SP GR UR REFRACTOMETRY: >1.03 (ref 1–1.03)
TROPONIN I SERPL HS-MCNC: 8 NG/L (ref 0–54)
UROBILINOGEN UR QL STRIP.AUTO: 0.2 EU/DL (ref 0.2–1)
VENTRICULAR RATE, ECG03: 114 BPM
WBC # BLD AUTO: 7.2 K/UL (ref 4.6–13.2)
WBC URNS QL MICRO: NORMAL /HPF (ref 0–4)

## 2023-02-04 PROCEDURE — 87804 INFLUENZA ASSAY W/OPTIC: CPT

## 2023-02-04 PROCEDURE — 87635 SARS-COV-2 COVID-19 AMP PRB: CPT

## 2023-02-04 PROCEDURE — 96375 TX/PRO/DX INJ NEW DRUG ADDON: CPT

## 2023-02-04 PROCEDURE — 96376 TX/PRO/DX INJ SAME DRUG ADON: CPT

## 2023-02-04 PROCEDURE — 96374 THER/PROPH/DIAG INJ IV PUSH: CPT

## 2023-02-04 PROCEDURE — G0378 HOSPITAL OBSERVATION PER HR: HCPCS

## 2023-02-04 PROCEDURE — 80053 COMPREHEN METABOLIC PANEL: CPT

## 2023-02-04 PROCEDURE — 84484 ASSAY OF TROPONIN QUANT: CPT

## 2023-02-04 PROCEDURE — 83690 ASSAY OF LIPASE: CPT

## 2023-02-04 PROCEDURE — 93005 ELECTROCARDIOGRAM TRACING: CPT

## 2023-02-04 PROCEDURE — 99285 EMERGENCY DEPT VISIT HI MDM: CPT

## 2023-02-04 PROCEDURE — 81001 URINALYSIS AUTO W/SCOPE: CPT

## 2023-02-04 PROCEDURE — 74011000250 HC RX REV CODE- 250: Performed by: EMERGENCY MEDICINE

## 2023-02-04 PROCEDURE — 83605 ASSAY OF LACTIC ACID: CPT

## 2023-02-04 PROCEDURE — 74011250636 HC RX REV CODE- 250/636: Performed by: EMERGENCY MEDICINE

## 2023-02-04 PROCEDURE — 83880 ASSAY OF NATRIURETIC PEPTIDE: CPT

## 2023-02-04 PROCEDURE — 85025 COMPLETE CBC W/AUTO DIFF WBC: CPT

## 2023-02-04 PROCEDURE — 96361 HYDRATE IV INFUSION ADD-ON: CPT

## 2023-02-04 RX ORDER — MORPHINE SULFATE 4 MG/ML
4 INJECTION INTRAVENOUS
Status: COMPLETED | OUTPATIENT
Start: 2023-02-04 | End: 2023-02-04

## 2023-02-04 RX ORDER — ONDANSETRON 2 MG/ML
4 INJECTION INTRAMUSCULAR; INTRAVENOUS
Status: DISCONTINUED | OUTPATIENT
Start: 2023-02-04 | End: 2023-02-05 | Stop reason: HOSPADM

## 2023-02-04 RX ORDER — ONDANSETRON 2 MG/ML
4 INJECTION INTRAMUSCULAR; INTRAVENOUS
Status: COMPLETED | OUTPATIENT
Start: 2023-02-04 | End: 2023-02-04

## 2023-02-04 RX ORDER — DEXTROSE, SODIUM CHLORIDE, AND POTASSIUM CHLORIDE 5; .45; .15 G/100ML; G/100ML; G/100ML
125 INJECTION INTRAVENOUS CONTINUOUS
Status: DISCONTINUED | OUTPATIENT
Start: 2023-02-04 | End: 2023-02-04 | Stop reason: SDUPTHER

## 2023-02-04 RX ORDER — DEXTROSE MONOHYDRATE AND SODIUM CHLORIDE 5; .45 G/100ML; G/100ML
125 INJECTION, SOLUTION INTRAVENOUS CONTINUOUS
Status: DISCONTINUED | OUTPATIENT
Start: 2023-02-04 | End: 2023-02-05

## 2023-02-04 RX ADMIN — MORPHINE SULFATE 4 MG: 4 INJECTION, SOLUTION INTRAMUSCULAR; INTRAVENOUS at 13:19

## 2023-02-04 RX ADMIN — ONDANSETRON 4 MG: 2 INJECTION INTRAMUSCULAR; INTRAVENOUS at 20:18

## 2023-02-04 RX ADMIN — ONDANSETRON 4 MG: 2 INJECTION INTRAMUSCULAR; INTRAVENOUS at 13:18

## 2023-02-04 RX ADMIN — SODIUM CHLORIDE 1000 ML: 9 INJECTION, SOLUTION INTRAVENOUS at 11:31

## 2023-02-04 RX ADMIN — DEXTROSE MONOHYDRATE AND SODIUM CHLORIDE 125 ML/HR: 5; .45 INJECTION, SOLUTION INTRAVENOUS at 23:33

## 2023-02-04 RX ADMIN — DEXTROSE MONOHYDRATE AND SODIUM CHLORIDE 125 ML/HR: 5; .45 INJECTION, SOLUTION INTRAVENOUS at 16:24

## 2023-02-04 RX ADMIN — MORPHINE SULFATE 4 MG: 4 INJECTION, SOLUTION INTRAMUSCULAR; INTRAVENOUS at 20:18

## 2023-02-04 RX ADMIN — SODIUM CHLORIDE 1000 ML: 900 INJECTION, SOLUTION INTRAVENOUS at 13:18

## 2023-02-04 NOTE — ED PROVIDER NOTES
The patient is a 82-year-old woman with past medical history significant for coronary artery disease, hypertension, and duodenal adenocarcinoma s/p whipple, who presents to the ED today with dehydration. Her family member states that she was unable to urinate. She has had nausea and vomiting to the point where she cannot keep anything down. Her vomiting increased last evening. She is also having fairly significant abdominal pain. She had her last CT scan this past Wednesday. She has been trying to take her medications for nausea and vomiting but she has not been able to hold those down either. Her last chemotherapy was almost 2 weeks ago. It is now Saturday and she is scheduled for chemo on Monday. She gets it every other week. Dr. Bertin Alford is her oncologist.       Past Medical History:   Diagnosis Date    CAD (coronary artery disease)     Duodenal adenocarcinoma ([facility] [address] 2022    Hypertension     Uterine fibroid        History reviewed. No pertinent surgical history.       Family History:   Problem Relation Age of Onset    Diabetes Mother     Stroke Mother     Diabetes Father     Cancer Paternal Grandfather     Breast Cancer Cousin        Social History     Socioeconomic History    Marital status: SINGLE     Spouse name: Not on file    Number of children: Not on file    Years of education: Not on file    Highest education level: Not on file   Occupational History    Not on file   Tobacco Use    Smoking status: Every Day     Packs/day: 0.25     Years: 2.00     Pack years: 0.50     Types: Cigarettes    Smokeless tobacco: Never   Vaping Use    Vaping Use: Never used   Substance and Sexual Activity    Alcohol use: No     Alcohol/week: 0.8 standard drinks     Types: 1 Cans of beer per week    Drug use: Yes     Types: Marijuana     Comment: rarely     Sexual activity: Not on file   Other Topics Concern    Not on file   Social History Narrative    Not on file     Social Determinants of Health     Financial Resource Strain: Not on file   Food Insecurity: Not on file   Transportation Needs: Not on file   Physical Activity: Not on file   Stress: Not on file   Social Connections: Not on file   Intimate Partner Violence: Not on file   Housing Stability: Not on file     No current facility-administered medications on file prior to encounter. Current Outpatient Medications on File Prior to Encounter   Medication Sig Dispense Refill    tiZANidine (ZANAFLEX) 4 mg tablet TAKE 1 TABLET BY MOUTH 2 TO 3 TIMES DAILY AS NEEDED FOR MUSCLE SPASM 60 Tablet 0    pravastatin (PRAVACHOL) 40 mg tablet Take 1 Tablet by mouth nightly. 90 Tablet 2    insulin lispro (HumaLOG KwikPen Insulin) 100 unit/mL kwikpen 10 Units by SubCUTAneous route Before breakfast, lunch, and dinner. Administer subcutaneously 3 times daily before meals per sliding scale. Sliding scale: : 2 units; 201-250: 4 units; 251-300: 6 units; 301-350: 8 units; 351-400: 10 units. 1 Adjustable Dose Pre-filled Pen Syringe 2    Blood-Glucose Meter (True Metrix Glucose Meter) misc Use to check blood sugar before meals and bedtime 1 Each 0    glucose blood VI test strips (True Metrix Glucose Test Strip) strip Use to check blood sugar 2 times per day. 100 Strip 11    lancets misc Check glucose before meals and bedtime 200 Each 11    alcohol swabs padm 1 Each by Apply Externally route Before breakfast, lunch, dinner and at bedtime. accucheck ACHS and record in log. Take log to every doctor visit. **MEDICALLY NECESSARY** 100 Pad 2    Insulin Needles, Disposable, (Comfort EZ Pen Needles) 32 gauge x 5/32\" ndle To use with humalog kwikpen 100 Pen Needle 5    amLODIPine (NORVASC) 5 mg tablet Take 1 Tablet by mouth daily. 30 Tablet 2    lidocaine (LIDODERM) 5 % USE 1  PATCH TOPICALLY TO AFFECTED AREA EVERY 12 HOURS AND  REMOVE  FOR  12  HOURS  A  DAY 15 Patch 0    HYDROcodone-acetaminophen (NORCO)  mg tablet       iron sucrose (VENOFER) 20 MG/ML injection 200 mg by IntraVENous route. (Patient not taking: Reported on 12/9/2022)      metoclopramide HCl (REGLAN) 10 mg tablet Metoclopramide Oral     one tab tid before meals    active      naloxone (NARCAN) 4 mg/actuation nasal spray Give 1 spray into 1 nostril. Give additional doses using a new nasal spray with each dose, if patient does not respond or relapses into respiratory depression. Call 911. Additional doses may be given every 2 to 3 minutes until medical assistance arrives. (Patient not taking: Reported on 12/9/2022)      oxyCODONE IR (ROXICODONE) 5 mg immediate release tablet Take 1 Tablet by mouth. (Patient not taking: Reported on 12/9/2022)      oxyCODONE IR (ROXICODONE) 5 mg immediate release tablet TAKE 1 TABLET BY MOUTH EVERY 4 TO 6 HOURS AS NEEDED FOR PAIN (Patient not taking: Reported on 12/9/2022)      oxyCODONE IR (ROXICODONE) 10 mg tab immediate release tablet Take 1 Tablet by mouth. (Patient not taking: Reported on 12/9/2022)      oxyCODONE IR (ROXICODONE) 10 mg tab immediate release tablet TAKE 1 TABLET BY MOUTH EVERY 4 TO 6 HOURS AS NEEDED FOR PAIN (Patient not taking: Reported on 12/9/2022)      polyethylene glycol (MIRALAX) 17 gram/dose powder Mix 1 packet (17g) into 4-8 ounces of beverage and drink once daily. Results in 2-4 days, Max treatment length of 2 weeks. polyethylene glycol (MIRALAX) 17 gram/dose powder  (Patient not taking: Reported on 12/9/2022)      perphenazine (TRILAFON) 8 mg tablet Take 8 mg by mouth two (2) times a day. (Patient not taking: Reported on 12/9/2022)      gabapentin (NEURONTIN) 300 mg capsule TAKE 1 CAPSULE BY MOUTH THREE TIMES DAILY . DO NOT EXCEED 3 PER 24 HOURS FOR  NEUROPATHIC  PAIN  Indications: neuropathic pain 270 Capsule 1    ondansetron hcl (Zofran) 4 mg tablet Take 1 Tablet by mouth every eight (8) hours as needed for Nausea.  14 Tablet 0    ondansetron (ZOFRAN ODT) 4 mg disintegrating tablet Take 1 Tablet by mouth every eight (8) hours as needed for Nausea or Vomiting for up to 12 doses. 12 Tablet 0    triamcinolone acetonide (KENALOG) 0.1 % ointment Apply  to affected area two (2) times a day. use thin layer (Patient not taking: Reported on 12/9/2022) 60 g 0    mupirocin (BACTROBAN) 2 % ointment Apply  to affected area daily. Apply to any open areas. (Patient not taking: Reported on 12/9/2022) 22 g 0    nystatin (MYCOSTATIN) powder Apply  to affected area four (4) times daily. (Patient not taking: Reported on 12/9/2022) 30 g 1    omeprazole (PRILOSEC) 20 mg capsule Take 1 Capsule by mouth Daily (before breakfast). (Patient not taking: No sig reported) 90 Capsule 1    metFORMIN ER (GLUCOPHAGE XR) 500 mg tablet Take 1 Tablet by mouth daily (with dinner). (Patient not taking: No sig reported) 90 Tablet 1    diclofenac EC (VOLTAREN) 75 mg EC tablet Take 1 tab by mouth twice daily as needed. (Patient not taking: Reported on 12/9/2022) 180 Tablet 1    methocarbamoL (ROBAXIN) 750 mg tablet Take 1 tab by mouth 4 times daily as needed. (Patient not taking: Reported on 12/9/2022) 120 Tablet 5    fluticasone propionate (FLONASE) 50 mcg/actuation nasal spray 2 Sprays by Both Nostrils route daily as needed for Rhinitis. (Patient not taking: Reported on 12/9/2022) 16 g 2    diclofenac (Voltaren) 1 % gel Apply 4 grams to right shoulder up to 4 times per day, maximum 16 grams per joint per day. Dispense 5 100 gram tubes (Patient not taking: Reported on 12/9/2022) 500 g 1    lancets misc Use to check blood sugar 2 times daily (Patient not taking: Reported on 12/9/2022) 1 Each 11    traZODone (DESYREL) 150 mg tablet Take 150-300 mg by mouth nightly. sertraline (ZOLOFT) 100 mg tablet Take 200 mg by mouth daily. perphenazine (TRILAFON) 4 mg tablet Take 4 mg by mouth three (3) times daily. benztropine (COGENTIN) 1 mg tablet Take 1 mg by mouth daily. ALLERGIES: Latex, Penicillins, and Penicillin    Review of Systems   All other systems reviewed and are negative.     Vitals:    02/04/23 1107 02/04/23 1135 02/04/23 1138 02/04/23 1156   BP: (!) 139/101 (!) 168/101     Pulse: (!) 132  (!) 112 (!) 111   Resp: 16 27 22   Temp: 98.8 °F (37.1 °C)      SpO2: 100%  96% 95%   Weight: 60.8 kg (134 lb)      Height: 5' 5\" (1.651 m)               Physical Exam  Vitals and nursing note reviewed. Constitutional:       Appearance: Normal appearance. HENT:      Head: Normocephalic and atraumatic. Left Ear: External ear normal.      Nose: Nose normal.      Mouth/Throat:      Mouth: Mucous membranes are dry. Pharynx: Oropharynx is clear. Eyes:      Extraocular Movements: Extraocular movements intact. Conjunctiva/sclera: Conjunctivae normal.      Pupils: Pupils are equal, round, and reactive to light. Cardiovascular:      Rate and Rhythm: Regular rhythm. Tachycardia present. Pulses: Normal pulses. Heart sounds: Normal heart sounds. Pulmonary:      Effort: Pulmonary effort is normal.      Breath sounds: Normal breath sounds. Abdominal:      General: Abdomen is flat. Bowel sounds are normal.      Palpations: Abdomen is soft. Tenderness: There is abdominal tenderness. Comments: Mild diffuse tenderness to palpation   Musculoskeletal:         General: Normal range of motion. Cervical back: Normal range of motion and neck supple. Right lower leg: No edema. Left lower leg: No edema. Skin:     General: Skin is warm and dry. Capillary Refill: Capillary refill takes less than 2 seconds. Neurological:      General: No focal deficit present. Mental Status: She is alert and oriented to person, place, and time. Psychiatric:         Mood and Affect: Mood normal.         Behavior: Behavior normal.         Thought Content:  Thought content normal.         Judgment: Judgment normal.      Recent Results (from the past 12 hour(s))   URINALYSIS W/ RFLX MICROSCOPIC    Collection Time: 02/04/23 11:20 AM   Result Value Ref Range    Color YELLOW      Appearance CLEAR Specific gravity >1.030 (H) 1.003 - 1.030    pH (UA) 5.0 5.0 - 8.0      Protein 100 (A) NEG mg/dL    Glucose Negative NEG mg/dL    Ketone Negative NEG mg/dL    Bilirubin SMALL (A) NEG      Blood Negative NEG      Urobilinogen 0.2 0.2 - 1.0 EU/dL    Nitrites Negative NEG      Leukocyte Esterase Negative NEG     URINE MICROSCOPIC ONLY    Collection Time: 02/04/23 11:20 AM   Result Value Ref Range    WBC 0 to 3 0 - 4 /hpf    RBC NONE 0 - 5 /hpf    Epithelial cells 1+ 0 - 5 /lpf    Hyaline cast 11 to 20 0 - 2 /lpf   CBC WITH AUTOMATED DIFF    Collection Time: 02/04/23 11:23 AM   Result Value Ref Range    WBC 7.2 4.6 - 13.2 K/uL    RBC 4.79 4.20 - 5.30 M/uL    HGB 13.8 12.0 - 16.0 g/dL    HCT 41.9 35.0 - 45.0 %    MCV 87.5 78.0 - 100.0 FL    MCH 28.8 24.0 - 34.0 PG    MCHC 32.9 31.0 - 37.0 g/dL    RDW 15.6 (H) 11.6 - 14.5 %    PLATELET 198 (H) 940 - 420 K/uL    MPV 10.1 9.2 - 11.8 FL    NRBC 0.3 (H) 0  WBC    ABSOLUTE NRBC 0.02 (H) 0.00 - 0.01 K/uL    NEUTROPHILS 37 (L) 40 - 73 %    BAND NEUTROPHILS 2 0 - 5 %    LYMPHOCYTES 44 21 - 52 %    MONOCYTES 14 (H) 3 - 10 %    EOSINOPHILS 2 0 - 5 %    BASOPHILS 0 0 - 2 %    METAMYELOCYTES 1 (H) 0 %    IMMATURE GRANULOCYTES 0 %    ABS. NEUTROPHILS 2.8 1.8 - 8.0 K/UL    ABS. LYMPHOCYTES 3.2 0.9 - 3.6 K/UL    ABS. MONOCYTES 1.0 0.05 - 1.2 K/UL    ABS. EOSINOPHILS 0.1 0.0 - 0.4 K/UL    ABS. BASOPHILS 0.0 0.0 - 0.1 K/UL    ABS. IMM.  GRANS. 0.0 K/UL    DF MANUAL      PLATELET COMMENTS Increased Platelets      RBC COMMENTS NORMOCYTIC, NORMOCHROMIC     METABOLIC PANEL, COMPREHENSIVE    Collection Time: 02/04/23 11:23 AM   Result Value Ref Range    Sodium 131 (L) 136 - 145 mmol/L    Potassium 5.2 3.5 - 5.5 mmol/L    Chloride 88 (L) 100 - 111 mmol/L    CO2 30 21 - 32 mmol/L    Anion gap 13 3.0 - 18 mmol/L    Glucose 199 (H) 74 - 99 mg/dL    BUN 22 (H) 7.0 - 18 MG/DL    Creatinine 1.83 (H) 0.6 - 1.3 MG/DL    BUN/Creatinine ratio 12 12 - 20      eGFR 30 (L) >60 ml/min/1.73m2 Calcium 10.3 (H) 8.5 - 10.1 MG/DL    Bilirubin, total 0.4 0.2 - 1.0 MG/DL    ALT (SGPT) 42 13 - 56 U/L    AST (SGOT) 50 (H) 10 - 38 U/L    Alk. phosphatase 90 45 - 117 U/L    Protein, total 9.3 (H) 6.4 - 8.2 g/dL    Albumin 3.9 3.4 - 5.0 g/dL    Globulin 5.4 (H) 2.0 - 4.0 g/dL    A-G Ratio 0.7 (L) 0.8 - 1.7     NT-PRO BNP    Collection Time: 02/04/23 11:23 AM   Result Value Ref Range    NT pro-BNP 93 0 - 900 PG/ML   TROPONIN-HIGH SENSITIVITY    Collection Time: 02/04/23 11:23 AM   Result Value Ref Range    Troponin-High Sensitivity 8 0 - 54 ng/L   LIPASE    Collection Time: 02/04/23 11:23 AM   Result Value Ref Range    Lipase 99 73 - 393 U/L   COVID-19 RAPID TEST    Collection Time: 02/04/23 11:35 AM   Result Value Ref Range    Specimen source Nasopharyngeal      COVID-19 rapid test Not detected NOTD     INFLUENZA A & B AG (RAPID TEST)    Collection Time: 02/04/23 11:35 AM   Result Value Ref Range    Influenza A Antigen Negative NEG      Influenza B Antigen Negative NEG     POC LACTIC ACID    Collection Time: 02/04/23 11:43 AM   Result Value Ref Range    Lactic Acid (POC) 1.44 0.40 - 2.00 mmol/L   EKG, 12 LEAD, INITIAL    Collection Time: 02/04/23 11:47 AM   Result Value Ref Range    Ventricular Rate 114 BPM    Atrial Rate 114 BPM    P-R Interval 124 ms    QRS Duration 90 ms    Q-T Interval 366 ms    QTC Calculation (Bezet) 504 ms    Calculated P Axis 56 degrees    Calculated R Axis 95 degrees    Calculated T Axis 35 degrees    Diagnosis       Sinus tachycardia  Rightward axis  Nonspecific ST and T wave abnormality  Abnormal ECG  When compared with ECG of 17-OCT-2022 15:11,  No significant change was found       No orders to display       Medical Decision Making  The patient is a 29-year-old woman with past medical history significant for coronary artery disease and duodenal adenocarcinoma, who is currently undergoing chemotherapy, who presents to the ED today with nausea, vomiting and diarrhea.   The patient has not really able to keep anything down. She is tachycardic to the 1 teens. Initially when she presented she is tachycardic to 132. She has received 1 L of IV fluid so far. I have ordered a second liter of IV fluid, IV Zofran and IV morphine. The patient's baseline creatinine is 0.56. It has bumped up to 1.83. She does have an acute kidney injury. I believe that it is most likely prerenal given her degree of nausea and vomiting. She is receiving IV fluid. I have consulted the hospitalist for admission for further evaluation and management. They have accepted the patient service. Amount and/or Complexity of Data Reviewed  Labs: ordered. ECG/medicine tests: ordered. Risk  Prescription drug management.            Procedures

## 2023-02-04 NOTE — ED NOTES
Pt approved to eat and drink per Dr Amarjit Brabour. Pt is aware and states she will have her niece bring food.

## 2023-02-04 NOTE — Clinical Note
Patient Class[de-identified] OBSERVATION [104]  Type of Bed: Medical [8]  Cardiac Monitoring Required?: No  Reason for Observation: nausea, vomiting, dawson  Ad[facility] [[id number]]  Admitting Diagnosis: Vomiting [926150]  Admi tting Physician: Mannie Pedraza.Ends  Attending Physician: Germán Sheppard

## 2023-02-04 NOTE — ED NOTES
Pt laughing and joking with her niece. Maintenance IV fluids started per orders. Awaiting [facility] bed assignment.

## 2023-02-05 VITALS
TEMPERATURE: 98.3 F | SYSTOLIC BLOOD PRESSURE: 136 MMHG | HEART RATE: 97 BPM | BODY MASS INDEX: 22.33 KG/M2 | DIASTOLIC BLOOD PRESSURE: 76 MMHG | RESPIRATION RATE: 22 BRPM | WEIGHT: 134 LBS | OXYGEN SATURATION: 97 % | HEIGHT: 65 IN

## 2023-02-05 LAB
ANION GAP SERPL CALC-SCNC: 6 MMOL/L (ref 3–18)
BUN SERPL-MCNC: 14 MG/DL (ref 7–18)
BUN/CREAT SERPL: 14 (ref 12–20)
CALCIUM SERPL-MCNC: 8.8 MG/DL (ref 8.5–10.1)
CHLORIDE SERPL-SCNC: 102 MMOL/L (ref 100–111)
CO2 SERPL-SCNC: 30 MMOL/L (ref 21–32)
CREAT SERPL-MCNC: 1 MG/DL (ref 0.6–1.3)
GLUCOSE SERPL-MCNC: 136 MG/DL (ref 74–99)
MAGNESIUM SERPL-MCNC: 2.4 MG/DL (ref 1.6–2.6)
POTASSIUM SERPL-SCNC: 3.6 MMOL/L (ref 3.5–5.5)
SODIUM SERPL-SCNC: 138 MMOL/L (ref 136–145)

## 2023-02-05 PROCEDURE — 80048 BASIC METABOLIC PNL TOTAL CA: CPT

## 2023-02-05 PROCEDURE — 74011250636 HC RX REV CODE- 250/636: Performed by: STUDENT IN AN ORGANIZED HEALTH CARE EDUCATION/TRAINING PROGRAM

## 2023-02-05 PROCEDURE — G0378 HOSPITAL OBSERVATION PER HR: HCPCS

## 2023-02-05 PROCEDURE — 96361 HYDRATE IV INFUSION ADD-ON: CPT

## 2023-02-05 PROCEDURE — 83735 ASSAY OF MAGNESIUM: CPT

## 2023-02-05 RX ORDER — ONDANSETRON 4 MG/1
4 TABLET, ORALLY DISINTEGRATING ORAL
Qty: 30 TABLET | Refills: 3 | Status: SHIPPED | OUTPATIENT
Start: 2023-02-05

## 2023-02-05 RX ORDER — PROMETHAZINE HYDROCHLORIDE 25 MG/1
25 TABLET ORAL
Qty: 20 TABLET | Refills: 0 | Status: SHIPPED | OUTPATIENT
Start: 2023-02-05

## 2023-02-05 RX ORDER — PROMETHAZINE HYDROCHLORIDE 25 MG/1
25 SUPPOSITORY RECTAL
Qty: 12 SUPPOSITORY | Refills: 2 | Status: SHIPPED | OUTPATIENT
Start: 2023-02-05 | End: 2023-02-12

## 2023-02-05 RX ORDER — METOCLOPRAMIDE 10 MG/1
TABLET ORAL
Qty: 30 TABLET | Refills: 0 | Status: SHIPPED | OUTPATIENT
Start: 2023-02-05

## 2023-02-05 RX ORDER — SODIUM CHLORIDE 9 MG/ML
100 INJECTION, SOLUTION INTRAVENOUS ONCE
Status: COMPLETED | OUTPATIENT
Start: 2023-02-05 | End: 2023-02-05

## 2023-02-05 RX ADMIN — SODIUM CHLORIDE 100 ML/HR: 900 INJECTION, SOLUTION INTRAVENOUS at 06:04

## 2023-02-05 NOTE — ED NOTES
Patient signed out to me pending admission for DAWSON. Unfortunately has been holding at [facility] for 20 hours at this point, however has received a good amount of fluids. Her nausea and vomiting has stopped and she has been able to take an oral intake. Repeat BMP is performed and shows that her creatinine is back to normal levels. She no longer feels nauseous and since she is able to tolerate oral intake I do feel that she would be appropriate for discharge at this time. Patient is in agreement with this plan. She has chemo tomorrow with her oncologist.  She states that she does not want to go. Discussed the importance of talking to her oncologist about this. She was sent home with additional nausea medications to help her for the future. Otherwise she is hemodynamically stable, clinically appears well does not have any new acute findings at this time. Patient stable for discharge at this time. Patient is in agreement with the plan to be discharged at this time. All the patient's questions were answered. Patient was given written instructions on the diagnosis, and states understanding of the plan moving forward. We did discuss important signs and symptoms that should prompt quick return to the emergency department. Disposition: Patient was discharged home in stable condition.   They will follow up with oncology and her PCP    Prescriptions: Phenergan, Reglan, Zofran    Diagnosis: Acute DAWSON  Acute dehydration  Acute nausea vomiting

## 2023-02-05 NOTE — ED NOTES
Patient up for discharge. Discharge instructions reviewed with patient by the Provider and patient verbalized understanding of. Education taught to: patient to include to follow up with her Oncologist tomorrow and to drink plenty of PO fluids. Patient education was completed: yes  Pain assessment on discharge was 0. Condition stable. Patient discharged to home. Patient was discharged ambulatory. Valuables with patient  Current Discharge Medication List        START taking these medications    Details   promethazine (PHENERGAN) 25 mg tablet Take 1 Tablet by mouth every six (6) hours as needed for Nausea. Qty: 20 Tablet, Refills: 0  Start date: 2/5/2023      promethazine (PHENERGAN) 25 mg suppository Insert 1 Suppository into rectum every six (6) hours as needed for Nausea for up to 7 days. Qty: 12 Suppository, Refills: 2  Start date: 2/5/2023, End date: 2/12/2023           CONTINUE these medications which have CHANGED    Details   ondansetron (ZOFRAN ODT) 4 mg disintegrating tablet Take 1 Tablet by mouth every four (4) hours as needed for Nausea or Vomiting for up to 12 doses. Qty: 30 Tablet, Refills: 3  Start date: 2/5/2023      metoclopramide HCl (REGLAN) 10 mg tablet Metoclopramide Oral     one tab tid before meals    active  Qty: 30 Tablet, Refills: 0  Start date: 2/5/2023          Patient has called her [facility] to pick her up and take home and will remain in room until they arrive. Charge nurse Clemencia yañez.

## 2023-02-05 NOTE — ED NOTES
Patient declines being place in an inpatient hospital bed. Minimal uyrine output noted. Will continue to monitor.

## 2023-02-05 NOTE — ED NOTES
Purposeful rounding completed:    Side rails up x 2:  YES  Bed in low position and wheels locked: YES  Call bell within reach: YES  Comfort addressed: YES    Toileting needs addressed: YES  Plan of care reviewed/updated with patient and or family members as needed: YES  IV site assessed: n/a  Pain assessed and addressed: YES  Falls precautions maintained: YES

## 2023-02-05 NOTE — ED NOTES
Pt lying on stretcher, eyes closed; vs as noted per cardiac monitor. Bed is in lowest position, SR up x 2 for safety, call bell within easy reach. Pt's personal belongings on bedside table within her reach. Continue to await inpatient bed assignment. IV D5 1/2 NS infusing to R forearm IV @ 125ml/hr via Alaris pump without sx's of infiltration noted to site. Radha Soto remains in place. Will continue to monitor.

## 2023-02-05 NOTE — ED NOTES
Verbal shift change report given to Sunni Velazquez (oncoming nurse) by Gretta Ortega RN (offgoing nurse). Report included the following information SBAR.

## 2023-02-06 ENCOUNTER — TELEPHONE (OUTPATIENT)
Dept: FAMILY MEDICINE CLINIC | Age: 68
End: 2023-02-06

## 2023-02-06 NOTE — TELEPHONE ENCOUNTER
Care Transitions Initial Follow Up Call    Outreach made within 2 business days of discharge: Yes    Patient: Carlie Dent Patient : 1955   MRN: [id number]  Reason for Admission: Nausea/Vomiting  Discharge Date: 23       Spoke with: n/a- no answer    Discharge department/facility: [facility] ED    Scheduled appointment with PCP within 7-14 days    Follow Up  No future appointments. No answer from patient. Will attempt to contact at a later time.      Bianca Cuenca RN

## 2023-02-19 RX ORDER — TIZANIDINE 4 MG/1
TABLET ORAL
Qty: 60 TABLET | Refills: 0 | OUTPATIENT
Start: 2023-02-19

## 2023-02-22 ENCOUNTER — OFFICE VISIT (OUTPATIENT)
Age: 68
End: 2023-02-22
Payer: MEDICARE

## 2023-02-22 VITALS
HEIGHT: 65 IN | RESPIRATION RATE: 16 BRPM | BODY MASS INDEX: 22.3 KG/M2 | HEART RATE: 93 BPM | OXYGEN SATURATION: 97 % | SYSTOLIC BLOOD PRESSURE: 134 MMHG | DIASTOLIC BLOOD PRESSURE: 83 MMHG | TEMPERATURE: 97.1 F

## 2023-02-22 DIAGNOSIS — M62.838 OTHER MUSCLE SPASM: ICD-10-CM

## 2023-02-22 DIAGNOSIS — I10 PRIMARY HYPERTENSION: ICD-10-CM

## 2023-02-22 DIAGNOSIS — M79.2 NEURALGIA AND NEURITIS, UNSPECIFIED: Primary | ICD-10-CM

## 2023-02-22 DIAGNOSIS — C17.0 MALIGNANT NEOPLASM OF DUODENUM (HCC): ICD-10-CM

## 2023-02-22 DIAGNOSIS — M47.812 SPONDYLOSIS WITHOUT MYELOPATHY OR RADICULOPATHY, CERVICAL REGION: ICD-10-CM

## 2023-02-22 PROBLEM — R19.7 DIARRHEA: Status: ACTIVE | Noted: 2022-10-18

## 2023-02-22 PROBLEM — R10.33 PERIUMBILICAL ABDOMINAL PAIN: Status: ACTIVE | Noted: 2022-10-19

## 2023-02-22 PROCEDURE — 3074F SYST BP LT 130 MM HG: CPT | Performed by: PHYSICAL MEDICINE & REHABILITATION

## 2023-02-22 PROCEDURE — 3078F DIAST BP <80 MM HG: CPT | Performed by: PHYSICAL MEDICINE & REHABILITATION

## 2023-02-22 PROCEDURE — 99214 OFFICE O/P EST MOD 30 MIN: CPT | Performed by: PHYSICAL MEDICINE & REHABILITATION

## 2023-02-22 PROCEDURE — 96372 THER/PROPH/DIAG INJ SC/IM: CPT | Performed by: PHYSICAL MEDICINE & REHABILITATION

## 2023-02-22 PROCEDURE — 1123F ACP DISCUSS/DSCN MKR DOCD: CPT | Performed by: PHYSICAL MEDICINE & REHABILITATION

## 2023-02-22 RX ORDER — KETOROLAC TROMETHAMINE 30 MG/ML
30 INJECTION, SOLUTION INTRAMUSCULAR; INTRAVENOUS
Status: COMPLETED | OUTPATIENT
Start: 2023-02-22 | End: 2023-02-22

## 2023-02-22 RX ORDER — GABAPENTIN 300 MG/1
300 CAPSULE ORAL 3 TIMES DAILY
Qty: 270 CAPSULE | Refills: 1 | Status: SHIPPED | OUTPATIENT
Start: 2023-02-22 | End: 2023-08-21

## 2023-02-22 RX ORDER — TIZANIDINE 4 MG/1
4-8 TABLET ORAL EVERY 8 HOURS PRN
Qty: 180 TABLET | Refills: 4 | Status: SHIPPED | OUTPATIENT
Start: 2023-02-22

## 2023-02-22 RX ORDER — LIDOCAINE 50 MG/G
1 PATCH TOPICAL DAILY
Qty: 30 PATCH | Refills: 3 | Status: SHIPPED | OUTPATIENT
Start: 2023-02-22 | End: 2023-06-22

## 2023-02-22 RX ORDER — AMLODIPINE BESYLATE 5 MG/1
5 TABLET ORAL DAILY
Qty: 30 TABLET | Refills: 1 | Status: SHIPPED | OUTPATIENT
Start: 2023-02-22 | End: 2023-02-23 | Stop reason: SDUPTHER

## 2023-02-22 RX ADMIN — KETOROLAC TROMETHAMINE 30 MG: 30 INJECTION, SOLUTION INTRAMUSCULAR; INTRAVENOUS at 10:29

## 2023-02-22 NOTE — PROGRESS NOTES
Consent was explained to the pt and signed. No questions or concerns voiced at this time. Pt given 30mg/1ml of toradol IM in right gluteus. No sign or symptoms of infection noted at injection site. There was no bleeding, swelling or leaking noted after injection. Pt handed injection information sheet to take home.

## 2023-02-22 NOTE — PROGRESS NOTES
[facility]  [address] [place] [address], [facility] [address]  Phone: [phone]  Fax: [phone]    Pt's YOB: 1955    Alma Canela was seen today for lower back pain. Diagnoses and all orders for this visit:    Neuralgia and neuritis, unspecified  -     gabapentin (NEURONTIN) 300 MG capsule; Take 1 capsule by mouth 3 times daily for 180 days. Max Daily Amount: 900 mg    Other muscle spasm  -     tiZANidine (ZANAFLEX) 4 MG tablet; Take 1-2 tablets by mouth every 8 hours as needed (Muscle Spasms)    Spondylosis without myelopathy or radiculopathy, cervical region  -     ketorolac (TORADOL) injection 30 mg  -     lidocaine (LIDODERM) 5 %; Place 1 patch onto the skin daily 12 hours on, 12 hours off. Primary hypertension  -     amLODIPine (NORVASC) 5 MG tablet; Take 1 tablet by mouth daily    Malignant neoplasm of duodenum ([address]       IMPRESSION AND PLAN:  Catracho Salguero is a 74 y.o. female with history of chronic lumbar pain and presents to the office today for medication follow up. Pt complains of continued progressive pain in the lumbar region exacerbated by the colder weather and unchanged since her last office visit. She is taking Neurontin 300 mg 1 cap TID, Zanaflex 4 MG 1-2 tabs prn for muscle spasms, Norvasc 5 MG secondary to high blood pressure, and confirms she has been out of her medication for few weeks. 1) Pt was given information on low back arthritis exercises. 2) She received refills of Neurontin 300 MG 1 tab TID and Norvasc 5 MG 1 tab daily at this time -- pt has ran out of Norvasc (blood pressure medication) and is anticipating an appointment with BRIGITTE Chase NP on 03/23/2023. 3) Pt will increased frequency of Zanaflex 4 MG from 1 tab BID-TID to 1-2 tabs Q8H prn for muscle spasms and received refills at this time. 4) Pt was prescribed lidocaine 5% patches.    5) Pt received a 30 mg Toradol injection in the office today. 6) Ms. Molly Reyes has a reminder for a \"due or due soon\" health maintenance. I have asked that she contact her primary care provider, BRIGITTE Escalera NP, for follow-up on this health maintenance. 7)  demonstrated consistency with prescribing. Return in about 3 months (around 5/22/2023) for Medication follow up. HISTORY OF PRESENT ILLNESS:  Jett Whitehead is a 74 y.o. female with history of chronic lumbar pain and presents to the office today for medication follow up. Pt complains of continued progressive pain in the lumbar region exacerbated by the colder weather and unchanged since her last office visit. Regarding recent diagnoses of gastric cancer, pt has undergone attempted surgery to remove mass but has since began chemotherapy to shrink the mass before continuing with surgical intervention. She is anticipating her last chemotherapy session on 03/28/2023 before they check the state of the tumor and proceed from there. Pt is taking Neurontin 300 mg 1 cap TID, Zanaflex 4 MG 1-2 tabs prn for muscle spasms, Norvasc 5 MG secondary to high blood pressure, and confirms she has been out of her medication for few weeks. She also supplements with vitamin D. Pt at this time desires to continue with current care. Of note, pt has run out of her blood pressure medication and is monitoring it very carefully at home. She was previously taking 2 tab daily of Norvasc 5 MG but her pressure has remained stable. Refills will be provided at this time for 1 tab daily until she is able to follow up with her PCP on 03/23/2023.      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    Hypertension     Uterine fibroid         Social History     Tobacco Use    Smoking status: Every Day     Packs/day: 0.25     Types: Cigarettes    Smokeless tobacco: Never   Substance Use Topics    Alcohol use: No     Alcohol/week: 0.8 standard drinks    Drug use: Yes     Types: Marijuana [place])          Current Outpatient Medications:     amLODIPine (NORVASC) 5 MG tablet, Take 1 tablet by mouth daily, Disp: 30 tablet, Rfl: 1    lidocaine (LIDODERM) 5 %, Place 1 patch onto the skin daily 12 hours on, 12 hours off., Disp: 30 patch, Rfl: 3    tiZANidine (ZANAFLEX) 4 MG tablet, Take 1-2 tablets by mouth every 8 hours as needed (Muscle Spasms), Disp: 180 tablet, Rfl: 4    gabapentin (NEURONTIN) 300 MG capsule, Take 1 capsule by mouth 3 times daily for 180 days. Max Daily Amount: 900 mg, Disp: 270 capsule, Rfl: 1    amLODIPine (NORVASC) 5 MG tablet, Take 5 mg by mouth daily, Disp: , Rfl:     benztropine (COGENTIN) 1 MG tablet, Take 1 mg by mouth daily, Disp: , Rfl:     diclofenac sodium (VOLTAREN) 1 % GEL, Apply 4 grams to right shoulder up to 4 times per day, maximum 16 grams per joint per day. Dispense 5 100 gram tubes, Disp: , Rfl:     gabapentin (NEURONTIN) 300 MG capsule, TAKE 1 CAPSULE BY MOUTH THREE TIMES DAILY . DO NOT EXCEED 3 PER 24 HOURS FOR  NEUROPATHIC  PAIN  Indications: neuropathic pain, Disp: , Rfl:     HYDROcodone-acetaminophen (NORCO)  MG per tablet, ceived the following from [facility]: Outside name: HYDROcodone-acetaminophen (NORCO)  mg tablet, Disp: , Rfl:     Lancets MISC, Use to check blood sugar 2 times daily, Disp: , Rfl:     diclofenac (VOLTAREN) 75 MG EC tablet, Take 1 tab by mouth twice daily as needed.  (Patient not taking: Reported on 2/22/2023), Disp: , Rfl:     fluticasone (FLONASE) 50 MCG/ACT nasal spray, 2 sprays by Nasal route daily as needed, Disp: , Rfl:     insulin lispro, 1 Unit Dial, (HUMALOG/ADMELOG) 100 UNIT/ML SOPN, Inject 10 Units into the skin 3 times daily (before meals), Disp: , Rfl:     iron sucrose (VENOFER) 20 MG/ML injection, Infuse 200 mg intravenously, Disp: , Rfl:     lidocaine (LIDODERM) 5 %, USE 1  PATCH TOPICALLY TO AFFECTED AREA EVERY 12 HOURS AND  REMOVE  FOR  12 HOURS  A  DAY, Disp: , Rfl:     metFORMIN (GLUCOPHAGE-XR) 500 MG extended release tablet, Take 500 mg by mouth Daily with supper, Disp: , Rfl:     methocarbamol (ROBAXIN) 750 MG tablet, Take 1 tab by mouth 4 times daily as needed. , Disp: , Rfl:     metoclopramide (REGLAN) 10 MG tablet, Metoclopramide Oral     one tab tid before meals    active, Disp: , Rfl:     mupirocin (BACTROBAN) 2 % ointment, Apply topically daily, Disp: , Rfl:     naloxone 4 MG/0.1ML LIQD nasal spray, Give 1 spray into 1 nostril. Give additional doses using a new nasal spray with each dose, if patient does not respond or relapses into respiratory depression. Call 911. Additional doses may be given every 2 to 3 minutes until medical assistance arrives. , Disp: , Rfl:     nystatin (MYCOSTATIN) 475099 UNIT/GM powder, Apply topically 4 times daily, Disp: , Rfl:     omeprazole (PRILOSEC) 20 MG delayed release capsule, Take 20 mg by mouth every morning (before breakfast), Disp: , Rfl:     ondansetron (ZOFRAN-ODT) 4 MG disintegrating tablet, Take 4 mg by mouth every 8 hours as needed, Disp: , Rfl:     ondansetron (ZOFRAN) 4 MG tablet, Take 4 mg by mouth every 8 hours as needed, Disp: , Rfl:     oxyCODONE HCl (OXY-IR) 10 MG immediate release tablet, TAKE 1 TABLET BY MOUTH EVERY 4 TO 6 HOURS AS NEEDED FOR PAIN (Patient not taking: Reported on 2/22/2023), Disp: , Rfl:     oxyCODONE (ROXICODONE) 5 MG immediate release tablet, TAKE 1 TABLET BY MOUTH EVERY 4 TO 6 HOURS AS NEEDED FOR PAIN (Patient not taking: Reported on 2/22/2023), Disp: , Rfl:     perphenazine 4 MG tablet, Take 4 mg by mouth 3 times daily, Disp: , Rfl:     perphenazine 8 MG tablet, Take 8 mg by mouth 2 times daily, Disp: , Rfl:     polyethylene glycol (GLYCOLAX) 17 GM/SCOOP powder, ceived the following from [facility]: Outside name: polyethylene glycol (MIRALAX) 17 gram/dose powder, Disp: , Rfl:     pravastatin (PRAVACHOL) 40 MG tablet, Take 40 mg by mouth, Disp: , Rfl: sertraline (ZOLOFT) 100 MG tablet, Take 200 mg by mouth daily, Disp: , Rfl:     tiZANidine (ZANAFLEX) 4 MG tablet, Take 1 tab by mouth BID-TID as needed for muscle spasm., Disp: , Rfl:     traZODone (DESYREL) 150 MG tablet, Take 150-300 mg by mouth, Disp: , Rfl:     triamcinolone (KENALOG) 0.1 % ointment, Apply topically 2 times daily, Disp: , Rfl:      Allergies   Allergen Reactions    Latex Rash and Hives    Penicillins Nausea And Vomiting     Other reaction(s): Unknown (comments)         REVIEW OF SYSTEMS    Constitutional: Negative for fever, chills, or weight change. Respiratory: Negative for cough or shortness of breath. Cardiovascular: Negative for chest pain or palpitations. Gastrointestinal: Negative for acid reflux, change in bowel habits, or constipation. Genitourinary: Negative for dysuria and flank pain. Musculoskeletal: Positive for lumbar and right shoulder pain. Skin: Negative for rash. Neurological: Negative for headaches, dizziness, or numbness. Endo/Heme/Allergies: Negative for increased bruising. Psychiatric/Behavioral: Negative for difficulty with sleep. As per HPI    PHYSICAL EXAMINATION  /83   Pulse 93   Temp 97.1 °F (36.2 °C) (Temporal)   Resp 16   Ht 5' 5\" (1.651 m)   SpO2 97%   BMI 22.30 kg/m²     Constitutional: Awake, alert, and in no acute distress. Neurological: 1+ symmetrical DTRs in the upper extremities. 1+ symmetrical DTRs in the lower extremities. Sensation to light touch is intact. Negative Berumen's sign bilaterally. Skin: warm, dry, and intact. Musculoskeletal: No pain with extension, axial loading, or forward flexion. No pain with internal or external rotation of her hips. Negative straight leg raise bilaterally.       Biceps  Triceps Deltoids Wrist Ext Wrist Flex Hand Intrin   Right +4/5 +4/5 +4/5 +4/5 +4/5 +4/5   Left +4/5 +4/5 +4/5 +4/5 +4/5 +4/5      Hip Flex  Quads Hamstrings Ankle DF EHL Ankle PF   Right +4/5 +4/5 +4/5 +4/5 +4/5 +4/5 Left +4/5 +4/5 +4/5 +4/5 +4/5 +4/5     TORADOL INJECTION:  Administrations This Visit       ketorolac (TORADOL) injection 30 mg       Admin Date  02/22/2023 Action  Given Dose  30 mg Route  IntraMUSCular Administered By  Jonnathan Lomax LPN                      IMAGING:    Right shoulder x-rays from 6/5/2020 were personally reviewed with the patient and demonstrated:  FINDINGS:  No acute displaced fracture or dislocation. There is mild to moderate  degenerative changes of the acromioclavicular joint with slight downward sloping  of the acromium. No destructive lesion. IMPRESSION:      Mild to moderate degenerative changes. Thoracic spine MRI from 04/04/2018 was personally reviewed with the patient and demonstrated:  FINDINGS:     There is normal anatomic alignment of the thoracic spine. Vertebral body heights  are maintained. No infiltrative marrow replacement process or marrow edema. Thoracic spinal cord maintains normal caliber, signal intensity and morphology  throughout. Small bilateral pleural effusions. There  are multilevel tiny disc protrusions with contact of the ventral cord at  T6/T7 and T7/T8, but no impingement. No critical foraminal compromise. Incidentally imaged surrounding soft tissues demonstrate no acute abnormalities  otherwise. IMPRESSION:    Small disc protrusions are most pronounced at T6/T7 and T7/T8 where there is  mild flattening of the ventral cord,  but no cord impingement. Small bilateral pleural effusions. Lumbar spine x-rays from 11/18/2019 were personally reviewed and demonstrated:   Multilevel degenerative  facet, some straightening of lumbar spine, degenerative disc at L5-S1, no instability      Left hip AP x-rays from 11/18/2019 were personally reviewed and demonstrated:  Degenerative joint findings bilaterally.         Cervical spine x-rays from 12/27/2016 were personally reviewed and demonstrated:  FINDINGS:        Vertebral body height and alignment is intact. Slight straightening of the  cervical lordosis. Moderate disc height loss endplate sclerosis and vertebral  body osteophytes from C3 through C7. Alignment is intact. No fracture. Mild  facet joint arthropathy at multiple levels. The prevertebral space appears normal.        IMPRESSION:       Moderate cervical spondylosis. Written by Mello Jimenez, as dictated by Arlene Cuadra MD.  I, Dr. Arlene Cuadra confirm that all documentation is accurate.

## 2023-02-23 RX ORDER — AMLODIPINE BESYLATE 5 MG/1
TABLET ORAL
Qty: 90 TABLET | Refills: 1 | Status: SHIPPED | OUTPATIENT
Start: 2023-02-23

## 2023-03-28 RX ORDER — ZINC GLUCONATE 50 MG
50 TABLET ORAL DAILY
COMMUNITY

## 2023-03-28 NOTE — PERIOP NOTE
PRE-SURGICAL INSTRUCTIONS        Patient's Name:  Anupama Daniels      BWKGG'B Date:  3/28/2023            Covid Testing Date and Time:    Surgery Date:  03/30/2023              Do NOT eat or drink anything, including candy, gum, or ice chips after midnight on 03/29, unless you have specific instructions from your surgeon or anesthesia provider to do so. You may brush your teeth before coming to the hospital.  No smoking 24 hours prior to the day of surgery. No alcohol 24 hours prior to the day of surgery. No recreational drugs for one week prior to the day of surgery. Leave all valuables, including money/purse, at home. Remove all jewelry, nail polish, acrylic nails, and makeup (including mascara); no lotions powders, deodorant, or perfume/cologne/after shave on the skin. Follow instruction for Hibiclens washes and CHG wipes from surgeon's office. Glasses/contact lenses and dentures may be worn to the hospital.  They will be removed prior to surgery. Call your doctor if symptoms of a cold or illness develop within 24-48 hours prior to your surgery. 11.  If you are having an outpatient procedure, please make arrangements for a responsible ADULT TO [address] [place] [address] and stay with you for 24 hours after your surgery. 12. ONE VISITOR in the hospital at this time for outpatient procedures. Exceptions may be made for surgical admissions, per nursing unit guidelines      Special Instructions:      Bring list of CURRENT medications. Bring any pertinent legal medical records. Take these medications the morning of surgery with a sip of water: All AM mediations by 0800  Follow physician instructions about stopping anticoagulants. On the day of surgery, come in the main entrance of [facility]. Let the  at the desk know you are there for surgery. A staff member will come escort you to the surgical area on the second floor.     If you have any questions or concerns, please do not hesitate to call:     (Prior to the day of surgery) [facility] department:  [phone]   (Day of surgery) Pre-Op department:  [phone]    These surgical instructions were reviewed with Maria Luisa Regalado during the [facility] phone call.

## 2023-03-29 ENCOUNTER — ANESTHESIA EVENT (OUTPATIENT)
Facility: HOSPITAL | Age: 68
End: 2023-03-29
Payer: MEDICARE

## 2023-03-30 ENCOUNTER — HOSPITAL ENCOUNTER (OUTPATIENT)
Facility: HOSPITAL | Age: 68
Setting detail: OUTPATIENT SURGERY
Discharge: HOME | End: 2023-03-30
Attending: INTERNAL MEDICINE | Admitting: INTERNAL MEDICINE
Payer: MEDICARE

## 2023-03-30 ENCOUNTER — ANESTHESIA (OUTPATIENT)
Facility: HOSPITAL | Age: 68
End: 2023-03-30
Payer: MEDICARE

## 2023-03-30 VITALS
SYSTOLIC BLOOD PRESSURE: 151 MMHG | DIASTOLIC BLOOD PRESSURE: 89 MMHG | BODY MASS INDEX: 23.32 KG/M2 | WEIGHT: 140 LBS | HEART RATE: 103 BPM | RESPIRATION RATE: 20 BRPM | OXYGEN SATURATION: 97 % | TEMPERATURE: 97.8 F | HEIGHT: 65 IN

## 2023-03-30 LAB
AMPHET UR QL SCN: NEGATIVE
BARBITURATES UR QL SCN: NEGATIVE
BENZODIAZ UR QL: NEGATIVE
CANNABINOIDS UR QL SCN: POSITIVE
COCAINE UR QL SCN: NEGATIVE
GLUCOSE BLD STRIP.AUTO-MCNC: 90 MG/DL (ref 70–110)
Lab: ABNORMAL
METHADONE UR QL: NEGATIVE
OPIATES UR QL: POSITIVE
PCP UR QL: NEGATIVE

## 2023-03-30 PROCEDURE — 3600007502: Performed by: INTERNAL MEDICINE

## 2023-03-30 PROCEDURE — 3700000001 HC ADD 15 MINUTES (ANESTHESIA): Performed by: INTERNAL MEDICINE

## 2023-03-30 PROCEDURE — 2709999900 HC NON-CHARGEABLE SUPPLY: Performed by: INTERNAL MEDICINE

## 2023-03-30 PROCEDURE — 80307 DRUG TEST PRSMV CHEM ANLYZR: CPT

## 2023-03-30 PROCEDURE — 82962 GLUCOSE BLOOD TEST: CPT

## 2023-03-30 PROCEDURE — 3700000000 HC ANESTHESIA ATTENDED CARE: Performed by: INTERNAL MEDICINE

## 2023-03-30 PROCEDURE — 7100000011 HC PHASE II RECOVERY - ADDTL 15 MIN: Performed by: INTERNAL MEDICINE

## 2023-03-30 PROCEDURE — 6360000002 HC RX W HCPCS: Performed by: NURSE ANESTHETIST, CERTIFIED REGISTERED

## 2023-03-30 PROCEDURE — 7100000010 HC PHASE II RECOVERY - FIRST 15 MIN: Performed by: INTERNAL MEDICINE

## 2023-03-30 PROCEDURE — 3600007512: Performed by: INTERNAL MEDICINE

## 2023-03-30 PROCEDURE — 7100000000 HC PACU RECOVERY - FIRST 15 MIN: Performed by: INTERNAL MEDICINE

## 2023-03-30 PROCEDURE — 88305 TISSUE EXAM BY PATHOLOGIST: CPT

## 2023-03-30 PROCEDURE — 2580000003 HC RX 258: Performed by: NURSE ANESTHETIST, CERTIFIED REGISTERED

## 2023-03-30 PROCEDURE — 2500000003 HC RX 250 WO HCPCS: Performed by: NURSE ANESTHETIST, CERTIFIED REGISTERED

## 2023-03-30 RX ORDER — LIDOCAINE HYDROCHLORIDE 10 MG/ML
1 INJECTION, SOLUTION EPIDURAL; INFILTRATION; INTRACAUDAL; PERINEURAL
Status: DISCONTINUED | OUTPATIENT
Start: 2023-03-30 | End: 2023-03-30 | Stop reason: HOSPADM

## 2023-03-30 RX ORDER — DEXTROSE MONOHYDRATE 100 MG/ML
INJECTION, SOLUTION INTRAVENOUS CONTINUOUS PRN
Status: CANCELLED | OUTPATIENT
Start: 2023-03-30

## 2023-03-30 RX ORDER — ONDANSETRON 2 MG/ML
4 INJECTION INTRAMUSCULAR; INTRAVENOUS
Status: CANCELLED | OUTPATIENT
Start: 2023-03-30 | End: 2023-03-31

## 2023-03-30 RX ORDER — INSULIN LISPRO 100 [IU]/ML
0-12 INJECTION, SOLUTION INTRAVENOUS; SUBCUTANEOUS ONCE
Status: CANCELLED | OUTPATIENT
Start: 2023-03-30 | End: 2023-03-30

## 2023-03-30 RX ORDER — LIDOCAINE HYDROCHLORIDE 20 MG/ML
INJECTION, SOLUTION EPIDURAL; INFILTRATION; INTRACAUDAL; PERINEURAL PRN
Status: DISCONTINUED | OUTPATIENT
Start: 2023-03-30 | End: 2023-03-30 | Stop reason: SDUPTHER

## 2023-03-30 RX ORDER — SODIUM CHLORIDE, SODIUM LACTATE, POTASSIUM CHLORIDE, CALCIUM CHLORIDE 600; 310; 30; 20 MG/100ML; MG/100ML; MG/100ML; MG/100ML
INJECTION, SOLUTION INTRAVENOUS CONTINUOUS
Status: DISCONTINUED | OUTPATIENT
Start: 2023-03-30 | End: 2023-03-30 | Stop reason: HOSPADM

## 2023-03-30 RX ORDER — PROPOFOL 10 MG/ML
INJECTION, EMULSION INTRAVENOUS CONTINUOUS PRN
Status: DISCONTINUED | OUTPATIENT
Start: 2023-03-30 | End: 2023-03-30 | Stop reason: SDUPTHER

## 2023-03-30 RX ORDER — INSULIN LISPRO 100 [IU]/ML
1-15 INJECTION, SOLUTION INTRAVENOUS; SUBCUTANEOUS ONCE
Status: DISCONTINUED | OUTPATIENT
Start: 2023-03-30 | End: 2023-03-30 | Stop reason: HOSPADM

## 2023-03-30 RX ORDER — DEXTROSE MONOHYDRATE 100 MG/ML
INJECTION, SOLUTION INTRAVENOUS CONTINUOUS PRN
Status: DISCONTINUED | OUTPATIENT
Start: 2023-03-30 | End: 2023-03-30 | Stop reason: HOSPADM

## 2023-03-30 RX ORDER — PROPOFOL 10 MG/ML
INJECTION, EMULSION INTRAVENOUS PRN
Status: DISCONTINUED | OUTPATIENT
Start: 2023-03-30 | End: 2023-03-30 | Stop reason: SDUPTHER

## 2023-03-30 RX ADMIN — LIDOCAINE HYDROCHLORIDE 10 MG: 20 INJECTION, SOLUTION EPIDURAL; INFILTRATION; INTRACAUDAL; PERINEURAL at 14:06

## 2023-03-30 RX ADMIN — PROPOFOL 150 MCG/KG/MIN: 10 INJECTION, EMULSION INTRAVENOUS at 14:06

## 2023-03-30 RX ADMIN — SODIUM CHLORIDE, POTASSIUM CHLORIDE, SODIUM LACTATE AND CALCIUM CHLORIDE: 600; 310; 30; 20 INJECTION, SOLUTION INTRAVENOUS at 14:00

## 2023-03-30 RX ADMIN — PROPOFOL 50 MG: 10 INJECTION, EMULSION INTRAVENOUS at 14:06

## 2023-03-30 ASSESSMENT — PAIN SCALES - GENERAL
PAINLEVEL_OUTOF10: 0
PAINLEVEL_OUTOF10: 0

## 2023-03-30 ASSESSMENT — PAIN - FUNCTIONAL ASSESSMENT: PAIN_FUNCTIONAL_ASSESSMENT: 0-10

## 2023-03-30 NOTE — ANESTHESIA POSTPROCEDURE EVALUATION
Department of Anesthesiology  Postprocedure Note    Patient: Jacqueline Cho  MRN: [id number]  YOB: 1955  Date of evaluation: 3/30/2023      Procedure Summary     Date: 03/30/23 Room / Location: [facility] ENDO 02 / [facility] END[facility]COPY    Anesthesia Start: 1404 Anesthesia Stop: 4266    Procedure: EGD ESOPHAGOGASTRODUODENOSCOPY with Bx's (Upper GI Region) Diagnosis:       Duodenal adenocarcinoma ([address]      Elevated blood pressure reading      (Dysphagia, unspecified type [R13.10])    Surgeons: Melani Ulloa MD Responsible Provider: Leatha Oro MD    Anesthesia Type: MAC ASA Status: 3          Anesthesia Type: MAC    Camelia Phase I: Camelia Score: 10    Camelia Phase II: Camelia Score: 10      Anesthesia Post Evaluation    Patient location during evaluation: bedside  Patient participation: complete - patient participated  Level of consciousness: responsive to verbal stimuli  Airway patency: patent  Nausea & Vomiting: no nausea  Complications: no  Respiratory status: acceptable  Hydration status: euvolemic

## 2023-03-30 NOTE — BRIEF OP NOTE
[address] [place]  [facility], [address]      Brief Procedure Note    Zeus Doyle  1955  [id number]    Date of Procedure: 3/30/2023     Preoperative diagnosis: Dysphagia, unspecified type [R13.10]    Postoperative diagnosis: Dysphagia, unspecified type [R13.10]    Type of Anesthesia: MAC (Monitored anesthesia care)    Description of findings: same as post op dx    Procedure: Procedure(s):  EGD ESOPHAGOGASTRODUODENOSCOPY with Bx's    :  Dr. Nikolay Porras MD    Assistant(s): Circulator: Maru Webb RN  Endoscopy Technician: Fritz Alvarez    Devices/implants/grafts/tissues/prosthesis: None    EBL:None    Specimens:   ID Type Source Tests Collected by Time Destination   A : Duodenum Bx's Tissue Duodenum SURGICAL PATHOLOGY Nikolay Porras MD 3/30/2023 1415        Findings: See printed and scanned procedure note    Complications: None    Dr. Nikolay Porras MD  3/30/2023  2:34 PM

## 2023-03-30 NOTE — DISCHARGE INSTRUCTIONS
changes in your health, and be sure to contact your doctor if:    Your throat still hurts after a day or two. You do not get better as expected. Where can you learn more? Go to http://www.carter.com/  Enter J454 in the search box to learn more about \"Upper GI Endoscopy: What to Expect at Home. \"  Current as of: September 8, 2021               Content Version: 13.2  © 2006-2022 [employer]. Care instructions adapted under license by [employer] (which disclaims liability or warranty for this information). If you have questions about a medical condition or this instruction, always ask your healthcare professional. [place] [address] any warranty or liability for your use of this information. DISCHARGE SUMMARY from Nurse     POST-PROCEDURE INSTRUCTIONS:    Call your Physician if you:  Observe any excess bleeding. Develop a temperature over 100.5o F. Experience abdominal, shoulder or chest pain. Notice any signs of decreased circulation or nerve impairment to an extremity such as a change in color, persistent numbness, tingling, coldness or increase in pain. Vomit blood or you have nausea and vomiting lasting longer than 4 hours. Are unable to take medications. Are unable to urinate within 8 hours after discharge following general anesthesia or intravenous sedation. For the next 24 hours after receiving general anesthesia or intravenous sedation, or while taking prescription Narcotics, limit your activities:  Do NOT drive a motor vehicle, operate hazard machinery or power tools, or perform tasks that require coordination. The medication you received during your procedure may have some effect on your mental awareness. Do NOT make important personal or business decisions. The medication you received during your procedure may have some effect on your mental awareness. Do NOT drink alcoholic beverages.   These drinks do not mix well with dial 911. Educational references and/or instructions provided during this visit included:    See Attached      APPOINTMENTS:    Per MD Instruction    Discharge information has been reviewed with the patient. The patient verbalized understanding.

## 2023-03-30 NOTE — ANESTHESIA PRE PROCEDURE
ROS.         Other Findings:           Anesthesia Plan      MAC     ASA 3       Induction: intravenous. Anesthetic plan and risks discussed with patient.                         Fabiano Carvajal MD   3/30/2023

## 2023-03-30 NOTE — H&P
GASTROENTEROLOGY Pre-Procedure H and P      Impression/Plan:   1.  This patient is consented for an EGD for Duodenal adenocarcinoma-s/p chemo    Addendum: All lab tests orders pertaining to the procedure have been ordered by the anesthesia personnel and results will be addressed by the anesthesia team    Chief Complaint: Duodenal adenocarcinoma-s/p chemo    HPI:  Ronald Boateng is a 74 y.o. 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    Hypertension     Uterine fibroid        PSH:   Past Surgical History:   Procedure Laterality Date    CHOLECYSTECTOMY      WHIPPLE PROCEDURE W/ LAPAROSCOPY  06/2022    Unsuccessful       Social HX:   Social History     Socioeconomic History    Marital status: Single     Spouse name: Not on file    Number of children: Not on file    Years of education: Not on file    Highest education level: Not on file   Occupational History    Not on file   Tobacco Use    Smoking status: Every Day     Types: Cigars    Smokeless tobacco: Never   Vaping Use    Vaping Use: Never used   Substance and Sexual Activity    Alcohol use: Not Currently     Alcohol/week: 0.8 standard drinks    Drug use: Yes     Types: Marijuana [place])     Comment: Daily appetite    Sexual activity: Not on file   Other Topics Concern    Not on file   Social History Narrative    Not on file     Social Determinants of Health     Financial Resource Strain: Not on file   Food Insecurity: Not on file   Transportation Needs: Not on file   Physical Activity: Not on file   Stress: Not on file   Social Connections: Not on file   Intimate Partner Violence: Not on file   Housing Stability: Not on file       FHX:   Family History   Problem Relation Age of Onset    Cancer Paternal Grandfather     Breast Cancer Cousin     Diabetes Father     Stroke Mother     Diabetes Mother        Allergy:   Allergies Allergen Reactions    Latex Rash and Hives    Penicillins Nausea And Vomiting     Other reaction(s): Unknown (comments)       Home Medications:     [unfilled]    Review of Systems:     Constitutional: No fevers, chills, weight loss, fatigue. Skin: No rashes, pruritis, jaundice, ulcerations, erythema. HENT: No headaches, nosebleeds, sinus pressure, rhinorrhea, sore throat. Eyes: No visual changes, blurred vision, eye pain, photophobia, jaundice. Cardiovascular: No chest pain, heart palpitations. Respiratory: No cough, SOB, wheezing, chest discomfort, orthopnea. Gastrointestinal: Neg unless noted otherwise in H&P   Genitourinary: No dysuria, bleeding, discharge, pyuria. Musculoskeletal: No weakness, arthralgias, wasting. Endo: No sweats. Heme: No bruising, easy bleeding. Allergies: As noted. Neurological: Cranial nerves intact. Alert and oriented. Gait not assessed. Psychiatric:  No anxiety, depression, hallucinations. BP (!) 143/83   Pulse 75   Temp 98.4 °F (36.9 °C) (Oral)   Resp 20   Ht 5' 5\" (1.651 m)   Wt 140 lb (63.5 kg)   SpO2 97%   BMI 23.30 kg/m²     Physical Assessment:     constitutional: appearance: well developed, well nourished, normal habitus, no deformities, in no acute distress. skin: inspection: no rashes, ulcers, icterus or other lesions; no clubbing or telangiectasias. palpation: no induration or subcutaneos nodules. eyes: inspection: normal conjunctivae and lids; no jaundice pupils: normal  ENMT: mouth: normal oral mucosa,lips and gums; good dentition. oropharynx: normal tongue, hard and soft palate; posterior pharynx without erithema, exudate or lesions. neck: thyroid: normal size, consistency and position; no masses or tenderness. respiratory: effort: normal chest excursion; no intercostal retraction or accessory muscle use. cardiovascular: abdominal aorta: normal size and position; no bruits.  palpation: PMI of normal size and position;

## 2023-04-05 ENCOUNTER — OFFICE VISIT (OUTPATIENT)
Age: 68
End: 2023-04-05
Payer: MEDICARE

## 2023-04-05 VITALS
RESPIRATION RATE: 18 BRPM | HEART RATE: 82 BPM | OXYGEN SATURATION: 97 % | DIASTOLIC BLOOD PRESSURE: 74 MMHG | SYSTOLIC BLOOD PRESSURE: 123 MMHG | HEIGHT: 65 IN | WEIGHT: 141 LBS | BODY MASS INDEX: 23.49 KG/M2 | TEMPERATURE: 98.3 F

## 2023-04-05 DIAGNOSIS — E11.65 TYPE 2 DIABETES MELLITUS WITH HYPERGLYCEMIA, WITHOUT LONG-TERM CURRENT USE OF INSULIN (HCC): Primary | ICD-10-CM

## 2023-04-05 PROCEDURE — 99213 OFFICE O/P EST LOW 20 MIN: CPT

## 2023-04-05 PROCEDURE — 99214 OFFICE O/P EST MOD 30 MIN: CPT | Performed by: NURSE PRACTITIONER

## 2023-04-05 PROCEDURE — 1123F ACP DISCUSS/DSCN MKR DOCD: CPT | Performed by: NURSE PRACTITIONER

## 2023-04-05 RX ORDER — FLUTICASONE PROPIONATE 50 MCG
2 SPRAY, SUSPENSION (ML) NASAL DAILY PRN
Qty: 16 G | Refills: 11 | Status: SHIPPED | OUTPATIENT
Start: 2023-04-05

## 2023-04-05 SDOH — ECONOMIC STABILITY: HOUSING INSECURITY
IN THE LAST 12 MONTHS, WAS THERE A TIME WHEN YOU DID NOT HAVE A STEADY PLACE TO SLEEP OR SLEPT IN A SHELTER (INCLUDING NOW)?: NO

## 2023-04-05 SDOH — ECONOMIC STABILITY: FOOD INSECURITY: WITHIN THE PAST 12 MONTHS, THE FOOD YOU BOUGHT JUST DIDN'T LAST AND YOU DIDN'T HAVE MONEY TO GET MORE.: NEVER TRUE

## 2023-04-05 SDOH — ECONOMIC STABILITY: FOOD INSECURITY: WITHIN THE PAST 12 MONTHS, YOU WORRIED THAT YOUR FOOD WOULD RUN OUT BEFORE YOU GOT MONEY TO BUY MORE.: NEVER TRUE

## 2023-04-05 SDOH — ECONOMIC STABILITY: INCOME INSECURITY: HOW HARD IS IT FOR YOU TO PAY FOR THE VERY BASICS LIKE FOOD, HOUSING, MEDICAL CARE, AND HEATING?: NOT VERY HARD

## 2023-04-05 ASSESSMENT — PATIENT HEALTH QUESTIONNAIRE - PHQ9
SUM OF ALL RESPONSES TO PHQ QUESTIONS 1-9: 0
2. FEELING DOWN, DEPRESSED OR HOPELESS: 0
SUM OF ALL RESPONSES TO PHQ9 QUESTIONS 1 & 2: 0
1. LITTLE INTEREST OR PLEASURE IN DOING THINGS: 0
SUM OF ALL RESPONSES TO PHQ QUESTIONS 1-9: 0

## 2023-05-16 ENCOUNTER — TELEPHONE (OUTPATIENT)
Age: 68
End: 2023-05-16

## 2023-05-16 ENCOUNTER — HOSPITAL ENCOUNTER (OUTPATIENT)
Facility: HOSPITAL | Age: 68
Discharge: HOME | End: 2023-05-19

## 2023-05-16 LAB — LABCORP SPECIMEN COLLECTION: NORMAL

## 2023-05-16 NOTE — TELEPHONE ENCOUNTER
----- Message from Lashawn Coyne sent at 5/16/2023  8:55 AM EDT -----  Subject: Appointment Request    Reason for Call: Established Patient Appointment needed: Routine Existing   Condition Follow Up (Diabetes)    QUESTIONS    Reason for appointment request? No appointments available during search     Additional Information for Provider? Patient needs to schedule an appt for   a 6 week follow up; she is going to get her blood work completed today and   would like called with the results. ---------------------------------------------------------------------------  --------------  Azra ARZATE  [phone];  Do not leave any message, patient will call back for answer  ---------------------------------------------------------------------------  --------------  SCRIPT ANSWERS  KALINA Screen: Piotr Kearns

## 2023-05-17 LAB
ALBUMIN SERPL-MCNC: 4.6 G/DL (ref 3.8–4.8)
ALBUMIN/GLOB SERPL: 1.5 {RATIO} (ref 1.2–2.2)
ALP SERPL-CCNC: 84 IU/L (ref 44–121)
ALT SERPL-CCNC: 25 IU/L (ref 0–32)
AST SERPL-CCNC: 23 IU/L (ref 0–40)
BILIRUB SERPL-MCNC: <0.2 MG/DL (ref 0–1.2)
BUN SERPL-MCNC: 12 MG/DL (ref 8–27)
BUN/CREAT SERPL: 15 (ref 12–28)
CALCIUM SERPL-MCNC: 9.8 MG/DL (ref 8.7–10.3)
CHLORIDE SERPL-SCNC: 102 MMOL/L (ref 96–106)
CHOLEST SERPL-MCNC: 273 MG/DL (ref 100–199)
CO2 SERPL-SCNC: 25 MMOL/L (ref 20–29)
CREAT SERPL-MCNC: 0.78 MG/DL (ref 0.57–1)
EGFRCR SERPLBLD CKD-EPI 2021: 84 ML/MIN/1.73
GLOBULIN SER CALC-MCNC: 3.1 G/DL (ref 1.5–4.5)
GLUCOSE SERPL-MCNC: 86 MG/DL (ref 70–99)
HBA1C MFR BLD: 5.9 % (ref 4.8–5.6)
HDLC SERPL-MCNC: 58 MG/DL
LDLC SERPL CALC-MCNC: 183 MG/DL (ref 0–99)
POTASSIUM SERPL-SCNC: 4.4 MMOL/L (ref 3.5–5.2)
PROT SERPL-MCNC: 7.7 G/DL (ref 6–8.5)
SODIUM SERPL-SCNC: 140 MMOL/L (ref 134–144)
SPECIMEN STATUS REPORT: NORMAL
TRIGL SERPL-MCNC: 175 MG/DL (ref 0–149)
VLDLC SERPL CALC-MCNC: 32 MG/DL (ref 5–40)

## 2023-05-22 ENCOUNTER — OFFICE VISIT (OUTPATIENT)
Age: 68
End: 2023-05-22

## 2023-05-22 VITALS
HEIGHT: 65 IN | TEMPERATURE: 97.3 F | OXYGEN SATURATION: 98 % | HEART RATE: 78 BPM | BODY MASS INDEX: 22.82 KG/M2 | WEIGHT: 137 LBS

## 2023-05-22 DIAGNOSIS — K04.7 TOOTH ABSCESS: ICD-10-CM

## 2023-05-22 DIAGNOSIS — M79.2 NEURALGIA AND NEURITIS, UNSPECIFIED: ICD-10-CM

## 2023-05-22 DIAGNOSIS — M47.812 SPONDYLOSIS WITHOUT MYELOPATHY OR RADICULOPATHY, CERVICAL REGION: ICD-10-CM

## 2023-05-22 DIAGNOSIS — C17.0 MALIGNANT NEOPLASM OF DUODENUM (HCC): ICD-10-CM

## 2023-05-22 DIAGNOSIS — M51.36 DDD (DEGENERATIVE DISC DISEASE), LUMBAR: ICD-10-CM

## 2023-05-22 DIAGNOSIS — M54.50 LUMBAR PAIN: ICD-10-CM

## 2023-05-22 DIAGNOSIS — M62.838 OTHER MUSCLE SPASM: ICD-10-CM

## 2023-05-22 DIAGNOSIS — M47.816 LUMBAR FACET ARTHROPATHY: Primary | ICD-10-CM

## 2023-05-22 RX ORDER — KETOROLAC TROMETHAMINE 30 MG/ML
30 INJECTION, SOLUTION INTRAMUSCULAR; INTRAVENOUS ONCE
Status: COMPLETED | OUTPATIENT
Start: 2023-05-22 | End: 2023-05-22

## 2023-05-22 RX ORDER — GABAPENTIN 300 MG/1
CAPSULE ORAL
Qty: 270 CAPSULE | Refills: 1 | Status: SHIPPED | OUTPATIENT
Start: 2023-05-22 | End: 2023-11-08

## 2023-05-22 RX ORDER — KETOROLAC TROMETHAMINE 30 MG/ML
30 INJECTION, SOLUTION INTRAMUSCULAR; INTRAVENOUS ONCE
Qty: 1 ML | Refills: 0
Start: 2023-05-22 | End: 2023-05-22

## 2023-05-22 RX ADMIN — KETOROLAC TROMETHAMINE 30 MG: 30 INJECTION, SOLUTION INTRAMUSCULAR; INTRAVENOUS at 13:25

## 2023-05-22 ASSESSMENT — PATIENT HEALTH QUESTIONNAIRE - PHQ9
SUM OF ALL RESPONSES TO PHQ QUESTIONS 1-9: 1
1. LITTLE INTEREST OR PLEASURE IN DOING THINGS: 0
2. FEELING DOWN, DEPRESSED OR HOPELESS: 1
SUM OF ALL RESPONSES TO PHQ QUESTIONS 1-9: 1
SUM OF ALL RESPONSES TO PHQ9 QUESTIONS 1 & 2: 1

## 2023-05-22 NOTE — PROGRESS NOTES
Shana Rubalcava presents today for   Chief Complaint   Patient presents with    Back Pain       Is someone accompanying this pt? no    Is the patient using any DME equipment during OV? no    Depression Screening:  No flowsheet data found. Learning Assessment:  No flowsheet data found. Abuse Screening:  No flowsheet data found. Fall Risk  No flowsheet data found. OPIOID RISK TOOL  No flowsheet data found. Coordination of Care:  1. Have you been to the ER, urgent care clinic since your last visit? no  Hospitalized since your last visit? no    2. Have you seen or consulted any other health care providers outside of the [address] [place] [address] since your last visit? no Include any pap smears or colon screening.  no

## 2023-05-22 NOTE — PROGRESS NOTES
[facility]  [address] [place] [address], Formerly named [facility] [address]  Phone: [phone]  Fax: [phone]    Pt's YOB: 1955    Goldie Garcia was seen today for back pain. Diagnoses and all orders for this visit:    Lumbar facet arthropathy  -     ketorolac (TORADOL) 30 MG/ML injection; Inject 1 mL into the muscle once for 1 dose  -     MRI LUMBAR SPINE WO CONTRAST; Future  -     THER/PROPH/DIAG INJECTION, SUBCUT/IM  -     ketorolac (TORADOL) injection 30 mg    Lumbar pain  -     ketorolac (TORADOL) 30 MG/ML injection; Inject 1 mL into the muscle once for 1 dose  -     MRI LUMBAR SPINE WO CONTRAST; Future  -     AMB POC XRAY, SPINE, LUMBOSACRAL; 4+  -     THER/PROPH/DIAG INJECTION, SUBCUT/IM  -     ketorolac (TORADOL) injection 30 mg    DDD (degenerative disc disease), lumbar  -     MRI LUMBAR SPINE WO CONTRAST; Future    Neuralgia and neuritis, unspecified  -     MRI LUMBAR SPINE WO CONTRAST; Future  -     gabapentin (NEURONTIN) 300 MG capsule; TAKE 1 CAPSULE BY MOUTH THREE TIMES DAILY . DO NOT EXCEED 3 PER 24 HOURS FOR  NEUROPATHIC  PAIN  Indications: neuropathic pain    Malignant neoplasm of duodenum ([address]  -     MRI LUMBAR SPINE WO CONTRAST; Future    Other muscle spasm  -     MRI LUMBAR SPINE WO CONTRAST; Future    Spondylosis without myelopathy or radiculopathy, cervical region  -     ketorolac (TORADOL) 30 MG/ML injection; Inject 1 mL into the muscle once for 1 dose  -     THER/PROPH/DIAG INJECTION, SUBCUT/IM  -     ketorolac (TORADOL) injection 30 mg    Tooth abscess         IMPRESSION AND PLAN:  Unknown Breaker is a 74 y.o.  female with history of chronic lumbar pain and presents to the office today for medication follow up. Pt complains of continued progressive pain in the lumbar region exacerbated by the colder weather, unchanged since her last office visit.  She reports She is taking Neurontin 300 mg 1 cap TID, Zanaflex 4 MG 1-2 tabs prn for

## 2023-05-22 NOTE — PROGRESS NOTES
Consent was explained to the pt and signed. No questions or concerns voiced at this time. Pt given 30 mg/1 ml of toradol IM in right  gluteus. No sign or symptoms of infection noted at injection site. There was no bleeding, swelling or leaking noted after injection. Pt handed injection information sheet to take home. Patient walked to the   with out any issues.

## 2023-05-31 ENCOUNTER — HOSPITAL ENCOUNTER (OUTPATIENT)
Facility: HOSPITAL | Age: 68
Discharge: HOME | End: 2023-06-03
Payer: MEDICARE

## 2023-05-31 DIAGNOSIS — C17.0 MALIGNANT NEOPLASM OF DUODENUM (HCC): ICD-10-CM

## 2023-05-31 PROCEDURE — 6360000004 HC RX CONTRAST MEDICATION: Performed by: INTERNAL MEDICINE

## 2023-05-31 PROCEDURE — 71260 CT THORAX DX C+: CPT

## 2023-05-31 RX ADMIN — IOPAMIDOL 100 ML: 612 INJECTION, SOLUTION INTRAVENOUS at 12:27

## 2023-06-22 ENCOUNTER — OFFICE VISIT (OUTPATIENT)
Age: 68
End: 2023-06-22
Payer: MEDICARE

## 2023-06-22 VITALS
TEMPERATURE: 98.2 F | BODY MASS INDEX: 23.13 KG/M2 | WEIGHT: 138.8 LBS | SYSTOLIC BLOOD PRESSURE: 116 MMHG | OXYGEN SATURATION: 98 % | HEIGHT: 65 IN | DIASTOLIC BLOOD PRESSURE: 65 MMHG | HEART RATE: 65 BPM | RESPIRATION RATE: 12 BRPM

## 2023-06-22 DIAGNOSIS — Z12.31 ENCOUNTER FOR SCREENING MAMMOGRAM FOR MALIGNANT NEOPLASM OF BREAST: ICD-10-CM

## 2023-06-22 DIAGNOSIS — E11.65 TYPE 2 DIABETES MELLITUS WITH HYPERGLYCEMIA, WITHOUT LONG-TERM CURRENT USE OF INSULIN (HCC): ICD-10-CM

## 2023-06-22 DIAGNOSIS — Z00.00 MEDICARE ANNUAL WELLNESS VISIT, SUBSEQUENT: Primary | ICD-10-CM

## 2023-06-22 DIAGNOSIS — E78.2 MIXED HYPERLIPIDEMIA: ICD-10-CM

## 2023-06-22 PROCEDURE — G0439 PPPS, SUBSEQ VISIT: HCPCS | Performed by: NURSE PRACTITIONER

## 2023-06-22 PROCEDURE — 3044F HG A1C LEVEL LT 7.0%: CPT | Performed by: NURSE PRACTITIONER

## 2023-06-22 PROCEDURE — 1123F ACP DISCUSS/DSCN MKR DOCD: CPT | Performed by: NURSE PRACTITIONER

## 2023-06-22 RX ORDER — ARIPIPRAZOLE 2 MG/1
2 TABLET ORAL DAILY
COMMUNITY
Start: 2023-06-14

## 2023-06-22 RX ORDER — AMLODIPINE BESYLATE 5 MG/1
5 TABLET ORAL DAILY
Qty: 90 TABLET | Refills: 3 | Status: SHIPPED | OUTPATIENT
Start: 2023-06-22

## 2023-06-22 RX ORDER — PRAVASTATIN SODIUM 80 MG/1
80 TABLET ORAL DAILY
Qty: 90 TABLET | Refills: 3 | Status: SHIPPED | OUTPATIENT
Start: 2023-06-22

## 2023-06-22 ASSESSMENT — PATIENT HEALTH QUESTIONNAIRE - PHQ9
3. TROUBLE FALLING OR STAYING ASLEEP: 0
2. FEELING DOWN, DEPRESSED OR HOPELESS: 1
4. FEELING TIRED OR HAVING LITTLE ENERGY: 1
SUM OF ALL RESPONSES TO PHQ QUESTIONS 1-9: 2
1. LITTLE INTEREST OR PLEASURE IN DOING THINGS: 0
SUM OF ALL RESPONSES TO PHQ9 QUESTIONS 1 & 2: 1
6. FEELING BAD ABOUT YOURSELF - OR THAT YOU ARE A FAILURE OR HAVE LET YOURSELF OR YOUR FAMILY DOWN: 0
7. TROUBLE CONCENTRATING ON THINGS, SUCH AS READING THE NEWSPAPER OR WATCHING TELEVISION: 0
10. IF YOU CHECKED OFF ANY PROBLEMS, HOW DIFFICULT HAVE THESE PROBLEMS MADE IT FOR YOU TO DO YOUR WORK, TAKE CARE OF THINGS AT HOME, OR GET ALONG WITH OTHER PEOPLE: 1
5. POOR APPETITE OR OVEREATING: 0
SUM OF ALL RESPONSES TO PHQ QUESTIONS 1-9: 2
SUM OF ALL RESPONSES TO PHQ QUESTIONS 1-9: 2
9. THOUGHTS THAT YOU WOULD BE BETTER OFF DEAD, OR OF HURTING YOURSELF: 0
8. MOVING OR SPEAKING SO SLOWLY THAT OTHER PEOPLE COULD HAVE NOTICED. OR THE OPPOSITE, BEING SO FIGETY OR RESTLESS THAT YOU HAVE BEEN MOVING AROUND A LOT MORE THAN USUAL: 0
SUM OF ALL RESPONSES TO PHQ QUESTIONS 1-9: 2

## 2023-06-22 ASSESSMENT — LIFESTYLE VARIABLES
HOW OFTEN DO YOU HAVE A DRINK CONTAINING ALCOHOL: NEVER
HOW MANY STANDARD DRINKS CONTAINING ALCOHOL DO YOU HAVE ON A TYPICAL DAY: PATIENT DOES NOT DRINK

## 2023-07-05 ENCOUNTER — HOSPITAL ENCOUNTER (OUTPATIENT)
Facility: HOSPITAL | Age: 68
Discharge: HOME | End: 2023-07-08
Attending: PHYSICAL MEDICINE & REHABILITATION
Payer: MEDICARE

## 2023-07-05 DIAGNOSIS — M62.838 OTHER MUSCLE SPASM: ICD-10-CM

## 2023-07-05 DIAGNOSIS — M51.36 DDD (DEGENERATIVE DISC DISEASE), LUMBAR: ICD-10-CM

## 2023-07-05 DIAGNOSIS — M79.2 NEURALGIA AND NEURITIS, UNSPECIFIED: ICD-10-CM

## 2023-07-05 DIAGNOSIS — M47.816 LUMBAR FACET ARTHROPATHY: ICD-10-CM

## 2023-07-05 DIAGNOSIS — C17.0 MALIGNANT NEOPLASM OF DUODENUM (HCC): ICD-10-CM

## 2023-07-05 DIAGNOSIS — M54.50 LUMBAR PAIN: ICD-10-CM

## 2023-07-05 PROCEDURE — 72148 MRI LUMBAR SPINE W/O DYE: CPT

## 2023-07-17 ENCOUNTER — TELEPHONE (OUTPATIENT)
Age: 68
End: 2023-07-17

## 2023-07-17 NOTE — TELEPHONE ENCOUNTER
Patient called for an appointment for an injection. Patient stated she is in a lot of pain.     Please advise on availability: [phone]

## 2023-07-17 NOTE — TELEPHONE ENCOUNTER
Pt also had her MRI done and needs a MRI fu with Dr. Tamara Jones. Talk to Dr. Tamara Jones about fitting her in.

## 2023-07-18 ENCOUNTER — TRANSCRIBE ORDERS (OUTPATIENT)
Facility: HOSPITAL | Age: 68
End: 2023-07-18

## 2023-07-18 ENCOUNTER — OFFICE VISIT (OUTPATIENT)
Age: 68
End: 2023-07-18
Payer: MEDICARE

## 2023-07-18 VITALS
HEIGHT: 65 IN | HEART RATE: 71 BPM | OXYGEN SATURATION: 96 % | BODY MASS INDEX: 22.16 KG/M2 | DIASTOLIC BLOOD PRESSURE: 61 MMHG | WEIGHT: 133 LBS | SYSTOLIC BLOOD PRESSURE: 100 MMHG | RESPIRATION RATE: 18 BRPM | TEMPERATURE: 97.9 F

## 2023-07-18 DIAGNOSIS — M47.816 LUMBAR FACET ARTHROPATHY: ICD-10-CM

## 2023-07-18 DIAGNOSIS — M79.2 NEURALGIA AND NEURITIS, UNSPECIFIED: ICD-10-CM

## 2023-07-18 DIAGNOSIS — M54.50 LUMBAR PAIN: ICD-10-CM

## 2023-07-18 DIAGNOSIS — M54.9 UPPER BACK PAIN ON RIGHT SIDE: ICD-10-CM

## 2023-07-18 DIAGNOSIS — M47.812 SPONDYLOSIS WITHOUT MYELOPATHY OR RADICULOPATHY, CERVICAL REGION: ICD-10-CM

## 2023-07-18 DIAGNOSIS — C17.0 MALIGNANT NEOPLASM OF DUODENUM (HCC): ICD-10-CM

## 2023-07-18 DIAGNOSIS — Z12.31 VISIT FOR SCREENING MAMMOGRAM: Primary | ICD-10-CM

## 2023-07-18 DIAGNOSIS — M54.41 ACUTE MIDLINE LOW BACK PAIN WITH RIGHT-SIDED SCIATICA: Primary | ICD-10-CM

## 2023-07-18 PROCEDURE — 99214 OFFICE O/P EST MOD 30 MIN: CPT | Performed by: PHYSICAL MEDICINE & REHABILITATION

## 2023-07-18 PROCEDURE — 1123F ACP DISCUSS/DSCN MKR DOCD: CPT | Performed by: PHYSICAL MEDICINE & REHABILITATION

## 2023-07-18 PROCEDURE — 96372 THER/PROPH/DIAG INJ SC/IM: CPT | Performed by: PHYSICAL MEDICINE & REHABILITATION

## 2023-07-18 RX ORDER — TRAZODONE HYDROCHLORIDE 50 MG/1
50 TABLET ORAL NIGHTLY
COMMUNITY
Start: 2023-06-14

## 2023-07-18 RX ORDER — HYDROCODONE BITARTRATE AND ACETAMINOPHEN 5; 325 MG/1; MG/1
1 TABLET ORAL EVERY 4 HOURS PRN
COMMUNITY
Start: 2023-07-12

## 2023-07-18 RX ORDER — HYDROCODONE BITARTRATE AND ACETAMINOPHEN 7.5; 325 MG/1; MG/1
TABLET ORAL
COMMUNITY
Start: 2023-06-08

## 2023-07-18 RX ORDER — KETOROLAC TROMETHAMINE 30 MG/ML
30 INJECTION, SOLUTION INTRAMUSCULAR; INTRAVENOUS ONCE
Qty: 1 ML | Refills: 0
Start: 2023-07-18 | End: 2023-07-18 | Stop reason: SDUPTHER

## 2023-07-18 RX ORDER — KETOROLAC TROMETHAMINE 30 MG/ML
30 INJECTION, SOLUTION INTRAMUSCULAR; INTRAVENOUS ONCE
Status: COMPLETED | OUTPATIENT
Start: 2023-07-18 | End: 2023-07-18

## 2023-07-18 RX ADMIN — KETOROLAC TROMETHAMINE 30 MG: 30 INJECTION, SOLUTION INTRAMUSCULAR; INTRAVENOUS at 09:20

## 2023-07-18 NOTE — PROGRESS NOTES
Brennan Nelson presents today for   Chief Complaint   Patient presents with    Back Problem    Pain    Leg Pain     Right         Is someone accompanying this pt? no    Is the patient using any DME equipment during OV? no    Depression Screening:  No flowsheet data found. Learning Assessment:  No flowsheet data found. Abuse Screening:  No flowsheet data found. Fall Risk  No flowsheet data found. OPIOID RISK TOOL  No flowsheet data found. Coordination of Care:  1. Have you been to the ER, urgent care clinic since your last visit? no  Hospitalized since your last visit? no    2. Have you seen or consulted any other health care providers outside of the [address] [place] [address] since your last visit? no Include any pap smears or colon screening.  no
Consent was explained to the pt and signed. No questions or concerns voiced at this time. Pt given 30mg/1ml of toradol IM in right gluteus. No sign or symptoms of infection noted at injection site. There was no bleeding, swelling or leaking noted after injection. Pt handed injection information sheet to take home. Ms. Frankie Roy tolerated the injection well and did not want to wait in the exam room for observation. She ambulated to check out with out any issues.
spondylosis. Written by Loki Donald, as dictated by Jean Rogers MD.  I, Dr. Jean Rogers confirm that all documentation is accurate.

## 2023-07-20 ENCOUNTER — HOSPITAL ENCOUNTER (OUTPATIENT)
Facility: HOSPITAL | Age: 68
Discharge: HOME | End: 2023-07-20
Payer: MEDICARE

## 2023-07-20 DIAGNOSIS — Z12.31 VISIT FOR SCREENING MAMMOGRAM: ICD-10-CM

## 2023-07-20 PROCEDURE — 77067 SCR MAMMO BI INCL CAD: CPT

## 2023-10-04 ENCOUNTER — TELEPHONE (OUTPATIENT)
Age: 68
End: 2023-10-04

## 2023-10-04 NOTE — TELEPHONE ENCOUNTER
Called patient [phone] and left voice mail asking patient to call the office back. I was calling to confirm her home address. Please verify her mailing address.  Thank you

## 2023-10-10 ENCOUNTER — HOSPITAL ENCOUNTER (EMERGENCY)
Facility: HOSPITAL | Age: 68
End: 2023-10-10
Payer: MEDICARE

## 2023-10-10 ENCOUNTER — HOSPITAL ENCOUNTER (EMERGENCY)
Facility: HOSPITAL | Age: 68
Discharge: HOME | End: 2023-10-10
Attending: EMERGENCY MEDICINE
Payer: MEDICARE

## 2023-10-10 ENCOUNTER — APPOINTMENT (OUTPATIENT)
Facility: HOSPITAL | Age: 68
End: 2023-10-10
Payer: MEDICARE

## 2023-10-10 VITALS
TEMPERATURE: 98.6 F | SYSTOLIC BLOOD PRESSURE: 177 MMHG | RESPIRATION RATE: 18 BRPM | HEART RATE: 92 BPM | DIASTOLIC BLOOD PRESSURE: 92 MMHG | OXYGEN SATURATION: 97 %

## 2023-10-10 DIAGNOSIS — R10.9 ABDOMINAL PAIN, UNSPECIFIED ABDOMINAL LOCATION: Primary | ICD-10-CM

## 2023-10-10 DIAGNOSIS — R10.84 GENERALIZED ABDOMINAL PAIN: ICD-10-CM

## 2023-10-10 DIAGNOSIS — C17.0 DUODENAL CANCER (HCC): ICD-10-CM

## 2023-10-10 DIAGNOSIS — R79.89 ELEVATED LFTS: ICD-10-CM

## 2023-10-10 DIAGNOSIS — Z85.068 HISTORY OF MALIGNANT NEOPLASM OF DUODENUM: ICD-10-CM

## 2023-10-10 LAB
ALBUMIN SERPL-MCNC: 3.9 G/DL (ref 3.4–5)
ALBUMIN/GLOB SERPL: 0.9 (ref 0.8–1.7)
ALP SERPL-CCNC: 451 U/L (ref 45–117)
ALT SERPL-CCNC: 595 U/L (ref 13–56)
ANION GAP SERPL CALC-SCNC: 5 MMOL/L (ref 3–18)
AST SERPL-CCNC: 258 U/L (ref 10–38)
BASOPHILS # BLD: 0.1 K/UL (ref 0–0.1)
BASOPHILS NFR BLD: 1 % (ref 0–2)
BILIRUB SERPL-MCNC: 0.6 MG/DL (ref 0.2–1)
BUN SERPL-MCNC: 11 MG/DL (ref 7–18)
BUN/CREAT SERPL: 15 (ref 12–20)
CALCIUM SERPL-MCNC: 10.4 MG/DL (ref 8.5–10.1)
CHLORIDE SERPL-SCNC: 106 MMOL/L (ref 100–111)
CO2 SERPL-SCNC: 26 MMOL/L (ref 21–32)
CREAT SERPL-MCNC: 0.73 MG/DL (ref 0.6–1.3)
DIFFERENTIAL METHOD BLD: ABNORMAL
EOSINOPHIL # BLD: 0.3 K/UL (ref 0–0.4)
EOSINOPHIL NFR BLD: 3 % (ref 0–5)
ERYTHROCYTE [DISTWIDTH] IN BLOOD BY AUTOMATED COUNT: 14.9 % (ref 11.6–14.5)
GLOBULIN SER CALC-MCNC: 4.4 G/DL (ref 2–4)
GLUCOSE SERPL-MCNC: 113 MG/DL (ref 74–99)
HCT VFR BLD AUTO: 38.2 % (ref 35–45)
HGB BLD-MCNC: 12.8 G/DL (ref 12–16)
IMM GRANULOCYTES # BLD AUTO: 0 K/UL (ref 0–0.04)
IMM GRANULOCYTES NFR BLD AUTO: 0 % (ref 0–0.5)
LACTATE SERPL-SCNC: 0.8 MMOL/L (ref 0.4–2)
LIPASE SERPL-CCNC: 181 U/L (ref 73–393)
LIPASE SERPL-CCNC: 183 U/L (ref 73–393)
LYMPHOCYTES # BLD: 2.1 K/UL (ref 0.9–3.6)
LYMPHOCYTES NFR BLD: 22 % (ref 21–52)
MCH RBC QN AUTO: 30.7 PG (ref 24–34)
MCHC RBC AUTO-ENTMCNC: 33.5 G/DL (ref 31–37)
MCV RBC AUTO: 91.6 FL (ref 78–100)
MONOCYTES # BLD: 0.5 K/UL (ref 0.05–1.2)
MONOCYTES NFR BLD: 5 % (ref 3–10)
NEUTS SEG # BLD: 6.6 K/UL (ref 1.8–8)
NEUTS SEG NFR BLD: 69 % (ref 40–73)
NRBC # BLD: 0 K/UL (ref 0–0.01)
NRBC BLD-RTO: 0 PER 100 WBC
PLATELET # BLD AUTO: 365 K/UL (ref 135–420)
PMV BLD AUTO: 10.6 FL (ref 9.2–11.8)
POTASSIUM SERPL-SCNC: 3.5 MMOL/L (ref 3.5–5.5)
PROT SERPL-MCNC: 8.3 G/DL (ref 6.4–8.2)
RBC # BLD AUTO: 4.17 M/UL (ref 4.2–5.3)
SODIUM SERPL-SCNC: 137 MMOL/L (ref 136–145)
WBC # BLD AUTO: 9.6 K/UL (ref 4.6–13.2)

## 2023-10-10 PROCEDURE — 80053 COMPREHEN METABOLIC PANEL: CPT

## 2023-10-10 PROCEDURE — 96375 TX/PRO/DX INJ NEW DRUG ADDON: CPT

## 2023-10-10 PROCEDURE — 74177 CT ABD & PELVIS W/CONTRAST: CPT

## 2023-10-10 PROCEDURE — 6360000002 HC RX W HCPCS: Performed by: HEALTH CARE PROVIDER

## 2023-10-10 PROCEDURE — 83605 ASSAY OF LACTIC ACID: CPT

## 2023-10-10 PROCEDURE — 93005 ELECTROCARDIOGRAM TRACING: CPT | Performed by: EMERGENCY MEDICINE

## 2023-10-10 PROCEDURE — 99285 EMERGENCY DEPT VISIT HI MDM: CPT

## 2023-10-10 PROCEDURE — 2500000003 HC RX 250 WO HCPCS: Performed by: HEALTH CARE PROVIDER

## 2023-10-10 PROCEDURE — 85025 COMPLETE CBC W/AUTO DIFF WBC: CPT

## 2023-10-10 PROCEDURE — 6360000004 HC RX CONTRAST MEDICATION: Performed by: HEALTH CARE PROVIDER

## 2023-10-10 PROCEDURE — 6370000000 HC RX 637 (ALT 250 FOR IP): Performed by: HEALTH CARE PROVIDER

## 2023-10-10 PROCEDURE — 83690 ASSAY OF LIPASE: CPT

## 2023-10-10 PROCEDURE — 96374 THER/PROPH/DIAG INJ IV PUSH: CPT

## 2023-10-10 RX ORDER — ALBUTEROL SULFATE 2.5 MG/3ML
SOLUTION RESPIRATORY (INHALATION)
Status: DISCONTINUED
Start: 2023-10-10 | End: 2023-10-11 | Stop reason: HOSPADM

## 2023-10-10 RX ORDER — DIPHENHYDRAMINE HYDROCHLORIDE 50 MG/ML
50 INJECTION INTRAMUSCULAR; INTRAVENOUS ONCE
Status: COMPLETED | OUTPATIENT
Start: 2023-10-10 | End: 2023-10-10

## 2023-10-10 RX ORDER — ONDANSETRON 2 MG/ML
4 INJECTION INTRAMUSCULAR; INTRAVENOUS
Status: COMPLETED | OUTPATIENT
Start: 2023-10-10 | End: 2023-10-10

## 2023-10-10 RX ORDER — HYDROXYZINE HYDROCHLORIDE 25 MG/1
50 TABLET, FILM COATED ORAL
Status: COMPLETED | OUTPATIENT
Start: 2023-10-10 | End: 2023-10-10

## 2023-10-10 RX ORDER — HYDROMORPHONE HYDROCHLORIDE 1 MG/ML
1 INJECTION, SOLUTION INTRAMUSCULAR; INTRAVENOUS; SUBCUTANEOUS ONCE
Status: COMPLETED | OUTPATIENT
Start: 2023-10-10 | End: 2023-10-10

## 2023-10-10 RX ADMIN — IOPAMIDOL 100 ML: 612 INJECTION, SOLUTION INTRAVENOUS at 19:30

## 2023-10-10 RX ADMIN — DIPHENHYDRAMINE HYDROCHLORIDE 50 MG: 50 INJECTION INTRAMUSCULAR; INTRAVENOUS at 21:17

## 2023-10-10 RX ADMIN — ONDANSETRON 4 MG: 2 INJECTION INTRAMUSCULAR; INTRAVENOUS at 18:59

## 2023-10-10 RX ADMIN — HYDROMORPHONE HYDROCHLORIDE 1 MG: 1 INJECTION, SOLUTION INTRAMUSCULAR; INTRAVENOUS; SUBCUTANEOUS at 18:59

## 2023-10-10 RX ADMIN — HYDROXYZINE HYDROCHLORIDE 50 MG: 25 TABLET, FILM COATED ORAL at 23:48

## 2023-10-10 ASSESSMENT — PAIN DESCRIPTION - LOCATION: LOCATION: ABDOMEN

## 2023-10-10 NOTE — ED TRIAGE NOTES
79 yrs. Old female chief complaint of non controlled pain from her duodenal cancer. She was brought in by EMS and was AxO4. She stated that she has morphine for her cancer and she isn't getting relief with her meds so she called 911. On the way to the hospital the meds started working and she states she now feels better. No other complaints are noted at this time.

## 2023-10-11 LAB
EKG ATRIAL RATE: 97 BPM
EKG DIAGNOSIS: NORMAL
EKG P AXIS: 66 DEGREES
EKG P-R INTERVAL: 134 MS
EKG Q-T INTERVAL: 344 MS
EKG QRS DURATION: 98 MS
EKG QTC CALCULATION (BAZETT): 436 MS
EKG R AXIS: 81 DEGREES
EKG T AXIS: 71 DEGREES
EKG VENTRICULAR RATE: 97 BPM

## 2023-10-11 PROCEDURE — 93010 ELECTROCARDIOGRAM REPORT: CPT | Performed by: INTERNAL MEDICINE

## 2023-10-11 ASSESSMENT — ENCOUNTER SYMPTOMS
ABDOMINAL DISTENTION: 0
CONSTIPATION: 0
ANAL BLEEDING: 0
ABDOMINAL PAIN: 1
BLOOD IN STOOL: 0
COUGH: 0
WHEEZING: 0
NAUSEA: 0
SHORTNESS OF BREATH: 0
VOMITING: 0
DIARRHEA: 0

## 2023-10-11 NOTE — DISCHARGE INSTRUCTIONS
You were seen in the emergency department due to increasing abdominal pain due to your known duodenal cancer. We spoke with [facility], and they would like you to follow-up this week or next. Recommend taking your hydroxyzine with all of your opiate pain medication to help with itching. Recommend discussing with your oncologist alternate pain medication choices if you continue to have discomfort with the morphine. Return to ER for any worsening abdominal pain, inability to hold down fluids, fevers, or any other concerning symptoms.

## 2023-10-11 NOTE — ED NOTES
Pt resting NAD, VS assessed pt remains hypertensive, tolerating Po intake      Martin Hazel, RN  10/10/23 2070

## 2023-10-30 ENCOUNTER — APPOINTMENT (OUTPATIENT)
Facility: HOSPITAL | Age: 68
End: 2023-10-30
Payer: MEDICARE

## 2023-10-30 ENCOUNTER — HOSPITAL ENCOUNTER (EMERGENCY)
Facility: HOSPITAL | Age: 68
Discharge: LEFT BEFORE DISCHARGE | End: 2023-10-31
Attending: EMERGENCY MEDICINE
Payer: MEDICARE

## 2023-10-30 VITALS
SYSTOLIC BLOOD PRESSURE: 146 MMHG | TEMPERATURE: 98.5 F | WEIGHT: 110 LBS | BODY MASS INDEX: 18.33 KG/M2 | RESPIRATION RATE: 18 BRPM | HEART RATE: 107 BPM | DIASTOLIC BLOOD PRESSURE: 94 MMHG | OXYGEN SATURATION: 97 % | HEIGHT: 65 IN

## 2023-10-30 DIAGNOSIS — C80.1: Primary | ICD-10-CM

## 2023-10-30 DIAGNOSIS — C78.89: Primary | ICD-10-CM

## 2023-10-30 LAB
ALBUMIN SERPL-MCNC: 3.3 G/DL (ref 3.4–5)
ALBUMIN/GLOB SERPL: 0.7 (ref 0.8–1.7)
ALP SERPL-CCNC: 1006 U/L (ref 45–117)
ALT SERPL-CCNC: 693 U/L (ref 13–56)
AMORPH CRY URNS QL MICRO: ABNORMAL
ANION GAP SERPL CALC-SCNC: 7 MMOL/L (ref 3–18)
APPEARANCE UR: ABNORMAL
AST SERPL-CCNC: 423 U/L (ref 10–38)
BACTERIA URNS QL MICRO: ABNORMAL /HPF
BASOPHILS # BLD: 0.1 K/UL (ref 0–0.1)
BASOPHILS NFR BLD: 1 % (ref 0–2)
BILIRUB DIRECT SERPL-MCNC: 4.1 MG/DL (ref 0–0.2)
BILIRUB SERPL-MCNC: 5.2 MG/DL (ref 0.2–1)
BILIRUB UR QL: ABNORMAL
BUN SERPL-MCNC: 8 MG/DL (ref 7–18)
BUN/CREAT SERPL: 12 (ref 12–20)
CALCIUM SERPL-MCNC: 10.1 MG/DL (ref 8.5–10.1)
CHLORIDE SERPL-SCNC: 98 MMOL/L (ref 100–111)
CO2 SERPL-SCNC: 28 MMOL/L (ref 21–32)
COLOR UR: ABNORMAL
CREAT SERPL-MCNC: 0.67 MG/DL (ref 0.6–1.3)
DIFFERENTIAL METHOD BLD: ABNORMAL
EOSINOPHIL # BLD: 0.2 K/UL (ref 0–0.4)
EOSINOPHIL NFR BLD: 2 % (ref 0–5)
EPITH CASTS URNS QL MICRO: ABNORMAL /LPF (ref 0–5)
ERYTHROCYTE [DISTWIDTH] IN BLOOD BY AUTOMATED COUNT: 14.5 % (ref 11.6–14.5)
GLOBULIN SER CALC-MCNC: 4.7 G/DL (ref 2–4)
GLUCOSE SERPL-MCNC: 134 MG/DL (ref 74–99)
GLUCOSE UR STRIP.AUTO-MCNC: NEGATIVE MG/DL
HCT VFR BLD AUTO: 39 % (ref 35–45)
HGB BLD-MCNC: 13.1 G/DL (ref 12–16)
HGB UR QL STRIP: NEGATIVE
IMM GRANULOCYTES # BLD AUTO: 0 K/UL (ref 0–0.04)
IMM GRANULOCYTES NFR BLD AUTO: 0 % (ref 0–0.5)
KETONES UR QL STRIP.AUTO: ABNORMAL MG/DL
LEUKOCYTE ESTERASE UR QL STRIP.AUTO: ABNORMAL
LIPASE SERPL-CCNC: 95 U/L (ref 13–75)
LYMPHOCYTES # BLD: 1.6 K/UL (ref 0.9–3.6)
LYMPHOCYTES NFR BLD: 16 % (ref 21–52)
MCH RBC QN AUTO: 29.8 PG (ref 24–34)
MCHC RBC AUTO-ENTMCNC: 33.6 G/DL (ref 31–37)
MCV RBC AUTO: 88.8 FL (ref 78–100)
MONOCYTES # BLD: 1 K/UL (ref 0.05–1.2)
MONOCYTES NFR BLD: 9 % (ref 3–10)
NEUTS SEG # BLD: 7.3 K/UL (ref 1.8–8)
NEUTS SEG NFR BLD: 72 % (ref 40–73)
NITRITE UR QL STRIP.AUTO: POSITIVE
NRBC # BLD: 0 K/UL (ref 0–0.01)
NRBC BLD-RTO: 0 PER 100 WBC
PH UR STRIP: 5.5 (ref 5–8)
PLATELET # BLD AUTO: 441 K/UL (ref 135–420)
PMV BLD AUTO: 10.8 FL (ref 9.2–11.8)
POTASSIUM SERPL-SCNC: 4.1 MMOL/L (ref 3.5–5.5)
PROT SERPL-MCNC: 8 G/DL (ref 6.4–8.2)
PROT UR STRIP-MCNC: ABNORMAL MG/DL
RBC # BLD AUTO: 4.39 M/UL (ref 4.2–5.3)
RBC #/AREA URNS HPF: NEGATIVE /HPF (ref 0–5)
SODIUM SERPL-SCNC: 133 MMOL/L (ref 136–145)
SP GR UR REFRACTOMETRY: 1.03 (ref 1–1.03)
UROBILINOGEN UR QL STRIP.AUTO: 4 EU/DL (ref 0.2–1)
WBC # BLD AUTO: 10.1 K/UL (ref 4.6–13.2)
WBC URNS QL MICRO: ABNORMAL /HPF (ref 0–4)

## 2023-10-30 PROCEDURE — 6360000002 HC RX W HCPCS: Performed by: STUDENT IN AN ORGANIZED HEALTH CARE EDUCATION/TRAINING PROGRAM

## 2023-10-30 PROCEDURE — 96374 THER/PROPH/DIAG INJ IV PUSH: CPT

## 2023-10-30 PROCEDURE — 85025 COMPLETE CBC W/AUTO DIFF WBC: CPT

## 2023-10-30 PROCEDURE — 74177 CT ABD & PELVIS W/CONTRAST: CPT

## 2023-10-30 PROCEDURE — 6360000004 HC RX CONTRAST MEDICATION: Performed by: STUDENT IN AN ORGANIZED HEALTH CARE EDUCATION/TRAINING PROGRAM

## 2023-10-30 PROCEDURE — 99285 EMERGENCY DEPT VISIT HI MDM: CPT

## 2023-10-30 PROCEDURE — 81001 URINALYSIS AUTO W/SCOPE: CPT

## 2023-10-30 PROCEDURE — 80048 BASIC METABOLIC PNL TOTAL CA: CPT

## 2023-10-30 PROCEDURE — 80076 HEPATIC FUNCTION PANEL: CPT

## 2023-10-30 PROCEDURE — 83690 ASSAY OF LIPASE: CPT

## 2023-10-30 PROCEDURE — 74018 RADEX ABDOMEN 1 VIEW: CPT

## 2023-10-30 PROCEDURE — 96375 TX/PRO/DX INJ NEW DRUG ADDON: CPT

## 2023-10-30 PROCEDURE — 2580000003 HC RX 258: Performed by: STUDENT IN AN ORGANIZED HEALTH CARE EDUCATION/TRAINING PROGRAM

## 2023-10-30 PROCEDURE — 6360000002 HC RX W HCPCS: Performed by: EMERGENCY MEDICINE

## 2023-10-30 RX ORDER — DIPHENHYDRAMINE HYDROCHLORIDE 50 MG/ML
25 INJECTION INTRAMUSCULAR; INTRAVENOUS
Status: COMPLETED | OUTPATIENT
Start: 2023-10-30 | End: 2023-10-30

## 2023-10-30 RX ORDER — ONDANSETRON 2 MG/ML
4 INJECTION INTRAMUSCULAR; INTRAVENOUS
Status: COMPLETED | OUTPATIENT
Start: 2023-10-30 | End: 2023-10-30

## 2023-10-30 RX ORDER — MORPHINE SULFATE 2 MG/ML
2 INJECTION, SOLUTION INTRAMUSCULAR; INTRAVENOUS
Status: COMPLETED | OUTPATIENT
Start: 2023-10-30 | End: 2023-10-30

## 2023-10-30 RX ORDER — MORPHINE SULFATE 4 MG/ML
4 INJECTION, SOLUTION INTRAMUSCULAR; INTRAVENOUS
Status: DISCONTINUED | OUTPATIENT
Start: 2023-10-31 | End: 2023-10-31 | Stop reason: HOSPADM

## 2023-10-30 RX ADMIN — DIATRIZOATE MEGLUMINE AND DIATRIZOATE SODIUM 30 ML: 660; 100 LIQUID ORAL; RECTAL at 20:41

## 2023-10-30 RX ADMIN — DIPHENHYDRAMINE HYDROCHLORIDE 25 MG: 50 INJECTION INTRAMUSCULAR; INTRAVENOUS at 22:37

## 2023-10-30 RX ADMIN — MORPHINE SULFATE 2 MG: 2 INJECTION, SOLUTION INTRAMUSCULAR; INTRAVENOUS at 22:25

## 2023-10-30 RX ADMIN — WATER 1000 MG: 1 INJECTION INTRAMUSCULAR; INTRAVENOUS; SUBCUTANEOUS at 22:25

## 2023-10-30 RX ADMIN — ONDANSETRON 4 MG: 2 INJECTION INTRAMUSCULAR; INTRAVENOUS at 22:38

## 2023-10-30 ASSESSMENT — ENCOUNTER SYMPTOMS
ABDOMINAL PAIN: 1
SHORTNESS OF BREATH: 0
VOMITING: 1
NAUSEA: 1
BACK PAIN: 0
EYE PAIN: 0
CONSTIPATION: 1

## 2023-10-30 ASSESSMENT — PAIN DESCRIPTION - LOCATION: LOCATION: ABDOMEN

## 2023-10-30 ASSESSMENT — PAIN - FUNCTIONAL ASSESSMENT: PAIN_FUNCTIONAL_ASSESSMENT: 0-10

## 2023-10-30 ASSESSMENT — PAIN SCALES - GENERAL: PAINLEVEL_OUTOF10: 10

## 2023-10-30 NOTE — ED TRIAGE NOTES
Pt arrived via EMS for reported abdominal pain. Pt reports diffuse abdominal pain, nausea and vomiting times 2 week. Per family at bedside patient has a history of \"stomach cancer\".

## 2023-10-31 PROCEDURE — 6360000004 HC RX CONTRAST MEDICATION: Performed by: STUDENT IN AN ORGANIZED HEALTH CARE EDUCATION/TRAINING PROGRAM

## 2023-10-31 RX ADMIN — IOPAMIDOL 80 ML: 612 INJECTION, SOLUTION INTRAVENOUS at 00:01

## 2023-10-31 NOTE — ED NOTES
Patient upset, states that she is ready to go. Reminded patient that the plan was for admission, however patient states that she doesn't care. Patient has already called her niece to come and pick her up and her niece is in the waiting room. Patient requesting for her IV to be removed. Patient ambulated to nurse's station but is requesting a wheelchair to be taken to her niece's vehicle.      Jessica Jorge., RN  10/31/23 2214

## 2023-10-31 NOTE — PROGRESS NOTES
PO contrast ordered, RN aware of instructions to get patient drinking. Please mix in 32 oz non carbonated beverage/water. Please call CT @ 0345 when pt has finished drinking. Transport will be sent for 60-90 minutes after patient has finished drinking. Thank you.

## 2023-11-30 NOTE — PATIENT INSTRUCTIONS
Low Back Arthritis: Exercises  Introduction  Here are some examples of typical rehabilitation exercises for your condition. Start each exercise slowly. Ease off the exercise if you start to have pain. Your doctor or physical therapist will tell you when you can start these exercises and which ones will work best for you. When you are not being active, find a comfortable position for rest. Some people are comfortable on the floor or a medium-firm bed with a small pillow under their head and another under their knees. Some people prefer to lie on their side with a pillow between their knees. Don't stay in one position for too long. Take short walks (10 to 20 minutes) every 2 to 3 hours. Avoid slopes, hills, and stairs until you feel better. Walk only distances you can manage without pain, especially leg pain. How to do the exercises  Pelvic tilt    1. Lie on your back with your knees bent. 2. \"Brace\" your stomach--tighten your muscles by pulling in and imagining your belly button moving toward your spine. 3. Press your lower back into the floor. You should feel your hips and pelvis rock back. 4. Hold for 6 seconds while breathing smoothly. 5. Relax and allow your pelvis and hips to rock forward. 6. Repeat 8 to 12 times. Back stretches    1. Get down on your hands and knees on the floor. 2. Relax your head and allow it to droop. Round your back up toward the ceiling until you feel a nice stretch in your upper, middle, and lower back. Hold this stretch for as long as it feels comfortable, or about 15 to 30 seconds. 3. Return to the starting position with a flat back while you are on your hands and knees. 4. Let your back sway by pressing your stomach toward the floor. Lift your buttocks toward the ceiling. 5. Hold this position for 15 to 30 seconds. 6. Repeat 2 to 4 times. Follow-up care is a key part of your treatment and safety.  Be sure to make and go to all appointments, and call your doctor if Patient thinks she has pink eye you are having problems. It's also a good idea to know your test results and keep a list of the medicines you take. Where can you learn more? Go to http://www.Isai.com/  Enter T094 in the search box to learn more about \"Low Back Arthritis: Exercises. \"  Current as of: July 1, 2021               Content Version: 13.0  © 2006-2021 [employer]. Care instructions adapted under license by [employer] (which disclaims liability or warranty for this information). If you have questions about a medical condition or this instruction, always ask your healthcare professional. [place] [address] any warranty or liability for your use of this information. Shoulder Arthritis: Exercises  Introduction  Here are some examples of exercises for you to try. The exercises may be suggested for a condition or for rehabilitation. Start each exercise slowly. Ease off the exercises if you start to have pain. You will be told when to start these exercises and which ones will work best for you. How to do the exercises  Shoulder flexion (lying down)    To make a wand for this exercise, use a piece of PVC pipe or a broom handle with the broom removed. Make the wand about a foot wider than your shoulders. 1. Lie on your back, holding a wand with both hands. Your palms should face down as you hold the wand. 2. Keeping your elbows straight, slowly raise your arms over your head. Raise them until you feel a stretch in your shoulders, upper back, and chest.  3. Hold for 15 to 30 seconds. 4. Repeat 2 to 4 times. Shoulder rotation (lying down)    To make a wand for this exercise, use a piece of PVC pipe or a broom handle with the broom removed. Make the wand about a foot wider than your shoulders. 1. Lie on your back. Hold a wand with both hands with your elbows bent and palms up.   2. Keep your elbows close to your body, and move the wand across your body toward the sore arm. 3. Hold for 8 to 12 seconds. 4. Repeat 2 to 4 times. Shoulder internal rotation with towel    1. Hold a towel above and behind your head with the arm that is not sore. 2. With your sore arm, reach behind your back and grasp the towel. 3. With the arm above your head, pull the towel upward. Do this until you feel a stretch on the front and outside of your sore shoulder. 4. Hold 15 to 30 seconds. 5. Repeat 2 to 4 times. Shoulder blade squeeze    1. Stand with your arms at your sides, and squeeze your shoulder blades together. Do not raise your shoulders up as you squeeze. 2. Hold 6 seconds. 3. Repeat 8 to 12 times. Resisted rows    For this exercise, you will need elastic exercise material, such as surgical tubing or Thera-Band. 1. Put the band around a solid object at about waist level. (A bedpost will work well.) Each hand should hold an end of the band. 2. With your elbows at your sides and bent to 90 degrees, pull the band back. Your shoulder blades should move toward each other. Return to the starting position. 3. Repeat 8 to 12 times. External rotator strengthening exercise    1. Start by tying a piece of elastic exercise material to a doorknob. You can use surgical tubing or Thera-Band. (You may also hold one end of the band in each hand.)  2. Stand or sit with your shoulder relaxed and your elbow bent 90 degrees. Your upper arm should rest comfortably against your side. Squeeze a rolled towel between your elbow and your body for comfort. This will help keep your arm at your side. 3. Hold one end of the elastic band with the hand of the painful arm. 4. Start with your forearm across your belly. Slowly rotate the forearm out away from your body. Keep your elbow and upper arm tucked against the towel roll or the side of your body until you begin to feel tightness in your shoulder. Slowly move your arm back to where you started. 5. Repeat 8 to 12 times.   Internal rotator strengthening exercise    1. Start by tying a piece of elastic exercise material to a doorknob. You can use surgical tubing or Thera-Band. 2. Stand or sit with your shoulder relaxed and your elbow bent 90 degrees. Your upper arm should rest comfortably against your side. Squeeze a rolled towel between your elbow and your body for comfort. This will help keep your arm at your side. 3. Hold one end of the elastic band in the hand of the painful arm. 4. Slowly rotate your forearm toward your body until it touches your belly. Slowly move it back to where you started. 5. Keep your elbow and upper arm firmly tucked against the towel roll or at your side. 6. Repeat 8 to 12 times. Pendulum swing    If you have pain in your back, do not do this exercise. 1. Hold on to a table or the back of a chair with your good arm. Then bend forward a little and let your sore arm hang straight down. This exercise does not use the arm muscles. Rather, use your legs and your hips to create movement that makes your arm swing freely. 2. Use the movement from your hips and legs to guide the slightly swinging arm back and forth like a pendulum (or elephant trunk). Then guide it in circles that start small (about the size of a dinner plate). Make the circles a bit larger each day, as your pain allows. 3. Do this exercise for 5 minutes, 5 to 7 times each day. 4. As you have less pain, try bending over a little farther to do this exercise. This will increase the amount of movement at your shoulder. Follow-up care is a key part of your treatment and safety. Be sure to make and go to all appointments, and call your doctor if you are having problems. It's also a good idea to know your test results and keep a list of the medicines you take. Where can you learn more? Go to http://www.gray.com/  Enter H562 in the search box to learn more about \"Shoulder Arthritis: Exercises. \"  Current as of: July 1, 2021               Content Version: 13.0  © 9150-4951 [employer]. Care instructions adapted under license by [employer] (which disclaims liability or warranty for this information). If you have questions about a medical condition or this instruction, always ask your healthcare professional. [address] any warranty or liability for your use of this information.

## 2023-12-19 NOTE — TELEPHONE ENCOUNTER
[place] to inquire which medication required a prior auth. Pharmacy Tech reports Sertraline requires a prior auth and they have been in contact with the prescribing physician's office regarding this matter. Disregard as this message does not pertain to this office.
Patient called in states that her pharmacy will not fill her Rx without a prior auth. She says it is at [facility]. Please advise patient at [phone].
Please be on the look out for PA fax and start PA if needed
Yes

## (undated) DEVICE — STERILE POLYISOPRENE POWDER-FREE SURGICAL GLOVES: Brand: PROTEXIS

## (undated) DEVICE — Device: Brand: SPOT EX ENDOSCOPIC TATTOO

## (undated) DEVICE — FCPS RAD JAW 4LC 240CM W/NDL -- BX/20 RADIAL JAW 4

## (undated) DEVICE — BITE BLOCK ENDOSCP UNIV AD 6 TO 9.4 MM

## (undated) DEVICE — CATHETER SUCT TR FL TIP 14FR W/ O CTRL

## (undated) DEVICE — MEDI-VAC NON-CONDUCTIVE SUCTION TUBING: Brand: CARDINAL HEALTH

## (undated) DEVICE — CANNULA NSL AD TBNG L14FT STD PVC O2 CRV CONN NONFLARED NSL

## (undated) DEVICE — FLUFF AND POLYMER UNDERPAD,EXTRA HEAVY: Brand: WINGS

## (undated) DEVICE — BASIN EMSIS 16OZ GRAPHITE PLAS KID SHP MOLD GRAD FOR ORAL

## (undated) DEVICE — LINER SUCT CANSTR 3000CC PLAS SFT PRE ASSEMB W/OUT TBNG W/

## (undated) DEVICE — YANKAUER,SMOOTH HANDLE,HIGH CAPACITY: Brand: MEDLINE INDUSTRIES, INC.

## (undated) DEVICE — ENDOSCOPY PUMP TUBING/ CAP SET: Brand: ERBE

## (undated) DEVICE — GAUZE,SPONGE,4"X4",16PLY,STRL,LF,10/TRAY: Brand: MEDLINE

## (undated) DEVICE — SYRINGE MED 25GA 3ML L5/8IN SUBQ PLAS W/ DETACH NDL SFTY

## (undated) DEVICE — NDL INJ SCLERO 25G 240CM -- INTERJECT M00518360 BX/5

## (undated) DEVICE — UNDERPAD INCONT W23XL36IN STD BLU POLYPR BK FLUF SFT

## (undated) DEVICE — FORCEPS BX L240CM JAW DIA2.4MM ORNG L CAP W/ NDL DISP RAD

## (undated) DEVICE — SOLUTION IRRIG 1000ML H2O STRL BLT

## (undated) DEVICE — SYRINGE MED 50ML LUERSLIP TIP

## (undated) DEVICE — SYR 50ML SLIP TIP NSAF LF STRL --